# Patient Record
Sex: FEMALE | Race: WHITE | NOT HISPANIC OR LATINO | Employment: FULL TIME | URBAN - METROPOLITAN AREA
[De-identification: names, ages, dates, MRNs, and addresses within clinical notes are randomized per-mention and may not be internally consistent; named-entity substitution may affect disease eponyms.]

---

## 2017-01-12 ENCOUNTER — OFFICE VISIT (OUTPATIENT)
Dept: OBSTETRICS AND GYNECOLOGY | Facility: CLINIC | Age: 32
End: 2017-01-12
Payer: COMMERCIAL

## 2017-01-12 ENCOUNTER — ROUTINE PRENATAL (OUTPATIENT)
Dept: OBSTETRICS AND GYNECOLOGY | Facility: CLINIC | Age: 32
End: 2017-01-12
Payer: COMMERCIAL

## 2017-01-12 VITALS
WEIGHT: 157.63 LBS | BODY MASS INDEX: 25.44 KG/M2 | SYSTOLIC BLOOD PRESSURE: 108 MMHG | DIASTOLIC BLOOD PRESSURE: 66 MMHG

## 2017-01-12 DIAGNOSIS — Z34.90 PREGNANCY, UNSPECIFIED GESTATIONAL AGE: Primary | ICD-10-CM

## 2017-01-12 DIAGNOSIS — Z36.85 SCREENING, ANTENATAL, FOR STREPTOCOCCUS B: Primary | ICD-10-CM

## 2017-01-12 DIAGNOSIS — O26.849 UTERINE SIZE DATE DISCREPANCY, ANTEPARTUM, UNSPECIFIED TRIMESTER: Primary | ICD-10-CM

## 2017-01-12 DIAGNOSIS — Z34.90 PREGNANCY, UNSPECIFIED GESTATIONAL AGE: ICD-10-CM

## 2017-01-12 PROCEDURE — 99999 PR PBB SHADOW E&M-EST. PATIENT-LVL II: CPT | Mod: PBBFAC,,, | Performed by: OBSTETRICS & GYNECOLOGY

## 2017-01-12 PROCEDURE — 87081 CULTURE SCREEN ONLY: CPT

## 2017-01-12 PROCEDURE — 76816 OB US FOLLOW-UP PER FETUS: CPT | Mod: S$GLB,,, | Performed by: OBSTETRICS & GYNECOLOGY

## 2017-01-12 PROCEDURE — 0502F SUBSEQUENT PRENATAL CARE: CPT | Mod: S$GLB,,, | Performed by: OBSTETRICS & GYNECOLOGY

## 2017-01-17 LAB — BACTERIA SPEC AEROBE CULT: NORMAL

## 2017-01-19 ENCOUNTER — ROUTINE PRENATAL (OUTPATIENT)
Dept: OBSTETRICS AND GYNECOLOGY | Facility: CLINIC | Age: 32
End: 2017-01-19
Payer: COMMERCIAL

## 2017-01-19 VITALS — SYSTOLIC BLOOD PRESSURE: 104 MMHG | WEIGHT: 158.5 LBS | BODY MASS INDEX: 25.58 KG/M2 | DIASTOLIC BLOOD PRESSURE: 76 MMHG

## 2017-01-19 DIAGNOSIS — N91.2 AMENORRHEA: Primary | ICD-10-CM

## 2017-01-19 PROCEDURE — 0502F SUBSEQUENT PRENATAL CARE: CPT | Mod: S$GLB,,, | Performed by: OBSTETRICS & GYNECOLOGY

## 2017-01-19 PROCEDURE — 99999 PR PBB SHADOW E&M-EST. PATIENT-LVL II: CPT | Mod: PBBFAC,,, | Performed by: OBSTETRICS & GYNECOLOGY

## 2017-01-26 ENCOUNTER — ROUTINE PRENATAL (OUTPATIENT)
Dept: OBSTETRICS AND GYNECOLOGY | Facility: CLINIC | Age: 32
End: 2017-01-26
Payer: COMMERCIAL

## 2017-01-26 VITALS
DIASTOLIC BLOOD PRESSURE: 60 MMHG | WEIGHT: 160.06 LBS | SYSTOLIC BLOOD PRESSURE: 106 MMHG | BODY MASS INDEX: 25.83 KG/M2

## 2017-01-26 DIAGNOSIS — N91.2 AMENORRHEA: Primary | ICD-10-CM

## 2017-01-26 PROCEDURE — 99999 PR PBB SHADOW E&M-EST. PATIENT-LVL II: CPT | Mod: PBBFAC,,, | Performed by: OBSTETRICS & GYNECOLOGY

## 2017-01-26 PROCEDURE — 0502F SUBSEQUENT PRENATAL CARE: CPT | Mod: S$GLB,,, | Performed by: OBSTETRICS & GYNECOLOGY

## 2017-01-27 ENCOUNTER — TELEPHONE (OUTPATIENT)
Dept: OBSTETRICS AND GYNECOLOGY | Facility: CLINIC | Age: 32
End: 2017-01-27

## 2017-01-27 NOTE — TELEPHONE ENCOUNTER
----- Message from Luann Wesley sent at 1/27/2017 11:18 AM CST -----  Contact: [phone]/self  Patient is returning your call. Pt states Monday 1/30/17 at 10am is fine with her. Please advise

## 2017-01-29 ENCOUNTER — HOSPITAL ENCOUNTER (OUTPATIENT)
Facility: HOSPITAL | Age: 32
Discharge: HOME | End: 2017-01-30
Attending: OBSTETRICS & GYNECOLOGY | Admitting: OBSTETRICS & GYNECOLOGY
Payer: COMMERCIAL

## 2017-01-29 DIAGNOSIS — R58 BLEEDING: ICD-10-CM

## 2017-01-29 PROCEDURE — 87086 URINE CULTURE/COLONY COUNT: CPT

## 2017-01-29 PROCEDURE — 81000 URINALYSIS NONAUTO W/SCOPE: CPT

## 2017-01-29 NOTE — IP AVS SNAPSHOT
\[facility] [place] [facility]\""  [address]  [place] [address]  Phone: [phone]           Patient Discharge Instructions     Our goal is to set you up for success. This packet includes information on your condition, medications, and your home care. It will help you to care for yourself so you don't get sicker and need to go back to the hospital.     Please ask your nurse if you have any questions.        There are many details to remember when preparing to leave the hospital. Here is what you will need to do:    1. Take your medicine. If you are prescribed medications, review your Medication List in the following pages. You may have new medications to  at the pharmacy and others that you'll need to stop taking. Review the instructions for how and when to take your medications. Talk with your doctor or nurses if you are unsure of what to do.     2. Go to your follow-up appointments. Specific follow-up information is listed in the following pages. Your may be contacted by a transition nurse or clinical provider about future appointments. Be sure we have all of the phone numbers to reach you, if needed. Please contact your provider's office if you are unable to make an appointment.     3. Watch for warning signs. Your doctor or nurse will give you detailed warning signs to watch for and when to call for assistance. These instructions may also include educational information about your condition. If you experience any of warning signs to your health, call your doctor.               [facility]  Unless otherwise directed by your provider, please contact [facility], our nurse care line that is available for 24/7 assistance.     [phone] (toll-free)    Registered nurses in the [facility] provide clinical advisement, health education, appointment booking, and other advisory services.                    ** Verify the list of medication(s) below is accurate and up to date. Carry this  with you in case of emergency. If your medications have changed, please notify your healthcare provider.             Medication List      ASK your doctor about these medications        Additional Info                      nitrofurantoin (macrocrystal-monohydrate) 100 MG capsule   Commonly known as:  MACROBID   Refills:  0   Dose:  100 mg   Indications:  Urinary Tract Infection    Instructions:  Take 100 mg by mouth 2 (two) times daily.     Begin Date    AM    Noon    PM    Bedtime       ondansetron 4 MG tablet   Commonly known as:  ZOFRAN   Quantity:  30 tablet   Refills:  0    Instructions:  TAKE 1 TABLET (4 MG TOTAL) BY MOUTH DAILY AS NEEDED FOR NAUSEA.     Begin Date    AM    Noon    PM    Bedtime                  Please bring to all follow up appointments:    1. A copy of your discharge instructions.  2. All medicines you are currently taking in their original bottles.  3. Identification and insurance card.    Please arrive 15 minutes ahead of scheduled appointment time.    Please call 24 hours in advance if you must reschedule your appointment and/or time.        Your Scheduled Appointments     2017 10:00 AM CST   Routine Prenatal Visit with Michael A. Wiedemann, MD [facility] ([place]  5th Floor [facility], [address]  [place] [address] [phone]                  Discharge Instructions       Follow Labor Precautions:  Call MD or return to Labor and Delivery if...    Labor (after 36 weeks)  - Painful contractions every 5 minutes for 2 hours that do not go away with 2 bottles of water, 2 tylenol, and rest.      Labor (before 36 weeks)  - More than 4 contractions in 1 hour   -First try 2 bottles of water, rest, and 2 tylenol.... If the contractions do not go away call doctors office or after hours clinic first and/or come to hospital for evaluation.      Water Breaks  - Gush or leaking of fluid from vagina, or if unsure.     Vaginal bleeding  - Bright red bleeding  "like a period, soaking a pad in 1 hour.    Decreased or No fetal movement  - You should feel 10 movements within two hours.  -If you are not feeling the baby move.... Drink a glass of orange juice or apple juice  - If you DO NOT feel 10 movements within two hours, please  call the MD or come to the hospital.    Unable to keep fluids or food down for more than 24 hours    Blurred vision, spots before your eyes, dizziness, headache that does not get better with Tylenol (Acetaminophen), bad swelling, chest pain, or trouble breathing.    Drink 10-12 glasses of water daily  Take medications as prescribed  Keep all follow-up appointments      Admission Information     Date & Time Provider Department CSN    1/29/2017 11:38 PM Michael A. Wiedemann, MD [facility] [id number]      Care Providers     Provider Role Specialty Primary office phone    Michael A. Wiedemann, MD Attending Provider Obstetrics [phone]      Your Vitals Were     BP Pulse Temp Resp Height Weight    111/68 88 97.1 °F (36.2 °C) (Oral) 16 5' 6" (1.676 m) 72.6 kg (160 lb)    Last Period SpO2 BMI          05/07/2016 96% 25.82 kg/m2        Recent Lab Values     No lab values to display.      Pending Labs     Order Current Status    Urine culture In process      Allergies as of 1/30/2017     No Known Allergies      Advance Directives     An advance directive is a document which, in the event you are no longer able to make decisions for yourself, tells your healthcare team what kind of treatment you do or do not want to receive, or who you would like to make those decisions for you.  If you do not currently have an advance directive, [facility] encourages you to create one.  For more information call:  [phone]), [phone] ([phone]),  or log on to [email or web address].        Language Assistance Services     ATTENTION: Language assistance services are available, free of charge. Please call [phone].  "     ATENCIÓN: Si habla español, tiene a iraheta disposición servicios gratuitos de asistencia lingüística. Juan Carlos al [phone].     CHÚ Ý: N?u b?n nói Ti?ng Vi?t, có các d?ch v? h? tr? ngôn ng? mi?n phí dành cho b?n. G?i s? [phone].         [facility] complies with applicable Federal civil rights laws and does not discriminate on the basis of race, color, national origin, age, disability, or sex.

## 2017-01-30 ENCOUNTER — ROUTINE PRENATAL (OUTPATIENT)
Dept: OBSTETRICS AND GYNECOLOGY | Facility: CLINIC | Age: 32
End: 2017-01-30
Payer: COMMERCIAL

## 2017-01-30 VITALS
SYSTOLIC BLOOD PRESSURE: 116 MMHG | WEIGHT: 159.38 LBS | BODY MASS INDEX: 25.73 KG/M2 | DIASTOLIC BLOOD PRESSURE: 64 MMHG

## 2017-01-30 VITALS
HEIGHT: 66 IN | BODY MASS INDEX: 25.71 KG/M2 | SYSTOLIC BLOOD PRESSURE: 111 MMHG | WEIGHT: 160 LBS | TEMPERATURE: 97 F | OXYGEN SATURATION: 96 % | HEART RATE: 88 BPM | RESPIRATION RATE: 16 BRPM | DIASTOLIC BLOOD PRESSURE: 68 MMHG

## 2017-01-30 DIAGNOSIS — Z34.90 TERM PREGNANCY: ICD-10-CM

## 2017-01-30 DIAGNOSIS — N91.2 AMENORRHEA: Primary | ICD-10-CM

## 2017-01-30 PROBLEM — R58 BLEEDING: Status: ACTIVE | Noted: 2017-01-30

## 2017-01-30 LAB
BILIRUB UR QL STRIP: NEGATIVE
CLARITY UR: CLEAR
COLOR UR: ABNORMAL
GLUCOSE UR QL STRIP: NEGATIVE
HGB UR QL STRIP: ABNORMAL
KETONES UR QL STRIP: NEGATIVE
LEUKOCYTE ESTERASE UR QL STRIP: ABNORMAL
MICROSCOPIC COMMENT: ABNORMAL
NITRITE UR QL STRIP: NEGATIVE
PH UR STRIP: 7 [PH] (ref 5–8)
PROT UR QL STRIP: ABNORMAL
RBC #/AREA URNS HPF: >100 /HPF (ref 0–4)
SP GR UR STRIP: 1.01 (ref 1–1.03)
URN SPEC COLLECT METH UR: ABNORMAL
UROBILINOGEN UR STRIP-ACNC: NEGATIVE EU/DL
WBC #/AREA URNS HPF: 1 /HPF (ref 0–5)

## 2017-01-30 PROCEDURE — 25000003 PHARM REV CODE 250: Performed by: OBSTETRICS & GYNECOLOGY

## 2017-01-30 PROCEDURE — 59025 FETAL NON-STRESS TEST: CPT

## 2017-01-30 PROCEDURE — 99211 OFF/OP EST MAY X REQ PHY/QHP: CPT | Mod: 25

## 2017-01-30 PROCEDURE — 99999 PR PBB SHADOW E&M-EST. PATIENT-LVL II: CPT | Mod: PBBFAC,,, | Performed by: OBSTETRICS & GYNECOLOGY

## 2017-01-30 PROCEDURE — 0502F SUBSEQUENT PRENATAL CARE: CPT | Mod: S$GLB,,, | Performed by: OBSTETRICS & GYNECOLOGY

## 2017-01-30 RX ORDER — NITROFURANTOIN 25; 75 MG/1; MG/1
100 CAPSULE ORAL 2 TIMES DAILY
COMMUNITY
Start: 2017-01-30 | End: 2017-02-08

## 2017-01-30 RX ORDER — ACETAMINOPHEN AND CODEINE PHOSPHATE 300; 30 MG/1; MG/1
1 TABLET ORAL ONCE
Status: COMPLETED | OUTPATIENT
Start: 2017-01-30 | End: 2017-01-30

## 2017-01-30 RX ORDER — ACETAMINOPHEN 500 MG
500 TABLET ORAL EVERY 6 HOURS PRN
Status: DISCONTINUED | OUTPATIENT
Start: 2017-01-30 | End: 2017-01-30 | Stop reason: HOSPADM

## 2017-01-30 RX ADMIN — ACETAMINOPHEN AND CODEINE PHOSPHATE 1 TABLET: 300; 30 TABLET ORAL at 12:01

## 2017-01-30 NOTE — PROGRESS NOTES
Pt on LnD last pm, with hematuria, known stones in past, on macrobid, hydration, feels better now,   abd soft, baby actvie, cx 1-2, 50%, same, us done, grade 2 placenta, fluid normal  Plan, induce Friday, ie 39 weeks

## 2017-01-30 NOTE — PLAN OF CARE
5500 - Pt is  at 38 weeks 3 days arrived to L&D unit via ambulation with complaints of ctx, intermittent lower back and abd pain 4/10 and bright red bleeding during urination. Pt denies any other urinary symptoms, vaginal bleeding or leaking of fluid. +FM. Pt states she has a history of kidney stones and previous stent placement. Care assumed. Full assessment done, history and medications reviewed with pt. Pt updated on plan of care with questions answered. Bed in low locked position, call bell in reach. Pt placed on EFM and TOCO, automatic BP and pulse ox.     0015 - SVE /-4, intact.     0030 - Spoke to Dr. Talavera, notified of pt arrival and complaints, reassuring FHTs, ctx pattern 2-5 min, SVE, and urine specimen color. New orders received for tylenol #3 x one dose now for pain, send urine for UA and urine culture.    0115 - Spoke to Dr. Talavera, notified of results from UA, ctx pattern 1-13 min. Pt may go home on labor precautions and with prescription for macrobid, called in to [facility]. Take tylenol for pain.     0145 - Discharge instructions given to pt, address for pharmacy included. Pt verbalizes understanding. Ambulated off of unit.

## 2017-01-30 NOTE — DISCHARGE INSTRUCTIONS
Follow Labor Precautions:  Call MD or return to Labor and Delivery if...    Labor (after 36 weeks)  - Painful contractions every 5 minutes for 2 hours that do not go away with 2 bottles of water, 2 tylenol, and rest.      Labor (before 36 weeks)  - More than 4 contractions in 1 hour   -First try 2 bottles of water, rest, and 2 tylenol.... If the contractions do not go away call doctors office or after hours clinic first and/or come to hospital for evaluation.      Water Breaks  - Gush or leaking of fluid from vagina, or if unsure.     Vaginal bleeding  - Bright red bleeding like a period, soaking a pad in 1 hour.    Decreased or No fetal movement  - You should feel 10 movements within two hours.  -If you are not feeling the baby move.... Drink a glass of orange juice or apple juice  - If you DO NOT feel 10 movements within two hours, please  call the MD or come to the hospital.    Unable to keep fluids or food down for more than 24 hours    Blurred vision, spots before your eyes, dizziness, headache that does not get better with Tylenol (Acetaminophen), bad swelling, chest pain, or trouble breathing.    Drink 10-12 glasses of water daily  Take medications as prescribed  Keep all follow-up appointments

## 2017-01-31 LAB — BACTERIA UR CULT: NO GROWTH

## 2017-02-02 ENCOUNTER — HOSPITAL ENCOUNTER (INPATIENT)
Facility: HOSPITAL | Age: 32
LOS: 2 days | Discharge: HOME | End: 2017-02-05
Attending: OBSTETRICS & GYNECOLOGY | Admitting: OBSTETRICS & GYNECOLOGY
Payer: COMMERCIAL

## 2017-02-02 DIAGNOSIS — Z34.90 TERM PREGNANCY: ICD-10-CM

## 2017-02-02 DIAGNOSIS — N91.2 AMENORRHEA: ICD-10-CM

## 2017-02-02 LAB
ANISOCYTOSIS BLD QL SMEAR: SLIGHT
BASOPHILS # BLD AUTO: ABNORMAL K/UL
BASOPHILS NFR BLD: 0 %
DIFFERENTIAL METHOD: ABNORMAL
EOSINOPHIL # BLD AUTO: ABNORMAL K/UL
EOSINOPHIL NFR BLD: 0 %
ERYTHROCYTE [DISTWIDTH] IN BLOOD BY AUTOMATED COUNT: 13.8 %
HCT VFR BLD AUTO: 32.4 %
HGB BLD-MCNC: 10.6 G/DL
HYPOCHROMIA BLD QL SMEAR: ABNORMAL
LYMPHOCYTES # BLD AUTO: ABNORMAL K/UL
LYMPHOCYTES NFR BLD: 11 %
MCH RBC QN AUTO: 29.4 PG
MCHC RBC AUTO-ENTMCNC: 32.7 %
MCV RBC AUTO: 90 FL
MONOCYTES # BLD AUTO: ABNORMAL K/UL
MONOCYTES NFR BLD: 8 %
NEUTROPHILS NFR BLD: 77 %
NEUTS BAND NFR BLD MANUAL: 4 %
PLATELET # BLD AUTO: 174 K/UL
PLATELET BLD QL SMEAR: ABNORMAL
PMV BLD AUTO: 11.2 FL
RBC # BLD AUTO: 3.6 M/UL
WBC # BLD AUTO: 7.76 K/UL

## 2017-02-02 PROCEDURE — 86592 SYPHILIS TEST NON-TREP QUAL: CPT

## 2017-02-02 PROCEDURE — 86900 BLOOD TYPING SEROLOGIC ABO: CPT

## 2017-02-02 PROCEDURE — 85027 COMPLETE CBC AUTOMATED: CPT

## 2017-02-02 PROCEDURE — 86901 BLOOD TYPING SEROLOGIC RH(D): CPT

## 2017-02-02 PROCEDURE — 25000003 PHARM REV CODE 250: Performed by: OBSTETRICS & GYNECOLOGY

## 2017-02-02 PROCEDURE — 85007 BL SMEAR W/DIFF WBC COUNT: CPT

## 2017-02-02 PROCEDURE — 36415 COLL VENOUS BLD VENIPUNCTURE: CPT

## 2017-02-02 RX ORDER — METHYLERGONOVINE MALEATE 0.2 MG/ML
200 INJECTION INTRAVENOUS
Status: CANCELLED | OUTPATIENT
Start: 2017-02-02

## 2017-02-02 RX ORDER — OXYTOCIN/RINGER'S LACTATE 20/1000 ML
2 PLASTIC BAG, INJECTION (ML) INTRAVENOUS CONTINUOUS
Status: CANCELLED | OUTPATIENT
Start: 2017-02-02

## 2017-02-02 RX ORDER — MISOPROSTOL 200 UG/1
200 TABLET ORAL
Status: DISCONTINUED | OUTPATIENT
Start: 2017-02-02 | End: 2017-02-05 | Stop reason: HOSPADM

## 2017-02-02 RX ORDER — ONDANSETRON 4 MG/1
8 TABLET, ORALLY DISINTEGRATING ORAL EVERY 8 HOURS PRN
Status: CANCELLED | OUTPATIENT
Start: 2017-02-02

## 2017-02-02 RX ORDER — CARBOPROST TROMETHAMINE 250 UG/ML
250 INJECTION, SOLUTION INTRAMUSCULAR
Status: CANCELLED | OUTPATIENT
Start: 2017-02-02

## 2017-02-02 RX ORDER — ONDANSETRON 8 MG/1
8 TABLET, ORALLY DISINTEGRATING ORAL EVERY 8 HOURS PRN
Status: DISCONTINUED | OUTPATIENT
Start: 2017-02-02 | End: 2017-02-05 | Stop reason: HOSPADM

## 2017-02-02 RX ORDER — SODIUM CHLORIDE, SODIUM LACTATE, POTASSIUM CHLORIDE, CALCIUM CHLORIDE 600; 310; 30; 20 MG/100ML; MG/100ML; MG/100ML; MG/100ML
INJECTION, SOLUTION INTRAVENOUS CONTINUOUS
Status: CANCELLED | OUTPATIENT
Start: 2017-02-02

## 2017-02-02 RX ORDER — METHYLERGONOVINE MALEATE 0.2 MG/ML
200 INJECTION INTRAVENOUS
Status: DISCONTINUED | OUTPATIENT
Start: 2017-02-02 | End: 2017-02-05 | Stop reason: HOSPADM

## 2017-02-02 RX ORDER — OXYTOCIN/RINGER'S LACTATE 20/1000 ML
41.65 PLASTIC BAG, INJECTION (ML) INTRAVENOUS CONTINUOUS
Status: CANCELLED | OUTPATIENT
Start: 2017-02-02 | End: 2017-02-03

## 2017-02-02 RX ORDER — MISOPROSTOL 100 UG/1
200 TABLET ORAL
Status: CANCELLED | OUTPATIENT
Start: 2017-02-02

## 2017-02-02 RX ORDER — CARBOPROST TROMETHAMINE 250 UG/ML
250 INJECTION, SOLUTION INTRAMUSCULAR
Status: DISCONTINUED | OUTPATIENT
Start: 2017-02-02 | End: 2017-02-05 | Stop reason: HOSPADM

## 2017-02-02 RX ORDER — SODIUM CHLORIDE, SODIUM LACTATE, POTASSIUM CHLORIDE, CALCIUM CHLORIDE 600; 310; 30; 20 MG/100ML; MG/100ML; MG/100ML; MG/100ML
INJECTION, SOLUTION INTRAVENOUS CONTINUOUS
Status: DISCONTINUED | OUTPATIENT
Start: 2017-02-02 | End: 2017-02-05 | Stop reason: HOSPADM

## 2017-02-02 RX ORDER — OXYTOCIN/RINGER'S LACTATE 20/1000 ML
2 PLASTIC BAG, INJECTION (ML) INTRAVENOUS CONTINUOUS
Status: DISCONTINUED | OUTPATIENT
Start: 2017-02-02 | End: 2017-02-02 | Stop reason: SDUPTHER

## 2017-02-02 RX ORDER — OXYTOCIN/RINGER'S LACTATE 20/1000 ML
2 PLASTIC BAG, INJECTION (ML) INTRAVENOUS CONTINUOUS
Status: DISCONTINUED | OUTPATIENT
Start: 2017-02-02 | End: 2017-02-05 | Stop reason: HOSPADM

## 2017-02-02 RX ORDER — OXYTOCIN/RINGER'S LACTATE 20/1000 ML
41.65 PLASTIC BAG, INJECTION (ML) INTRAVENOUS CONTINUOUS
Status: ACTIVE | OUTPATIENT
Start: 2017-02-02 | End: 2017-02-03

## 2017-02-02 RX ORDER — SODIUM CHLORIDE, SODIUM LACTATE, POTASSIUM CHLORIDE, CALCIUM CHLORIDE 600; 310; 30; 20 MG/100ML; MG/100ML; MG/100ML; MG/100ML
INJECTION, SOLUTION INTRAVENOUS CONTINUOUS
Status: DISCONTINUED | OUTPATIENT
Start: 2017-02-02 | End: 2017-02-02 | Stop reason: SDUPTHER

## 2017-02-02 RX ORDER — BUTORPHANOL TARTRATE 1 MG/ML
1 INJECTION INTRAMUSCULAR; INTRAVENOUS
Status: DISCONTINUED | OUTPATIENT
Start: 2017-02-02 | End: 2017-02-05 | Stop reason: HOSPADM

## 2017-02-02 RX ORDER — BUTORPHANOL TARTRATE 1 MG/ML
1 INJECTION INTRAMUSCULAR; INTRAVENOUS
Status: CANCELLED | OUTPATIENT
Start: 2017-02-02

## 2017-02-02 RX ADMIN — SODIUM CHLORIDE, SODIUM LACTATE, POTASSIUM CHLORIDE, AND CALCIUM CHLORIDE: .6; .31; .03; .02 INJECTION, SOLUTION INTRAVENOUS at 10:02

## 2017-02-02 NOTE — IP AVS SNAPSHOT
[facility]  [address]  [place] [address]  Phone: [phone]           Patient Discharge Instructions     Our goal is to set you up for success. This packet includes information on your condition, medications, and your home care. It will help you to care for yourself so you don't get sicker and need to go back to the hospital.     Please ask your nurse if you have any questions.        There are many details to remember when preparing to leave the hospital. Here is what you will need to do:    1. Take your medicine. If you are prescribed medications, review your Medication List in the following pages. You may have new medications to  at the pharmacy and others that you'll need to stop taking. Review the instructions for how and when to take your medications. Talk with your doctor or nurses if you are unsure of what to do.     2. Go to your follow-up appointments. Specific follow-up information is listed in the following pages. Your may be contacted by a transition nurse or clinical provider about future appointments. Be sure we have all of the phone numbers to reach you, if needed. Please contact your provider's office if you are unable to make an appointment.     3. Watch for warning signs. Your doctor or nurse will give you detailed warning signs to watch for and when to call for assistance. These instructions may also include educational information about your condition. If you experience any of warning signs to your health, call your doctor.               [facility]  Unless otherwise directed by your provider, please contact [facility], our nurse care line that is available for 24/7 assistance.     [phone] (toll-free)    Registered nurses in the [facility] provide clinical advisement, health education, appointment booking, and other advisory services.                    ** Verify the list of medication(s) below is accurate and up to date. Carry this  with you in case of emergency. If your medications have changed, please notify your healthcare provider.             Medication List      ASK your doctor about these medications        Additional Info                      nitrofurantoin (macrocrystal-monohydrate) 100 MG capsule   Commonly known as:  MACROBID   Refills:  0   Dose:  100 mg   Indications:  Urinary Tract Infection    Instructions:  Take 100 mg by mouth 2 (two) times daily.     Begin Date    AM    Noon    PM    Bedtime       ondansetron 4 MG tablet   Commonly known as:  ZOFRAN   Quantity:  30 tablet   Refills:  0    Instructions:  TAKE 1 TABLET (4 MG TOTAL) BY MOUTH DAILY AS NEEDED FOR NAUSEA.     Begin Date    AM    Noon    PM    Bedtime                  Please bring to all follow up appointments:    1. A copy of your discharge instructions.  2. All medicines you are currently taking in their original bottles.  3. Identification and insurance card.    Please arrive 15 minutes ahead of scheduled appointment time.    Please call 24 hours in advance if you must reschedule your appointment and/or time.        Follow-up Information     Follow up with Michael A Wiedemann, MD. Schedule an appointment as soon as possible for a visit in 6 weeks.    Specialties:  Obstetrics, Obstetrics and Gynecology    Contact information:    [address] [place] [address]  [place] [id number] [phone]            Discharge Instructions       Patient Discharge Instructions for Postpartum Women    Resume Regular Diet  Increase activity gradually, no heavy lifting  Shower  No tampons, douching or sexual intercourse.  Wear a support bra    Call your physician if     *Fever of 100.4 or higher  *Persistent nausea/ vomiting  *Heavy vaginal bleeding or large clots (Heavy bleeding is soaking 1 pad in an hour)  *Swelling and pain in arms or legs  *Severe headaches, blurred vision or fainting  *Shortness of breath  *Frequency and burning with urination  *Signs of postpartum depression,  "discuss these signs with your physician    Call lactation services for questions regarding feeding, nipple and breast care, and general questions about lactation.  They can be reached at [phone]         Understanding Postpartum Depression    You've just had a baby.  You know you should be excited and happy.  But instead you find yourself crying for no reason.  You may have trouble coping with your daily tasks.  You feel sad, tired, and hopeless most of the time.  You may even feel ashamed or guilty.  But what you're going through is not your fault and you can feel better.  Talk to your doctor.  He or she can help.    Depression After Childbirth    You may be weepy and tired right after giving birth.  These feelings are normal.  They're sometimes called the "baby blues."  These blues go away 2-3 weeks.  However, postpartum (meaning "after birth") depression lasts much longer and is more sever than the "baby blues."  It can make you feel sad and hopeless.  You may also fear that your baby will be harmed and worry about being a bad mother.      What is Depression?    Depression is a mood disorder that affects the way you think and feel.  The most common symptom is a feeling of deep sadness.  You may also feel as if you just can't cope with life.    Other symptoms include:      * Gaining or loosing weight  * Sleeping too much or too little  * Feeling tired all the time  * Feeling restless  * Fears of harming your baby   * Lack of interest in your baby  * Feeling worthless or guilty  * No longer finding pleasure in things you used to  * Having trouble thinking clearly or making decisions  * Thoughts of hurting yourself or your baby    What Causes Postpartum Depression    The exact causes of postpartum depression isn't known.  It may be due to changes in your hormones during and after childbirth.  You may also be tired from caring for your baby and adjusting to being a mother.  All these factors may make you feel " depressed.  In some cases, your genes may also play a role.    Depression Can Be Treated    The good news is that there are many ways to treat postpartum depression.  Talking to your doctor is the first step toward feeling better.    Resources:    * National [place] of Mental Health  -- 678.174.8467    www.nimh.nih.gov    * National [place] on Mental Illness --304.523.5859    Www.kole.org    * Mental Health [place] -- 510.210.5979     Www.[facility].org    * National Suicide Hotline --475.429.8090 (800-SUICIDE)    8629-6744 [employer]  All rights reserved.  This information is not intended as a substitute for professional medical care.  Always follow up with your healthcare professional's instructions.      Breastfeeding Discharge Instructions       Feed the baby at the earliest sign of hunger or comfort  o Hands to mouth, sucking motions  o Rooting or searching for something to suck on  o Dont wait for crying - it is a late sign of hunger and comfort.     The feedings may be 8-12 times per 24hrs and will not follow a schedule   Avoid pacifiers and bottles for the first 4 weeks   Alternate the breast you start the feeding with, or start with the breast that feels the fullest   Switch breasts when the baby takes himself off the breast or falls asleep   Keep offering breasts until the baby looks full, no longer gives hunger signs, and stays asleep when placed on his back in the crib   If the baby is sleepy and wont wake for a feeding, put the baby skin-to-skin dressed in a diaper against the mothers bare chest   Sleep near your baby   The baby should be positioned and latched on to the breast correctly  o Chest-to-chest, chin in the breast  o Babys lips are flipped outward  o Babys mouth is stretched open wide like a shout  o Babys sucking should feel like tugging to the mother  - The baby should be drinking at the breast:  o You should hear swallowing or gulping throughout the  feeding  o You should see milk on the babys lips when he comes off the breast  o Your breasts should be softer when the baby is finished feeding  o The baby should look relaxed at the end of feedings  o After the 4th day and your milk is in:  o The babys poop should turn bright yellow and be loose, watery, and seedy  o The baby should have at least 3-4 poops the size of the palm of your hand per day  o The baby should have at least 6-8 wet diapers per day  o The urine should be light yellow in color  You should drink when you are thirsty and eat a healthy diet when you are    hungry.     Take naps to get the rest you need.   Take medications and/or drink alcohol only with permission of your obstetrician    or the babys pediatrician.  You can also call the Infant Risk Center,   [phone]), Monday-Friday, 8am-5pm Central time, to get the most   up-to-date evidence-based information on the use of medications during   pregnancy and breastfeeding.      The baby should be examined by a pediatrician at 3-5 days of age.  Once your   milk comes in, the baby should be gaining at least ½ - 1oz each day and should be back to birthweight no later than 10-14 days of age.          Community Resources    [facility] Breastfeeding Warmline:  741.732.1881  Local [facility] clinics: provide incentives and breastpumps to eligible mothers  La Leche League International (LLLI):  mother-to-mother support group website        www.lll.org  Local La Leche League mother-to-mother support groups:        www.wilberBioStratum.[employer]        La Leche League [facility]         www.karoline@MD.Voice.com  Dr. Chris Moser website for latch videos and general information:        www.breastfeedinginc.ca  Infant Risk Center is a call center that provides information about the safety of taking medications while breastfeeding.  Call [phone], M-F, 8am-5pm, CT.  International Lactation Consultant Association provides resources for  "assistance:        www.ilca.org  [place] Breastfeeding Coalition provides informationand resources for parents  and the community    http://louisianabreastfeeding.org     [employer] mom provides resources for assistance:        www.nolamom.org  Part[employer] for Healthy Babies:  8-200-943-BABY(7766)  [facility] provides a list of breastfeeding services by zip code:        www.MapbarmilBoatSetter.org  [employer]:  328.903.2309 a breastfeeding support group for women of color        Primary Diagnosis     Your primary diagnosis was:  Loss Of Menstrual Periods      Admission Information     Date & Time Provider Department CSN    2/2/2017 10:32 PM Michael A. Wiedemann, MD [facility] [id number]      Care Providers     Provider Role Specialty Primary office phone    Michael A. Wiedemann, MD Attending Provider Obstetrics [phone]      Your Vitals Were     BP Pulse Temp Resp Height Weight    105/69 (BP Location: Right arm, Patient Position: Sitting, BP Method: Automatic) 75 97.6 °F (36.4 °C) (Oral) 18 5' 6" (1.676 m) 72.3 kg (159 lb 6.3 oz)    Last Period SpO2 BMI          05/07/2016 98% 25.73 kg/m2        Recent Lab Values     No lab values to display.      Allergies as of 2/5/2017     No Known Allergies      Advance Directives     An advance directive is a document which, in the event you are no longer able to make decisions for yourself, tells your healthcare team what kind of treatment you do or do not want to receive, or who you would like to make those decisions for you.  If you do not currently have an advance directive, [facility] encourages you to create one.  For more information call:  [phone] ([phone]), [phone] ([phone]),  or log on to [email or web address].        Language Assistance Services     ATTENTION: Language assistance services are available, free of charge. Please call [phone].      ATENCIÓN: Si habla español, tiene a iraheta disposición servicios gratuitos de asistencia " lingüística. Juan Carlos al [phone].     EVELYN Ý: N?u b?n nói Ti?ng Vi?t, có các d?ch v? h? tr? ngôn ng? mi?n phí dành cho b?n. G?i s? [phone].         [facility] complies with applicable Federal civil rights laws and does not discriminate on the basis of race, color, national origin, age, disability, or sex.

## 2017-02-03 ENCOUNTER — ANESTHESIA EVENT (OUTPATIENT)
Dept: OBSTETRICS AND GYNECOLOGY | Facility: HOSPITAL | Age: 32
End: 2017-02-03
Payer: COMMERCIAL

## 2017-02-03 ENCOUNTER — ANESTHESIA (OUTPATIENT)
Dept: OBSTETRICS AND GYNECOLOGY | Facility: HOSPITAL | Age: 32
End: 2017-02-03
Payer: COMMERCIAL

## 2017-02-03 LAB
ABO + RH BLD: NORMAL
BLD GP AB SCN CELLS X3 SERPL QL: NORMAL
RPR SER QL: NORMAL

## 2017-02-03 PROCEDURE — 27800516 HC TRAY, EPIDURAL COMBO: Performed by: ANESTHESIOLOGY

## 2017-02-03 PROCEDURE — 25000003 PHARM REV CODE 250: Performed by: OBSTETRICS & GYNECOLOGY

## 2017-02-03 PROCEDURE — 59400 OBSTETRICAL CARE: CPT | Mod: ,,, | Performed by: OBSTETRICS & GYNECOLOGY

## 2017-02-03 PROCEDURE — 25000003 PHARM REV CODE 250: Performed by: ANESTHESIOLOGY

## 2017-02-03 PROCEDURE — 25000003 PHARM REV CODE 250

## 2017-02-03 PROCEDURE — 10907ZC DRAINAGE OF AMNIOTIC FLUID, THERAPEUTIC FROM PRODUCTS OF CONCEPTION, VIA NATURAL OR ARTIFICIAL OPENING: ICD-10-PCS | Performed by: OBSTETRICS & GYNECOLOGY

## 2017-02-03 PROCEDURE — 27200710 HC EPIDURAL INFUSION PUMP SET: Performed by: ANESTHESIOLOGY

## 2017-02-03 PROCEDURE — 11000001 HC ACUTE MED/SURG PRIVATE ROOM

## 2017-02-03 PROCEDURE — 72100003 HC LABOR CARE, EA. ADDL. 8 HRS

## 2017-02-03 PROCEDURE — 72200005 HC VAGINAL DELIVERY LEVEL II

## 2017-02-03 PROCEDURE — 72100002 HC LABOR CARE, 1ST 8 HOURS

## 2017-02-03 PROCEDURE — 51702 INSERT TEMP BLADDER CATH: CPT

## 2017-02-03 PROCEDURE — 63600175 PHARM REV CODE 636 W HCPCS: Performed by: ANESTHESIOLOGY

## 2017-02-03 PROCEDURE — 62326 NJX INTERLAMINAR LMBR/SAC: CPT | Performed by: ANESTHESIOLOGY

## 2017-02-03 RX ORDER — ACETAMINOPHEN 325 MG/1
650 TABLET ORAL EVERY 6 HOURS PRN
Status: DISCONTINUED | OUTPATIENT
Start: 2017-02-03 | End: 2017-02-05 | Stop reason: HOSPADM

## 2017-02-03 RX ORDER — SODIUM CITRATE AND CITRIC ACID MONOHYDRATE 334; 500 MG/5ML; MG/5ML
30 SOLUTION ORAL ONCE
Status: DISCONTINUED | OUTPATIENT
Start: 2017-02-03 | End: 2017-02-05 | Stop reason: HOSPADM

## 2017-02-03 RX ORDER — OXYCODONE AND ACETAMINOPHEN 5; 325 MG/1; MG/1
1 TABLET ORAL EVERY 4 HOURS PRN
Status: DISCONTINUED | OUTPATIENT
Start: 2017-02-03 | End: 2017-02-05 | Stop reason: HOSPADM

## 2017-02-03 RX ORDER — OXYTOCIN/RINGER'S LACTATE 20/1000 ML
41.65 PLASTIC BAG, INJECTION (ML) INTRAVENOUS CONTINUOUS
Status: DISPENSED | OUTPATIENT
Start: 2017-02-03 | End: 2017-02-03

## 2017-02-03 RX ORDER — DIPHENHYDRAMINE HYDROCHLORIDE 50 MG/ML
25 INJECTION INTRAMUSCULAR; INTRAVENOUS EVERY 4 HOURS PRN
Status: DISCONTINUED | OUTPATIENT
Start: 2017-02-03 | End: 2017-02-05 | Stop reason: HOSPADM

## 2017-02-03 RX ORDER — METOCLOPRAMIDE HYDROCHLORIDE 5 MG/ML
10 INJECTION INTRAMUSCULAR; INTRAVENOUS ONCE
Status: DISCONTINUED | OUTPATIENT
Start: 2017-02-03 | End: 2017-02-05 | Stop reason: HOSPADM

## 2017-02-03 RX ORDER — OXYCODONE AND ACETAMINOPHEN 10; 325 MG/1; MG/1
1 TABLET ORAL EVERY 4 HOURS PRN
Status: DISCONTINUED | OUTPATIENT
Start: 2017-02-03 | End: 2017-02-05 | Stop reason: HOSPADM

## 2017-02-03 RX ORDER — NAPROXEN 500 MG/1
500 TABLET ORAL EVERY 8 HOURS PRN
Status: DISCONTINUED | OUTPATIENT
Start: 2017-02-03 | End: 2017-02-05 | Stop reason: HOSPADM

## 2017-02-03 RX ORDER — FAMOTIDINE 10 MG/ML
20 INJECTION INTRAVENOUS ONCE
Status: DISCONTINUED | OUTPATIENT
Start: 2017-02-03 | End: 2017-02-05 | Stop reason: HOSPADM

## 2017-02-03 RX ORDER — DIPHENHYDRAMINE HCL 25 MG
25 CAPSULE ORAL EVERY 4 HOURS PRN
Status: DISCONTINUED | OUTPATIENT
Start: 2017-02-03 | End: 2017-02-05 | Stop reason: HOSPADM

## 2017-02-03 RX ORDER — HYDROCORTISONE 25 MG/G
CREAM TOPICAL 3 TIMES DAILY PRN
Status: DISCONTINUED | OUTPATIENT
Start: 2017-02-03 | End: 2017-02-05 | Stop reason: HOSPADM

## 2017-02-03 RX ORDER — ONDANSETRON 8 MG/1
8 TABLET, ORALLY DISINTEGRATING ORAL EVERY 8 HOURS PRN
Status: DISCONTINUED | OUTPATIENT
Start: 2017-02-03 | End: 2017-02-05 | Stop reason: HOSPADM

## 2017-02-03 RX ORDER — ZOLPIDEM TARTRATE 5 MG/1
5 TABLET ORAL NIGHTLY PRN
Status: DISCONTINUED | OUTPATIENT
Start: 2017-02-03 | End: 2017-02-05 | Stop reason: HOSPADM

## 2017-02-03 RX ORDER — DIPHENHYDRAMINE HYDROCHLORIDE 50 MG/ML
12.5 INJECTION INTRAMUSCULAR; INTRAVENOUS EVERY 4 HOURS PRN
Status: DISCONTINUED | OUTPATIENT
Start: 2017-02-03 | End: 2017-02-05 | Stop reason: HOSPADM

## 2017-02-03 RX ORDER — ROPIVACAINE HYDROCHLORIDE 2 MG/ML
INJECTION, SOLUTION EPIDURAL; INFILTRATION; PERINEURAL
Status: DISCONTINUED | OUTPATIENT
Start: 2017-02-03 | End: 2017-02-03

## 2017-02-03 RX ADMIN — OXYCODONE HYDROCHLORIDE AND ACETAMINOPHEN 1 TABLET: 10; 325 TABLET ORAL at 03:02

## 2017-02-03 RX ADMIN — NAPROXEN 500 MG: 500 TABLET ORAL at 11:02

## 2017-02-03 RX ADMIN — OXYCODONE HYDROCHLORIDE AND ACETAMINOPHEN 1 TABLET: 10; 325 TABLET ORAL at 11:02

## 2017-02-03 RX ADMIN — HYDROCORTISONE: 25 CREAM TOPICAL at 03:02

## 2017-02-03 RX ADMIN — ROPIVACAINE HYDROCHLORIDE 12 ML: 2 INJECTION, SOLUTION EPIDURAL; INFILTRATION at 01:02

## 2017-02-03 RX ADMIN — NAPROXEN 500 MG: 500 TABLET ORAL at 07:02

## 2017-02-03 RX ADMIN — SODIUM CHLORIDE, SODIUM LACTATE, POTASSIUM CHLORIDE, AND CALCIUM CHLORIDE 1000 ML: .6; .31; .03; .02 INJECTION, SOLUTION INTRAVENOUS at 01:02

## 2017-02-03 RX ADMIN — ACETAMINOPHEN 650 MG: 325 TABLET ORAL at 05:02

## 2017-02-03 RX ADMIN — Medication 2 MILLI-UNITS/MIN: at 12:02

## 2017-02-03 RX ADMIN — Medication 50 MILLI-UNITS/MIN: at 08:02

## 2017-02-03 RX ADMIN — SODIUM CHLORIDE, SODIUM LACTATE, POTASSIUM CHLORIDE, AND CALCIUM CHLORIDE: .6; .31; .03; .02 INJECTION, SOLUTION INTRAVENOUS at 02:02

## 2017-02-03 RX ADMIN — OXYCODONE HYDROCHLORIDE AND ACETAMINOPHEN 1 TABLET: 10; 325 TABLET ORAL at 07:02

## 2017-02-03 RX ADMIN — Medication 12 ML/HR: at 01:02

## 2017-02-03 NOTE — H&P
32 y/o , at term, for labor induction, however presented with contractions, on pitocin, uncomplicated prenatal care.  PMH neg  PSh neg  Sh neg  PE vss  Head/neck neg  abd gravid  extr normal    cx now 4-5, 70%, vtx, arom yielded clear fluid, vtx well applied to cx,  Pelvis feels adequate, fht cat 1    Assessment/ as above  Plan, cont labor, expect vag delivery

## 2017-02-03 NOTE — PLAN OF CARE
Problem: Patient Care Overview  Goal: Individualization & Mutuality  Outcome: Ongoing (interventions implemented as appropriate)  PT AMBULATED TO BATHROOM WITH MIN assist. Pt states slight weakness in one leg(l) . Pt voided without difficulty and a,buated back to bed with nurse at side for precaution.

## 2017-02-03 NOTE — ANESTHESIA PREPROCEDURE EVALUATION
2017  Kan Traore is a 31 y.o., female for SIUP@TERM    Review of patient's allergies indicates:  No Known Allergies    Past Medical History   Diagnosis Date    Allergic rhinitis     Anxiety     Asthma     Attention deficit disorder (ADD)     Depression     Kidney stone     Urinary tract infection     Vaginal infection      Past Surgical History   Procedure Laterality Date    Kidney stone surgery      Tonsillectomy      Dilation and curettage of uterus  2014    Lithotripsy  2011       Patient Active Problem List   Diagnosis    , threatened    Missed     Attention deficit disorder (ADD)    Allergic rhinitis    Anxiety    Depression    Kidney stone    Abdominal pain of unknown etiology    Active labor    28 weeks gestation of pregnancy    Bleeding    Amenorrhea     Wt Readings from Last 3 Encounters:   17 72.3 kg (159 lb 6.3 oz)   17 72.3 kg (159 lb 6.3 oz)   17 72.6 kg (160 lb)     Temp Readings from Last 3 Encounters:   17 36.5 °C (97.7 °F) (Oral)   17 36.2 °C (97.1 °F) (Oral)   16 37.2 °C (99 °F)     BP Readings from Last 3 Encounters:   17 (!) 106/58   17 116/64   17 111/68     Pulse Readings from Last 3 Encounters:   17 97   17 88   16 82         [facility] Anesthesia Evaluation         Review of Systems      Physical Exam  General:  Well nourished    Airway/Jaw/Neck:  Airway Findings: Mouth Opening: Normal Tongue: Normal  General Airway Assessment: Adult  Mallampati: II  Improves to II with phonation.  TM Distance: Normal, at least 6 cm            Mental Status:  Mental Status Findings:  Cooperative, Alert and Oriented       Lab Results   Component Value Date    WBC 7.76 2017    HGB 10.6 (L) 2017    HCT 32.4 (L) 2017    MCV 90 2017     2017          Anesthesia Plan  Type of Anesthesia, risks & benefits discussed:  Anesthesia Type:  epidural  Patient's Preference:   Intra-op Monitoring Plan:   Intra-op Monitoring Plan Comments:   Post Op Pain Control Plan:   Post Op Pain Control Plan Comments:   Induction:   IV  Beta Blocker:         Informed Consent: Patient understands risks and agrees with Anesthesia plan.  Questions answered. Anesthesia consent signed with patient.  ASA Score: 2     Day of Surgery Review of History & Physical: I have interviewed and examined the patient. I have reviewed the patient's H&P dated:  There are no significant changes.  H&P update referred to the provider.  H&P completed by Anesthesiologist.       Ready For Surgery From Anesthesia Perspective.

## 2017-02-03 NOTE — PLAN OF CARE
2 - Pt is  at 38 weeks 6 days arrived to L&D unit for scheduled induction of labor at [facility]. Care assumed. Full assessment done, history and medications reviewed with pt. Pt gowned, EFM, TOCO, automatic b/p and pulse ox applied. Pt and family updated on plan of care with questions answered. Bed in low locked position, call bell in reach.    2345 - Spoke to Dr. Wiedemann, update given on pt, orders to start pitocin at [facility] and okay for pt to continue home medication of macrobid.    0100 - Pt request epidural, Dr. Houston on his way to unit, LR bolus started.    0115 - Anesthesia at bedside for epidural placement, pt positioned, timeout @ 0121, test dose negative @ 0128.     0435 - Dr. Wiedemann updated on pt status, SVE 4/70/-3, bulging bag, ctx 2-3 mins, reassuring FHTs, pitocin @ 12mu/min.     0530 -  Dr. Wiedemann at bedside for SVE and AROM. 4.5/70/-2, clear. Orders to leave pit at 14.    0645 - Report given to RUBI Kim.

## 2017-02-03 NOTE — BRIEF OP NOTE
Delivery Note:       of healthy male after 3 pushes, nuchal cord x 1,clear fluid, no complications, very superficial perineal tear, 3-0 chromic x 1, cervix and placenta intact, ebl 300ccc, post delivery exam neg, vag sweep neg.    Rx for dc on chart

## 2017-02-03 NOTE — ANESTHESIA PROCEDURE NOTES
Epidural    Patient location during procedure: OB   Reason for block: primary anesthetic   Diagnosis: SIUP@TERM   Start time: 2/3/2017 1:21 AM  Timeout: 2/3/2017 1:21 AM  End time: 2/3/2017 1:30 AM  Surgery related to: labour  Staffing  Anesthesiologist: LUIS ENRIQUE FARLEY  Performed by: anesthesiologist   Preanesthetic Checklist  Completed: patient identified, site marked, surgical consent, pre-op evaluation, timeout performed, IV checked, risks and benefits discussed, monitors and equipment checked, anesthesia consent given, hand hygiene performed and patient being monitored  Preparation  Patient position: sitting  Prep: ChloraPrep  Patient monitoring: Blood Pressure and Pulse Ox  Epidural  Skin Anesthetic: lidocaine 1%  Skin Wheal: 5 mL  Administration type: continuous  Approach: midline  Interspace: L3-4  Injection technique: YANA saline  Needle and Epidural Catheter  Needle type: Tuohy   Needle gauge: 17  Needle length: 7.0 inches  Needle insertion depth: 5 cm  Catheter type: springwound  Catheter size: 20 G  Catheter at skin depth: 13 cm  Test dose: 5 mL of lidocaine 1.5% with Epi 1-to-200,000  Additional Documentation: incremental injection, negative aspiration for heme and CSF, no paresthesia on injection, no signs/symptoms of IV or SA injection, no significant complaints from patient and no significant pain on injection  Needle localization: anatomical landmarks  Medications:  Bolus administered: 12 mL of ropivacaine  Volume per aspiration: 3 mL  Time between aspirations: 0.5 minutes  Assessment  Upper dermatomal levels - Left: T6  Right: T6  Lower dermatomal level: T6   Dermatomal levels determined by alcohol wipe  Ease of block: easy  Patient's tolerance of the procedure: comfortable throughout block and no complaints

## 2017-02-03 NOTE — PLAN OF CARE
Problem: Patient Care Overview  Goal: Plan of Care Review  Outcome: Ongoing (interventions implemented as appropriate)  Pt is , IOL on pitocin at 4 mu/min. Pain controlled with epidural. Remains free of falls and trauma, repositioning in bed with assistance. IVF infusing per order. Aguilar draining to gravity with red urine, adequate UO. Ctx 2-3 min, -130s with acels/early decel/variables/late decels. SVE 7/90/-1, AROM clear @ 0624. Mother plans to breastfeed.

## 2017-02-03 NOTE — PLAN OF CARE
Dr. Wiedemann in room to deliver patient. Placed in stirrups and ready to push.   7:29 Patient delivered without difficulty  9:30 In and out cath done, 500cc urine drained

## 2017-02-04 LAB
BASOPHILS # BLD AUTO: 0.03 K/UL
BASOPHILS NFR BLD: 0.3 %
DIFFERENTIAL METHOD: ABNORMAL
EOSINOPHIL # BLD AUTO: 0.3 K/UL
EOSINOPHIL NFR BLD: 3.4 %
ERYTHROCYTE [DISTWIDTH] IN BLOOD BY AUTOMATED COUNT: 14.1 %
HCT VFR BLD AUTO: 34.9 %
HGB BLD-MCNC: 11.3 G/DL
LYMPHOCYTES # BLD AUTO: 2.7 K/UL
LYMPHOCYTES NFR BLD: 27.5 %
MCH RBC QN AUTO: 29.2 PG
MCHC RBC AUTO-ENTMCNC: 32.4 %
MCV RBC AUTO: 90 FL
MONOCYTES # BLD AUTO: 1 K/UL
MONOCYTES NFR BLD: 9.9 %
NEUTROPHILS # BLD AUTO: 5.7 K/UL
NEUTROPHILS NFR BLD: 58.3 %
PLATELET # BLD AUTO: 157 K/UL
PMV BLD AUTO: 11.6 FL
RBC # BLD AUTO: 3.87 M/UL
WBC # BLD AUTO: 9.82 K/UL

## 2017-02-04 PROCEDURE — 85025 COMPLETE CBC W/AUTO DIFF WBC: CPT

## 2017-02-04 PROCEDURE — 25000003 PHARM REV CODE 250: Performed by: OBSTETRICS & GYNECOLOGY

## 2017-02-04 PROCEDURE — 36415 COLL VENOUS BLD VENIPUNCTURE: CPT

## 2017-02-04 PROCEDURE — 11000001 HC ACUTE MED/SURG PRIVATE ROOM

## 2017-02-04 RX ADMIN — OXYCODONE HYDROCHLORIDE AND ACETAMINOPHEN 1 TABLET: 10; 325 TABLET ORAL at 09:02

## 2017-02-04 RX ADMIN — OXYCODONE HYDROCHLORIDE AND ACETAMINOPHEN 1 TABLET: 10; 325 TABLET ORAL at 04:02

## 2017-02-04 RX ADMIN — NAPROXEN 500 MG: 500 TABLET ORAL at 09:02

## 2017-02-04 RX ADMIN — NAPROXEN 500 MG: 500 TABLET ORAL at 04:02

## 2017-02-04 RX ADMIN — OXYCODONE HYDROCHLORIDE AND ACETAMINOPHEN 1 TABLET: 10; 325 TABLET ORAL at 05:02

## 2017-02-04 RX ADMIN — OXYCODONE HYDROCHLORIDE AND ACETAMINOPHEN 1 TABLET: 10; 325 TABLET ORAL at 01:02

## 2017-02-04 RX ADMIN — NAPROXEN 500 MG: 500 TABLET ORAL at 01:02

## 2017-02-04 NOTE — LACTATION NOTE
This note was copied from a baby's chart.  Called to patients room. Questions about how long is too long to breastfeed. Told mom to feed until baby is completely satisfied. Discussed cluster feeding and importance of being alert for feeding cues.

## 2017-02-04 NOTE — LACTATION NOTE
02/04/17 1025   Maternal Infant Assessment   Breast Size Issue none   Breast Shape round   Breast Density soft   Areola elastic   Nipple(s) everted   Nipple Symptoms tender   Infant Assessment   Mouth Size average   Sucking Reflex present   Rooting Reflex present   Swallow Reflex present   LATCH Score   Latch 2-->grasps breast, tongue down, lips flanged, rhythmic sucking   Audible Swallowing 2-->spontaneous and intermittent (24 hrs old)   Type Of Nipple 2-->everted (after stimulation)   Comfort (Breast/Nipple) 2-->soft/nontender   Hold (Positioning) 1-->minimal assist, teach one side: mother does other, staff holds   Score (less than 7 for 2/more consecutive times, consult Lactation Consultant) 9   Maternal Infant Feeding   Maternal Preparation breast care;hand hygiene   Maternal Emotional State relaxed   Infant Positioning cross-cradle;cradle   Signs of Milk Transfer audible swallow   Presence of Pain no   Time Spent (min) 15-30 min   Comfort Measures Following Feeding expressed milk applied   Latch Assistance no   Breastfeeding Education adequate milk volume;adequate infant intake;importance of skin-to-skin contact;increasing milk supply    Following Delivery yes   Breastfeeding History   Currently Breastfeeding no   Infant First Feeding   Infant First Feeding breastfeeding   Breastfeeding Start Date 02/03/17   Breastfeeding Start Time 1445   Skin-to-Skin Contact Following Delivery yes  (per mom)   Breastfeeding breastfeeding, left side only   Breastfeeding Left Side (min) 20 Min  (feeding continues)   Feeding Infant   Feeding Readiness Cues rooting   Feeding Tolerance/Success coordinated suck;coordinated swallow;strong suck;sustained alertness;adequate pause for breath   Effective Latch During Feeding yes   Audible Swallow yes   Suck/Swallow Coordination present   Skin-to-Skin Contact During Feeding yes   Lactation Referrals   Lactation Consult Initial assessment;Breastfeeding assessment   Lactation  Interventions   Attachment Promotion breastfeeding assistance provided;counseling provided;environment adjusted;face-to-face positioning promoted;infant-mother separation minimized;privacy provided;rooming-in promoted;skin-to-skin contact encouraged   Breast Care: Breastfeeding lanolin to nipple(s) applied   Breastfeeding Assistance assisted with positioning;feeding cue recognition promoted;feeding on demand promoted;feeding session observed;infant latch-on verified;infant suck/swallow verified   Maternal Breastfeeding Support encouragement offered;infant-mother separation minimized;lactation counseling provided   Latch Promotion positioning assisted

## 2017-02-04 NOTE — PLAN OF CARE
Problem: Breastfeeding (Adult,Obstetrics,Pediatric)  Goal: Signs and Symptoms of Listed Potential Problems Will be Absent, Minimized or Managed (Breastfeeding)  Signs and symptoms of listed potential problems will be absent, minimized or managed by discharge/transition of care (reference Breastfeeding (Adult,Obstetrics,Pediatric) CPG).   Outcome: Ongoing (interventions implemented as appropriate)  Mother will breastfeed on cue at least eight or more times in 24 hours. Will keep track of feedings and wet and dirty diapers. Will call with any breastfeeding needs.

## 2017-02-04 NOTE — PROGRESS NOTES
"POD #1 s/p     Subjective: No complaints. Doing well, wants circ    Objective:   Visit Vitals    /63 (BP Location: Right arm, Patient Position: Sitting, BP Method: Automatic)    Pulse 80    Temp 97.8 °F (36.6 °C) (Oral)    Resp 18    Ht 5' 6" (1.676 m)    Wt 72.3 kg (159 lb 6.3 oz)    LMP 2016    SpO2 98%    Breastfeeding Yes    BMI 25.73 kg/m2       I/O last 3 completed shifts:  In:  [IV Piggyback:]  Out: 3000 [Urine:1100; Blood:1900]           H/H:   Lab Results   Component Value Date    WBC 9.82 2017    HGB 11.3 (L) 2017    HCT 34.9 (L) 2017    MCV 90 2017     2017       Chest: Clear to auscultation  CV: Regular rate and rhythm  Abdomen: Non-tender, non-distended, soft, positive bowel sounds    Extremities: Non-tender, no edema    Assessment:POD #1 S/P     Plan: Routine progressive care    circ today  D/c nohemy  "

## 2017-02-05 VITALS
TEMPERATURE: 98 F | HEART RATE: 75 BPM | SYSTOLIC BLOOD PRESSURE: 105 MMHG | RESPIRATION RATE: 18 BRPM | HEIGHT: 66 IN | OXYGEN SATURATION: 98 % | BODY MASS INDEX: 25.61 KG/M2 | WEIGHT: 159.38 LBS | DIASTOLIC BLOOD PRESSURE: 69 MMHG

## 2017-02-05 PROCEDURE — 25000003 PHARM REV CODE 250: Performed by: OBSTETRICS & GYNECOLOGY

## 2017-02-05 RX ADMIN — OXYCODONE HYDROCHLORIDE AND ACETAMINOPHEN 1 TABLET: 10; 325 TABLET ORAL at 12:02

## 2017-02-05 RX ADMIN — OXYCODONE HYDROCHLORIDE AND ACETAMINOPHEN 1 TABLET: 10; 325 TABLET ORAL at 03:02

## 2017-02-05 RX ADMIN — NAPROXEN 500 MG: 500 TABLET ORAL at 07:02

## 2017-02-05 RX ADMIN — NAPROXEN 500 MG: 500 TABLET ORAL at 05:02

## 2017-02-05 RX ADMIN — OXYCODONE HYDROCHLORIDE AND ACETAMINOPHEN 1 TABLET: 10; 325 TABLET ORAL at 07:02

## 2017-02-05 RX ADMIN — OXYCODONE HYDROCHLORIDE AND ACETAMINOPHEN 1 TABLET: 10; 325 TABLET ORAL at 05:02

## 2017-02-05 NOTE — NURSING
1250 - Discharge instructions given verbally and in writing.  Verbalized understanding.  Received Mother-Baby care guide during hospital stay.  Prescriptions given with explanation for use.  Verbalized understanding.   States she feels comfortable taking care of baby and has demonstrated ability to care for  and herself.  Says she will have assistance when she returns home.      182 - Discharged to home in stable condition via wheelchair with infant in arms.

## 2017-02-05 NOTE — DISCHARGE SUMMARY
Pt is a 31 y.o. yo  PPD 2 from vaginal delivery. She was admitted on 2017 for labor. She was managed accordingly. She delivered a male infant weighing 8# 2oz.  By PPD 1, she was voiding without difficulty, ambulating well, tolerating a diet, and passing flatus. Her pain is well controlled. H/H is 1134. She was desiring discharge home. She was given discharge teaching and instructions prior to leaving the hospital.  Pt voiced understanding of all instructions.     D/C pt. To home in stable condition  Reg diet  Ad christiano activity with pelvic rest x 6 wks  Call for fever >101, heavy vag bleeding, pain uncontrolled w/ pain meds  F/u in office in 6-8wks  Rx for Motrin and Percocet given  Vitaly Vargas MD

## 2017-02-05 NOTE — PLAN OF CARE
Problem: Patient Care Overview  Goal: Individualization & Mutuality  Outcome: Outcome(s) achieved Date Met:  02/05/17  Pt bonding well with baby. Pain well controlled with po pain medicaitons. Ambulating well.

## 2017-02-05 NOTE — DISCHARGE INSTRUCTIONS
"Patient Discharge Instructions for Postpartum Women    Resume Regular Diet  Increase activity gradually, no heavy lifting  Shower  No tampons, douching or sexual intercourse.  Wear a support bra    Call your physician if     *Fever of 100.4 or higher  *Persistent nausea/ vomiting  *Heavy vaginal bleeding or large clots (Heavy bleeding is soaking 1 pad in an hour)  *Swelling and pain in arms or legs  *Severe headaches, blurred vision or fainting  *Shortness of breath  *Frequency and burning with urination  *Signs of postpartum depression, discuss these signs with your physician    Call lactation services for questions regarding feeding, nipple and breast care, and general questions about lactation.  They can be reached at [phone]         Understanding Postpartum Depression    You've just had a baby.  You know you should be excited and happy.  But instead you find yourself crying for no reason.  You may have trouble coping with your daily tasks.  You feel sad, tired, and hopeless most of the time.  You may even feel ashamed or guilty.  But what you're going through is not your fault and you can feel better.  Talk to your doctor.  He or she can help.    Depression After Childbirth    You may be weepy and tired right after giving birth.  These feelings are normal.  They're sometimes called the "baby blues."  These blues go away 2-3 weeks.  However, postpartum (meaning "after birth") depression lasts much longer and is more sever than the "baby blues."  It can make you feel sad and hopeless.  You may also fear that your baby will be harmed and worry about being a bad mother.      What is Depression?    Depression is a mood disorder that affects the way you think and feel.  The most common symptom is a feeling of deep sadness.  You may also feel as if you just can't cope with life.    Other symptoms include:      * Gaining or loosing weight  * Sleeping too much or too little  * Feeling tired all the time  * Feeling " restless  * Fears of harming your baby   * Lack of interest in your baby  * Feeling worthless or guilty  * No longer finding pleasure in things you used to  * Having trouble thinking clearly or making decisions  * Thoughts of hurting yourself or your baby    What Causes Postpartum Depression    The exact causes of postpartum depression isn't known.  It may be due to changes in your hormones during and after childbirth.  You may also be tired from caring for your baby and adjusting to being a mother.  All these factors may make you feel depressed.  In some cases, your genes may also play a role.    Depression Can Be Treated    The good news is that there are many ways to treat postpartum depression.  Talking to your doctor is the first step toward feeling better.    Resources:    * National [place] of Mental Health  -- 388.411.9682    www.nimh.nih.gov    * National [place] on Mental Illness --920.966.3381    Www.kole.org    * Mental Health [place] -- 274.155.2927     Www.nmha.org    * National Suicide Hotline --934.639.4652 (800-SUICIDE)    3811-0817 [employer]  All rights reserved.  This information is not intended as a substitute for professional medical care.  Always follow up with your healthcare professional's instructions.      Breastfeeding Discharge Instructions       Feed the baby at the earliest sign of hunger or comfort  o Hands to mouth, sucking motions  o Rooting or searching for something to suck on  o Dont wait for crying - it is a late sign of hunger and comfort.     The feedings may be 8-12 times per 24hrs and will not follow a schedule   Avoid pacifiers and bottles for the first 4 weeks   Alternate the breast you start the feeding with, or start with the breast that feels the fullest   Switch breasts when the baby takes himself off the breast or falls asleep   Keep offering breasts until the baby looks full, no longer gives hunger signs, and stays asleep when placed on his  back in the crib   If the baby is sleepy and wont wake for a feeding, put the baby skin-to-skin dressed in a diaper against the mothers bare chest   Sleep near your baby   The baby should be positioned and latched on to the breast correctly  o Chest-to-chest, chin in the breast  o Babys lips are flipped outward  o Babys mouth is stretched open wide like a shout  o Babys sucking should feel like tugging to the mother  - The baby should be drinking at the breast:  o You should hear swallowing or gulping throughout the feeding  o You should see milk on the babys lips when he comes off the breast  o Your breasts should be softer when the baby is finished feeding  o The baby should look relaxed at the end of feedings  o After the 4th day and your milk is in:  o The babys poop should turn bright yellow and be loose, watery, and seedy  o The baby should have at least 3-4 poops the size of the palm of your hand per day  o The baby should have at least 6-8 wet diapers per day  o The urine should be light yellow in color  You should drink when you are thirsty and eat a healthy diet when you are    hungry.     Take naps to get the rest you need.   Take medications and/or drink alcohol only with permission of your obstetrician    or the babys pediatrician.  You can also call the Infant Risk Center,   ([phone]), Monday-Friday, 8am-5pm Central time, to get the most   up-to-date evidence-based information on the use of medications during   pregnancy and breastfeeding.      The baby should be examined by a pediatrician at 3-5 days of age.  Once your   milk comes in, the baby should be gaining at least ½ - 1oz each day and should be back to birthweight no later than 10-14 days of age.          Community Resources    [facility] Breastfeeding Warmline:  661.225.2395  Local [facility] clinics: provide incentives and breastpumps to eligible mothers  La Leche League International (LLLI):  mother-to-mother  support group website        www.lll.org  [facility] La Leche League mother-to-mother support groups:        www.[employer]wh[employer]ijaya[employer]ribe.[employer]earScience        La Leche [facility]         www.karoline@[employer].com  Dr. Chris Moser website for latch videos and general information:        www.breastfeedinginc.ca  Infant Risk Center is a call center that provides information about the safety of taking medications while breastfeeding.  Call 1-727.722.5456, M-F, 8am-5pm, [place].  International Lactation Consultant Association provides resources for assistance:        www.ilca.org  [facility] Breastfeeding Coalition provides informationand resources for parents  and the community    http://[facility] [place].org     [place] mom provides resources for assistance:        www.nolamom.org  [facility] for Healthy Babies:  1-204-233-BABY(7130)  [facility] provides a list of breastfeeding services by zip code:        www.[facility] Center[facility]etect[facility] Microsystems.org  C[employer]ait:  466.485.6524 a breastfeeding support group for women of color

## 2017-02-05 NOTE — ANESTHESIA POSTPROCEDURE EVALUATION
"Anesthesia Post Evaluation    Patient: Kan Traore    Procedure(s) Performed: * No procedures listed *        Anesthetic complications: no              Visit Vitals    /69 (BP Location: Right arm, Patient Position: Sitting, BP Method: Automatic)    Pulse 75    Temp 36.4 °C (97.6 °F) (Oral)    Resp 18    Ht 5' 6" (1.676 m)    Wt 72.3 kg (159 lb 6.3 oz)    LMP 05/07/2016    SpO2 98%    Breastfeeding Yes    BMI 25.73 kg/m2       Pain/Alberto Score: Pain Rating Prior to Med Admin: 7 (2/5/2017  7:49 AM)  Pain Rating Post Med Admin: 0 (2/4/2017 10:30 PM)    Post Anesthesia Evaluation      Anesthesia Type: CSE    Patient Location: OB floor    Post Pain: Adequate analgesia    Post Assessment: no apparent anesthetic complications and tolerated procedure well    Post Vital Signs: stable    Was patient able to participate in evaluation? Yes    Level of Consciousness: awake, alert  and oriented    Nausea/Vomiting: no nausea/no vomiting    Complications: none    Airway Patency: patent    Respiratory: unassisted    Cardiovascular: stable    Hydration: euvolemic    Follow-up Needed: No    No catheter in back  No headache/neckache/backache  Full return of neurological function  Able to urinate  Advised patient to report any new problems of back pain, especially with fever or decreasing bladder function occurring during coming days to weeks    Awake, alert & oriented  yes  Vital signs stable  yes  Pain controlled  yes  No nausea/vomiting  yes  Adequate hydration  yes    "

## 2017-02-05 NOTE — LACTATION NOTE
02/05/17 0815   Maternal Infant Assessment   Breast Size Issue none   Breast Shape round   Breast Density soft   Areola elastic   Nipple(s) everted   Nipple Symptoms tender  (left nipple)   Infant Assessment   Weight Loss (%) 5.5   Mouth Size average   Sucking Reflex present   Rooting Reflex present   Swallow Reflex present   LATCH Score   Latch 2-->grasps breast, tongue down, lips flanged, rhythmic sucking   Audible Swallowing 2-->spontaneous and intermittent (24 hrs old)   Type Of Nipple 2-->everted (after stimulation)   Comfort (Breast/Nipple) 2-->soft/nontender   Hold (Positioning) 2-->no assist from staff, mother able to position/hold infant   Score (less than 7 for 2/more consecutive times, consult Lactation Consultant) 10   Maternal Infant Feeding   Maternal Preparation breast care;hand hygiene   Maternal Emotional State independent;relaxed   Infant Positioning cradle   Signs of Milk Transfer audible swallow   Presence of Pain no   Time Spent (min) 15-30 min   Comfort Measures Following Feeding (gel pad)   Latch Assistance no   Breastfeeding Education adequate infant intake;adequate milk volume;diet;importance of skin-to-skin contact;increasing milk supply;medication effects;returning to work;vitamins/minerals, infant   Lactation Referrals   Lactation Consult Breastfeeding assessment  (discharge teaching)   Lactation Referrals pediatric care provider   Lactation Interventions   Attachment Promotion counseling provided;family involvement promoted;infant-mother separation minimized   Breastfeeding Assistance feeding cue recognition promoted;feeding on demand promoted;infant latch-on verified;infant suck/swallow verified   Maternal Breastfeeding Support diary/feeding log utilized;encouragement offered;infant-mother separation minimized;lactation counseling provided;maternal hydration promoted;maternal nutrition promoted;maternal rest encouraged

## 2017-02-05 NOTE — PLAN OF CARE
Problem: Breastfeeding (Adult,Obstetrics,Pediatric)  Goal: Signs and Symptoms of Listed Potential Problems Will be Absent, Minimized or Managed (Breastfeeding)  Signs and symptoms of listed potential problems will be absent, minimized or managed by discharge/transition of care (reference Breastfeeding (Adult,Obstetrics,Pediatric) CPG).   Outcome: Outcome(s) achieved Date Met:  02/05/17  Mother will breastfeed on cue at least eight or more times in 24 hours. Will keep track of feedings and wet and dirty diapers. Will call with any breastfeeding needs.

## 2017-02-06 ENCOUNTER — TELEPHONE (OUTPATIENT)
Dept: OBSTETRICS AND GYNECOLOGY | Facility: CLINIC | Age: 32
End: 2017-02-06

## 2017-02-06 NOTE — TELEPHONE ENCOUNTER
----- Message from Michael A. Wiedemann, MD sent at 2/6/2017  6:49 AM CST -----  Give post partal appt in 4weeks, thanks

## 2017-02-07 ENCOUNTER — PATIENT MESSAGE (OUTPATIENT)
Dept: OBSTETRICS AND GYNECOLOGY | Facility: CLINIC | Age: 32
End: 2017-02-07

## 2017-02-08 ENCOUNTER — TELEPHONE (OUTPATIENT)
Dept: OBSTETRICS AND GYNECOLOGY | Facility: CLINIC | Age: 32
End: 2017-02-08

## 2017-02-08 ENCOUNTER — OFFICE VISIT (OUTPATIENT)
Dept: UROLOGY | Facility: CLINIC | Age: 32
End: 2017-02-08
Payer: COMMERCIAL

## 2017-02-08 ENCOUNTER — TELEPHONE (OUTPATIENT)
Dept: UROLOGY | Facility: CLINIC | Age: 32
End: 2017-02-08

## 2017-02-08 ENCOUNTER — PATIENT MESSAGE (OUTPATIENT)
Dept: FAMILY MEDICINE | Facility: CLINIC | Age: 32
End: 2017-02-08

## 2017-02-08 VITALS
WEIGHT: 147 LBS | OXYGEN SATURATION: 98 % | SYSTOLIC BLOOD PRESSURE: 107 MMHG | TEMPERATURE: 99 F | DIASTOLIC BLOOD PRESSURE: 70 MMHG | HEART RATE: 95 BPM | HEIGHT: 66 IN | BODY MASS INDEX: 23.63 KG/M2

## 2017-02-08 DIAGNOSIS — N13.30 HYDRONEPHROSIS OF RIGHT KIDNEY: ICD-10-CM

## 2017-02-08 DIAGNOSIS — N23 RENAL COLIC ON RIGHT SIDE: ICD-10-CM

## 2017-02-08 DIAGNOSIS — N20.1 URETERAL CALCULUS, RIGHT: ICD-10-CM

## 2017-02-08 PROCEDURE — 99999 PR PBB SHADOW E&M-EST. PATIENT-LVL IV: CPT | Mod: PBBFAC,,, | Performed by: UROLOGY

## 2017-02-08 PROCEDURE — 99245 OFF/OP CONSLTJ NEW/EST HI 55: CPT | Mod: S$GLB,,, | Performed by: UROLOGY

## 2017-02-08 RX ORDER — SODIUM CHLORIDE 9 MG/ML
INJECTION, SOLUTION INTRAVENOUS CONTINUOUS
Status: CANCELLED | OUTPATIENT
Start: 2017-02-08

## 2017-02-08 RX ORDER — LIDOCAINE HYDROCHLORIDE 20 MG/ML
JELLY TOPICAL ONCE
Status: CANCELLED | OUTPATIENT
Start: 2017-02-08 | End: 2017-02-08

## 2017-02-08 NOTE — LETTER
February 8, 2017        [place]  [address] [facility] [address]  [address]  [facility] [address]             [facility]  [address]  [facility] [address]  Phone: [phone]  Fax: [phone]   Patient: Kan Traore   MR Number: [id number]   YOB: 1985   Date of Visit: 2/8/2017       Dear Dr. Aguilar:    Thank you for referring Kan Traore to me for evaluation. Below are the relevant portions of my assessment and plan of care.       1. Ureteral calculus, right    2. Hydronephrosis of right kidney    3. Renal colic on right side         Patient Instructions   Plan to Admit to Outpatient in am and perform ureteroscopic stone extraction with laser lithotripsy.        If you have questions, please do not hesitate to call me. I look forward to following Kan along with you.    Sincerely,      Kendell Hernandez MD           CC  No Recipients

## 2017-02-08 NOTE — TELEPHONE ENCOUNTER
Dr. Hernandez ordered patient to hold Naproxen today prior to surgery. Patient contacted and verbalized understanding.

## 2017-02-08 NOTE — TELEPHONE ENCOUNTER
----- Message from Galina Wilhelm sent at 2/8/2017 12:10 PM CST -----  Patient no. [phone] or [phone]   If necessary, please leave message.   Patient is having surgery tomorrow.  Does she take the Naproxen.   Please call.

## 2017-02-08 NOTE — TELEPHONE ENCOUNTER
Whatever she needs is ok, maybe send a message to the urology nurse  And say the pt is so nice, can get in earlier?   LMK, thanks

## 2017-02-08 NOTE — PATIENT INSTRUCTIONS
Plan to Admit to Outpatient in am and perform ureteroscopic stone extraction with laser lithotripsy.

## 2017-02-08 NOTE — TELEPHONE ENCOUNTER
Dr. Min,   I am having very bad right flank pain. I am scheduled to see urology tomorrow at 2:30pm. I wanted to let you know because i know I am still under your care even though I am discharged.  I also wanted to see if you could maybe call the urologist there and get me an appointment a little earlier. I spoke with Roshni in L&D and she said I could take two 5mg  Percocets so that's what I am taking getting a small amount of relief. I am also dealing with Geoffrey who may need to be readmittrd for Jaundice. I would really appreciate anything you can do to help me. Thanks!!   Kan Traore

## 2017-02-08 NOTE — PROGRESS NOTES
Subjective:       Patient ID: Kan Traore is a 31 y.o. female.    Chief Complaint: Nephrolithiasis    HPI Comments: Pt is 5 days s/p delivery of baby. History of nephrolithiasis since college. Had stones with the last 2 pregnancies. Developed severe Right flank pain with Nausea and vomiting last pm and was seen in the ED. Pt wanted to be seen as soon as possible and presented to Dr. Aguilar's office and she referred pt for treatment. Pt's CT reveals  a 9 mm distal ureteral calculus and a 7 mm upper ureteral calculus on the right with resultant hydronephrosis. Multiple bilateral calculi.      Flank Pain   This is a new problem. The current episode started yesterday. The problem occurs constantly. The problem is unchanged. The pain is present in the costovertebral angle. The quality of the pain is described as stabbing. Radiates to: Radiates to RLQ. The pain is at a severity of 9/10. The pain is severe. The pain is the same all the time. Associated symptoms include dysuria. Pertinent negatives include no abdominal pain, bladder incontinence, bowel incontinence, chest pain, fever, headaches, leg pain, numbness, paresis, paresthesias, pelvic pain, perianal numbness, tingling, weakness or weight loss. Risk factors include renal stones and pregnancy. She has tried analgesics for the symptoms. The treatment provided moderate relief.     Review of Systems   Constitutional: Negative for activity change, appetite change, chills, fatigue, fever and weight loss.   HENT: Negative for congestion, ear pain, hearing loss, nosebleeds, sinus pressure, sore throat and trouble swallowing.    Eyes: Negative for pain and visual disturbance.   Respiratory: Negative for apnea, cough and shortness of breath.    Cardiovascular: Negative for chest pain and leg swelling.   Gastrointestinal: Negative for abdominal distention, abdominal pain, anal bleeding, blood in stool, bowel incontinence, constipation, diarrhea, nausea, rectal pain  and vomiting.   Endocrine: Negative for cold intolerance, heat intolerance, polydipsia, polyphagia and polyuria.   Genitourinary: Positive for dysuria and flank pain. Negative for bladder incontinence, decreased urine volume, difficulty urinating, dyspareunia, enuresis, frequency, genital sores, hematuria, menstrual problem, pelvic pain, urgency, vaginal bleeding, vaginal discharge and vaginal pain.   Musculoskeletal: Negative for arthralgias and back pain.   Skin: Negative for color change, pallor and rash.   Allergic/Immunologic: Negative for environmental allergies, food allergies and immunocompromised state.   Neurological: Negative for dizziness, tingling, speech difficulty, weakness, numbness, headaches and paresthesias.   Hematological: Negative for adenopathy. Does not bruise/bleed easily.   Psychiatric/Behavioral: Negative.        Objective:      Physical Exam   Nursing note and vitals reviewed.  Constitutional: She is oriented to person, place, and time. She appears well-developed and well-nourished.   HENT:   Head: Normocephalic.   Nose: Nose normal.   Mouth/Throat: Oropharynx is clear and moist.   Eyes: Conjunctivae and EOM are normal. Pupils are equal, round, and reactive to light.   Neck: Normal range of motion. Neck supple.   Cardiovascular: Normal rate, regular rhythm, normal heart sounds and intact distal pulses.    Pulmonary/Chest: Effort normal and breath sounds normal.   Abdominal: Soft. Bowel sounds are normal.   Genitourinary:   Genitourinary Comments: R. CVAT   Musculoskeletal: Normal range of motion.   Neurological: She is alert and oriented to person, place, and time. She has normal reflexes.   Skin: Skin is warm and dry.     Psychiatric: She has a normal mood and affect. Her behavior is normal. Judgment and thought content normal.       Assessment:       1. Ureteral calculus, right    2. Hydronephrosis of right kidney    3. Renal colic on right side        Plan:       Patient Instructions    Plan to Admit to Outpatient in am and perform ureteroscopic stone extraction with laser lithotripsy.

## 2017-02-13 ENCOUNTER — OFFICE VISIT (OUTPATIENT)
Dept: FAMILY MEDICINE | Facility: CLINIC | Age: 32
End: 2017-02-13
Payer: COMMERCIAL

## 2017-02-13 VITALS
SYSTOLIC BLOOD PRESSURE: 100 MMHG | OXYGEN SATURATION: 98 % | TEMPERATURE: 98 F | HEART RATE: 65 BPM | WEIGHT: 140.44 LBS | HEIGHT: 66 IN | BODY MASS INDEX: 22.57 KG/M2 | DIASTOLIC BLOOD PRESSURE: 64 MMHG

## 2017-02-13 DIAGNOSIS — F32.A ANXIETY AND DEPRESSION: Primary | ICD-10-CM

## 2017-02-13 DIAGNOSIS — F41.9 ANXIETY AND DEPRESSION: Primary | ICD-10-CM

## 2017-02-13 PROCEDURE — 99999 PR PBB SHADOW E&M-EST. PATIENT-LVL III: CPT | Mod: PBBFAC,,, | Performed by: NURSE PRACTITIONER

## 2017-02-13 PROCEDURE — 99213 OFFICE O/P EST LOW 20 MIN: CPT | Mod: S$GLB,,, | Performed by: NURSE PRACTITIONER

## 2017-02-13 RX ORDER — SERTRALINE HYDROCHLORIDE 50 MG/1
50 TABLET, FILM COATED ORAL DAILY
Qty: 30 TABLET | Refills: 1 | Status: SHIPPED | OUTPATIENT
Start: 2017-02-13 | End: 2017-04-18 | Stop reason: SDUPTHER

## 2017-02-13 NOTE — PROGRESS NOTES
Subjective:       Patient ID: Kan Traore is a 31 y.o. female.    Chief Complaint: Medication Refill    HPI Comments: Patient is  Here today to discuss medications.    Patient has a history of Anxiety and Depression.  Patient was taking Zoloft 50 mg daily prior to pregnancy.  She reports she got off of the medication when she found out she was pregnant. Patient delivered her baby @ 2/2/17 and had kidney stones during pregnancy.  Last week - kidney stone pain became severe - she has 19 stones - she had a lithotripsy done on 2/9/17 and still has stent in place.  She states she will require more surgeries to have stones removed.  Patient reports she finds she is having increase in depressed mood and would like to get back on medication.  She states she is not sure if she is having postpartal depression or just overwhelmed with having 2 small children at home and now an infant and dealing with kidney stone pain and surgery all in such a short period of time.  Patient denies any thoughts of suicide.    Patient also has ADHD and was on Adderall prior to pregnancy.  I advised patient that she should not start any medication for ADHD until the anxiety/depression is well controlled.  Patient verbalizes understanding.          Previous Medications    HYDROCODONE-ACETAMINOPHEN 5-325MG (NORCO) 5-325 MG PER TABLET    Take 1 tablet by mouth every 6 (six) hours as needed for Pain.    NAPROXEN (NAPROSYN) 500 MG TABLET    Take 500 mg by mouth 2 (two) times daily.    NITROFURANTOIN, MACROCRYSTAL-MONOHYDRATE, (MACROBID) 100 MG CAPSULE    Take 1 capsule (100 mg total) by mouth 2 (two) times daily.    ONDANSETRON (ZOFRAN) 4 MG TABLET    Take 1 tablet (4 mg total) by mouth every 6 (six) hours.    OXYCODONE-ACETAMINOPHEN (PERCOCET) 5-325 MG PER TABLET    Take 1 tablet by mouth every 4 (four) hours as needed for Pain.       Past Medical History   Diagnosis Date    Allergic rhinitis     Anxiety     Asthma     Attention deficit  disorder (ADD)     Depression     Kidney stone     Urinary tract infection     Vaginal infection        Past Surgical History   Procedure Laterality Date    Kidney stone surgery      Tonsillectomy      Dilation and curettage of uterus  2014    Lithotripsy  2011    Lithotripsy  2017       Family History   Problem Relation Age of Onset    Hypertension Mother 50    Hypothyroidism Father 52    Stroke Maternal Grandfather     Hypertension Maternal Grandfather     Heart disease Maternal Grandfather 56      of AMI    Heart failure Paternal Grandmother 81    No Known Problems Son     No Known Problems Son     No Known Problems Son     Breast cancer Neg Hx     Colon cancer Neg Hx     Ovarian cancer Neg Hx     Kidney disease Neg Hx        Social History     Social History    Marital status:      Spouse name: N/A    Number of children: N/A    Years of education: N/A     Occupational History     [employer]     Social History Main Topics    Smoking status: Never Smoker    Smokeless tobacco: None    Alcohol use No    Drug use: No    Sexual activity: Yes     Partners: Male     Other Topics Concern    None     Social History Narrative    None       Review of Systems   Constitutional: Negative for appetite change, chills, fatigue, fever and unexpected weight change.   HENT: Negative for congestion, ear pain, mouth sores, nosebleeds, postnasal drip, rhinorrhea, sinus pressure, sneezing, sore throat, trouble swallowing and voice change.    Eyes: Negative for photophobia, pain, discharge, redness, itching and visual disturbance.   Respiratory: Negative for cough, chest tightness and shortness of breath.    Cardiovascular: Negative for chest pain, palpitations and leg swelling.   Gastrointestinal: Negative for abdominal pain, blood in stool, constipation, diarrhea, nausea and vomiting.   Genitourinary: Negative for dysuria, frequency, hematuria and urgency.  "  Musculoskeletal: Negative for arthralgias, back pain, joint swelling and myalgias.   Skin: Negative for color change and rash.   Allergic/Immunologic: Negative for immunocompromised state.   Neurological: Negative for dizziness, seizures, syncope, weakness and headaches.   Hematological: Negative for adenopathy. Does not bruise/bleed easily.   Psychiatric/Behavioral: Positive for dysphoric mood and sleep disturbance. Negative for agitation, self-injury and suicidal ideas. The patient is nervous/anxious.          Objective:     Vitals:    02/13/17 1131   BP: 100/64   BP Location: Left arm   Patient Position: Sitting   BP Method: Manual   Pulse: 65   Temp: 97.8 °F (36.6 °C)   TempSrc: Oral   SpO2: 98%   Weight: 63.7 kg (140 lb 6.9 oz)   Height: 5' 6" (1.676 m)          Physical Exam   Constitutional: She is oriented to person, place, and time. She appears well-developed and well-nourished.   HENT:   Head: Normocephalic and atraumatic.   Right Ear: External ear normal.   Left Ear: External ear normal.   Nose: Nose normal.   Mouth/Throat: Oropharynx is clear and moist. No oropharyngeal exudate.   Eyes: EOM are normal. Pupils are equal, round, and reactive to light.   Neck: Normal range of motion. Neck supple. No tracheal deviation present. No thyromegaly present.   Cardiovascular: Normal rate, regular rhythm and normal heart sounds.    No murmur heard.  Pulmonary/Chest: Effort normal and breath sounds normal. No respiratory distress.   Abdominal: Soft. She exhibits no distension.   Musculoskeletal: Normal range of motion. She exhibits no edema.   Lymphadenopathy:     She has no cervical adenopathy.   Neurological: She is alert and oriented to person, place, and time. No cranial nerve deficit. Coordination normal.   Skin: Skin is warm and dry. No rash noted.   Psychiatric: She has a normal mood and affect.         Assessment:         ICD-10-CM ICD-9-CM   1. Anxiety and depression F41.9 300.00    F32.9 311       Plan: "       Anxiety and depression  -  Will start back on Zoloft 50 mg daily.  Patient is aware that it will take 2 weeks to note the positive benefits of medication.  Advised patient that she may want to consider counseling if she feels this is more postpartum depression. Will follow up in 4 weeks or sooner if needed.  Patient has good support system at home.    -     sertraline (ZOLOFT) 50 MG tablet; Take 1 tablet (50 mg total) by mouth once daily.  Dispense: 30 tablet; Refill: 1    Return in about 4 weeks (around 3/13/2017).     Patient's Medications   New Prescriptions    SERTRALINE (ZOLOFT) 50 MG TABLET    Take 1 tablet (50 mg total) by mouth once daily.   Previous Medications    HYDROCODONE-ACETAMINOPHEN 5-325MG (NORCO) 5-325 MG PER TABLET    Take 1 tablet by mouth every 6 (six) hours as needed for Pain.    NAPROXEN (NAPROSYN) 500 MG TABLET    Take 500 mg by mouth 2 (two) times daily.    NITROFURANTOIN, MACROCRYSTAL-MONOHYDRATE, (MACROBID) 100 MG CAPSULE    Take 1 capsule (100 mg total) by mouth 2 (two) times daily.    ONDANSETRON (ZOFRAN) 4 MG TABLET    Take 1 tablet (4 mg total) by mouth every 6 (six) hours.    OXYCODONE-ACETAMINOPHEN (PERCOCET) 5-325 MG PER TABLET    Take 1 tablet by mouth every 4 (four) hours as needed for Pain.   Modified Medications    No medications on file   Discontinued Medications    ACETAMINOPHEN (TYLENOL) 325 MG TABLET    Take 1 tablet (325 mg total) by mouth every 6 (six) hours as needed for Pain.

## 2017-02-13 NOTE — MR AVS SNAPSHOT
[facility]  [address] [place] [address]  [place] [address]  Phone: [phone]  Fax: [phone]                  Kan Traore   2017 11:20 AM   Office Visit    Description:  Female : 1985   Provider:  Madeline Aguilar NP   Department:  [facility]           Reason for Visit     Medication Refill           Diagnoses this Visit        Comments    Anxiety and depression    -  Primary            To Do List           Future Appointments        Provider Department Dept Phone    2017 8:00 AM Kendell Hernandez MD [facility] [phone]    3/1/2017 11:00 AM Kendell Hernandez MD [facility] [phone]    3/6/2017 11:15 AM Michael A. Wiedemann, MD [phone]      Goals (5 Years of Data)     None      Follow-Up and Disposition     Return in about 4 weeks (around 3/13/2017).       These Medications        Disp Refills Start End    sertraline (ZOLOFT) 50 MG tablet 30 tablet 1 2017    Take 1 tablet (50 mg total) by mouth once daily. - Oral    Pharmacy: [id number] [place] - [address] [facility]  #: [phone]         [facility] On Call     [facility] On Call Nurse Care Line - 24/7 Assistance  Registered nurses in the [facility] On Call Center provide clinical advisement, health education, appointment booking, and other advisory services.  Call for this free service at [phone].             Medications           Message regarding Medications     Verify the changes and/or additions to your medication regime listed below are the same as discussed with your clinician today.  If any of these changes or additions are incorrect, please notify your healthcare provider.        START taking these NEW medications        Refills    sertraline (ZOLOFT) 50 MG tablet 1    Sig: Take 1 tablet (50 mg total) by mouth once daily.    Class: Normal    Route: Oral      STOP taking these medications     acetaminophen  (TYLENOL) 325 MG tablet Take 1 tablet (325 mg total) by mouth every 6 (six) hours as needed for Pain.           Verify that the below list of medications is an accurate representation of the medications you are currently taking.  If none reported, the list may be blank. If incorrect, please contact your healthcare provider. Carry this list with you in case of emergency.           Current Medications     nitrofurantoin, macrocrystal-monohydrate, (MACROBID) 100 MG capsule Take 1 capsule (100 mg total) by mouth 2 (two) times daily.    ondansetron (ZOFRAN) 4 MG tablet Take 1 tablet (4 mg total) by mouth every 6 (six) hours.    oxycodone-acetaminophen (PERCOCET) 5-325 mg per tablet Take 1 tablet by mouth every 4 (four) hours as needed for Pain.    hydrocodone-acetaminophen 5-325mg (NORCO) 5-325 mg per tablet Take 1 tablet by mouth every 6 (six) hours as needed for Pain.    naproxen (NAPROSYN) 500 MG tablet Take 500 mg by mouth 2 (two) times daily.    sertraline (ZOLOFT) 50 MG tablet Take 1 tablet (50 mg total) by mouth once daily.           Clinical Reference Information           Prenatal Vitals     Enc. Date GA Prenatal Vitals Prenatal Pulse Pain Level Urine Albumin/Glucose Edema Presentation Dilation/Effacement/Station    2/13/17 39w0d 100/64 / 63.7 kg (140 lb 6.9 oz)  65         2/3/17 39w0d Admission Dept: [facility] L&D    2/2/17 38w6d Admission Dx: Amenorrhea Dept: [facility] MOMBABY    1/30/17 38w3d 116/64 / 72.3 kg (159 lb 6.3 oz)   6        1/29/17 38w2d Admission Dept: [facility] L&D    1/26/17 37w6d 106/60 / 72.6 kg (160 lb 0.9 oz)   0        1/19/17 36w6d 104/76 / 71.9 kg (158 lb 8.2 oz)   0        1/12/17 35w6d 108/66 / 71.5 kg (157 lb 10.1 oz)   3        12/30/16 34w0d 110/62 / 70.2 kg (154 lb 12.2 oz)   0 Negative / Negative       11/18/16 28w0d Admission Dept: [facility] L&D    11/15/16 27w4d 116/70 / 65.7 kg (144 lb 13.5 oz)   0        10/17/16 23w3d 100/80 / 64.6 kg (142 lb 6.7 oz)           9/19/16 19w3d 114/68 / 60.9 kg  "(134 lb 4.2 oz)   0        8/22/16 15w3d 104/68 / 58.5 kg (128 lb 15.5 oz)   0        7/14/16 9w6d 122/64 / 54.7 kg (120 lb 9.5 oz)   0           Number of babies: 1   Height: 5' 6" (1.676 m)       Your Vitals Were     BP Pulse Temp Height Weight Last Period    100/64 (BP Location: Left arm, Patient Position: Sitting, BP Method: Manual) 65 97.8 °F (36.6 °C) (Oral) 5' 6" (1.676 m) 63.7 kg (140 lb 6.9 oz) (LMP Unknown)    SpO2 BMI             98% 22.67 kg/m2         Blood Pressure          Most Recent Value    BP  100/64      Allergies as of 2/13/2017     No Known Allergies      Immunizations Administered on Date of Encounter - 2/13/2017     None      Language Assistance Services     ATTENTION: Language assistance services are available, free of charge. Please call [phone].      ATENCIÓN: Si habla silva, tiene a iraheta disposición servicios gratuitos de asistencia lingüística. Llame al [phone].     EVELYN Ý: N?u b?n nói Ti?ng Vi?t, có các d?ch v? h? tr? ngôn ng? mi?n phí dành cho b?n. G?i s? [phone].         [facility] complies with applicable Federal civil rights laws and does not discriminate on the basis of race, color, national origin, age, disability, or sex.        "

## 2017-02-17 ENCOUNTER — OFFICE VISIT (OUTPATIENT)
Dept: UROLOGY | Facility: CLINIC | Age: 32
End: 2017-02-17
Payer: COMMERCIAL

## 2017-02-17 VITALS
TEMPERATURE: 98 F | WEIGHT: 138.88 LBS | SYSTOLIC BLOOD PRESSURE: 110 MMHG | HEART RATE: 66 BPM | BODY MASS INDEX: 22.32 KG/M2 | DIASTOLIC BLOOD PRESSURE: 70 MMHG | HEIGHT: 66 IN | RESPIRATION RATE: 20 BRPM

## 2017-02-17 DIAGNOSIS — N20.1 RIGHT URETERAL CALCULUS: Primary | ICD-10-CM

## 2017-02-17 DIAGNOSIS — T83.84XA PAIN DUE TO URETERAL STENT, INITIAL ENCOUNTER: ICD-10-CM

## 2017-02-17 DIAGNOSIS — N20.1 URETERAL CALCULUS, RIGHT: ICD-10-CM

## 2017-02-17 DIAGNOSIS — N20.0 NEPHROLITHIASIS: ICD-10-CM

## 2017-02-17 DIAGNOSIS — N13.30 HYDRONEPHROSIS OF RIGHT KIDNEY: ICD-10-CM

## 2017-02-17 DIAGNOSIS — N23 RENAL COLIC ON RIGHT SIDE: ICD-10-CM

## 2017-02-17 PROCEDURE — 99999 PR PBB SHADOW E&M-EST. PATIENT-LVL V: CPT | Mod: PBBFAC,,, | Performed by: UROLOGY

## 2017-02-17 PROCEDURE — 82370 X-RAY ASSAY CALCULUS: CPT

## 2017-02-17 PROCEDURE — 99213 OFFICE O/P EST LOW 20 MIN: CPT | Mod: S$GLB,,, | Performed by: UROLOGY

## 2017-02-17 RX ORDER — SODIUM CHLORIDE 9 MG/ML
INJECTION, SOLUTION INTRAVENOUS CONTINUOUS
Status: CANCELLED | OUTPATIENT
Start: 2017-02-17

## 2017-02-17 RX ORDER — OXYCODONE AND ACETAMINOPHEN 5; 325 MG/1; MG/1
1 TABLET ORAL EVERY 4 HOURS PRN
Qty: 30 TABLET | Refills: 0 | Status: SHIPPED | OUTPATIENT
Start: 2017-02-17 | End: 2017-04-18

## 2017-02-17 NOTE — MR AVS SNAPSHOT
[facility]  [address]  [place] [address]  Phone: [phone]  Fax: [phone]                  Kan Traore   2017 8:00 AM   Office Visit    Description:  Female : 1985   Provider:  Kendell Hernandez MD   Department:  [facility]           Reason for Visit     Post-op Evaluation           Diagnoses this Visit        Comments    Right ureteral calculus    -  Primary     Ureteral calculus, right         Renal colic on right side         Nephrolithiasis         Hydronephrosis of right kidney         Pain due to ureteral stent, initial encounter                To Do List           Future Appointments        Provider Department Dept Phone    3/1/2017 11:00 AM Kendell Hernandez MD [facility] [place] [phone]    3/6/2017 11:15 AM Michael A. Wiedemann, MD [place] [id number]    3/24/2017 4:00 PM Kendell Hernandez MD [facility] [place] [phone]      Goals (5 Years of Data)     None      Follow-Up and Disposition     Return in about 4 weeks (around 3/17/2017) for with KUB.    Follow-up and Disposition History       These Medications        Disp Refills Start End    oxycodone-acetaminophen (PERCOCET) 5-325 mg per tablet 30 tablet 0 2017     Take 1 tablet by mouth every 4 (four) hours as needed for Pain. - Oral    Pharmacy: [id number] [place] [facility] [address] Ph #: [phone]         [facility] Call     [facility] On Call Nurse Care Line -  Assistance  Registered nurses in the [facility] On Call Center provide clinical advisement, health education, appointment booking, and other advisory services.  Call for this free service at [phone].             Medications           Message regarding Medications     Verify the changes and/or additions to your medication regime listed below are the same as discussed with your clinician today.  If any of these changes or additions are incorrect, please notify your  healthcare provider.             Verify that the below list of medications is an accurate representation of the medications you are currently taking.  If none reported, the list may be blank. If incorrect, please contact your healthcare provider. Carry this list with you in case of emergency.           Current Medications     hydrocodone-acetaminophen 5-325mg (NORCO) 5-325 mg per tablet Take 1 tablet by mouth every 6 (six) hours as needed for Pain.    naproxen (NAPROSYN) 500 MG tablet Take 500 mg by mouth 2 (two) times daily.    nitrofurantoin, macrocrystal-monohydrate, (MACROBID) 100 MG capsule Take 1 capsule (100 mg total) by mouth 2 (two) times daily.    ondansetron (ZOFRAN) 4 MG tablet Take 1 tablet (4 mg total) by mouth every 6 (six) hours.    oxycodone-acetaminophen (PERCOCET) 5-325 mg per tablet Take 1 tablet by mouth every 4 (four) hours as needed for Pain.    sertraline (ZOLOFT) 50 MG tablet Take 1 tablet (50 mg total) by mouth once daily.           Clinical Reference Information           Prenatal Vitals     Enc. Date GA Prenatal Vitals Prenatal Pulse Pain Level Urine Albumin/Glucose Edema Presentation Dilation/Effacement/Station    2/17/17 39w0d 110/70 / 63 kg (138 lb 14.2 oz)  66         2/3/17 39w0d Admission Dept: [facility] L&D    2/2/17 38w6d Admission Dx: Amenorrhea Dept: [facility] MOMBABY    1/30/17 38w3d 116/64 / 72.3 kg (159 lb 6.3 oz)   6        1/29/17 38w2d Admission Dept: [facility] L&D    1/26/17 37w6d 106/60 / 72.6 kg (160 lb 0.9 oz)   0        1/19/17 36w6d 104/76 / 71.9 kg (158 lb 8.2 oz)   0        1/12/17 35w6d 108/66 / 71.5 kg (157 lb 10.1 oz)   3        12/30/16 34w0d 110/62 / 70.2 kg (154 lb 12.2 oz)   0 Negative / Negative       11/18/16 28w0d Admission Dept: [facility] L&D    11/15/16 27w4d 116/70 / 65.7 kg (144 lb 13.5 oz)   0        10/17/16 23w3d 100/80 / 64.6 kg (142 lb 6.7 oz)           9/19/16 19w3d 114/68 / 60.9 kg (134 lb 4.2 oz)   0        8/22/16 15w3d 104/68 / 58.5 kg (128 lb 15.5 oz)   0      "   7/14/16 9w6d 122/64 / 54.7 kg (120 lb 9.5 oz)   0           Number of babies: 1   Height: 5' 6" (1.676 m)       Your Vitals Were     BP Pulse Temp Resp Height Weight    110/70 66 98 °F (36.7 °C) 20 5' 6" (1.676 m) 63 kg (138 lb 14.2 oz)    Last Period BMI             (LMP Unknown) 22.42 kg/m2         Blood Pressure          Most Recent Value    BP  110/70      Allergies as of 2/17/2017     No Known Allergies      Immunizations Administered on Date of Encounter - 2/17/2017     None      Orders Placed During Today's Visit      Normal Orders This Visit    Case Request Operating Room: LITHOTRIPSY-EXTRACORPOREAL SHOCK WAVE, CYSTOSCOPY WITH STENT REMOVAL     Diet NPO     Future Labs/Procedures Expected by Expires    Basic metabolic panel  2/17/2017 4/18/2018    CBC auto differential  2/17/2017 4/18/2018    Urinalysis  2/17/2017 4/18/2018    Urinary Stone Analysis  2/17/2017 4/18/2018    Urine culture  2/17/2017 4/18/2018      Instructions    ESWL 2/23/17 with stent removal.  Percocet as needed.  Increase fluid intake.  Sone analysis       Language Assistance Services     ATTENTION: Language assistance services are available, free of charge. Please call [phone].      ATENCIÓN: Si habla silva, tiene a iraheta disposición servicios gratuitos de asistencia lingüística. Llame al [phone].     [facility] Ý: N?u b?n nói Ti?ng Vi?t, có các d?ch v? h? tr? ngôn ng? mi?n phí dành cho b?n. G?i s? [phone].         [facility] complies with applicable Federal civil rights laws and does not discriminate on the basis of race, color, national origin, age, disability, or sex.        "

## 2017-02-17 NOTE — PROGRESS NOTES
Subjective:       Patient ID: Kan Traore is a 31 y.o. female.    Chief Complaint: Post-op Evaluation    HPI Comments: Pt with impacted right upper ureteral calculus. S/P laser lithotripsy to Right Lower ureteral calculus. Pt ha s stent colic. 14 days post-partum.      Other   This is a new problem. The current episode started 1 to 4 weeks ago. The problem occurs 2 to 4 times per day. The problem has been unchanged. Associated symptoms include fatigue and urinary symptoms. Pertinent negatives include no abdominal pain, anorexia, arthralgias, change in bowel habit, chest pain, chills, congestion, coughing, diaphoresis, fever, headaches, joint swelling, myalgias, nausea, neck pain, numbness, rash, sore throat, swollen glands, vertigo, visual change, vomiting or weakness.   Flank Pain   This is a recurrent problem. The current episode started 1 to 4 weeks ago. The problem occurs 2 to 4 times per day. The problem has been waxing and waning since onset. The pain is present in the costovertebral angle. The quality of the pain is described as cramping and aching. The pain does not radiate. The pain is at a severity of 3/10. The pain is mild. The pain is the same all the time. The symptoms are aggravated by urinating (Picking up children). Pertinent negatives include no abdominal pain, bladder incontinence, bowel incontinence, chest pain, dysuria, fever, headaches, leg pain, numbness, paresis, paresthesias, pelvic pain, perianal numbness, tingling, weakness or weight loss. Risk factors include renal stones and pregnancy. She has tried analgesics (S/P laser lithotripsy) for the symptoms. The treatment provided moderate relief.     Review of Systems   Constitutional: Positive for fatigue. Negative for activity change, appetite change, chills, diaphoresis, fever and weight loss.   HENT: Negative for congestion, ear pain, hearing loss, nosebleeds, sinus pressure, sore throat and trouble swallowing.    Eyes: Negative  for pain and visual disturbance.   Respiratory: Negative for apnea, cough and shortness of breath.    Cardiovascular: Negative for chest pain and leg swelling.   Gastrointestinal: Negative for abdominal distention, abdominal pain, anal bleeding, anorexia, blood in stool, bowel incontinence, change in bowel habit, constipation, diarrhea, nausea, rectal pain and vomiting.   Endocrine: Negative for cold intolerance, heat intolerance, polydipsia, polyphagia and polyuria.   Genitourinary: Positive for flank pain. Negative for bladder incontinence, decreased urine volume, difficulty urinating, dyspareunia, dysuria, enuresis, frequency, genital sores, hematuria, menstrual problem, pelvic pain, urgency, vaginal bleeding, vaginal discharge and vaginal pain.   Musculoskeletal: Negative for arthralgias, back pain, joint swelling, myalgias and neck pain.   Skin: Negative for color change, pallor and rash.   Allergic/Immunologic: Negative for environmental allergies, food allergies and immunocompromised state.   Neurological: Negative for dizziness, vertigo, tingling, speech difficulty, weakness, numbness, headaches and paresthesias.   Hematological: Negative for adenopathy. Does not bruise/bleed easily.   Psychiatric/Behavioral: Negative.        Objective:      Physical Exam   Nursing note and vitals reviewed.  Constitutional: She is oriented to person, place, and time. She appears well-developed and well-nourished.   HENT:   Head: Normocephalic.   Nose: Nose normal.   Mouth/Throat: Oropharynx is clear and moist.   Eyes: Conjunctivae and EOM are normal. Pupils are equal, round, and reactive to light.   Neck: Normal range of motion. Neck supple.   Cardiovascular: Normal rate, regular rhythm, normal heart sounds and intact distal pulses.    Pulmonary/Chest: Effort normal and breath sounds normal.   Abdominal: Soft. Bowel sounds are normal.   Genitourinary: Vagina normal.   Genitourinary Comments: Mild Right CVAT.    Musculoskeletal: Normal range of motion.   Neurological: She is alert and oriented to person, place, and time. She has normal reflexes.   Skin: Skin is warm and dry.     Psychiatric: She has a normal mood and affect. Her behavior is normal. Judgment and thought content normal.       Assessment:       1. Right ureteral calculus    2. Ureteral calculus, right    3. Renal colic on right side    4. Nephrolithiasis    5. Hydronephrosis of right kidney    6. Pain due to ureteral stent, initial encounter        Plan:       Patient Instructions   ESWL 2/23/17 with stent removal.  Percocet as needed.  Increase fluid intake.  Sone analysis

## 2017-02-23 ENCOUNTER — TELEPHONE (OUTPATIENT)
Dept: OBSTETRICS AND GYNECOLOGY | Facility: CLINIC | Age: 32
End: 2017-02-23

## 2017-02-23 DIAGNOSIS — N20.0 KIDNEY STONES: Primary | ICD-10-CM

## 2017-02-23 NOTE — TELEPHONE ENCOUNTER
Returned patients call.   Patient notified she can take percocet or vicodin. Patient verbalized understanding.

## 2017-02-23 NOTE — TELEPHONE ENCOUNTER
Mother called for information on a medication she has been prescribed.  On Thurs. 2/16 and today (2/23) she was on out patient at [facility] for a Lithotripsy procedure for kidney stones.  She was prescribed Norco for pain and wanted information on drug and compare it to Percocet which she was discharged from here with.  Information given on both drugs (L3) from Dr. French book on Medications and mother's milk.  Mother told to call Dr. Renee, baby's pediatrician and discuss with her if she has a preference on which drug she can take for pain.  Mother stated that her baby was breastfeeding well, getting lots of dirty/wet diapers and denies any needs.  Positive reinforcement given to patient, all questions answered, verbalizes understanding.

## 2017-02-23 NOTE — TELEPHONE ENCOUNTER
----- Message from Sallie Paul sent at 2/23/2017 10:36 AM CST -----  Contact: [phone]  Patient states she is breast feeding and has kidney stones and would like to know what kind of pain meds. She can take either vicodin or oxycodone

## 2017-03-10 LAB — URINARY STONE ANALYSIS: NORMAL

## 2017-03-24 ENCOUNTER — OFFICE VISIT (OUTPATIENT)
Dept: UROLOGY | Facility: CLINIC | Age: 32
End: 2017-03-24
Payer: COMMERCIAL

## 2017-03-24 VITALS
SYSTOLIC BLOOD PRESSURE: 100 MMHG | DIASTOLIC BLOOD PRESSURE: 60 MMHG | WEIGHT: 128.5 LBS | HEART RATE: 65 BPM | TEMPERATURE: 98 F | HEIGHT: 66 IN | RESPIRATION RATE: 20 BRPM | BODY MASS INDEX: 20.65 KG/M2

## 2017-03-24 DIAGNOSIS — N20.0 NEPHROLITHIASIS: Primary | ICD-10-CM

## 2017-03-24 PROCEDURE — 99999 PR PBB SHADOW E&M-EST. PATIENT-LVL III: CPT | Mod: PBBFAC,,, | Performed by: UROLOGY

## 2017-03-24 PROCEDURE — 99024 POSTOP FOLLOW-UP VISIT: CPT | Mod: S$GLB,,, | Performed by: UROLOGY

## 2017-03-24 NOTE — MR AVS SNAPSHOT
[facility]  [address] [place] [address] [address]  [place] [address]  Phone: [phone]  Fax: [phone]                  Kan Traore   3/24/2017 4:00 PM   Office Visit    Description:  Female : 1985   Provider:  Kendell Hernandez MD   Department:  [facility]           Reason for Visit     Nephrolithiasis           Diagnoses this Visit        Comments    Nephrolithiasis    -  Primary            To Do List           Goals (5 Years of Data)     None      Follow-Up and Disposition     Return in about 6 months (around 2017) for f/u and renal U/S.      [facility] On Call     [facility] On Call Nurse [facility] -  Assistance  Registered nurses in the [facility] On Call Center provide clinical advisement, health education, appointment booking, and other advisory services.  Call for this free service at [phone].             Medications           Message regarding Medications     Verify the changes and/or additions to your medication regime listed below are the same as discussed with your clinician today.  If any of these changes or additions are incorrect, please notify your healthcare provider.        STOP taking these medications     oxycodone-acetaminophen (PERCOCET)  mg per tablet Take 1 tablet by mouth every 4 (four) hours as needed for Pain.    nitrofurantoin, macrocrystal-monohydrate, (MACROBID) 100 MG capsule Take 100 mg by mouth 2 (two) times daily.           Verify that the below list of medications is an accurate representation of the medications you are currently taking.  If none reported, the list may be blank. If incorrect, please contact your healthcare provider. Carry this list with you in case of emergency.           Current Medications     acetaminophen (TYLENOL) 325 MG tablet Take 1 tablet (325 mg total) by mouth every 6 (six) hours as needed for Pain.    ondansetron (ZOFRAN) 4 MG tablet Take 1 tablet (4 mg total) by mouth every 6 (six) hours.     "oxycodone-acetaminophen (PERCOCET) 5-325 mg per tablet Take 1 tablet by mouth every 4 (four) hours as needed for Pain.    sertraline (ZOLOFT) 50 MG tablet Take 1 tablet (50 mg total) by mouth once daily.    tamsulosin (FLOMAX) 0.4 mg Cp24 Take 1 capsule (0.4 mg total) by mouth every evening.    ketorolac (TORADOL) 10 mg tablet Take 1 tablet (10 mg total) by mouth every 6 (six) hours.    naproxen (NAPROSYN) 500 MG tablet Take 500 mg by mouth 2 (two) times daily.           Clinical Reference Information           Prenatal Vitals     Enc. Date GA Prenatal Vitals Prenatal Pulse Pain Level Urine Albumin/Glucose Edema Presentation Dilation/Effacement/Station    3/24/17 39w0d 100/60 / 58.3 kg (128 lb 8.5 oz)  65         2/3/17 39w0d Admission Dept: [facility] L&D    2/2/17 38w6d [facility] Dx: [facility]hea [facility]    1/30/17 38w3d 116/64 / 72.3 kg (159 lb 6.3 oz)   6        1/29/17 38w2d Admission Dept: [facility] L&D    1/26/17 37w6d 106/60 / 72.6 kg (160 lb 0.9 oz)   0        1/19/17 36w6d 104/76 / 71.9 kg (158 lb 8.2 oz)   0        1/12/17 35w6d 108/66 / 71.5 kg (157 lb 10.1 oz)   3        12/30/16 34w0d 110/62 / 70.2 kg (154 lb 12.2 oz)   0 Negative / Negative       11/18/16 28w0d Admission Dept: [facility] L&D    11/15/16 27w4d 116/70 / 65.7 kg (144 lb 13.5 oz)   0        10/17/16 23w3d 100/80 / 64.6 kg (142 lb 6.7 oz)           9/19/16 19w3d 114/68 / 60.9 kg (134 lb 4.2 oz)   0        8/22/16 15w3d 104/68 / 58.5 kg (128 lb 15.5 oz)   0        7/14/16 9w6d 122/64 / 54.7 kg (120 lb 9.5 oz)   0           Number of babies: 1   Height: 5' 6" (1.676 m)       Your Vitals Were     BP Pulse Temp Resp Height Weight    100/60 65 98.2 °F (36.8 °C) 20 5' 6" (1.676 m) 58.3 kg (128 lb 8.5 oz)    Last Period BMI             05/07/2016 20.74 kg/m2         Blood Pressure          Most Recent Value    BP  100/60      Allergies as of 3/24/2017     No Known Allergies      Immunizations Administered on Date of Encounter - 3/24/2017     None    "   Orders Placed During Today's Visit     Future Labs/Procedures Expected by Expires    BASIC METABOLIC PANEL  3/24/2017 5/23/2018    Stone risk profile  As directed 3/24/2018      Instructions    Obtain 24 hour stone risk study and BMP  F/U 6 mo with renal U/S  Increase water to 8 glasses of water daily.  Preventing Kidney Stones  If youve had a kidney stone, you may worry that youll have another. Removing or passing your stone doesnt prevent future stones. With your doctors help, though, you can reduce your risk of forming new stones. Follow up with your doctor to help detect new stones. You may need follow-up every 3 months to a year for a lifetime.    Drink lots of water  Staying well-hydrated is the best way to reduce your risk of future stones. Drink 8 12-ounce glasses of water daily. Have 2 with each meal and 2 between meals. Try keeping a pitcher of water nearby during the day and at night.  Take medications if needed  Medications, including vitamins and minerals, may be prescribed for certain types of stones. You may want to write your doses and medication times on a calendar. Some medications decrease stone-forming chemicals in your blood. Others help prevent those chemicals from crystallizing in urine. Still others help keep a normal acid balance in your urine.  Follow your prescribed diet  Your doctor will tell you which foods contain the chemicals you should avoid. Your doctor may also suggest talking to a dietitian. He or she can help you plan meals youll enjoy. These meals wont put you at risk for future stones. You may be told to limit certain foods, depending on which type of stones youve had. You should limit the amount of salt in your food to about 2 grams a day. This will help prevent most types of kidney stones. Make sure you get an adequate amount of calcium in your diet.  For calcium oxalate stones: Limit animal protein, such as meat, eggs, and fish. Limit grapefruit juice and  alcohol. Limit high-oxalate foods (such as cola, tea, chocolate, spinach, rhubarb, wheat bran, and peanuts).  For uric acid stones: Limit high-purine foods, such as mushrooms, peas, beans, anchovies, meat, poultry, shellfish, and organ meats. These foods increase uric acid production.  For cystine stones: Limit high-methionine foods (fish is the most common, but eggs and meats, also). These foods increase production of cystine.  Date Last Reviewed: 1/5/2015  © 1180-2653 [employer]. [address] [place] [address]. All rights reserved. This information is not intended as a substitute for professional medical care. Always follow your healthcare professional's instructions.             Language Assistance Services     ATTENTION: Language assistance services are available, free of charge. Please call [phone].      ATENCIÓN: Si sofiala silva, tiene a iraheta disposición servicios gratuitos de asistencia lingüística. Llame al [phone].     CHÚ Ý: N?u b?n nói Ti?ng Vi?t, có các d?ch v? h? tr? ngôn ng? mi?n phí dành cho b?n. G?i s? [phone].         [facility] complies with applicable Federal civil rights laws and does not discriminate on the basis of race, color, national origin, age, disability, or sex.

## 2017-03-24 NOTE — PROGRESS NOTES
Subjective:       Patient ID: Kan Traore is a 31 y.o. female.    Chief Complaint: Nephrolithiasis    HPI Comments: 32 yo WF 29 days s/p ESWL. No pain at present. KUB with residual renal calculi.    Other   This is a chronic (nephrolithiasis) problem. The current episode started more than 1 year ago. The problem occurs intermittently. The problem has been gradually worsening. Pertinent negatives include no abdominal pain, anorexia, arthralgias, change in bowel habit, chest pain, chills, congestion, coughing, diaphoresis, fatigue, fever, headaches, joint swelling, myalgias, nausea, neck pain, numbness, rash, sore throat, swollen glands, urinary symptoms, vertigo, visual change, vomiting or weakness. Exacerbated by: pregnancy. She has tried oral narcotics, NSAIDs and drinking (ESWL, Ureteroscopy) for the symptoms. The treatment provided mild relief.     Review of Systems   Constitutional: Negative for activity change, appetite change, chills, diaphoresis, fatigue and fever.   HENT: Negative for congestion, ear pain, hearing loss, nosebleeds, sinus pressure, sore throat and trouble swallowing.    Eyes: Negative for pain and visual disturbance.   Respiratory: Negative for apnea, cough and shortness of breath.    Cardiovascular: Negative for chest pain and leg swelling.   Gastrointestinal: Negative for abdominal distention, abdominal pain, anal bleeding, anorexia, blood in stool, change in bowel habit, constipation, diarrhea, nausea, rectal pain and vomiting.   Endocrine: Negative for cold intolerance, heat intolerance, polydipsia, polyphagia and polyuria.   Genitourinary: Negative for decreased urine volume, difficulty urinating, dyspareunia, dysuria, enuresis, flank pain, frequency, genital sores, hematuria, menstrual problem, pelvic pain, urgency, vaginal bleeding, vaginal discharge and vaginal pain.   Musculoskeletal: Negative for arthralgias, back pain, joint swelling, myalgias and neck pain.   Skin:  Negative for color change, pallor and rash.   Allergic/Immunologic: Negative for environmental allergies, food allergies and immunocompromised state.   Neurological: Negative for dizziness, vertigo, speech difficulty, weakness, numbness and headaches.   Hematological: Negative for adenopathy. Does not bruise/bleed easily.   Psychiatric/Behavioral: Negative.        Objective:      Physical Exam   Nursing note and vitals reviewed.  Constitutional: She is oriented to person, place, and time. She appears well-developed and well-nourished.   HENT:   Head: Normocephalic.   Nose: Nose normal.   Mouth/Throat: Oropharynx is clear and moist.   Eyes: Conjunctivae and EOM are normal. Pupils are equal, round, and reactive to light.   Neck: Normal range of motion. Neck supple.   Cardiovascular: Normal rate, regular rhythm, normal heart sounds and intact distal pulses.    Pulmonary/Chest: Effort normal and breath sounds normal.   Abdominal: Soft. Bowel sounds are normal.   Musculoskeletal: Normal range of motion.   Neurological: She is alert and oriented to person, place, and time. She has normal reflexes.   Skin: Skin is warm and dry.     Psychiatric: She has a normal mood and affect. Her behavior is normal. Judgment and thought content normal.       Assessment:       1. Nephrolithiasis        Plan:       Patient Instructions   Obtain 24 hour stone risk study and BMP  F/U 6 mo with renal U/S  Increase water to 8 glasses of water daily.  Preventing Kidney Stones  If youve had a kidney stone, you may worry that youll have another. Removing or passing your stone doesnt prevent future stones. With your doctors help, though, you can reduce your risk of forming new stones. Follow up with your doctor to help detect new stones. You may need follow-up every 3 months to a year for a lifetime.    Drink lots of water  Staying well-hydrated is the best way to reduce your risk of future stones. Drink 8 12-ounce glasses of water daily. Have  2 with each meal and 2 between meals. Try keeping a pitcher of water nearby during the day and at night.  Take medications if needed  Medications, including vitamins and minerals, may be prescribed for certain types of stones. You may want to write your doses and medication times on a calendar. Some medications decrease stone-forming chemicals in your blood. Others help prevent those chemicals from crystallizing in urine. Still others help keep a normal acid balance in your urine.  Follow your prescribed diet  Your doctor will tell you which foods contain the chemicals you should avoid. Your doctor may also suggest talking to a dietitian. He or she can help you plan meals youll enjoy. These meals wont put you at risk for future stones. You may be told to limit certain foods, depending on which type of stones youve had. You should limit the amount of salt in your food to about 2 grams a day. This will help prevent most types of kidney stones. Make sure you get an adequate amount of calcium in your diet.  For calcium oxalate stones: Limit animal protein, such as meat, eggs, and fish. Limit grapefruit juice and alcohol. Limit high-oxalate foods (such as cola, tea, chocolate, spinach, rhubarb, wheat bran, and peanuts).  For uric acid stones: Limit high-purine foods, such as mushrooms, peas, beans, anchovies, meat, poultry, shellfish, and organ meats. These foods increase uric acid production.  For cystine stones: Limit high-methionine foods (fish is the most common, but eggs and meats, also). These foods increase production of cystine.  Date Last Reviewed: 1/5/2015  © 6210-3334 [employer]. [address] [place] [address], [place] [address]. All rights reserved. This information is not intended as a substitute for professional medical care. Always follow your healthcare professional's instructions.

## 2017-03-24 NOTE — PATIENT INSTRUCTIONS
Obtain 24 hour stone risk study and BMP  F/U 6 mo with renal U/S  Increase water to 8 glasses of water daily.  Preventing Kidney Stones  If youve had a kidney stone, you may worry that youll have another. Removing or passing your stone doesnt prevent future stones. With your doctors help, though, you can reduce your risk of forming new stones. Follow up with your doctor to help detect new stones. You may need follow-up every 3 months to a year for a lifetime.    Drink lots of water  Staying well-hydrated is the best way to reduce your risk of future stones. Drink 8 12-ounce glasses of water daily. Have 2 with each meal and 2 between meals. Try keeping a pitcher of water nearby during the day and at night.  Take medications if needed  Medications, including vitamins and minerals, may be prescribed for certain types of stones. You may want to write your doses and medication times on a calendar. Some medications decrease stone-forming chemicals in your blood. Others help prevent those chemicals from crystallizing in urine. Still others help keep a normal acid balance in your urine.  Follow your prescribed diet  Your doctor will tell you which foods contain the chemicals you should avoid. Your doctor may also suggest talking to a dietitian. He or she can help you plan meals youll enjoy. These meals wont put you at risk for future stones. You may be told to limit certain foods, depending on which type of stones youve had. You should limit the amount of salt in your food to about 2 grams a day. This will help prevent most types of kidney stones. Make sure you get an adequate amount of calcium in your diet.  For calcium oxalate stones: Limit animal protein, such as meat, eggs, and fish. Limit grapefruit juice and alcohol. Limit high-oxalate foods (such as cola, tea, chocolate, spinach, rhubarb, wheat bran, and peanuts).  For uric acid stones: Limit high-purine foods, such as mushrooms, peas, beans, anchovies, meat,  poultry, shellfish, and organ meats. These foods increase uric acid production.  For cystine stones: Limit high-methionine foods (fish is the most common, but eggs and meats, also). These foods increase production of cystine.  Date Last Reviewed: 1/5/2015  © 8005-1974 [employer]. [address] [place] [address], [place] [address]. All rights reserved. This information is not intended as a substitute for professional medical care. Always follow your healthcare professional's instructions.

## 2017-04-06 ENCOUNTER — OFFICE VISIT (OUTPATIENT)
Dept: OBSTETRICS AND GYNECOLOGY | Facility: CLINIC | Age: 32
End: 2017-04-06
Payer: COMMERCIAL

## 2017-04-06 VITALS
WEIGHT: 126.56 LBS | SYSTOLIC BLOOD PRESSURE: 120 MMHG | BODY MASS INDEX: 20.42 KG/M2 | DIASTOLIC BLOOD PRESSURE: 76 MMHG

## 2017-04-06 DIAGNOSIS — Z01.419 WELL WOMAN EXAM WITH ROUTINE GYNECOLOGICAL EXAM: Primary | ICD-10-CM

## 2017-04-06 PROCEDURE — 99999 PR PBB SHADOW E&M-EST. PATIENT-LVL III: CPT | Mod: PBBFAC,,, | Performed by: OBSTETRICS & GYNECOLOGY

## 2017-04-06 PROCEDURE — 99395 PREV VISIT EST AGE 18-39: CPT | Mod: S$GLB,,, | Performed by: OBSTETRICS & GYNECOLOGY

## 2017-04-06 PROCEDURE — 88175 CYTOPATH C/V AUTO FLUID REDO: CPT

## 2017-04-06 RX ORDER — ACETAMINOPHEN AND CODEINE PHOSPHATE 120; 12 MG/5ML; MG/5ML
1 SOLUTION ORAL DAILY
Qty: 30 TABLET | Refills: 6 | Status: SHIPPED | OUTPATIENT
Start: 2017-04-06 | End: 2017-11-17 | Stop reason: SDUPTHER

## 2017-04-06 NOTE — PROGRESS NOTES
HPI:   31 y.o.   OB History      Para Term  AB TAB SAB Ectopic Multiple Living    4 3 3 0 1 0 1 0 0 3       Patient's last menstrual period was 2016.    Patient is  here for her annual gynecologic exam.  She has no complaints.     ROS:  GENERAL: No fever, chills, fatigability or weight loss.  SKIN: No rashes, itching or changes in color or texture of skin.  HEAD: No headaches or recent head trauma.  EYES: Visual acuity fine. No photophobia, ocular pain or diplopia.  EARS: Denies ear pain, discharge or vertigo.  NOSE: No loss of smell, no epistaxis or postnasal drip.  MOUTH & THROAT: No hoarseness or change in voice. No excessive gum bleeding.  NODES: Denies swollen glands.  CHEST: Denies JONES, cyanosis, wheezing, cough and sputum production.  CARDIOVASCULAR: Denies chest pain, PND, orthopnea or reduced exercise tolerance.  ABDOMEN: Appetite fine. No weight loss. Denies diarrhea, abdominal pain, hematemesis or blood in stool.  URINARY: No flank pain, dysuria or hematuria.  PERIPHERAL VASCULAR: No claudication or cyanosis.  MUSCULOSKELETAL: No joint stiffness or swelling. Denies back pain.  NEUROLOGIC: No history of seizures, paralysis, alteration of gait or coordination.    PE:   /76  Wt 57.4 kg (126 lb 8.7 oz)  LMP 2016  BMI 20.42 kg/m2  APPEARANCE: Well nourished, well developed, in no acute distress.  NECK: Neck symmetric without masses or thyromegaly.  BREASTS: Symmetrical, no skin changes or visible lesions. No palpable masses, nipple discharge or adenopathy bilaterally.  ABDOMEN: Flat. Soft. No tenderness or masses. No hepatosplenomegaly. No hernias. No CVA tenderness.  VULVA: No lesions. Normal female genitalia.  URETHRAL MEATUS: Normal size and location, no lesions, no prolapse.  URETHRA: No masses, tenderness, prolapse or scarring.  VAGINA: Moist and well rugated, no discharge, no significant cystocele or rectocele.  CERVIX: No lesions and discharge. PAP done.  UTERUS:  Normal size, regular shape, mobile, non-tender, bladder base nontender.  ADNEXA: No masses, tenderness or CDS nodularity.  ANUS PERINEUM: Normal.    PROCEDURES:  Pap smear    Assessment:  Normal Gynecologic Exam    Plan:  Mammogram and Colonoscopy if indicated by current recommendations.  Return to clinic in one year or for any problems or complaints.  Kidney stones,   Bf  prog only pill

## 2017-04-18 ENCOUNTER — OFFICE VISIT (OUTPATIENT)
Dept: FAMILY MEDICINE | Facility: CLINIC | Age: 32
End: 2017-04-18
Payer: COMMERCIAL

## 2017-04-18 VITALS
HEART RATE: 77 BPM | BODY MASS INDEX: 20.35 KG/M2 | DIASTOLIC BLOOD PRESSURE: 64 MMHG | TEMPERATURE: 98 F | OXYGEN SATURATION: 97 % | SYSTOLIC BLOOD PRESSURE: 100 MMHG | WEIGHT: 126.13 LBS

## 2017-04-18 DIAGNOSIS — F41.9 ANXIETY AND DEPRESSION: ICD-10-CM

## 2017-04-18 DIAGNOSIS — F32.A ANXIETY AND DEPRESSION: ICD-10-CM

## 2017-04-18 DIAGNOSIS — F98.8 ATTENTION DEFICIT DISORDER (ADD): Primary | ICD-10-CM

## 2017-04-18 PROCEDURE — 99213 OFFICE O/P EST LOW 20 MIN: CPT | Mod: S$GLB,,, | Performed by: NURSE PRACTITIONER

## 2017-04-18 PROCEDURE — 1160F RVW MEDS BY RX/DR IN RCRD: CPT | Mod: S$GLB,,, | Performed by: NURSE PRACTITIONER

## 2017-04-18 PROCEDURE — 99999 PR PBB SHADOW E&M-EST. PATIENT-LVL III: CPT | Mod: PBBFAC,,, | Performed by: NURSE PRACTITIONER

## 2017-04-18 RX ORDER — SERTRALINE HYDROCHLORIDE 50 MG/1
50 TABLET, FILM COATED ORAL DAILY
Qty: 30 TABLET | Refills: 5 | Status: SHIPPED | OUTPATIENT
Start: 2017-04-18 | End: 2017-10-16 | Stop reason: SDUPTHER

## 2017-04-18 RX ORDER — DEXTROAMPHETAMINE SACCHARATE, AMPHETAMINE ASPARTATE MONOHYDRATE, DEXTROAMPHETAMINE SULFATE AND AMPHETAMINE SULFATE 2.5; 2.5; 2.5; 2.5 MG/1; MG/1; MG/1; MG/1
10 CAPSULE, EXTENDED RELEASE ORAL EVERY MORNING
Qty: 30 CAPSULE | Refills: 0 | Status: SHIPPED | OUTPATIENT
Start: 2017-04-18 | End: 2017-05-18 | Stop reason: SDUPTHER

## 2017-04-18 NOTE — MR AVS SNAPSHOT
[facility]  [address]  [place] [address]  Phone: [phone]  Fax: [phone]                  Kan Traore   2017 11:20 AM   Office Visit    Description:  Female : 1985   Provider:  Madeline Aguilar NP   Department:  [facility]           Reason for Visit     Medication Refill           Diagnoses this Visit        Comments    Attention deficit disorder (ADD)    -  Primary     Anxiety and depression                To Do List           Future Appointments        Provider Department Dept Phone    2017 10:00 AM Madeline Aguilar NP [facility] [phone]    2017 10:30 AM Kendell Hernandez MD [facility] [phone]      Goals (5 Years of Data)     None      Follow-Up and Disposition     Return in about 4 weeks (around 2017).       These Medications        Disp Refills Start End    dextroamphetamine-amphetamine (ADDERALL XR) 10 MG 24 hr capsule 30 capsule 0 2017     Take 1 capsule (10 mg total) by mouth every morning. - Oral    Pharmacy: [id number] - [place] - [id number] [facility] Ph #: [phone]       sertraline (ZOLOFT) 50 MG tablet 30 tablet 5 2017    Take 1 tablet (50 mg total) by mouth once daily. - Oral    Pharmacy: [id number] - [place] - [address] [facility] Ph #: [phone]         [facility]     [facility] -  Assistance  Unless otherwise directed by your provider, please contact [facility], our nurse care line that is available for  assistance.     Registered nurses in the [facility] provide: appointment scheduling, clinical advisement, health education, and other advisory services.  Call: [phone] (toll free)               Medications           Message regarding Medications     Verify the changes and/or additions to your medication regime listed below are the same as discussed with  your clinician today.  If any of these changes or additions are incorrect, please notify your healthcare provider.        START taking these NEW medications        Refills    dextroamphetamine-amphetamine (ADDERALL XR) 10 MG 24 hr capsule 0    Sig: Take 1 capsule (10 mg total) by mouth every morning.    Class: Print    Route: Oral      STOP taking these medications     acetaminophen (TYLENOL) 325 MG tablet Take 1 tablet (325 mg total) by mouth every 6 (six) hours as needed for Pain.    ketorolac (TORADOL) 10 mg tablet Take 1 tablet (10 mg total) by mouth every 6 (six) hours.    naproxen (NAPROSYN) 500 MG tablet Take 500 mg by mouth 2 (two) times daily.    ondansetron (ZOFRAN) 4 MG tablet Take 1 tablet (4 mg total) by mouth every 6 (six) hours.    oxycodone-acetaminophen (PERCOCET) 5-325 mg per tablet Take 1 tablet by mouth every 4 (four) hours as needed for Pain.    tamsulosin (FLOMAX) 0.4 mg Cp24 Take 1 capsule (0.4 mg total) by mouth every evening.           Verify that the below list of medications is an accurate representation of the medications you are currently taking.  If none reported, the list may be blank. If incorrect, please contact your healthcare provider. Carry this list with you in case of emergency.           Current Medications     norethindrone (MICRONOR) 0.35 mg tablet Take 1 tablet (0.35 mg total) by mouth once daily.    sertraline (ZOLOFT) 50 MG tablet Take 1 tablet (50 mg total) by mouth once daily.    dextroamphetamine-amphetamine (ADDERALL XR) 10 MG 24 hr capsule Take 1 capsule (10 mg total) by mouth every morning.           Clinical Reference Information           Prenatal Vitals     Enc. Date GA Prenatal Vitals Prenatal Pulse Pain Level Urine Albumin/Glucose Edema Presentation Dilation/Effacement/Station    4/18/17 39w0d 100/64 / 57.2 kg (126 lb 1.7 oz)  77         4/6/17 39w0d 120/76 / 57.4 kg (126 lb 8.7 oz)           2/3/17 39w0d Admission Dept: [facility] L&D    2/2/17 38w6d Admission Dx:  "Amenorrhea Dept: [facility] MOMBABY    1/30/17 38w3d 116/64 / 72.3 kg (159 lb 6.3 oz)   6        1/29/17 38w2d Admission Dept: [facility] L&D    1/26/17 37w6d 106/60 / 72.6 kg (160 lb 0.9 oz)   0        1/19/17 36w6d 104/76 / 71.9 kg (158 lb 8.2 oz)   0        1/12/17 35w6d 108/66 / 71.5 kg (157 lb 10.1 oz)   3        12/30/16 34w0d 110/62 / 70.2 kg (154 lb 12.2 oz)   0 Negative / Negative       11/18/16 28w0d Admission Dept: [facility] L&D    11/15/16 27w4d 116/70 / 65.7 kg (144 lb 13.5 oz)   0        10/17/16 23w3d 100/80 / 64.6 kg (142 lb 6.7 oz)           9/19/16 19w3d 114/68 / 60.9 kg (134 lb 4.2 oz)   0        8/22/16 15w3d 104/68 / 58.5 kg (128 lb 15.5 oz)   0        7/14/16 9w6d 122/64 / 54.7 kg (120 lb 9.5 oz)   0           Number of babies: 1   Height: 5' 6" (1.676 m)       Your Vitals Were     BP Pulse Temp Weight Last Period SpO2    100/64 (BP Location: Left arm, Patient Position: Sitting, BP Method: Manual) 77 97.7 °F (36.5 °C) (Oral) 57.2 kg (126 lb 1.7 oz) 05/07/2016 97%    BMI                20.35 kg/m2          Blood Pressure          Most Recent Value    BP  100/64      Allergies as of 4/18/2017     No Known Allergies      Immunizations Administered on Date of Encounter - 4/18/2017     None      Language Assistance Services     ATTENTION: Language assistance services are available, free of charge. Please call [phone].      ATENCIÓN: Si habla español, tiene a iraheta disposición servicios gratuitos de asistencia lingüística. Llame al [phone].     CHÚ Ý: N?u b?n nói Ti?ng Vi?t, có các d?ch v? h? tr? ngôn ng? mi?n phí dành cho b?n. G?i s? [phone].         [facility] complies with applicable Federal civil rights laws and does not discriminate on the basis of race, color, national origin, age, disability, or sex.        "

## 2017-04-18 NOTE — PROGRESS NOTES
Subjective:       Patient ID: Kan Traore is a 31 y.o. female.    Chief Complaint: Medication Refill    HPI Comments: Patient is here today for follow up.    Patient has Anxiety and Depression and was started back on Zoloft 50 mg daily after delivering baby in 2017.  Anxiety and depression are now well controlled on present medication.    Patient also has ADHD.  We had held off on starting back on ADHD medication until we got anxiety/depression controlled.  Now controlled, would like to get back on ADHD medication before she starts back/goes back to work after having baby.  Patient was on Adderall XR 20 mg in AM and plain Aderall 10 mg mid-day prior to pregnancy.          Previous Medications    NORETHINDRONE (MICRONOR) 0.35 MG TABLET    Take 1 tablet (0.35 mg total) by mouth once daily.    SERTRALINE (ZOLOFT) 50 MG TABLET    Take 1 tablet (50 mg total) by mouth once daily.       Past Medical History:   Diagnosis Date    Allergic rhinitis     Anxiety     Asthma     Attention deficit disorder (ADD)     Depression     Kidney stone     Urinary tract infection     Vaginal infection        Past Surgical History:   Procedure Laterality Date    DILATION AND CURETTAGE OF UTERUS  2014    KIDNEY STONE SURGERY      LITHOTRIPSY  2011    LITHOTRIPSY  2017    TONSILLECTOMY         Family History   Problem Relation Age of Onset    Hypertension Mother 50    Hypothyroidism Father 52    Stroke Maternal Grandfather     Hypertension Maternal Grandfather     Heart disease Maternal Grandfather 56      of AMI    Heart failure Paternal Grandmother 81    No Known Problems Son     No Known Problems Son     No Known Problems Son     Breast cancer Neg Hx     Colon cancer Neg Hx     Ovarian cancer Neg Hx     Kidney disease Neg Hx        Social History     Social History    Marital status:      Spouse name: N/A    Number of children: N/A    Years of education: N/A     Occupational  History     [employer]     Social History Main Topics    Smoking status: Never Smoker    Smokeless tobacco: None    Alcohol use No    Drug use: No    Sexual activity: Yes     Partners: Male     Other Topics Concern    None     Social History Narrative       Review of Systems   Constitutional: Negative for appetite change, chills, fatigue, fever and unexpected weight change.   HENT: Negative for congestion, ear pain, mouth sores, nosebleeds, postnasal drip, rhinorrhea, sinus pressure, sneezing, sore throat, trouble swallowing and voice change.    Eyes: Negative for photophobia, pain, discharge, redness, itching and visual disturbance.   Respiratory: Negative for cough, chest tightness and shortness of breath.    Cardiovascular: Negative for chest pain, palpitations and leg swelling.   Gastrointestinal: Negative for abdominal pain, blood in stool, constipation, diarrhea, nausea and vomiting.   Genitourinary: Negative for dysuria, frequency, hematuria and urgency.   Musculoskeletal: Negative for arthralgias, back pain, joint swelling and myalgias.   Skin: Negative for color change and rash.   Allergic/Immunologic: Negative for immunocompromised state.   Neurological: Negative for dizziness, seizures, syncope, weakness and headaches.   Hematological: Negative for adenopathy. Does not bruise/bleed easily.   Psychiatric/Behavioral: Positive for decreased concentration. Negative for agitation, dysphoric mood, sleep disturbance and suicidal ideas. The patient is not nervous/anxious.          Objective:     Vitals:    04/18/17 1143   BP: 100/64   BP Location: Left arm   Patient Position: Sitting   BP Method: Manual   Pulse: 77   Temp: 97.7 °F (36.5 °C)   TempSrc: Oral   SpO2: 97%   Weight: 57.2 kg (126 lb 1.7 oz)          Physical Exam   Constitutional: She is oriented to person, place, and time. She appears well-developed and well-nourished.   HENT:   Head: Normocephalic and atraumatic.   Right  Ear: External ear normal.   Left Ear: External ear normal.   Nose: Nose normal.   Mouth/Throat: Oropharynx is clear and moist. No oropharyngeal exudate.   Eyes: EOM are normal. Pupils are equal, round, and reactive to light.   Neck: Normal range of motion. Neck supple. No tracheal deviation present. No thyromegaly present.   Cardiovascular: Normal rate, regular rhythm and normal heart sounds.    No murmur heard.  Pulmonary/Chest: Effort normal and breath sounds normal. No respiratory distress.   Abdominal: Soft. She exhibits no distension.   Musculoskeletal: Normal range of motion. She exhibits no edema.   Lymphadenopathy:     She has no cervical adenopathy.   Neurological: She is alert and oriented to person, place, and time. No cranial nerve deficit. Coordination normal.   Skin: Skin is warm and dry. No rash noted.   Psychiatric: She has a normal mood and affect.         Assessment:         ICD-10-CM ICD-9-CM   1. Attention deficit disorder (ADD) F98.8 314.00   2. Anxiety and depression F41.9 300.00    F32.9 311       Plan:       Attention deficit disorder (ADD)  -  Will start back on lower dose.  Start Adderall XR 10 mg in AM and follow up in 4 weeks.  -     dextroamphetamine-amphetamine (ADDERALL XR) 10 MG 24 hr capsule; Take 1 capsule (10 mg total) by mouth every morning.  Dispense: 30 capsule; Refill: 0    Anxiety and depression  -  Controlled on present medication.  -     sertraline (ZOLOFT) 50 MG tablet; Take 1 tablet (50 mg total) by mouth once daily.  Dispense: 30 tablet; Refill: 5    Return in about 4 weeks (around 5/16/2017).     Patient's Medications   New Prescriptions    DEXTROAMPHETAMINE-AMPHETAMINE (ADDERALL XR) 10 MG 24 HR CAPSULE    Take 1 capsule (10 mg total) by mouth every morning.   Previous Medications    NORETHINDRONE (MICRONOR) 0.35 MG TABLET    Take 1 tablet (0.35 mg total) by mouth once daily.   Modified Medications    Modified Medication Previous Medication    SERTRALINE (ZOLOFT) 50 MG  TABLET sertraline (ZOLOFT) 50 MG tablet       Take 1 tablet (50 mg total) by mouth once daily.    Take 1 tablet (50 mg total) by mouth once daily.   Discontinued Medications    ACETAMINOPHEN (TYLENOL) 325 MG TABLET    Take 1 tablet (325 mg total) by mouth every 6 (six) hours as needed for Pain.    KETOROLAC (TORADOL) 10 MG TABLET    Take 1 tablet (10 mg total) by mouth every 6 (six) hours.    NAPROXEN (NAPROSYN) 500 MG TABLET    Take 500 mg by mouth 2 (two) times daily.    ONDANSETRON (ZOFRAN) 4 MG TABLET    Take 1 tablet (4 mg total) by mouth every 6 (six) hours.    OXYCODONE-ACETAMINOPHEN (PERCOCET) 5-325 MG PER TABLET    Take 1 tablet by mouth every 4 (four) hours as needed for Pain.    TAMSULOSIN (FLOMAX) 0.4 MG CP24    Take 1 capsule (0.4 mg total) by mouth every evening.

## 2017-04-28 ENCOUNTER — TELEPHONE (OUTPATIENT)
Dept: UROLOGY | Facility: CLINIC | Age: 32
End: 2017-04-28

## 2017-04-28 DIAGNOSIS — R10.9 FLANK PAIN: Primary | ICD-10-CM

## 2017-04-28 RX ORDER — KETOROLAC TROMETHAMINE 10 MG/1
10 TABLET, FILM COATED ORAL EVERY 6 HOURS
Qty: 20 TABLET | Refills: 1 | Status: SHIPPED | OUTPATIENT
Start: 2017-04-28 | End: 2017-05-18

## 2017-04-28 RX ORDER — TAMSULOSIN HYDROCHLORIDE 0.4 MG/1
0.4 CAPSULE ORAL DAILY
Qty: 30 CAPSULE | Refills: 11 | Status: SHIPPED | OUTPATIENT
Start: 2017-04-28 | End: 2017-06-06 | Stop reason: SDUPTHER

## 2017-04-28 NOTE — TELEPHONE ENCOUNTER
----- Message from Sallie Paul sent at 4/28/2017  8:16 AM CDT -----  Contact: [phone]  Patient  States she has kidney pain and would like to speak with you

## 2017-04-28 NOTE — TELEPHONE ENCOUNTER
Patient states her flank pain has returned. She has an appointment to come in next week. Needs a RX for pain just in case it gets worse before she comes in.

## 2017-05-18 ENCOUNTER — OFFICE VISIT (OUTPATIENT)
Dept: FAMILY MEDICINE | Facility: CLINIC | Age: 32
End: 2017-05-18
Payer: COMMERCIAL

## 2017-05-18 VITALS
SYSTOLIC BLOOD PRESSURE: 102 MMHG | HEART RATE: 81 BPM | OXYGEN SATURATION: 99 % | BODY MASS INDEX: 20.46 KG/M2 | TEMPERATURE: 98 F | WEIGHT: 122.81 LBS | HEIGHT: 65 IN | DIASTOLIC BLOOD PRESSURE: 68 MMHG

## 2017-05-18 DIAGNOSIS — F32.A ANXIETY AND DEPRESSION: Primary | ICD-10-CM

## 2017-05-18 DIAGNOSIS — F98.8 ATTENTION DEFICIT DISORDER (ADD): ICD-10-CM

## 2017-05-18 DIAGNOSIS — F41.9 ANXIETY AND DEPRESSION: Primary | ICD-10-CM

## 2017-05-18 PROCEDURE — 1160F RVW MEDS BY RX/DR IN RCRD: CPT | Mod: S$GLB,,, | Performed by: NURSE PRACTITIONER

## 2017-05-18 PROCEDURE — 99999 PR PBB SHADOW E&M-EST. PATIENT-LVL III: CPT | Mod: PBBFAC,,, | Performed by: NURSE PRACTITIONER

## 2017-05-18 PROCEDURE — 99213 OFFICE O/P EST LOW 20 MIN: CPT | Mod: S$GLB,,, | Performed by: NURSE PRACTITIONER

## 2017-05-18 RX ORDER — DEXTROAMPHETAMINE SACCHARATE, AMPHETAMINE ASPARTATE MONOHYDRATE, DEXTROAMPHETAMINE SULFATE AND AMPHETAMINE SULFATE 5; 5; 5; 5 MG/1; MG/1; MG/1; MG/1
20 CAPSULE, EXTENDED RELEASE ORAL EVERY MORNING
Qty: 30 CAPSULE | Refills: 0 | Status: SHIPPED | OUTPATIENT
Start: 2017-05-18 | End: 2017-06-15 | Stop reason: SDUPTHER

## 2017-05-18 NOTE — MR AVS SNAPSHOT
[facility]  [address]  [place] [address]  Phone: [phone]  Fax: [phone]                  Kan Traore   2017 10:00 AM   Office Visit    Description:  Female : 1985   Provider:  Madeline Aguilar NP   Department:  [facility]           Reason for Visit     Follow-up           Diagnoses this Visit        Comments    Anxiety and depression    -  Primary     Attention deficit disorder (ADD)                To Do List           Future Appointments        Provider Department Dept Phone    2017 3:00 PM Kendell Hernandez MD [facility] [phone]    6/15/2017 11:00 AM Madeline Aguilar NP [facility] [phone]      Goals (5 Years of Data)     None      Follow-Up and Disposition     Return in about 4 weeks (around 6/15/2017).       These Medications        Disp Refills Start End    dextroamphetamine-amphetamine (ADDERALL XR) 20 MG 24 hr capsule 30 capsule 0 2017     Take 1 capsule (20 mg total) by mouth every morning. - Oral    Pharmacy: [id number] - [place], [place] - [address] [address] Ph #: [phone]         [facility]     [facility] -  Assistance  Unless otherwise directed by your provider, please contact [facility], our nurse care line that is available for  assistance.     Registered nurses in the [facility] provide: appointment scheduling, clinical advisement, health education, and other advisory services.  Call: [phone] (toll free)               Medications           Message regarding Medications     Verify the changes and/or additions to your medication regime listed below are the same as discussed with your clinician today.  If any of these changes or additions are incorrect, please notify your healthcare provider.        CHANGE how you are taking these medications     Start Taking Instead of    dextroamphetamine-amphetamine  (ADDERALL XR) 20 MG 24 hr capsule dextroamphetamine-amphetamine (ADDERALL XR) 10 MG 24 hr capsule    Dosage:  Take 1 capsule (20 mg total) by mouth every morning. Dosage:  Take 1 capsule (10 mg total) by mouth every morning.    Reason for Change:  Reorder       STOP taking these medications     ketorolac (TORADOL) 10 mg tablet Take 1 tablet (10 mg total) by mouth every 6 (six) hours. For pain    dextroamphetamine-amphetamine (ADDERALL XR) 10 MG 24 hr capsule Take 1 capsule (10 mg total) by mouth every morning.           Verify that the below list of medications is an accurate representation of the medications you are currently taking.  If none reported, the list may be blank. If incorrect, please contact your healthcare provider. Carry this list with you in case of emergency.           Current Medications     dextroamphetamine-amphetamine (ADDERALL XR) 20 MG 24 hr capsule Take 1 capsule (20 mg total) by mouth every morning.    norethindrone (MICRONOR) 0.35 mg tablet Take 1 tablet (0.35 mg total) by mouth once daily.    sertraline (ZOLOFT) 50 MG tablet Take 1 tablet (50 mg total) by mouth once daily.    tamsulosin (FLOMAX) 0.4 mg Cp24 Take 1 capsule (0.4 mg total) by mouth once daily.           Clinical Reference Information           Prenatal Vitals     Enc. Date GA Prenatal Vitals Prenatal Pulse Pain Level Urine Albumin/Glucose Edema Presentation Dilation/Effacement/Station    5/18/17 39w0d 102/68 / 55.7 kg (122 lb 12.7 oz)  81         4/6/17 39w0d 120/76 / 57.4 kg (126 lb 8.7 oz)           2/3/17 39w0d Admission Dept: [facility] L&D    2/2/17 38w6d Admission Dx: Amenorrhea Dept: [facility] MOMBABY    1/30/17 38w3d 116/64 / 72.3 kg (159 lb 6.3 oz)   6        1/29/17 38w2d Admission Dept: [facility] L&D    1/26/17 37w6d 106/60 / 72.6 kg (160 lb 0.9 oz)   0        1/19/17 36w6d 104/76 / 71.9 kg (158 lb 8.2 oz)   0        1/12/17 35w6d 108/66 / 71.5 kg (157 lb 10.1 oz)   3        12/30/16 34w0d 110/62 / 70.2 kg (154 lb 12.2 oz)   0  "Negative / Negative       11/18/16 28w0d Admission Dept: [facility] L&D    11/15/16 27w4d 116/70 / 65.7 kg (144 lb 13.5 oz)   0        10/17/16 23w3d 100/80 / 64.6 kg (142 lb 6.7 oz)           9/19/16 19w3d 114/68 / 60.9 kg (134 lb 4.2 oz)   0        8/22/16 15w3d 104/68 / 58.5 kg (128 lb 15.5 oz)   0        7/14/16 9w6d 122/64 / 54.7 kg (120 lb 9.5 oz)   0           Number of babies: 1   Height: 5' 6" (1.676 m)       Your Vitals Were     BP Pulse Temp Height Weight Last Period    102/68 (BP Location: Left arm, Patient Position: Sitting, BP Method: Manual) 81 97.7 °F (36.5 °C) (Oral) 5' 5" (1.651 m) 55.7 kg (122 lb 12.7 oz) 05/07/2016    SpO2 BMI             99% 20.43 kg/m2         Blood Pressure          Most Recent Value    BP  102/68      Allergies as of 5/18/2017     No Known Allergies      Immunizations Administered on Date of Encounter - 5/18/2017     None      Language Assistance Services     ATTENTION: Language assistance services are available, free of charge. Please call [phone].      ATENCIÓN: Si habla silva, tiene a iraheta disposición servicios gratuitos de asistencia lingüística. Llame al [phone].     CHÚ Ý: N?u b?n nói Ti?ng Vi?t, có các d?ch v? h? tr? ngôn ng? mi?n phí dành cho b?n. G?i s? [phone].         [facility] complies with applicable Federal civil rights laws and does not discriminate on the basis of race, color, national origin, age, disability, or sex.        "

## 2017-05-18 NOTE — PROGRESS NOTES
Subjective:       Patient ID: Kan Troare is a 31 y.o. female.    Chief Complaint: Follow-up (med check)    HPI Comments: Patient has Anxiety and Depression and was started back on Zoloft 50 mg daily after delivering baby in 2017. Anxiety and depression are now well controlled on present medication.     Patient also has ADHD. We started back on ADHD medication Adderall XR 10 mg daily and tolerated well but not quite as effective.  Patient was on Adderall XR 20 in AM and Adderall 10 mg mid-day in the past.      Previous Medications    DEXTROAMPHETAMINE-AMPHETAMINE (ADDERALL XR) 10 MG 24 HR CAPSULE    Take 1 capsule (10 mg total) by mouth every morning.    NORETHINDRONE (MICRONOR) 0.35 MG TABLET    Take 1 tablet (0.35 mg total) by mouth once daily.    SERTRALINE (ZOLOFT) 50 MG TABLET    Take 1 tablet (50 mg total) by mouth once daily.    TAMSULOSIN (FLOMAX) 0.4 MG CP24    Take 1 capsule (0.4 mg total) by mouth once daily.       Past Medical History:   Diagnosis Date    Allergic rhinitis     Anxiety     Asthma     Attention deficit disorder (ADD)     Depression     Kidney stone     Urinary tract infection     Vaginal infection        Past Surgical History:   Procedure Laterality Date    DILATION AND CURETTAGE OF UTERUS  2014    KIDNEY STONE SURGERY      LITHOTRIPSY  2011    LITHOTRIPSY  2017    TONSILLECTOMY         Family History   Problem Relation Age of Onset    Hypertension Mother 50    Hypothyroidism Father 52    Stroke Maternal Grandfather     Hypertension Maternal Grandfather     Heart disease Maternal Grandfather 56      of AMI    Heart failure Paternal Grandmother 81    No Known Problems Son     No Known Problems Son     No Known Problems Son     Breast cancer Neg Hx     Colon cancer Neg Hx     Ovarian cancer Neg Hx     Kidney disease Neg Hx        Social History     Social History    Marital status:      Spouse name: N/A    Number of children: N/A  "   Years of education: N/A     Occupational History     [employer]     Social History Main Topics    Smoking status: Never Smoker    Smokeless tobacco: None    Alcohol use No    Drug use: No    Sexual activity: Yes     Partners: Male     Other Topics Concern    None     Social History Narrative       Review of Systems   Constitutional: Negative for appetite change, chills, fatigue, fever and unexpected weight change.   HENT: Negative for congestion, ear pain, mouth sores, nosebleeds, postnasal drip, rhinorrhea, sinus pressure, sneezing, sore throat, trouble swallowing and voice change.    Eyes: Negative for photophobia, pain, discharge, redness, itching and visual disturbance.   Respiratory: Negative for cough, chest tightness and shortness of breath.    Cardiovascular: Negative for chest pain, palpitations and leg swelling.   Gastrointestinal: Negative for abdominal pain, blood in stool, constipation, diarrhea, nausea and vomiting.   Genitourinary: Negative for dysuria, frequency, hematuria and urgency.   Musculoskeletal: Negative for arthralgias, back pain, joint swelling and myalgias.   Skin: Negative for color change and rash.   Allergic/Immunologic: Negative for immunocompromised state.   Neurological: Negative for dizziness, seizures, syncope, weakness and headaches.   Hematological: Negative for adenopathy. Does not bruise/bleed easily.   Psychiatric/Behavioral: Positive for decreased concentration. Negative for agitation, dysphoric mood, sleep disturbance and suicidal ideas. The patient is not nervous/anxious.          Objective:     Vitals:    05/18/17 1015   BP: (!) 90/58   BP Location: Left arm   Patient Position: Sitting   BP Method: Manual   Pulse: 81   Temp: 97.7 °F (36.5 °C)   TempSrc: Oral   SpO2: 99%   Weight: 55.7 kg (122 lb 12.7 oz)   Height: 5' 6" (1.676 m)          Physical Exam   Constitutional: She is oriented to person, place, and time. She appears well-developed " and well-nourished.   HENT:   Head: Normocephalic and atraumatic.   Right Ear: External ear normal.   Left Ear: External ear normal.   Nose: Nose normal.   Mouth/Throat: Oropharynx is clear and moist. No oropharyngeal exudate.   Eyes: EOM are normal. Pupils are equal, round, and reactive to light.   Neck: Normal range of motion. Neck supple. No tracheal deviation present. No thyromegaly present.   Cardiovascular: Normal rate, regular rhythm and normal heart sounds.    No murmur heard.  Pulmonary/Chest: Effort normal and breath sounds normal. No respiratory distress.   Abdominal: Soft. She exhibits no distension.   Musculoskeletal: Normal range of motion. She exhibits no edema.   Lymphadenopathy:     She has no cervical adenopathy.   Neurological: She is alert and oriented to person, place, and time. No cranial nerve deficit. Coordination normal.   Skin: Skin is warm and dry. No rash noted.   Psychiatric: She has a normal mood and affect.         Assessment:         ICD-10-CM ICD-9-CM   1. Anxiety and depression F41.9 300.00    F32.9 311   2. Attention deficit disorder (ADD) F98.8 314.00       Plan:       Anxiety and depression    Attention deficit disorder (ADD)  -  Increase Adderall XR from 10 to 20 mg daily and recheck in 1 month.  -     dextroamphetamine-amphetamine (ADDERALL XR) 20 MG 24 hr capsule; Take 1 capsule (20 mg total) by mouth every morning.  Dispense: 30 capsule; Refill: 0    Return in about 4 weeks (around 6/15/2017).     Patient's Medications   New Prescriptions    No medications on file   Previous Medications    NORETHINDRONE (MICRONOR) 0.35 MG TABLET    Take 1 tablet (0.35 mg total) by mouth once daily.    SERTRALINE (ZOLOFT) 50 MG TABLET    Take 1 tablet (50 mg total) by mouth once daily.    TAMSULOSIN (FLOMAX) 0.4 MG CP24    Take 1 capsule (0.4 mg total) by mouth once daily.   Modified Medications    Modified Medication Previous Medication    DEXTROAMPHETAMINE-AMPHETAMINE (ADDERALL XR) 20 MG 24  HR CAPSULE dextroamphetamine-amphetamine (ADDERALL XR) 10 MG 24 hr capsule       Take 1 capsule (20 mg total) by mouth every morning.    Take 1 capsule (10 mg total) by mouth every morning.   Discontinued Medications    KETOROLAC (TORADOL) 10 MG TABLET    Take 1 tablet (10 mg total) by mouth every 6 (six) hours. For pain

## 2017-06-06 ENCOUNTER — OFFICE VISIT (OUTPATIENT)
Dept: UROLOGY | Facility: CLINIC | Age: 32
End: 2017-06-06
Payer: COMMERCIAL

## 2017-06-06 VITALS
DIASTOLIC BLOOD PRESSURE: 70 MMHG | HEIGHT: 65 IN | TEMPERATURE: 98 F | BODY MASS INDEX: 20.35 KG/M2 | HEART RATE: 68 BPM | SYSTOLIC BLOOD PRESSURE: 100 MMHG | WEIGHT: 122.13 LBS

## 2017-06-06 DIAGNOSIS — R10.9 FLANK PAIN: ICD-10-CM

## 2017-06-06 DIAGNOSIS — N23 RENAL COLIC ON RIGHT SIDE: Primary | ICD-10-CM

## 2017-06-06 DIAGNOSIS — N20.0 NEPHROLITHIASIS: ICD-10-CM

## 2017-06-06 PROCEDURE — 99999 PR PBB SHADOW E&M-EST. PATIENT-LVL III: CPT | Mod: PBBFAC,,, | Performed by: UROLOGY

## 2017-06-06 PROCEDURE — 99214 OFFICE O/P EST MOD 30 MIN: CPT | Mod: S$GLB,,, | Performed by: UROLOGY

## 2017-06-06 RX ORDER — KETOROLAC TROMETHAMINE 10 MG/1
10 TABLET, FILM COATED ORAL EVERY 6 HOURS
Qty: 20 TABLET | Refills: 1 | Status: SHIPPED | OUTPATIENT
Start: 2017-06-06 | End: 2017-06-06 | Stop reason: SDUPTHER

## 2017-06-06 RX ORDER — TAMSULOSIN HYDROCHLORIDE 0.4 MG/1
0.4 CAPSULE ORAL DAILY
Qty: 30 CAPSULE | Refills: 11 | Status: SHIPPED | OUTPATIENT
Start: 2017-06-06 | End: 2017-08-16

## 2017-06-06 RX ORDER — HYDROCODONE BITARTRATE AND ACETAMINOPHEN 5; 325 MG/1; MG/1
1 TABLET ORAL EVERY 6 HOURS PRN
Qty: 30 TABLET | Refills: 0 | Status: SHIPPED | OUTPATIENT
Start: 2017-06-06 | End: 2017-06-16 | Stop reason: SDUPTHER

## 2017-06-06 RX ORDER — TAMSULOSIN HYDROCHLORIDE 0.4 MG/1
0.4 CAPSULE ORAL DAILY
Qty: 30 CAPSULE | Refills: 11 | Status: SHIPPED | OUTPATIENT
Start: 2017-06-06 | End: 2017-06-06 | Stop reason: SDUPTHER

## 2017-06-06 RX ORDER — KETOROLAC TROMETHAMINE 10 MG/1
10 TABLET, FILM COATED ORAL EVERY 6 HOURS
Qty: 20 TABLET | Refills: 1 | Status: SHIPPED | OUTPATIENT
Start: 2017-06-06 | End: 2017-06-16 | Stop reason: SDUPTHER

## 2017-06-06 NOTE — PROGRESS NOTES
Subjective:       Patient ID: Kan Traore is a 31 y.o. female.    Chief Complaint: Follow-up    32 yo WF with history of Ca Phosphate calculi. 24 hour urine pending. Here with Right>Left Flank Pain.      Flank Pain   This is a recurrent problem. The current episode started 1 to 4 weeks ago. The problem occurs daily. The problem has been waxing and waning since onset. The pain is present in the costovertebral angle (Right mostly, but occssionally). The quality of the pain is described as aching. The pain does not radiate. The pain is at a severity of 5/10. The pain is moderate. The pain is worse during the night. The symptoms are aggravated by sitting and lying down. Pertinent negatives include no abdominal pain, bladder incontinence, bowel incontinence, chest pain, dysuria, fever, headaches, leg pain, numbness, paresis, paresthesias, pelvic pain, perianal numbness, tingling, weakness or weight loss. Risk factors include renal stones. She has tried analgesics and NSAIDs for the symptoms. The treatment provided no relief.     Review of Systems   Constitutional: Negative for activity change, appetite change, chills, fatigue, fever and weight loss.   HENT: Negative for congestion, ear pain, hearing loss, nosebleeds, sinus pressure, sore throat and trouble swallowing.    Eyes: Negative for pain and visual disturbance.   Respiratory: Negative for apnea, cough and shortness of breath.    Cardiovascular: Negative for chest pain and leg swelling.   Gastrointestinal: Negative for abdominal distention, abdominal pain, anal bleeding, blood in stool, bowel incontinence, constipation, diarrhea, nausea, rectal pain and vomiting.   Endocrine: Negative for cold intolerance, heat intolerance, polydipsia, polyphagia and polyuria.   Genitourinary: Positive for flank pain. Negative for bladder incontinence, decreased urine volume, difficulty urinating, dyspareunia, dysuria, enuresis, frequency, genital sores, hematuria,  menstrual problem, pelvic pain, urgency, vaginal bleeding, vaginal discharge and vaginal pain.   Musculoskeletal: Negative for arthralgias and back pain.   Skin: Negative for color change, pallor and rash.   Allergic/Immunologic: Negative for environmental allergies, food allergies and immunocompromised state.   Neurological: Negative for dizziness, tingling, speech difficulty, weakness, numbness, headaches and paresthesias.   Hematological: Negative for adenopathy. Does not bruise/bleed easily.   Psychiatric/Behavioral: Negative.        Objective:      Physical Exam   Nursing note and vitals reviewed.  Constitutional: She is oriented to person, place, and time. She appears well-developed and well-nourished.   HENT:   Head: Normocephalic.   Nose: Nose normal.   Mouth/Throat: Oropharynx is clear and moist.   Eyes: Conjunctivae and EOM are normal. Pupils are equal, round, and reactive to light.   Neck: Normal range of motion. Neck supple.   Cardiovascular: Normal rate, regular rhythm, normal heart sounds and intact distal pulses.    Pulmonary/Chest: Effort normal and breath sounds normal.   Abdominal: Soft. Bowel sounds are normal.   Genitourinary: Vagina normal and uterus normal.   Genitourinary Comments: Mild Right CVAT   Musculoskeletal: Normal range of motion.   Neurological: She is alert and oriented to person, place, and time. She has normal reflexes.   Skin: Skin is warm and dry.     Psychiatric: She has a normal mood and affect. Her behavior is normal. Judgment and thought content normal.       Assessment:       1. Renal colic on right side    2. Nephrolithiasis    3. Flank pain        Plan:       Patient Instructions   Obtain Renal U/S.  Increase water intake  Check U/A.  If stones are too large to pass, May need ESWL.  Await 24 hour urine test and adjust meds and diet as necessary  Flomax Daily  Toradol as needed  Norco PRN

## 2017-06-06 NOTE — PATIENT INSTRUCTIONS
Obtain Renal U/S.  Increase water intake  Check U/A.  If stones are too large to pass, May need ESWL.  Await 24 hour urine test and adjust meds and diet as necessary  Flomax Daily  Toradol as needed  Norco PRN

## 2017-06-15 ENCOUNTER — OFFICE VISIT (OUTPATIENT)
Dept: FAMILY MEDICINE | Facility: CLINIC | Age: 32
End: 2017-06-15
Payer: COMMERCIAL

## 2017-06-15 VITALS
WEIGHT: 124.75 LBS | DIASTOLIC BLOOD PRESSURE: 60 MMHG | HEIGHT: 65 IN | HEART RATE: 75 BPM | OXYGEN SATURATION: 99 % | BODY MASS INDEX: 20.79 KG/M2 | SYSTOLIC BLOOD PRESSURE: 90 MMHG | TEMPERATURE: 98 F

## 2017-06-15 DIAGNOSIS — F32.A ANXIETY AND DEPRESSION: ICD-10-CM

## 2017-06-15 DIAGNOSIS — F41.9 ANXIETY AND DEPRESSION: ICD-10-CM

## 2017-06-15 DIAGNOSIS — F98.8 ATTENTION DEFICIT DISORDER (ADD): Primary | ICD-10-CM

## 2017-06-15 PROBLEM — R58 BLEEDING: Status: RESOLVED | Noted: 2017-01-30 | Resolved: 2017-06-15

## 2017-06-15 PROCEDURE — 99999 PR PBB SHADOW E&M-EST. PATIENT-LVL IV: CPT | Mod: PBBFAC,,, | Performed by: NURSE PRACTITIONER

## 2017-06-15 PROCEDURE — 99213 OFFICE O/P EST LOW 20 MIN: CPT | Mod: S$GLB,,, | Performed by: NURSE PRACTITIONER

## 2017-06-15 RX ORDER — DEXTROAMPHETAMINE SACCHARATE, AMPHETAMINE ASPARTATE MONOHYDRATE, DEXTROAMPHETAMINE SULFATE AND AMPHETAMINE SULFATE 5; 5; 5; 5 MG/1; MG/1; MG/1; MG/1
20 CAPSULE, EXTENDED RELEASE ORAL EVERY MORNING
Qty: 30 CAPSULE | Refills: 0 | Status: SHIPPED | OUTPATIENT
Start: 2017-06-15 | End: 2017-08-16 | Stop reason: SDUPTHER

## 2017-06-15 RX ORDER — DEXTROAMPHETAMINE SACCHARATE, AMPHETAMINE ASPARTATE MONOHYDRATE, DEXTROAMPHETAMINE SULFATE AND AMPHETAMINE SULFATE 5; 5; 5; 5 MG/1; MG/1; MG/1; MG/1
20 CAPSULE, EXTENDED RELEASE ORAL EVERY MORNING
Qty: 30 CAPSULE | Refills: 0 | Status: SHIPPED | OUTPATIENT
Start: 2017-07-14 | End: 2017-08-16 | Stop reason: SDUPTHER

## 2017-06-15 NOTE — PROGRESS NOTES
Subjective:       Patient ID: Kan Traore is a 31 y.o. female.    Chief Complaint: Medication Refill    Patient is here today for ADHD follow up.  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 Reports the Adderall XR 20 mg is working well without problems.        Previous Medications    DEXTROAMPHETAMINE-AMPHETAMINE (ADDERALL XR) 20 MG 24 HR CAPSULE    Take 1 capsule (20 mg total) by mouth every morning.    HYDROCODONE-ACETAMINOPHEN 5-325MG (NORCO) 5-325 MG PER TABLET    Take 1 tablet by mouth every 6 (six) hours as needed for Pain.    KETOROLAC (TORADOL) 10 MG TABLET    Take 1 tablet (10 mg total) by mouth every 6 (six) hours.    NORETHINDRONE (MICRONOR) 0.35 MG TABLET    Take 1 tablet (0.35 mg total) by mouth once daily.    SERTRALINE (ZOLOFT) 50 MG TABLET    Take 1 tablet (50 mg total) by mouth once daily.    TAMSULOSIN (FLOMAX) 0.4 MG CP24    Take 1 capsule (0.4 mg total) by mouth once daily.       Past Medical History:   Diagnosis Date    Allergic rhinitis     Anxiety     Asthma     Attention deficit disorder (ADD)     Depression     Kidney stone     Urinary tract infection     Vaginal infection        Past Surgical History:   Procedure Laterality Date    DILATION AND CURETTAGE OF UTERUS  2014    KIDNEY STONE SURGERY      LITHOTRIPSY      LITHOTRIPSY  2017    TONSILLECTOMY         Family History   Problem Relation Age of Onset    Hypertension Mother 50    Hypothyroidism Father 52    Stroke Maternal Grandfather     Hypertension Maternal Grandfather     Heart disease Maternal Grandfather 56      of AMI    Heart failure Paternal Grandmother 81    No Known Problems Son     No Known Problems Son     No Known Problems Son     Breast cancer Neg Hx     Colon cancer Neg Hx     Ovarian cancer Neg Hx     Kidney disease Neg Hx        Social History  "    Social History    Marital status:      Spouse name: N/A    Number of children: N/A    Years of education: N/A     Occupational History     [employer]     Social History Main Topics    Smoking status: Never Smoker    Smokeless tobacco: None    Alcohol use No    Drug use: No    Sexual activity: Yes     Partners: Male     Other Topics Concern    None     Social History Narrative    None       Review of Systems   Constitutional: Negative for appetite change, chills, fatigue, fever and unexpected weight change.   HENT: Negative for congestion, ear pain, mouth sores, nosebleeds, postnasal drip, rhinorrhea, sinus pressure, sneezing, sore throat, trouble swallowing and voice change.    Eyes: Negative for photophobia, pain, discharge, redness, itching and visual disturbance.   Respiratory: Negative for cough, chest tightness and shortness of breath.    Cardiovascular: Negative for chest pain, palpitations and leg swelling.   Gastrointestinal: Negative for abdominal pain, blood in stool, constipation, diarrhea, nausea and vomiting.   Genitourinary: Negative for dysuria, frequency, hematuria and urgency.   Musculoskeletal: Negative for arthralgias, back pain, joint swelling and myalgias.   Skin: Negative for color change and rash.   Allergic/Immunologic: Negative for immunocompromised state.   Neurological: Negative for dizziness, seizures, syncope, weakness and headaches.   Hematological: Negative for adenopathy. Does not bruise/bleed easily.   Psychiatric/Behavioral: Negative for agitation, dysphoric mood, sleep disturbance and suicidal ideas. The patient is not nervous/anxious.          Objective:     Vitals:    06/15/17 0855 06/15/17 0915   BP: (!) 80/60 90/60   BP Location: Right arm    Patient Position: Sitting    BP Method: Manual    Pulse: 75    Temp: 97.5 °F (36.4 °C)    TempSrc: Oral    SpO2: 99%    Weight: 56.6 kg (124 lb 12.5 oz)    Height: 5' 5" (1.651 m)         "   Physical Exam   Constitutional: She is oriented to person, place, and time. She appears well-developed and well-nourished.   HENT:   Head: Normocephalic and atraumatic.   Right Ear: External ear normal.   Left Ear: External ear normal.   Nose: Nose normal.   Mouth/Throat: Oropharynx is clear and moist. No oropharyngeal exudate.   Eyes: EOM are normal. Pupils are equal, round, and reactive to light.   Neck: Normal range of motion. Neck supple. No tracheal deviation present. No thyromegaly present.   Cardiovascular: Normal rate, regular rhythm and normal heart sounds.    No murmur heard.  Pulmonary/Chest: Effort normal and breath sounds normal. No respiratory distress.   Abdominal: Soft. She exhibits no distension.   Musculoskeletal: Normal range of motion. She exhibits no edema.   Lymphadenopathy:     She has no cervical adenopathy.   Neurological: She is alert and oriented to person, place, and time. No cranial nerve deficit. Coordination normal.   Skin: Skin is warm and dry. No rash noted.   Psychiatric: She has a normal mood and affect.         Assessment:         ICD-10-CM ICD-9-CM   1. Attention deficit disorder (ADD) F98.8 314.00   2. Anxiety and depression F41.9 300.00    F32.9 311       Plan:       Attention deficit disorder (ADD)  -  Controlled on present medication - follow up in 2 months.  -     dextroamphetamine-amphetamine (ADDERALL XR) 20 MG 24 hr capsule; Take 1 capsule (20 mg total) by mouth every morning.  Dispense: 30 capsule; Refill: 0  -     dextroamphetamine-amphetamine (ADDERALL XR) 20 MG 24 hr capsule; Take 1 capsule (20 mg total) by mouth every morning.  Dispense: 30 capsule; Refill: 0    Anxiety and depression  -  Controlled on Zoloft at present dose.      Return in about 2 months (around 8/15/2017).     Patient's Medications   New Prescriptions    DEXTROAMPHETAMINE-AMPHETAMINE (ADDERALL XR) 20 MG 24 HR CAPSULE    Take 1 capsule (20 mg total) by mouth every morning.   Previous Medications     HYDROCODONE-ACETAMINOPHEN 5-325MG (NORCO) 5-325 MG PER TABLET    Take 1 tablet by mouth every 6 (six) hours as needed for Pain.    KETOROLAC (TORADOL) 10 MG TABLET    Take 1 tablet (10 mg total) by mouth every 6 (six) hours.    NORETHINDRONE (MICRONOR) 0.35 MG TABLET    Take 1 tablet (0.35 mg total) by mouth once daily.    SERTRALINE (ZOLOFT) 50 MG TABLET    Take 1 tablet (50 mg total) by mouth once daily.    TAMSULOSIN (FLOMAX) 0.4 MG CP24    Take 1 capsule (0.4 mg total) by mouth once daily.   Modified Medications    Modified Medication Previous Medication    DEXTROAMPHETAMINE-AMPHETAMINE (ADDERALL XR) 20 MG 24 HR CAPSULE dextroamphetamine-amphetamine (ADDERALL XR) 20 MG 24 hr capsule       Take 1 capsule (20 mg total) by mouth every morning.    Take 1 capsule (20 mg total) by mouth every morning.   Discontinued Medications    No medications on file

## 2017-06-16 ENCOUNTER — OFFICE VISIT (OUTPATIENT)
Dept: UROLOGY | Facility: CLINIC | Age: 32
End: 2017-06-16
Payer: COMMERCIAL

## 2017-06-16 VITALS
RESPIRATION RATE: 18 BRPM | WEIGHT: 124.75 LBS | SYSTOLIC BLOOD PRESSURE: 100 MMHG | HEIGHT: 65 IN | HEART RATE: 70 BPM | BODY MASS INDEX: 20.79 KG/M2 | TEMPERATURE: 98 F | OXYGEN SATURATION: 99 % | DIASTOLIC BLOOD PRESSURE: 62 MMHG

## 2017-06-16 DIAGNOSIS — N23 RENAL COLIC ON RIGHT SIDE: ICD-10-CM

## 2017-06-16 DIAGNOSIS — N20.0 NEPHROLITHIASIS: Primary | ICD-10-CM

## 2017-06-16 PROCEDURE — 99213 OFFICE O/P EST LOW 20 MIN: CPT | Mod: S$GLB,,, | Performed by: UROLOGY

## 2017-06-16 PROCEDURE — 99999 PR PBB SHADOW E&M-EST. PATIENT-LVL III: CPT | Mod: PBBFAC,,, | Performed by: UROLOGY

## 2017-06-16 RX ORDER — HYDROCODONE BITARTRATE AND ACETAMINOPHEN 5; 325 MG/1; MG/1
1 TABLET ORAL EVERY 6 HOURS PRN
Qty: 30 TABLET | Refills: 0 | Status: SHIPPED | OUTPATIENT
Start: 2017-06-16 | End: 2017-07-28 | Stop reason: SDUPTHER

## 2017-06-16 RX ORDER — KETOROLAC TROMETHAMINE 10 MG/1
10 TABLET, FILM COATED ORAL EVERY 6 HOURS
Qty: 20 TABLET | Refills: 1 | Status: SHIPPED | OUTPATIENT
Start: 2017-06-16 | End: 2017-09-27 | Stop reason: SDUPTHER

## 2017-06-16 NOTE — PROGRESS NOTES
Subjective:       Patient ID: Kan Traore is a 31 y.o. female.    Chief Complaint: Follow-up (test results)    32 yo WF with recurrent right flank pain. History of nephrolithiasis bilaterally on CT 2/17. Recent U/S does not reveal calculi but pt having renal colic.      Flank Pain   This is a recurrent problem. The current episode started more than 1 month ago. The problem occurs intermittently. The problem has been gradually worsening since onset. The pain is present in the costovertebral angle. The quality of the pain is described as cramping (Throbbing). The pain does not radiate. The pain is at a severity of 5/10. The pain is moderate. The pain is worse during the night. Exacerbated by: Holding child. Pertinent negatives include no abdominal pain, bladder incontinence, bowel incontinence, chest pain, dysuria, fever, headaches, leg pain, numbness, paresis, paresthesias, pelvic pain, perianal numbness, tingling, weakness or weight loss. Risk factors include renal stones. She has tried NSAIDs, analgesics and heat for the symptoms. The treatment provided moderate relief.     Review of Systems   Constitutional: Negative for activity change, appetite change, chills, fatigue, fever and weight loss.   HENT: Negative for congestion, ear pain, hearing loss, nosebleeds, sinus pressure, sore throat and trouble swallowing.    Eyes: Negative for pain and visual disturbance.   Respiratory: Negative for apnea, cough and shortness of breath.    Cardiovascular: Negative for chest pain and leg swelling.   Gastrointestinal: Negative for abdominal distention, abdominal pain, anal bleeding, blood in stool, bowel incontinence, constipation, diarrhea, nausea, rectal pain and vomiting.   Endocrine: Negative for cold intolerance, heat intolerance, polydipsia, polyphagia and polyuria.   Genitourinary: Positive for flank pain. Negative for bladder incontinence, decreased urine volume, difficulty urinating, dyspareunia, dysuria,  enuresis, frequency, genital sores, hematuria, menstrual problem, pelvic pain, urgency, vaginal bleeding, vaginal discharge and vaginal pain.   Musculoskeletal: Negative for arthralgias and back pain.   Skin: Negative for color change, pallor and rash.   Allergic/Immunologic: Negative for environmental allergies, food allergies and immunocompromised state.   Neurological: Negative for dizziness, tingling, speech difficulty, weakness, numbness, headaches and paresthesias.   Hematological: Negative for adenopathy. Does not bruise/bleed easily.   Psychiatric/Behavioral: Negative.        Objective:      Physical Exam   Nursing note and vitals reviewed.  Constitutional: She is oriented to person, place, and time. She appears well-developed and well-nourished.   HENT:   Head: Normocephalic.   Nose: Nose normal.   Mouth/Throat: Oropharynx is clear and moist.   Eyes: Conjunctivae and EOM are normal. Pupils are equal, round, and reactive to light.   Neck: Normal range of motion. Neck supple.   Cardiovascular: Normal rate, regular rhythm, normal heart sounds and intact distal pulses.    Pulmonary/Chest: Effort normal and breath sounds normal.   Abdominal: Soft. Bowel sounds are normal.   Genitourinary:   Genitourinary Comments: Mild Right CVAT   Musculoskeletal: Normal range of motion.   Neurological: She is alert and oriented to person, place, and time. She has normal reflexes.   Skin: Skin is warm and dry.     Psychiatric: She has a normal mood and affect. Her behavior is normal. Judgment and thought content normal.       Assessment:       1. Nephrolithiasis    2. Renal colic on right side        Plan:       Patient Instructions   CT renal survey.   ESWL if needed, pending result  Norco and Toradol as needed

## 2017-06-20 ENCOUNTER — OFFICE VISIT (OUTPATIENT)
Dept: PODIATRY | Facility: CLINIC | Age: 32
End: 2017-06-20
Payer: COMMERCIAL

## 2017-06-20 VITALS
WEIGHT: 122.38 LBS | DIASTOLIC BLOOD PRESSURE: 69 MMHG | HEIGHT: 65 IN | HEART RATE: 79 BPM | BODY MASS INDEX: 20.39 KG/M2 | SYSTOLIC BLOOD PRESSURE: 106 MMHG | RESPIRATION RATE: 18 BRPM

## 2017-06-20 DIAGNOSIS — L60.0 INGROWN NAIL: Primary | ICD-10-CM

## 2017-06-20 PROCEDURE — 99203 OFFICE O/P NEW LOW 30 MIN: CPT | Mod: S$GLB,,, | Performed by: PODIATRIST

## 2017-06-20 RX ORDER — CEPHALEXIN 500 MG/1
500 CAPSULE ORAL EVERY 6 HOURS
Qty: 28 CAPSULE | Refills: 0 | Status: SHIPPED | OUTPATIENT
Start: 2017-06-20 | End: 2017-06-27

## 2017-06-20 NOTE — PROGRESS NOTES
Subjective:    Patient ID: Kan Traore is a 31 y.o. female.    Chief Complaint: Ingrown Toenail (Left Big Toe)      HPI:   Kan is a 31 y.o. female who presents to the clinic complaining of painful ingrown toenail on the left foot.  HPI    Last Podiatry Enc: Visit date not found  Last Enc w/ Me: Visit date not found    Past Medical History:   Diagnosis Date    Allergic rhinitis     Anxiety     Asthma     Attention deficit disorder (ADD)     Depression     Kidney stone     Urinary tract infection     Vaginal infection      Past Surgical History:   Procedure Laterality Date    DILATION AND CURETTAGE OF UTERUS  4/2014    KIDNEY STONE SURGERY      LITHOTRIPSY  2011    LITHOTRIPSY  02/09/2017    TONSILLECTOMY       Social History     Social History    Marital status:      Spouse name: N/A    Number of children: N/A    Years of education: N/A     Occupational History     [employer]     Social History Main Topics    Smoking status: Never Smoker    Smokeless tobacco: Not on file    Alcohol use No    Drug use: No    Sexual activity: Yes     Partners: Male     Other Topics Concern    Not on file     Social History Narrative    No narrative on file         Current Outpatient Prescriptions:     dextroamphetamine-amphetamine (ADDERALL XR) 20 MG 24 hr capsule, Take 1 capsule (20 mg total) by mouth every morning., Disp: 30 capsule, Rfl: 0    [START ON 7/14/2017] dextroamphetamine-amphetamine (ADDERALL XR) 20 MG 24 hr capsule, Take 1 capsule (20 mg total) by mouth every morning., Disp: 30 capsule, Rfl: 0    hydrocodone-acetaminophen 5-325mg (NORCO) 5-325 mg per tablet, Take 1 tablet by mouth every 6 (six) hours as needed for Pain., Disp: 30 tablet, Rfl: 0    ketorolac (TORADOL) 10 mg tablet, Take 1 tablet (10 mg total) by mouth every 6 (six) hours., Disp: 20 tablet, Rfl: 1    norethindrone (MICRONOR) 0.35 mg tablet, Take 1 tablet (0.35 mg total) by mouth once  "daily., Disp: 30 tablet, Rfl: 6    sertraline (ZOLOFT) 50 MG tablet, Take 1 tablet (50 mg total) by mouth once daily., Disp: 30 tablet, Rfl: 5    cephALEXin (KEFLEX) 500 MG capsule, Take 1 capsule (500 mg total) by mouth every 6 (six) hours., Disp: 28 capsule, Rfl: 0    tamsulosin (FLOMAX) 0.4 mg Cp24, Take 1 capsule (0.4 mg total) by mouth once daily., Disp: 30 capsule, Rfl: 11  Review of patient's allergies indicates:  No Known Allergies    ROS  ROS Constitutional:  General Appearance: well nourished  Vascular: negative for cramps, edema and bruising  Musculoskeletal: negative for joint paint and joint edema  Skin: negative for rashes and lesions  Neurological: negative for burning, tingling and numbness  Gastrointestinal: negative for stomach pain, nausea and vomiting        Objective:        /69 (BP Location: Right arm, Patient Position: Sitting, BP Method: Automatic)   Pulse 79   Resp 18   Ht 5' 5" (1.651 m)   Wt 55.5 kg (122 lb 6.4 oz)   LMP 06/05/2017   BMI 20.37 kg/m²     Ortho/SPM Exam  Physical Exam  LE exam con't:  V: DP 2/4, PT 2/4, CRT< 3s to all digits tested.     N: SILT in SP/DP/T/Zena/Saph distributions.    Derm: Skin intact. No erythema, edema or ecchymosis. L hallux nail mildly ingrowing and incurvated. Mild local erythema, but otherwise No signs of infection    Ortho:  +Motor EHL/FHL/TA/GA   Compartments soft/compressible. No pain on passive stretch of big toe. No calf  pain.        Assessment:     Imaging / Labs:            1. Ingrown nail          Plan:       Orders Placed This Encounter    cephALEXin (KEFLEX) 500 MG capsule     Procedures  .   Kan was seen today for ingrown toenail.    Diagnoses and all orders for this visit:    Ingrown nail  -     cephALEXin (KEFLEX) 500 MG capsule; Take 1 capsule (500 mg total) by mouth every 6 (six) hours.        Kan Traore is a 31 y.o. female presenting w/   1. Ingrown nail        -pt seen, evaluated, and managed  -dx discussed " in detail. All questions/concerns addressed  -all tx options discussed. All alternatives, risks, benefits of all txs discussed  -The patient was educated regarding the above diagnosis. We discussed conservative care options regarding shoe wear and/or padding.  -rxs dispensed: nkeflex PO for 5 days  -discussed ingrowing toenails and all tx options. Pt opts for non-procedural slantback which was performed as a courtesy w/o complication  -pt to perform epsom salt or betadine soaks once daily x 2wks. Written instructions dispensed  -keep toe covered with triple abx ointment + bandaid x 2wks  -will plan for procedural removal at nxt visit     rtc 4 wks for possible procedure: PNA w matrixectomy L hallux medial border      Return in about 4 weeks (around 7/18/2017).

## 2017-06-20 NOTE — LETTER
June 21, 2017      [place]  [address]  [address]  [facility] [address]           [facility]  [address], [address]   [facility] [address]  Phone: [phone]  Fax: [phone]          Patient: aKn Traore   MR Number: [id number]   YOB: 1985   Date of Visit: 6/20/2017       Dear Madeline Aguilar:    Thank you for referring Kan Traore to me for evaluation. Attached you will find relevant portions of my assessment and plan of care.    If you have questions, please do not hesitate to call me. I look forward to following Kan Traore along with you.    Sincerely,    Yonatan Douglass Jr., DPM    Enclosure  CC:  No Recipients    If you would like to receive this communication electronically, please contact [email or web address] or [phone] to request more information on [employer] Link access.    For providers and/or their staff who would like to refer a patient to [facility], please contact us through our one-stop-shop provider referral line, [facility] [place], at [phone].    If you feel you have received this communication in error or would no longer like to receive these types of communications, please e-mail [email or web address]

## 2017-06-20 NOTE — PATIENT INSTRUCTIONS
Instructions for Care after Ingrown Nail removal      Dressing Options- Traditional Method:  1. Soaking two times a day in WARM water with Epsom salts or diluted Povidone Iodine  (Betadine) for 15-20 minutes. You will need to purchase these products from the pharmacy.  2. Dry toe then apply an antibiotic cream or ointment such as Neosporin or Polysporin plus or  Garamycin and cover with a 2 x 2 inch size gauze and then secure with a 1 inch band aid.  3. In the second week, take the dressing off at bedtime to air dry the toe.  4. If the toe is infected take the Antibiotic Pills as directed until finished.            Understanding Ingrown Toenails    An ingrown nail is the result of a nail growing into the skin that surrounds it. This often occurs at either edge of the big toe. Ingrown nails may be caused by improper trimming, inherited nail deformities, injuries, fungal infections, or pressure.  Symptoms  Ingrown nails may cause pain at the tip of the toe or all the way to the base of the toe. The pain is often worse while walking. An ingrown nail may also lead to infection, inflammation, or a more serious condition. If its infected, you might see pus or redness.  Evaluation  To determine the extent of your problem, your healthcare provider examines and possibly presses the painful area. If other problems are suspected, blood tests, cultures, and X-rays may be done as well.  Treatment  If the nail isnt infected, your healthcare provider may trim the corner of it to help relieve your symptoms. He or she may need to remove one side of your nail back to the cuticle. The base of the nail may then be treated with a chemical to keep the ingrown part from growing back. Severe infections or ingrown nails may require antibiotics and temporary or permanent removal of a portion of the nail. To prevent pain, a local anesthetic may be used in these procedures. This treatment is usually done at your healthcare provider's  office.  Prevention  Many nail problems can be prevented by wearing the right shoes and trimming your nails properly. To help avoid infection, keep your feet clean and dry. If you have diabetes, talk with your healthcare provider before doing any foot self-care.  · The right shoes: Get your feet measured (your size may change as you age). Wear shoes that are supportive and roomy enough for your toes to wiggle. Look for shoes made of natural materials such as leather, which allow your feet to breathe.  · Proper trimming: To avoid problems, trim your toenails straight across without cutting down into the corners. If you cant trim your own nails, ask your healthcare provider to do so for you.  Date Last Reviewed: 10/1/2016  © 3713-7817 [employer]. [address] [place] [address], [place] [address]. All rights reserved. This information is not intended as a substitute for professional medical care. Always follow your healthcare professional's instructions.

## 2017-06-30 ENCOUNTER — TELEPHONE (OUTPATIENT)
Dept: UROLOGY | Facility: CLINIC | Age: 32
End: 2017-06-30

## 2017-06-30 NOTE — TELEPHONE ENCOUNTER
----- Message from Jenny Workman sent at 6/30/2017  1:36 PM CDT -----  Contact: Self/[phone]  Patient said she would like to speak with you regarding her CT scan results. Please advise

## 2017-07-28 NOTE — TELEPHONE ENCOUNTER
----- Message from Luann Wesley sent at 7/28/2017 10:46 AM CDT -----  Contact: [phone]/kotf  Pt states she's returning your call.  Please advise

## 2017-07-28 NOTE — TELEPHONE ENCOUNTER
Called patient she would like to schedule her lithotripsy and get a script for med's until she has the procedure.

## 2017-07-31 RX ORDER — HYDROCODONE BITARTRATE AND ACETAMINOPHEN 5; 325 MG/1; MG/1
1 TABLET ORAL EVERY 6 HOURS PRN
Qty: 30 TABLET | Refills: 0 | Status: SHIPPED | OUTPATIENT
Start: 2017-07-31 | End: 2017-09-27 | Stop reason: ALTCHOICE

## 2017-07-31 RX ORDER — HYDROCODONE BITARTRATE AND ACETAMINOPHEN 5; 325 MG/1; MG/1
1 TABLET ORAL EVERY 6 HOURS PRN
Qty: 30 TABLET | Refills: 0 | Status: SHIPPED | OUTPATIENT
Start: 2017-07-31 | End: 2017-07-31 | Stop reason: SDUPTHER

## 2017-08-14 ENCOUNTER — TELEPHONE (OUTPATIENT)
Dept: UROLOGY | Facility: CLINIC | Age: 32
End: 2017-08-14

## 2017-08-14 NOTE — TELEPHONE ENCOUNTER
----- Message from Juliana Riojas sent at 8/14/2017  9:33 AM CDT -----  Contact: self, [phone]  Patient requests to schedule her procedure appointment. Please advise.

## 2017-08-15 ENCOUNTER — TELEPHONE (OUTPATIENT)
Dept: UROLOGY | Facility: CLINIC | Age: 32
End: 2017-08-15

## 2017-08-15 DIAGNOSIS — N20.0 KIDNEY STONE: Primary | ICD-10-CM

## 2017-08-15 NOTE — TELEPHONE ENCOUNTER
----- Message from Chastity Tee sent at 8/15/2017  2:13 PM CDT -----  Contact: [phone]  Patient requesting to speak with you regarding her procedure. Please advise.

## 2017-08-16 ENCOUNTER — OFFICE VISIT (OUTPATIENT)
Dept: FAMILY MEDICINE | Facility: CLINIC | Age: 32
End: 2017-08-16
Payer: COMMERCIAL

## 2017-08-16 VITALS
WEIGHT: 123.69 LBS | DIASTOLIC BLOOD PRESSURE: 78 MMHG | HEART RATE: 97 BPM | SYSTOLIC BLOOD PRESSURE: 114 MMHG | TEMPERATURE: 98 F | HEIGHT: 66 IN | OXYGEN SATURATION: 99 % | BODY MASS INDEX: 19.88 KG/M2

## 2017-08-16 DIAGNOSIS — F98.8 ATTENTION DEFICIT DISORDER, UNSPECIFIED HYPERACTIVITY PRESENCE: ICD-10-CM

## 2017-08-16 DIAGNOSIS — N20.0 RENAL CALCULUS, RIGHT: Primary | ICD-10-CM

## 2017-08-16 DIAGNOSIS — N23 RENAL COLIC ON RIGHT SIDE: ICD-10-CM

## 2017-08-16 PROCEDURE — 99213 OFFICE O/P EST LOW 20 MIN: CPT | Mod: S$GLB,,, | Performed by: NURSE PRACTITIONER

## 2017-08-16 PROCEDURE — 99999 PR PBB SHADOW E&M-EST. PATIENT-LVL IV: CPT | Mod: PBBFAC,,, | Performed by: NURSE PRACTITIONER

## 2017-08-16 PROCEDURE — 3008F BODY MASS INDEX DOCD: CPT | Mod: S$GLB,,, | Performed by: NURSE PRACTITIONER

## 2017-08-16 RX ORDER — DEXTROAMPHETAMINE SACCHARATE, AMPHETAMINE ASPARTATE MONOHYDRATE, DEXTROAMPHETAMINE SULFATE AND AMPHETAMINE SULFATE 5; 5; 5; 5 MG/1; MG/1; MG/1; MG/1
20 CAPSULE, EXTENDED RELEASE ORAL EVERY MORNING
Qty: 30 CAPSULE | Refills: 0 | Status: SHIPPED | OUTPATIENT
Start: 2017-10-16 | End: 2017-11-06 | Stop reason: SDUPTHER

## 2017-08-16 RX ORDER — DEXTROAMPHETAMINE SACCHARATE, AMPHETAMINE ASPARTATE MONOHYDRATE, DEXTROAMPHETAMINE SULFATE AND AMPHETAMINE SULFATE 5; 5; 5; 5 MG/1; MG/1; MG/1; MG/1
20 CAPSULE, EXTENDED RELEASE ORAL EVERY MORNING
Qty: 30 CAPSULE | Refills: 0 | Status: SHIPPED | OUTPATIENT
Start: 2017-08-16 | End: 2017-10-06 | Stop reason: ALTCHOICE

## 2017-08-16 RX ORDER — DEXTROAMPHETAMINE SACCHARATE, AMPHETAMINE ASPARTATE MONOHYDRATE, DEXTROAMPHETAMINE SULFATE AND AMPHETAMINE SULFATE 5; 5; 5; 5 MG/1; MG/1; MG/1; MG/1
20 CAPSULE, EXTENDED RELEASE ORAL EVERY MORNING
Qty: 30 CAPSULE | Refills: 0 | Status: SHIPPED | OUTPATIENT
Start: 2017-09-15 | End: 2017-11-06 | Stop reason: SDUPTHER

## 2017-08-16 RX ORDER — CIPROFLOXACIN 2 MG/ML
400 INJECTION, SOLUTION INTRAVENOUS
Status: CANCELLED | OUTPATIENT
Start: 2017-08-16

## 2017-08-16 RX ORDER — SODIUM CHLORIDE 9 MG/ML
INJECTION, SOLUTION INTRAVENOUS CONTINUOUS
Status: CANCELLED | OUTPATIENT
Start: 2017-08-16

## 2017-08-16 NOTE — PROGRESS NOTES
Subjective:       Patient ID: Kan Traore is a 32 y.o. female.    Chief Complaint: Medication Refill    Patient is here today for ADHD follow up.  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 Reports the Adderall XR 20 mg is working well without problems.        Previous Medications    DEXTROAMPHETAMINE-AMPHETAMINE (ADDERALL XR) 20 MG 24 HR CAPSULE    Take 1 capsule (20 mg total) by mouth every morning.    HYDROCODONE-ACETAMINOPHEN 5-325MG (NORCO) 5-325 MG PER TABLET    Take 1 tablet by mouth every 6 (six) hours as needed for Pain.    KETOROLAC (TORADOL) 10 MG TABLET    Take 1 tablet (10 mg total) by mouth every 6 (six) hours.    NORETHINDRONE (MICRONOR) 0.35 MG TABLET    Take 1 tablet (0.35 mg total) by mouth once daily.    SERTRALINE (ZOLOFT) 50 MG TABLET    Take 1 tablet (50 mg total) by mouth once daily.       Past Medical History:   Diagnosis Date    Allergic rhinitis     Anxiety     Asthma     Attention deficit disorder (ADD)     Depression     Kidney stone     Urinary tract infection     Vaginal infection        Past Surgical History:   Procedure Laterality Date    DILATION AND CURETTAGE OF UTERUS  2014    KIDNEY STONE SURGERY      LITHOTRIPSY  2011    LITHOTRIPSY  2017    TONSILLECTOMY         Family History   Problem Relation Age of Onset    Hypertension Mother 50    Hypothyroidism Father 52    Stroke Maternal Grandfather     Hypertension Maternal Grandfather     Heart disease Maternal Grandfather 56      of AMI    Heart failure Paternal Grandmother 81    No Known Problems Son     No Known Problems Son     No Known Problems Son     Breast cancer Neg Hx     Colon cancer Neg Hx     Ovarian cancer Neg Hx     Kidney disease Neg Hx        Social History     Social History    Marital status:      Spouse name: N/A    Number of children:  "N/A    Years of education: N/A     Occupational History     [employer]     Social History Main Topics    Smoking status: Never Smoker    Smokeless tobacco: Never Used    Alcohol use No    Drug use: No    Sexual activity: Yes     Partners: Male     Other Topics Concern    None     Social History Narrative    None       Review of Systems   Constitutional: Negative for appetite change, chills, fatigue, fever and unexpected weight change.   HENT: Negative for congestion, ear pain, mouth sores, nosebleeds, postnasal drip, rhinorrhea, sinus pressure, sneezing, sore throat, trouble swallowing and voice change.    Eyes: Negative for photophobia, pain, discharge, redness, itching and visual disturbance.   Respiratory: Negative for cough, chest tightness and shortness of breath.    Cardiovascular: Negative for chest pain, palpitations and leg swelling.   Gastrointestinal: Negative for abdominal pain, blood in stool, constipation, diarrhea, nausea and vomiting.   Genitourinary: Negative for dysuria, frequency, hematuria and urgency.   Musculoskeletal: Negative for arthralgias, back pain, joint swelling and myalgias.   Skin: Negative for color change and rash.   Allergic/Immunologic: Negative for immunocompromised state.   Neurological: Negative for dizziness, seizures, syncope, weakness and headaches.   Hematological: Negative for adenopathy. Does not bruise/bleed easily.   Psychiatric/Behavioral: Negative for agitation, dysphoric mood, sleep disturbance and suicidal ideas. The patient is not nervous/anxious.          Objective:     Vitals:    08/16/17 1612   BP: 114/78   BP Location: Right arm   Patient Position: Sitting   BP Method: Medium (Manual)   Pulse: 97   Temp: 98.1 °F (36.7 °C)   TempSrc: Oral   SpO2: 99%   Weight: 56.1 kg (123 lb 10.9 oz)   Height: 5' 6" (1.676 m)          Physical Exam   Constitutional: She is oriented to person, place, and time. She appears well-developed and " well-nourished.   HENT:   Head: Normocephalic and atraumatic.   Right Ear: External ear normal.   Left Ear: External ear normal.   Nose: Nose normal.   Mouth/Throat: Oropharynx is clear and moist. No oropharyngeal exudate.   Eyes: EOM are normal. Pupils are equal, round, and reactive to light.   Neck: Normal range of motion. Neck supple. No tracheal deviation present. No thyromegaly present.   Cardiovascular: Normal rate, regular rhythm and normal heart sounds.    No murmur heard.  Pulmonary/Chest: Effort normal and breath sounds normal. No respiratory distress.   Abdominal: Soft. She exhibits no distension.   Musculoskeletal: Normal range of motion. She exhibits no edema.   Lymphadenopathy:     She has no cervical adenopathy.   Neurological: She is alert and oriented to person, place, and time. No cranial nerve deficit. Coordination normal.   Skin: Skin is warm and dry. No rash noted.   Psychiatric: She has a normal mood and affect.         Assessment:         ICD-10-CM ICD-9-CM   1. Attention deficit disorder, unspecified hyperactivity presence F98.8 314.00       Plan:       Attention deficit disorder, unspecified hyperactivity presence  -     dextroamphetamine-amphetamine (ADDERALL XR) 20 MG 24 hr capsule; Take 1 capsule (20 mg total) by mouth every morning.  Dispense: 30 capsule; Refill: 0  -     dextroamphetamine-amphetamine (ADDERALL XR) 20 MG 24 hr capsule; Take 1 capsule (20 mg total) by mouth every morning.  Dispense: 30 capsule; Refill: 0    Other orders  -     dextroamphetamine-amphetamine (ADDERALL XR) 20 MG 24 hr capsule; Take 1 capsule (20 mg total) by mouth every morning.  Dispense: 30 capsule; Refill: 0      Return in about 3 months (around 11/16/2017).     Patient's Medications   New Prescriptions    DEXTROAMPHETAMINE-AMPHETAMINE (ADDERALL XR) 20 MG 24 HR CAPSULE    Take 1 capsule (20 mg total) by mouth every morning.   Previous Medications    HYDROCODONE-ACETAMINOPHEN 5-325MG (NORCO) 5-325 MG PER  TABLET    Take 1 tablet by mouth every 6 (six) hours as needed for Pain.    KETOROLAC (TORADOL) 10 MG TABLET    Take 1 tablet (10 mg total) by mouth every 6 (six) hours.    NORETHINDRONE (MICRONOR) 0.35 MG TABLET    Take 1 tablet (0.35 mg total) by mouth once daily.    SERTRALINE (ZOLOFT) 50 MG TABLET    Take 1 tablet (50 mg total) by mouth once daily.   Modified Medications    Modified Medication Previous Medication    DEXTROAMPHETAMINE-AMPHETAMINE (ADDERALL XR) 20 MG 24 HR CAPSULE dextroamphetamine-amphetamine (ADDERALL XR) 20 MG 24 hr capsule       Take 1 capsule (20 mg total) by mouth every morning.    Take 1 capsule (20 mg total) by mouth every morning.    DEXTROAMPHETAMINE-AMPHETAMINE (ADDERALL XR) 20 MG 24 HR CAPSULE dextroamphetamine-amphetamine (ADDERALL XR) 20 MG 24 hr capsule       Take 1 capsule (20 mg total) by mouth every morning.    Take 1 capsule (20 mg total) by mouth every morning.   Discontinued Medications    No medications on file

## 2017-08-17 ENCOUNTER — OFFICE VISIT (OUTPATIENT)
Dept: PODIATRY | Facility: CLINIC | Age: 32
End: 2017-08-17
Payer: COMMERCIAL

## 2017-08-17 VITALS
WEIGHT: 120 LBS | BODY MASS INDEX: 19.29 KG/M2 | DIASTOLIC BLOOD PRESSURE: 72 MMHG | SYSTOLIC BLOOD PRESSURE: 105 MMHG | HEIGHT: 66 IN | HEART RATE: 75 BPM

## 2017-08-17 DIAGNOSIS — L60.0 INGROWN NAIL: Primary | ICD-10-CM

## 2017-08-17 PROBLEM — N20.0 RENAL CALCULUS, RIGHT: Status: ACTIVE | Noted: 2017-08-17

## 2017-08-17 PROCEDURE — 99213 OFFICE O/P EST LOW 20 MIN: CPT | Mod: S$GLB,,, | Performed by: PODIATRIST

## 2017-08-17 PROCEDURE — 3008F BODY MASS INDEX DOCD: CPT | Mod: S$GLB,,, | Performed by: PODIATRIST

## 2017-08-17 NOTE — LETTER
August 17, 2017      [place]  [address]  [address]  [facility] [address]           [place]  [address], [address]   [facility] [address]  Phone: [phone]  Fax: [phone]          Patient: Kan Traore   MR Number: [id number]   YOB: 1985   Date of Visit: 8/17/2017       Dear Madeline Aguilar:    Thank you for referring Kan Traore to me for evaluation. Attached you will find relevant portions of my assessment and plan of care.    If you have questions, please do not hesitate to call me. I look forward to following Kan Traore along with you.    Sincerely,    Yonatan Douglass Jr., DPM    Enclosure  CC:  No Recipients    If you would like to receive this communication electronically, please contact [email or web address] or [phone] to request more information on [employer] Link access.    For providers and/or their staff who would like to refer a patient to [facility], please contact us through our one-stop-shop provider referral line, [facility], at [phone].    If you feel you have received this communication in error or would no longer like to receive these types of communications, please e-mail [email or web address]

## 2017-08-17 NOTE — PATIENT INSTRUCTIONS
Instructions for Care after Ingrown Nail removal      Dressing Options- Traditional Method:  1. Soaking two times a day in WARM water with Epsom salts or diluted Povidone Iodine  (Betadine) for 15-20 minutes. You will need to purchase these products from the pharmacy.  2. Dry toe then apply an antibiotic cream or ointment such as Neosporin or Polysporin plus or  Garamycin and cover with a 2 x 2 inch size gauze and then secure with a 1 inch band aid.  3. In the second week, take the dressing off at bedtime to air dry the toe.  4. If the toe is infected take the Antibiotic Pills as directed until finished.            Understanding Ingrown Toenails    An ingrown nail is the result of a nail growing into the skin that surrounds it. This often occurs at either edge of the big toe. Ingrown nails may be caused by improper trimming, inherited nail deformities, injuries, fungal infections, or pressure.  Symptoms  Ingrown nails may cause pain at the tip of the toe or all the way to the base of the toe. The pain is often worse while walking. An ingrown nail may also lead to infection, inflammation, or a more serious condition. If its infected, you might see pus or redness.  Evaluation  To determine the extent of your problem, your healthcare provider examines and possibly presses the painful area. If other problems are suspected, blood tests, cultures, and X-rays may be done as well.  Treatment  If the nail isnt infected, your healthcare provider may trim the corner of it to help relieve your symptoms. He or she may need to remove one side of your nail back to the cuticle. The base of the nail may then be treated with a chemical to keep the ingrown part from growing back. Severe infections or ingrown nails may require antibiotics and temporary or permanent removal of a portion of the nail. To prevent pain, a local anesthetic may be used in these procedures. This treatment is usually done at your healthcare provider's  office.  Prevention  Many nail problems can be prevented by wearing the right shoes and trimming your nails properly. To help avoid infection, keep your feet clean and dry. If you have diabetes, talk with your healthcare provider before doing any foot self-care.  · The right shoes: Get your feet measured (your size may change as you age). Wear shoes that are supportive and roomy enough for your toes to wiggle. Look for shoes made of natural materials such as leather, which allow your feet to breathe.  · Proper trimming: To avoid problems, trim your toenails straight across without cutting down into the corners. If you cant trim your own nails, ask your healthcare provider to do so for you.  Date Last Reviewed: 10/1/2016  © 3839-5358 [employer]. [address] [place] [address], [place] [address]. All rights reserved. This information is not intended as a substitute for professional medical care. Always follow your healthcare professional's instructions.

## 2017-08-17 NOTE — PROGRESS NOTES
Subjective:    Patient ID: Kan Traore is a 32 y.o. female.    Chief Complaint: Ingrown Toenail      HPI:   Kan is a 32 y.o. female who presents to the clinic complaining of painful ingrown toenail on the left foot.    31 yo f/u for L hallux ingrowing nail. She had a slantback last visit and reports that it resolved her problem, but that she thinks the ingrowing nail is starting to recur        Last Podiatry Enc: Visit date not found  Last Enc w/ Me: Visit date not found    Past Medical History:   Diagnosis Date    Allergic rhinitis     Anxiety     Asthma     Attention deficit disorder (ADD)     Depression     Kidney stone     Urinary tract infection     Vaginal infection      Past Surgical History:   Procedure Laterality Date    DILATION AND CURETTAGE OF UTERUS  4/2014    KIDNEY STONE SURGERY      LITHOTRIPSY  2011    LITHOTRIPSY  02/09/2017    TONSILLECTOMY       Social History     Social History    Marital status:      Spouse name: N/A    Number of children: N/A    Years of education: N/A     Occupational History     [employer]     Social History Main Topics    Smoking status: Never Smoker    Smokeless tobacco: Never Used    Alcohol use No    Drug use: No    Sexual activity: Yes     Partners: Male     Other Topics Concern    Not on file     Social History Narrative    No narrative on file         Current Outpatient Prescriptions:     dextroamphetamine-amphetamine (ADDERALL XR) 20 MG 24 hr capsule, Take 1 capsule (20 mg total) by mouth every morning., Disp: 30 capsule, Rfl: 0    [START ON 9/15/2017] dextroamphetamine-amphetamine (ADDERALL XR) 20 MG 24 hr capsule, Take 1 capsule (20 mg total) by mouth every morning., Disp: 30 capsule, Rfl: 0    [START ON 10/16/2017] dextroamphetamine-amphetamine (ADDERALL XR) 20 MG 24 hr capsule, Take 1 capsule (20 mg total) by mouth every morning., Disp: 30 capsule, Rfl: 0    hydrocodone-acetaminophen 5-325mg  "(NORCO) 5-325 mg per tablet, Take 1 tablet by mouth every 6 (six) hours as needed for Pain., Disp: 30 tablet, Rfl: 0    norethindrone (MICRONOR) 0.35 mg tablet, Take 1 tablet (0.35 mg total) by mouth once daily., Disp: 30 tablet, Rfl: 6    sertraline (ZOLOFT) 50 MG tablet, Take 1 tablet (50 mg total) by mouth once daily., Disp: 30 tablet, Rfl: 5    acetaminophen (TYLENOL) 325 MG tablet, Take 1 tablet (325 mg total) by mouth every 6 (six) hours as needed for Pain., Disp: , Rfl:     ciprofloxacin HCl (CIPRO) 500 MG tablet, Take 1 tablet (500 mg total) by mouth every 12 (twelve) hours., Disp: 10 tablet, Rfl: 0    hydrocodone-acetaminophen 10-325mg (NORCO)  mg Tab, Take 1 tablet by mouth every 6 (six) hours as needed., Disp: 20 tablet, Rfl: 0    ketorolac (TORADOL) 10 mg tablet, Take 1 tablet (10 mg total) by mouth every 6 (six) hours. (Patient taking differently: Take 10 mg by mouth every 6 (six) hours as needed. ), Disp: 20 tablet, Rfl: 1    tamsulosin (FLOMAX) 0.4 mg Cp24, Take 1 capsule (0.4 mg total) by mouth every evening., Disp: 30 capsule, Rfl: 1  Review of patient's allergies indicates:  No Known Allergies    ROS  ROS Constitutional:  General Appearance: well nourished  Vascular: negative for cramps, edema and bruising  Musculoskeletal: negative for joint paint and joint edema  Skin: negative for rashes and lesions  Neurological: negative for burning, tingling and numbness  Gastrointestinal: negative for stomach pain, nausea and vomiting        Objective:        /72 (BP Location: Right arm, Patient Position: Sitting, BP Method: Medium (Automatic))   Pulse 75   Ht 5' 6" (1.676 m)   Wt 54.4 kg (120 lb)   LMP 08/01/2017   BMI 19.37 kg/m²     Ortho/SPM Exam  Physical Exam  LE exam con't:  V: DP 2/4, PT 2/4, CRT< 3s to all digits tested.     N: SILT in SP/DP/T/Zena/Saph distributions.    Derm: Skin intact. No erythema, edema or ecchymosis. L hallux nail mildly ingrowing and incurvated. Mild " local erythema, but otherwise No signs of infection    Ortho:  +Motor EHL/FHL/TA/GA   Compartments soft/compressible. No pain on passive stretch of big toe. No calf  pain.        Assessment:     Imaging / Labs:            1. Ingrown nail          Plan:          Procedures  .   Kan was seen today for ingrown toenail.    Diagnoses and all orders for this visit:    Ingrown nail        Kan Traore is a 32 y.o. female presenting w/   1. Ingrown nail        -pt seen, evaluated, and managed  -dx discussed in detail. All questions/concerns addressed  -all tx options discussed. All alternatives, risks, benefits of all txs discussed  -The patient was educated regarding the above diagnosis. We discussed conservative care options regarding shoe wear and/or padding.  -rxs dispensed: none  -discussed ingrowing toenails and all tx options. Pt opts for non-procedural slantback today which was performed as a courtesy w/o complication  -Pt wants to schedule to have L hallux nail removed  -pt to perform epsom salt or betadine soaks once daily x 2wks. Written instructions dispensed  -keep toe covered with triple abx ointment + bandaid x 2wks  -will plan for procedural removal at nxt visit     rtc 4-6 wks for possible procedure: TNA w matrixectomy L hallux b/l border      Return in about 4 weeks (around 9/14/2017).

## 2017-08-18 ENCOUNTER — TELEPHONE (OUTPATIENT)
Dept: UROLOGY | Facility: CLINIC | Age: 32
End: 2017-08-18

## 2017-08-18 DIAGNOSIS — N20.0 KIDNEY STONE: Primary | ICD-10-CM

## 2017-09-25 ENCOUNTER — PATIENT MESSAGE (OUTPATIENT)
Dept: UROLOGY | Facility: CLINIC | Age: 32
End: 2017-09-25

## 2017-09-27 ENCOUNTER — OFFICE VISIT (OUTPATIENT)
Dept: UROLOGY | Facility: CLINIC | Age: 32
End: 2017-09-27
Payer: COMMERCIAL

## 2017-09-27 VITALS
WEIGHT: 120 LBS | BODY MASS INDEX: 19.29 KG/M2 | DIASTOLIC BLOOD PRESSURE: 66 MMHG | SYSTOLIC BLOOD PRESSURE: 120 MMHG | HEIGHT: 66 IN

## 2017-09-27 DIAGNOSIS — N23 RENAL COLIC ON LEFT SIDE: ICD-10-CM

## 2017-09-27 DIAGNOSIS — N23 RENAL COLIC ON RIGHT SIDE: ICD-10-CM

## 2017-09-27 DIAGNOSIS — N20.0 NEPHROLITHIASIS: Primary | ICD-10-CM

## 2017-09-27 DIAGNOSIS — N20.0 RENAL CALCULUS, RIGHT: ICD-10-CM

## 2017-09-27 PROCEDURE — 99999 PR PBB SHADOW E&M-EST. PATIENT-LVL III: CPT | Mod: PBBFAC,,, | Performed by: UROLOGY

## 2017-09-27 PROCEDURE — 99024 POSTOP FOLLOW-UP VISIT: CPT | Mod: S$GLB,,, | Performed by: UROLOGY

## 2017-09-27 RX ORDER — TAMSULOSIN HYDROCHLORIDE 0.4 MG/1
0.4 CAPSULE ORAL DAILY
Qty: 30 CAPSULE | Refills: 11 | Status: SHIPPED | OUTPATIENT
Start: 2017-09-27 | End: 2018-07-26

## 2017-09-27 RX ORDER — KETOROLAC TROMETHAMINE 10 MG/1
10 TABLET, FILM COATED ORAL EVERY 6 HOURS PRN
Qty: 20 TABLET | Refills: 1 | Status: SHIPPED | OUTPATIENT
Start: 2017-09-27 | End: 2019-02-18

## 2017-09-27 RX ORDER — HYDROCODONE BITARTRATE AND ACETAMINOPHEN 10; 325 MG/1; MG/1
1 TABLET ORAL EVERY 6 HOURS PRN
Qty: 30 TABLET | Refills: 0 | Status: SHIPPED | OUTPATIENT
Start: 2017-09-27 | End: 2017-11-03 | Stop reason: SDUPTHER

## 2017-09-27 NOTE — PATIENT INSTRUCTIONS
Increase fluid intake  Flomax 0.4 mg daily  Toradol 10 mg every 6 hours as needed  Norco PRN  F/U 6 weeks with KUB  Plan further care at next visit.

## 2017-09-27 NOTE — PROGRESS NOTES
"Kan Traore is a 32 y.o. female patient.   No diagnosis found.  Past Medical History:   Diagnosis Date    Allergic rhinitis     Anxiety     Asthma     Attention deficit disorder (ADD)     Depression     Kidney stone     Urinary tract infection     Vaginal infection      No past surgical history pertinent negatives on file.  Scheduled Meds:  Continuous Infusions:  PRN Meds:    Review of patient's allergies indicates:  No Known Allergies  There are no hospital problems to display for this patient.    Blood pressure 120/66, height 5' 6" (1.676 m), weight 54.4 kg (120 lb), not currently breastfeeding.    Subjective:   Diet: Adequate intake.    Activity level: Returning to normal.    Pain control: Partially controlled (Pain at night when lying down).      Objective:  Vital signs (most recent): Blood pressure 120/66, height 5' 6" (1.676 m), weight 54.4 kg (120 lb), not currently breastfeeding.  General appearance: Comfortable, well-appearing, in no acute distress and not in pain.    Lungs:  Normal respiratory rate and normal effort.    Heart: Normal rate.  Regular rhythm.    Chest: Symmetric chest wall expansion.    Abdomen: Abdomen is soft.    Bowel sounds:  Bowel sounds are normal.    Tenderness: There is no abdominal tenderness tenderness.    Extremities: There is normal range of motion.    Neurological: The patient is alert.  Normal strength.  Pupils are equal, round, and reactive to light.      KUB: Right lower pole 1-2 mm calculi (3), Left renal calculus 2mm according to radiologists report. Films difficult to interpret personally on computer.   Assessment:   Post-op: 41 days.    Condition: In stable condition.     Plan:  Encourage ambulation.  Regular diet.  Resume oral medications.    Restar Flomax, otradol and Norco prn.   Repeat kub in 6 weeks and plan further treatment at that point.       Kendell Hernandez MD  9/27/2017  "

## 2017-10-06 ENCOUNTER — OFFICE VISIT (OUTPATIENT)
Dept: PODIATRY | Facility: CLINIC | Age: 32
End: 2017-10-06
Payer: COMMERCIAL

## 2017-10-06 VITALS
HEART RATE: 83 BPM | WEIGHT: 120 LBS | SYSTOLIC BLOOD PRESSURE: 101 MMHG | DIASTOLIC BLOOD PRESSURE: 67 MMHG | BODY MASS INDEX: 19.29 KG/M2 | RESPIRATION RATE: 18 BRPM | HEIGHT: 66 IN

## 2017-10-06 DIAGNOSIS — L60.0 INGROWN NAIL: Primary | ICD-10-CM

## 2017-10-06 PROCEDURE — 11750 EXCISION NAIL&NAIL MATRIX: CPT | Mod: TA,S$GLB,, | Performed by: PODIATRIST

## 2017-10-06 PROCEDURE — 99213 OFFICE O/P EST LOW 20 MIN: CPT | Mod: 25,S$GLB,, | Performed by: PODIATRIST

## 2017-10-06 NOTE — PATIENT INSTRUCTIONS
Instructions for Care after Ingrown Nail removal    General Information: Stay off your feet as much as possible today. You may wear a surgical shoe, sandal or any open toed shoe that does not squeeze, constrict or put pressure on your toe(s). Your toe(s) may remain numb for up to 2-24 hours after the procedure. Although most patients can wear a closed loose fitting shoe after the first week, the toe will heal faster the more you use the open toed shoe in the first 2-3  weeks. Please contact our office if you have any questions or concerns.    Bleeding: Slight bleeding, discoloration and drainage are normal. Due to the chemical used there may be some yellow-clear drainage coming from the toe for 2-3 weeks.    Discomfort: You can elevate your foot to help alleviate minor swelling, bleeding and discomfort. You may also take Advil, Tylenol or other over the counter pain medications to help alleviate pain. Call our office if the pain is not well controlled. Most patients have very little discomfort as long as they minimize their walking for the first 24 hours and do not bump the toe.    Removing the Bandage: Starting the day after surgery, carefully remove the dressing and shower or bathe as normal. It is Ok to get the bandage soaking wet in the shower and when you remove it, it should not stick to the surgery site.    Dressing Options- Traditional Method:  1. Soaking two times a day in WARM water with Epsom salts or diluted Povidone Iodine  (Betadine) for 15-20 minutes. You will need to purchase these products from the pharmacy.  2. Dry toe then apply an antibiotic cream or ointment such as Neosporin or Polysporin plus or  Garamycin and cover with a 2 x 2 inch size gauze and then secure with a 1 inch band aid.  3. In the second week, take the dressing off at bedtime to air dry the toe.  4. If the toe is infected take the Antibiotic Pills as directed until finished.        Understanding Ingrown Toenails    An ingrown  nail is the result of a nail growing into the skin that surrounds it. This often occurs at either edge of the big toe. Ingrown nails may be caused by improper trimming, inherited nail deformities, injuries, fungal infections, or pressure.  Symptoms  Ingrown nails may cause pain at the tip of the toe or all the way to the base of the toe. The pain is often worse while walking. An ingrown nail may also lead to infection, inflammation, or a more serious condition. If its infected, you might see pus or redness.  Evaluation  To determine the extent of your problem, your healthcare provider examines and possibly presses the painful area. If other problems are suspected, blood tests, cultures, and X-rays may be done as well.  Treatment  If the nail isnt infected, your healthcare provider may trim the corner of it to help relieve your symptoms. He or she may need to remove one side of your nail back to the cuticle. The base of the nail may then be treated with a chemical to keep the ingrown part from growing back. Severe infections or ingrown nails may require antibiotics and temporary or permanent removal of a portion of the nail. To prevent pain, a local anesthetic may be used in these procedures. This treatment is usually done at your healthcare provider's office.  Prevention  Many nail problems can be prevented by wearing the right shoes and trimming your nails properly. To help avoid infection, keep your feet clean and dry. If you have diabetes, talk with your healthcare provider before doing any foot self-care.  · The right shoes: Get your feet measured (your size may change as you age). Wear shoes that are supportive and roomy enough for your toes to wiggle. Look for shoes made of natural materials such as leather, which allow your feet to breathe.  · Proper trimming: To avoid problems, trim your toenails straight across without cutting down into the corners. If you cant trim your own nails, ask your healthcare  provider to do so for you.  Date Last Reviewed: 10/1/2016  © 5211-7349 [employer]. [address] [place] [address], [place] [address]. All rights reserved. This information is not intended as a substitute for professional medical care. Always follow your healthcare professional's instructions.          Plantar Wart (Verruca Plantaris)        What Is a Plantar Wart?     A wart is a small growth on the skin that develops when the skin is infected by a virus. Warts can develop anywhere on the foot, but they typically appear on the bottom (plantar side) of the foot. Plantar warts most commonly occur in children, adolescents and the elderly.    Plantar wart    There are two types of plantar warts:    A solitary wart is a single wart. It often increases in size and may eventually multiply, forming additional satellite warts.  Mosaic warts are a cluster of several small warts growing closely together in one area. Mosaic warts are more difficult to treat than solitary warts.     Causes  Plantar warts are caused by direct contact with the human papilloma virus (HPV). This is the same virus that causes warts on other areas of the body.    Symptoms  The symptoms of a plantar wart may include:    Thickened skin. A plantar wart often resembles a callus because of its tough, thick tissue.  Pain. Walking and standing may be painful. Squeezing the sides of the wart may also cause pain.  Tiny black dots. These often appear on the surface of the wart. The dots are actually dried blood contained in the capillaries (tiny blood vessels). Plantar warts grow deep into the skin. Usually, this growth occurs slowly with the wart starting small and becoming larger over time.     Diagnosis & Treatment  To diagnose a plantar wart, the foot and ankle surgeon will examine the patients foot and look for signs and symptoms of a wart.    Although plantar warts may eventually clear up on their own, most patients desire faster relief. The goal  of treatment is to completely remove the wart.    The foot and ankle surgeon may use topical or oral treatments, laser therapy, cryotherapy (freezing), acid treatments or surgery to remove the wart.    Regardless of the treatment approaches undertaken, it is important that the patient follow the surgeons instructions, including all home care and medication that has been prescribed, as well as follow-up visits with the surgeon. Warts may return, requiring further treatment.    If there is no response to treatment, further diagnostic evaluation may be necessary. In such cases, the surgeon can perform a biopsy to rule out other potential causes for the growth.    Although many folk remedies for warts exist, patients should be aware that these remain unproven and may be dangerous. Patients should never try to remove warts themselves. This can do more harm than good.

## 2017-10-06 NOTE — PROGRESS NOTES
Subjective:    Patient ID: Kan Traore is a 32 y.o. female.    Chief Complaint: Ingrown Toenail (Left Big Toe Nail Removal)      HPI:   Kan is a 32 y.o. female who presents to the clinic complaining of painful ingrown toenail on the left foot.    33 yo f/u for L hallux ingrowing nail. She had a slantback last visit and reports that it resolved her problem, but that she thinks the ingrowing nail is starting to recur        Last Podiatry Enc: Visit date not found  Last Enc w/ Me: Visit date not found    Past Medical History:   Diagnosis Date    Allergic rhinitis     Anxiety     Asthma     Attention deficit disorder (ADD)     Depression     Kidney stone     Urinary tract infection     Vaginal infection      Past Surgical History:   Procedure Laterality Date    DILATION AND CURETTAGE OF UTERUS  4/2014    KIDNEY STONE SURGERY      LITHOTRIPSY  2011    LITHOTRIPSY  02/09/2017    TONSILLECTOMY       Social History     Social History    Marital status:      Spouse name: N/A    Number of children: N/A    Years of education: N/A     Occupational History     [employer]     Social History Main Topics    Smoking status: Never Smoker    Smokeless tobacco: Never Used    Alcohol use No    Drug use: No    Sexual activity: Yes     Partners: Male     Other Topics Concern    Not on file     Social History Narrative    No narrative on file         Current Outpatient Prescriptions:     dextroamphetamine-amphetamine (ADDERALL XR) 20 MG 24 hr capsule, Take 1 capsule (20 mg total) by mouth every morning., Disp: 30 capsule, Rfl: 0    norethindrone (MICRONOR) 0.35 mg tablet, Take 1 tablet (0.35 mg total) by mouth once daily., Disp: 30 tablet, Rfl: 6    sertraline (ZOLOFT) 50 MG tablet, Take 1 tablet (50 mg total) by mouth once daily., Disp: 30 tablet, Rfl: 5    [START ON 10/16/2017] dextroamphetamine-amphetamine (ADDERALL XR) 20 MG 24 hr capsule, Take 1 capsule (20 mg total)  "by mouth every morning., Disp: 30 capsule, Rfl: 0    hydrocodone-acetaminophen 10-325mg (NORCO)  mg Tab, Take 1 tablet by mouth every 6 (six) hours as needed for Pain., Disp: 30 tablet, Rfl: 0    ketorolac (TORADOL) 10 mg tablet, Take 1 tablet (10 mg total) by mouth every 6 (six) hours as needed., Disp: 20 tablet, Rfl: 1    tamsulosin (FLOMAX) 0.4 mg Cp24, Take 1 capsule (0.4 mg total) by mouth once daily., Disp: 30 capsule, Rfl: 11  Review of patient's allergies indicates:  No Known Allergies    ROS  ROS Constitutional:  General Appearance: well nourished  Vascular: negative for cramps, edema and bruising  Musculoskeletal: negative for joint paint and joint edema  Skin: negative for rashes and lesions  Neurological: negative for burning, tingling and numbness  Gastrointestinal: negative for stomach pain, nausea and vomiting        Objective:        /67 (BP Location: Right arm, Patient Position: Lying, BP Method: Large (Automatic))   Pulse 83   Resp 18   Ht 5' 6" (1.676 m)   Wt 54.4 kg (120 lb)   BMI 19.37 kg/m²     Ortho/SPM Exam  Physical Exam  LE exam con't:  V: DP 2/4, PT 2/4, CRT< 3s to all digits tested.     N: SILT in SP/DP/T/Zena/Saph distributions.    Derm: Skin intact. No erythema, edema or ecchymosis. L hallux nail mildly ingrowing and incurvated. Mild local erythema, but otherwise No signs of infection    Ortho:  +Motor EHL/FHL/TA/GA   Compartments soft/compressible. No pain on passive stretch of big toe. No calf  pain.        Assessment:     Imaging / Labs:            1. Ingrown nail - Left Foot          Plan:       Orders Placed This Encounter    Nail Removal     Nail Removal  Date/Time: 10/6/2017 10:11 AM  Performed by: RUBY CUNNINGHAM JR.  Authorized by: RUBY CUNNINGHAM JR.     Consent Done?:  Yes (Written)    Location:  Left foot  Location detail:  Left big toe  Anesthesia:  Digital block  Anesthetic total (ml):  8  Preparation:  Skin prepped with alcohol, skin prepped with " Betadine and sterile field established    Amount removed:  Complete  Wedge excision of skin of nail fold: No    Nail bed sutured?: No    Nail matrix removed:  Partial  Removed nail replaced and anchored: No    Dressing applied:  4x4, tube gauze, antibiotic ointment, gauze roll, dressing applied and petrolatum-impregnated gauze  Patient tolerance:  Patient tolerated the procedure well with no immediate complications      .   Kan was seen today for ingrown toenail.    Diagnoses and all orders for this visit:    Ingrown nail - Left Foot  -     Nail Removal        Kan Traore is a 32 y.o. female presenting w/ 1. Ingrown nail - Left Foot        -pt seen, evaluated, and managed  -dx discussed in detail. All questions/concerns addressed  -all tx options discussed. All alternatives, risks, benefits of all txs discussed  -The patient was educated regarding the above diagnosis. We discussed conservative care options regarding shoe wear and/or padding.  -rxs dispensed: none  -discussed ingrowing toenails and all tx options. Pt opts for procedural removal  -px as above  -dressings applied. Keep c/d/i for 48 hrs and then remove  -pt to perform epsom salt or betadine soaks once daily x 2wks. Written instructions dispensed  -keep toe covered with triple abx ointment + bandaid x 2wks      Return in about 3 weeks (around 10/27/2017).

## 2017-10-16 DIAGNOSIS — F32.A ANXIETY AND DEPRESSION: ICD-10-CM

## 2017-10-16 DIAGNOSIS — F41.9 ANXIETY AND DEPRESSION: ICD-10-CM

## 2017-10-16 RX ORDER — SERTRALINE HYDROCHLORIDE 50 MG/1
TABLET, FILM COATED ORAL
Qty: 30 TABLET | Refills: 4 | Status: SHIPPED | OUTPATIENT
Start: 2017-10-16 | End: 2018-05-23

## 2017-10-17 ENCOUNTER — PATIENT MESSAGE (OUTPATIENT)
Dept: PODIATRY | Facility: CLINIC | Age: 32
End: 2017-10-17

## 2017-10-17 NOTE — TELEPHONE ENCOUNTER
I called patient to offer a  Earlier appt she wanted Dr Douglass opinion on the photo to advised what should be done next .

## 2017-10-19 ENCOUNTER — LAB VISIT (OUTPATIENT)
Dept: LAB | Facility: HOSPITAL | Age: 32
End: 2017-10-19
Attending: PODIATRIST
Payer: COMMERCIAL

## 2017-10-19 ENCOUNTER — OFFICE VISIT (OUTPATIENT)
Dept: PODIATRY | Facility: CLINIC | Age: 32
End: 2017-10-19
Payer: COMMERCIAL

## 2017-10-19 VITALS
RESPIRATION RATE: 18 BRPM | SYSTOLIC BLOOD PRESSURE: 107 MMHG | HEART RATE: 85 BPM | WEIGHT: 121 LBS | DIASTOLIC BLOOD PRESSURE: 71 MMHG | HEIGHT: 66 IN | BODY MASS INDEX: 19.44 KG/M2

## 2017-10-19 DIAGNOSIS — L60.0 INGROWN NAIL: Primary | ICD-10-CM

## 2017-10-19 DIAGNOSIS — L60.0 INGROWN NAIL: ICD-10-CM

## 2017-10-19 LAB
GRAM STN SPEC: NORMAL
GRAM STN SPEC: NORMAL

## 2017-10-19 PROCEDURE — 87116 MYCOBACTERIA CULTURE: CPT

## 2017-10-19 PROCEDURE — 87075 CULTR BACTERIA EXCEPT BLOOD: CPT

## 2017-10-19 PROCEDURE — 87102 FUNGUS ISOLATION CULTURE: CPT

## 2017-10-19 PROCEDURE — 87015 SPECIMEN INFECT AGNT CONCNTJ: CPT

## 2017-10-19 PROCEDURE — 87077 CULTURE AEROBIC IDENTIFY: CPT

## 2017-10-19 PROCEDURE — 87186 SC STD MICRODIL/AGAR DIL: CPT

## 2017-10-19 PROCEDURE — 99024 POSTOP FOLLOW-UP VISIT: CPT | Mod: S$GLB,,, | Performed by: PODIATRIST

## 2017-10-19 PROCEDURE — 87205 SMEAR GRAM STAIN: CPT

## 2017-10-19 PROCEDURE — 87070 CULTURE OTHR SPECIMN AEROBIC: CPT

## 2017-10-19 RX ORDER — DOXYCYCLINE 100 MG/1
100 CAPSULE ORAL 2 TIMES DAILY
Qty: 14 CAPSULE | Refills: 0 | Status: SHIPPED | OUTPATIENT
Start: 2017-10-19 | End: 2017-10-26

## 2017-10-19 RX ORDER — TRAMADOL HYDROCHLORIDE 50 MG/1
50 TABLET ORAL EVERY 6 HOURS PRN
Qty: 30 TABLET | Refills: 0 | Status: ON HOLD | OUTPATIENT
Start: 2017-10-19 | End: 2017-11-20 | Stop reason: HOSPADM

## 2017-10-19 NOTE — LETTER
October 19, 2017      [place]  [address]  [address]  [facility] [address]           [facility]  [address]   [facility] [address]  Phone: [phone]  Fax: [phone]          Patient: Kan Traore   MR Number: [id number]   YOB: 1985   Date of Visit: 10/19/2017       Dear Madeline Aguilar:    Thank you for referring Kan Traore to me for evaluation. Attached you will find relevant portions of my assessment and plan of care.    If you have questions, please do not hesitate to call me. I look forward to following Kan Traore along with you.    Sincerely,    Yonatan Douglass Jr., DPM    Enclosure  CC:  No Recipients    If you would like to receive this communication electronically, please contact [email or web address] or [phone] to request more information on [employer] [facility] access.    For providers and/or their staff who would like to refer a patient to [facility], please contact us through our one-stop-shop provider referral line, [facility], at [phone].    If you feel you have received this communication in error or would no longer like to receive these types of communications, please e-mail [email or web address]

## 2017-10-19 NOTE — PROGRESS NOTES
Subjective:    Patient ID: Kan rTaore is a 32 y.o. female.    Chief Complaint: Ingrown Toenail      HPI:   Kan is a 32 y.o. female who presents to the clinic complaining of painful ingrown toenail on the left foot.    31 yo f/u for L hallux ingrowing nail. She is 1 wk s/p b/l PNA with matrixectomy. Reports toenail became red hot and swollen ~ 2-3 days after the procedure. Denies n/v/f/c.        Last Podiatry Enc: Visit date not found  Last Enc w/ Me: Visit date not found    Past Medical History:   Diagnosis Date    Allergic rhinitis     Anxiety     Asthma     Attention deficit disorder (ADD)     Depression     Kidney stone     Urinary tract infection     Vaginal infection      Past Surgical History:   Procedure Laterality Date    DILATION AND CURETTAGE OF UTERUS  4/2014    KIDNEY STONE SURGERY      LITHOTRIPSY  2011    LITHOTRIPSY  02/09/2017    TONSILLECTOMY       Social History     Social History    Marital status:      Spouse name: N/A    Number of children: N/A    Years of education: N/A     Occupational History     [employer]     Social History Main Topics    Smoking status: Never Smoker    Smokeless tobacco: Never Used    Alcohol use No    Drug use: No    Sexual activity: Yes     Partners: Male     Other Topics Concern    Not on file     Social History Narrative    No narrative on file         Current Outpatient Prescriptions:     dextroamphetamine-amphetamine (ADDERALL XR) 20 MG 24 hr capsule, Take 1 capsule (20 mg total) by mouth every morning., Disp: 30 capsule, Rfl: 0    dextroamphetamine-amphetamine (ADDERALL XR) 20 MG 24 hr capsule, Take 1 capsule (20 mg total) by mouth every morning., Disp: 30 capsule, Rfl: 0    hydrocodone-acetaminophen 10-325mg (NORCO)  mg Tab, Take 1 tablet by mouth every 6 (six) hours as needed for Pain., Disp: 30 tablet, Rfl: 0    ketorolac (TORADOL) 10 mg tablet, Take 1 tablet (10 mg total) by mouth every 6  "(six) hours as needed., Disp: 20 tablet, Rfl: 1    norethindrone (MICRONOR) 0.35 mg tablet, Take 1 tablet (0.35 mg total) by mouth once daily., Disp: 30 tablet, Rfl: 6    sertraline (ZOLOFT) 50 MG tablet, TAKE ONE TABLET BY MOUTH EVERY DAY, Disp: 30 tablet, Rfl: 4    tamsulosin (FLOMAX) 0.4 mg Cp24, Take 1 capsule (0.4 mg total) by mouth once daily., Disp: 30 capsule, Rfl: 11    doxycycline (MONODOX) 100 MG capsule, Take 1 capsule (100 mg total) by mouth 2 (two) times daily., Disp: 14 capsule, Rfl: 0    tramadol (ULTRAM) 50 mg tablet, Take 1 tablet (50 mg total) by mouth every 6 (six) hours as needed for Pain., Disp: 30 tablet, Rfl: 0  Review of patient's allergies indicates:  No Known Allergies    ROS  ROS Constitutional:  General Appearance: well nourished  Vascular: negative for cramps, edema and bruising  Musculoskeletal: negative for joint paint and joint edema  Skin: negative for rashes and lesions  Neurological: negative for burning, tingling and numbness  Gastrointestinal: negative for stomach pain, nausea and vomiting        Objective:        /71 (BP Location: Right arm, Patient Position: Sitting, BP Method: Medium (Automatic))   Pulse 85   Resp 18   Ht 5' 6" (1.676 m)   Wt 54.9 kg (121 lb)   BMI 19.53 kg/m²     Ortho/SPM Exam  Physical Exam  LE exam con't:  V: DP 2/4, PT 2/4, CRT< 3s to all digits tested.     N: SILT in SP/DP/T/Zena/Saph distributions.    Derm: Skin intact. No erythema, edema or ecchymosis. L hallux nail b/l nail fold with erythema. Remaining central portion of nail plate loose.     Ortho:  +Motor EHL/FHL/TA/GA   Compartments soft/compressible. No pain on passive stretch of big toe. No calf  pain.        Assessment:     Imaging / Labs:            1. Ingrown nail          Plan:       Orders Placed This Encounter    Gram Stain    AFB Culture & Smear    Fungus Culture    Culture, Anaerobe    Aerobic Culture    doxycycline (MONODOX) 100 MG capsule    tramadol (ULTRAM) 50 " mg tablet     Procedures  .   Kan was seen today for ingrown toenail.    Diagnoses and all orders for this visit:    Ingrown nail  -     Gram Stain; Future  -     AFB Culture & Smear; Future  -     Fungus Culture; Future  -     Culture, Anaerobe; Future  -     Aerobic Culture; Future  -     doxycycline (MONODOX) 100 MG capsule; Take 1 capsule (100 mg total) by mouth 2 (two) times daily.  -     tramadol (ULTRAM) 50 mg tablet; Take 1 tablet (50 mg total) by mouth every 6 (six) hours as needed for Pain.        Kan Traore is a 32 y.o. female presenting w/ 1. Ingrown nail        -pt seen, evaluated, and managed  -dx discussed in detail. All questions/concerns addressed  -all tx options discussed. All alternatives, risks, benefits of all txs discussed  -The patient was educated regarding the above diagnosis. We discussed conservative care options regarding shoe wear and/or padding.  -rxs dispensed: PO doxy x 7 days  -discussed ingrowing toenails and all tx options.  -suspect localized bacterial contamination vs reaction to phenol  -with patients permission the remaining loosened nail plate was removed  -cultures were obtained and sent for micro analysis  -dressings applied. Keep c/d/i for 48 hrs and then remove  -pt to perform epsom salt or betadine soaks once daily x 2wks. Written instructions dispensed  -keep toe covered with triple abx ointment + bandaid x 2wks      Return in about 1 week (around 10/26/2017), or if symptoms worsen or fail to improve.

## 2017-10-19 NOTE — PATIENT INSTRUCTIONS
Instructions for Care after Ingrown Nail removal    General Information: Stay off your feet as much as possible today. You may wear a surgical shoe, sandal or any open toed shoe that does not squeeze, constrict or put pressure on your toe(s). Your toe(s) may remain numb for up to 2-24 hours after the procedure. Although most patients can wear a closed loose fitting shoe after the first week, the toe will heal faster the more you use the open toed shoe in the first 2-3  weeks. Please contact our office if you have any questions or concerns.    Bleeding: Slight bleeding, discoloration and drainage are normal. Due to the chemical used there may be some yellow-clear drainage coming from the toe for 2-3 weeks.    Discomfort: You can elevate your foot to help alleviate minor swelling, bleeding and discomfort. You may also take Advil, Tylenol or other over the counter pain medications to help alleviate pain. Call our office if the pain is not well controlled. Most patients have very little discomfort as long as they minimize their walking for the first 24 hours and do not bump the toe.    Removing the Bandage: Starting the day after surgery, carefully remove the dressing and shower or bathe as normal. It is Ok to get the bandage soaking wet in the shower and when you remove it, it should not stick to the surgery site.    Dressing Options- Traditional Method:  1. Soaking two times a day in WARM water with Epsom salts or diluted Povidone Iodine  (Betadine) for 15-20 minutes. You will need to purchase these products from the pharmacy.  2. Dry toe then apply an antibiotic cream or ointment such as Neosporin or Polysporin plus or  Garamycin and cover with a 2 x 2 inch size gauze and then secure with a 1 inch band aid.  3. In the second week, take the dressing off at bedtime to air dry the toe.  4. If the toe is infected take the Antibiotic Pills as directed until finished.

## 2017-10-21 LAB — BACTERIA SPEC AEROBE CULT: NORMAL

## 2017-10-24 LAB — BACTERIA SPEC ANAEROBE CULT: NORMAL

## 2017-10-30 ENCOUNTER — OFFICE VISIT (OUTPATIENT)
Dept: PODIATRY | Facility: CLINIC | Age: 32
End: 2017-10-30
Payer: COMMERCIAL

## 2017-10-30 VITALS
HEIGHT: 66 IN | DIASTOLIC BLOOD PRESSURE: 74 MMHG | BODY MASS INDEX: 19.44 KG/M2 | HEART RATE: 93 BPM | WEIGHT: 121 LBS | SYSTOLIC BLOOD PRESSURE: 106 MMHG | RESPIRATION RATE: 18 BRPM

## 2017-10-30 DIAGNOSIS — L60.0 INGROWN NAIL: Primary | ICD-10-CM

## 2017-10-30 PROCEDURE — 99024 POSTOP FOLLOW-UP VISIT: CPT | Mod: S$GLB,,, | Performed by: PODIATRIST

## 2017-10-30 RX ORDER — DOXYCYCLINE 100 MG/1
100 CAPSULE ORAL 2 TIMES DAILY
Qty: 14 CAPSULE | Refills: 0 | Status: SHIPPED | OUTPATIENT
Start: 2017-10-30 | End: 2017-11-06

## 2017-10-30 NOTE — PATIENT INSTRUCTIONS
Instructions for Care after Ingrown Nail removal    General Information: Stay off your feet as much as possible today. You may wear a surgical shoe, sandal or any open toed shoe that does not squeeze, constrict or put pressure on your toe(s). Your toe(s) may remain numb for up to 2-24 hours after the procedure. Although most patients can wear a closed loose fitting shoe after the first week, the toe will heal faster the more you use the open toed shoe in the first 2-3  weeks. Please contact our office if you have any questions or concerns.    Bleeding: Slight bleeding, discoloration and drainage are normal. Due to the chemical used there may be some yellow-clear drainage coming from the toe for 2-3 weeks.    Discomfort: You can elevate your foot to help alleviate minor swelling, bleeding and discomfort. You may also take Advil, Tylenol or other over the counter pain medications to help alleviate pain. Call our office if the pain is not well controlled. Most patients have very little discomfort as long as they minimize their walking for the first 24 hours and do not bump the toe.    Removing the Bandage: Starting the day after surgery, carefully remove the dressing and shower or bathe as normal. It is Ok to get the bandage soaking wet in the shower and when you remove it, it should not stick to the surgery site.    Dressing Options- Traditional Method:  1. Soaking two times a day in WARM water with Epsom salts or diluted Povidone Iodine  (Betadine) for 15-20 minutes. You will need to purchase these products from the pharmacy.  2. Dry toe then apply an antibiotic cream or ointment such as Neosporin or Polysporin plus or  Garamycin and cover with a 2 x 2 inch size gauze and then secure with a 1 inch band aid.  3. In the second week, take the dressing off at bedtime to air dry the toe.  4. If the toe is infected take the Antibiotic Pills as directed until finished.        Understanding Ingrown Toenails    An ingrown  nail is the result of a nail growing into the skin that surrounds it. This often occurs at either edge of the big toe. Ingrown nails may be caused by improper trimming, inherited nail deformities, injuries, fungal infections, or pressure.  Symptoms  Ingrown nails may cause pain at the tip of the toe or all the way to the base of the toe. The pain is often worse while walking. An ingrown nail may also lead to infection, inflammation, or a more serious condition. If its infected, you might see pus or redness.  Evaluation  To determine the extent of your problem, your healthcare provider examines and possibly presses the painful area. If other problems are suspected, blood tests, cultures, and X-rays may be done as well.  Treatment  If the nail isnt infected, your healthcare provider may trim the corner of it to help relieve your symptoms. He or she may need to remove one side of your nail back to the cuticle. The base of the nail may then be treated with a chemical to keep the ingrown part from growing back. Severe infections or ingrown nails may require antibiotics and temporary or permanent removal of a portion of the nail. To prevent pain, a local anesthetic may be used in these procedures. This treatment is usually done at your healthcare provider's office.  Prevention  Many nail problems can be prevented by wearing the right shoes and trimming your nails properly. To help avoid infection, keep your feet clean and dry. If you have diabetes, talk with your healthcare provider before doing any foot self-care.  · The right shoes: Get your feet measured (your size may change as you age). Wear shoes that are supportive and roomy enough for your toes to wiggle. Look for shoes made of natural materials such as leather, which allow your feet to breathe.  · Proper trimming: To avoid problems, trim your toenails straight across without cutting down into the corners. If you cant trim your own nails, ask your healthcare  provider to do so for you.  Date Last Reviewed: 10/1/2016  © 2509-9385 [employer]. [address] [place] [address], [place] [address]. All rights reserved. This information is not intended as a substitute for professional medical care. Always follow your healthcare professional's instructions.

## 2017-10-30 NOTE — PROGRESS NOTES
Subjective:    Patient ID: Kan Traore is a 32 y.o. female.    Chief Complaint: Nail Problem (left toe infection)      HPI:   Kan is a 32 y.o. female who presents to the clinic complaining of painful ingrown toenail on the left foot.    33 yo f/u for L hallux ingrowing nail. She is 2 wk s/p b/l PNA with matrixectomy. Denies n/v/f/c.        Last Podiatry Enc: Visit date not found  Last Enc w/ Me: Visit date not found    Past Medical History:   Diagnosis Date    Allergic rhinitis     Anxiety     Asthma     Attention deficit disorder (ADD)     Depression     Kidney stone     Urinary tract infection     Vaginal infection      Past Surgical History:   Procedure Laterality Date    DILATION AND CURETTAGE OF UTERUS  4/2014    KIDNEY STONE SURGERY      LITHOTRIPSY  2011    LITHOTRIPSY  02/09/2017    TONSILLECTOMY       Social History     Social History    Marital status:      Spouse name: N/A    Number of children: N/A    Years of education: N/A     Occupational History     [employer]     Social History Main Topics    Smoking status: Never Smoker    Smokeless tobacco: Never Used    Alcohol use No    Drug use: No    Sexual activity: Yes     Partners: Male     Other Topics Concern    Not on file     Social History Narrative    No narrative on file         Current Outpatient Prescriptions:     dextroamphetamine-amphetamine (ADDERALL XR) 20 MG 24 hr capsule, Take 1 capsule (20 mg total) by mouth every morning., Disp: 30 capsule, Rfl: 0    dextroamphetamine-amphetamine (ADDERALL XR) 20 MG 24 hr capsule, Take 1 capsule (20 mg total) by mouth every morning., Disp: 30 capsule, Rfl: 0    hydrocodone-acetaminophen 10-325mg (NORCO)  mg Tab, Take 1 tablet by mouth every 6 (six) hours as needed for Pain., Disp: 30 tablet, Rfl: 0    ketorolac (TORADOL) 10 mg tablet, Take 1 tablet (10 mg total) by mouth every 6 (six) hours as needed., Disp: 20 tablet, Rfl: 1     "norethindrone (MICRONOR) 0.35 mg tablet, Take 1 tablet (0.35 mg total) by mouth once daily., Disp: 30 tablet, Rfl: 6    sertraline (ZOLOFT) 50 MG tablet, TAKE ONE TABLET BY MOUTH EVERY DAY, Disp: 30 tablet, Rfl: 4    tamsulosin (FLOMAX) 0.4 mg Cp24, Take 1 capsule (0.4 mg total) by mouth once daily., Disp: 30 capsule, Rfl: 11    tramadol (ULTRAM) 50 mg tablet, Take 1 tablet (50 mg total) by mouth every 6 (six) hours as needed for Pain., Disp: 30 tablet, Rfl: 0    doxycycline (MONODOX) 100 MG capsule, Take 1 capsule (100 mg total) by mouth 2 (two) times daily., Disp: 14 capsule, Rfl: 0  Review of patient's allergies indicates:  No Known Allergies    ROS  ROS Constitutional:  General Appearance: well nourished  Vascular: negative for cramps, edema and bruising  Musculoskeletal: negative for joint paint and joint edema  Skin: negative for rashes and lesions  Neurological: negative for burning, tingling and numbness  Gastrointestinal: negative for stomach pain, nausea and vomiting        Objective:        /74 (BP Location: Right arm, Patient Position: Sitting, BP Method: Medium (Automatic))   Pulse 93   Resp 18   Ht 5' 6" (1.676 m)   Wt 54.9 kg (121 lb)   BMI 19.53 kg/m²     Ortho/SPM Exam  Physical Exam  LE exam con't:  V: DP 2/4, PT 2/4, CRT< 3s to all digits tested.     N: SILT in SP/DP/T/Zena/Saph distributions.    Derm: Skin intact. No erythema, edema or ecchymosis. L hallux nail b/l nail fold with erythema.    Ortho:  +Motor EHL/FHL/TA/GA   Compartments soft/compressible. No pain on passive stretch of big toe. No calf  pain.        Assessment:     Imaging / Labs:    Aerobic Culture   Order: [id number]   Status:  Final result   Visible to patient:  Yes (Patient Portal)   Next appt:  11/02/2017 at 09:30 AM in Radiology ([facility])   Dx:  Ingrown nail    11d ago   Aerobic Bacterial Culture METHICILLIN RESISTANT STAPHYLOCOCCUS AUREUS Many    Resulting Agency [facility]   Susceptibility " "     Methicillin resistant staphylococcus aureus     CULTURE, AEROBIC  (SPECIFY SOURCE)     Clindamycin <=0.5 "><=0.5  Sensitive     Erythromycin >4  Resistant     Oxacillin >2  Resistant     Penicillin 8  Resistant     Tetracycline <=4 "><=4  Sensitive     Trimeth/Sulfa <=0.5/9.5 "><=0.5/9.5  Sensitive     Vancomycin 1  Sensitive              Specimen Collected: 10/19/17 09:32 Last Resulted: 10/21/17 12:20                      1. Ingrown nail          Plan:       Orders Placed This Encounter    doxycycline (MONODOX) 100 MG capsule     Procedures  .   Kan was seen today for nail problem.    Diagnoses and all orders for this visit:    Ingrown nail  -     doxycycline (MONODOX) 100 MG capsule; Take 1 capsule (100 mg total) by mouth 2 (two) times daily.        Kan Traore is a 32 y.o. female presenting w/   1. Ingrown nail        -pt seen, evaluated, and managed  -dx discussed in detail. All questions/concerns addressed  -all tx options discussed. All alternatives, risks, benefits of all txs discussed  -The patient was educated regarding the above diagnosis. We discussed conservative care options regarding shoe wear and/or padding.  -rxs dispensed: doxy PO x 7 days  -discussed ingrowing toenails and all tx options.  -cultures were obtained and sent for micro analysis at last visit   Grew MRSA sensitive to doxy   -b/l nail fold manually curetted and cleansed with betadine and then NS  -pt to perform epsom salt or betadine soaks once daily x 2wks. Written instructions dispensed  -keep toe covered with triple abx ointment + bandaid x 2wks      Return in about 1 week (around 11/6/2017).        "

## 2017-11-03 ENCOUNTER — OFFICE VISIT (OUTPATIENT)
Dept: UROLOGY | Facility: CLINIC | Age: 32
End: 2017-11-03
Payer: COMMERCIAL

## 2017-11-03 VITALS — WEIGHT: 119 LBS | HEIGHT: 66 IN | BODY MASS INDEX: 19.13 KG/M2

## 2017-11-03 DIAGNOSIS — N23 RENAL COLIC, BILATERAL: Primary | ICD-10-CM

## 2017-11-03 DIAGNOSIS — N20.0: ICD-10-CM

## 2017-11-03 LAB
BILIRUB UR QL STRIP: NEGATIVE
CLARITY UR REFRACT.AUTO: CLEAR
COLOR UR AUTO: YELLOW
GLUCOSE UR QL STRIP: NEGATIVE
HGB UR QL STRIP: NEGATIVE
KETONES UR QL STRIP: NEGATIVE
LEUKOCYTE ESTERASE UR QL STRIP: NEGATIVE
NITRITE UR QL STRIP: NEGATIVE
PH UR STRIP: 6 [PH] (ref 5–8)
PROT UR QL STRIP: NEGATIVE
SP GR UR STRIP: 1.02 (ref 1–1.03)
URN SPEC COLLECT METH UR: NORMAL
UROBILINOGEN UR STRIP-ACNC: NEGATIVE EU/DL

## 2017-11-03 PROCEDURE — 99024 POSTOP FOLLOW-UP VISIT: CPT | Mod: S$GLB,,, | Performed by: UROLOGY

## 2017-11-03 PROCEDURE — 99999 PR PBB SHADOW E&M-EST. PATIENT-LVL III: CPT | Mod: PBBFAC,,, | Performed by: UROLOGY

## 2017-11-03 PROCEDURE — 81003 URINALYSIS AUTO W/O SCOPE: CPT

## 2017-11-03 RX ORDER — KETOROLAC TROMETHAMINE 10 MG/1
10 TABLET, FILM COATED ORAL EVERY 6 HOURS
Qty: 20 TABLET | Refills: 1 | Status: SHIPPED | OUTPATIENT
Start: 2017-11-03 | End: 2017-11-09

## 2017-11-03 RX ORDER — HYDROCODONE BITARTRATE AND ACETAMINOPHEN 10; 325 MG/1; MG/1
1 TABLET ORAL EVERY 6 HOURS PRN
Qty: 30 TABLET | Refills: 0 | Status: ON HOLD | OUTPATIENT
Start: 2017-11-03 | End: 2017-11-20 | Stop reason: HOSPADM

## 2017-11-03 NOTE — PROGRESS NOTES
Subjective:       Patient ID: Kan Traore is a 32 y.o. female.    Chief Complaint: Results (kub from yesterday); Medication Refill (norco); and Other (disscuss possible litho)    31 yo WF with recurrent Ca Ox calculi presents with bilateral renal colic and bilateral renal calculi      Medication Refill   This is a recurrent (Renal Calculi and renal colic flare) problem. The current episode started more than 1 year ago. The problem occurs intermittently. The problem has been waxing and waning. Associated symptoms include nausea and urinary symptoms (Frequency and nocturia). Pertinent negatives include no abdominal pain, anorexia, arthralgias, change in bowel habit, chest pain, chills, congestion, coughing, diaphoresis, fatigue, fever, headaches, joint swelling, myalgias, neck pain, numbness, rash, sore throat, swollen glands, vertigo, visual change, vomiting or weakness. The symptoms are aggravated by bending (Sitting on Ground). She has tried NSAIDs and oral narcotics (Diet) for the symptoms.     Review of Systems   Constitutional: Negative for activity change, appetite change, chills, diaphoresis, fatigue and fever.   HENT: Negative for congestion, ear pain, hearing loss, nosebleeds, sinus pressure, sore throat and trouble swallowing.    Eyes: Negative for pain and visual disturbance.   Respiratory: Negative for apnea, cough and shortness of breath.    Cardiovascular: Negative for chest pain and leg swelling.   Gastrointestinal: Positive for nausea. Negative for abdominal distention, abdominal pain, anal bleeding, anorexia, blood in stool, change in bowel habit, constipation, diarrhea, rectal pain and vomiting.   Endocrine: Negative for cold intolerance, heat intolerance, polydipsia, polyphagia and polyuria.   Genitourinary: Negative for decreased urine volume, difficulty urinating, dyspareunia, dysuria, enuresis, flank pain, frequency, genital sores, hematuria, menstrual problem, pelvic pain, urgency,  vaginal bleeding, vaginal discharge and vaginal pain.   Musculoskeletal: Negative for arthralgias, back pain, joint swelling, myalgias and neck pain.   Skin: Negative for color change, pallor and rash.   Allergic/Immunologic: Negative for environmental allergies, food allergies and immunocompromised state.   Neurological: Negative for dizziness, vertigo, speech difficulty, weakness, numbness and headaches.   Hematological: Negative for adenopathy. Does not bruise/bleed easily.   Psychiatric/Behavioral: Negative.        Objective:      Physical Exam   Nursing note and vitals reviewed.  Constitutional: She is oriented to person, place, and time. She appears well-developed and well-nourished.   HENT:   Head: Normocephalic.   Nose: Nose normal.   Mouth/Throat: Oropharynx is clear and moist.   Eyes: Conjunctivae and EOM are normal. Pupils are equal, round, and reactive to light.   Neck: Normal range of motion. Neck supple.   Cardiovascular: Normal rate, regular rhythm, normal heart sounds and intact distal pulses.    Pulmonary/Chest: Effort normal and breath sounds normal.   Abdominal: Soft. Bowel sounds are normal.   Genitourinary:   Genitourinary Comments: Bilateral CVAT   Musculoskeletal: Normal range of motion.   Neurological: She is alert and oriented to person, place, and time. She has normal reflexes.   Skin: Skin is warm and dry.     Psychiatric: She has a normal mood and affect. Her behavior is normal. Judgment and thought content normal.       Assessment:       1. Renal colic, bilateral    2. Calcium oxalate monohydrate renal calculi        Plan:       Patient Instructions   CT Stone survey.  U/A  Norco 10 mg PRN  Toradol 10 mg  Every 6 hours  For 5 days for stone pain and passge

## 2017-11-03 NOTE — PATIENT INSTRUCTIONS
CT Stone survey.  U/A  Norco 10 mg PRN  Toradol 10 mg  Every 6 hours  For 5 days for stone pain and passge  Plan ESWL prior to year end if necessary.

## 2017-11-06 ENCOUNTER — OFFICE VISIT (OUTPATIENT)
Dept: FAMILY MEDICINE | Facility: CLINIC | Age: 32
End: 2017-11-06
Payer: COMMERCIAL

## 2017-11-06 ENCOUNTER — PATIENT MESSAGE (OUTPATIENT)
Dept: ADMINISTRATIVE | Facility: OTHER | Age: 32
End: 2017-11-06

## 2017-11-06 VITALS
SYSTOLIC BLOOD PRESSURE: 100 MMHG | TEMPERATURE: 98 F | HEIGHT: 66 IN | BODY MASS INDEX: 19.35 KG/M2 | OXYGEN SATURATION: 100 % | DIASTOLIC BLOOD PRESSURE: 62 MMHG | WEIGHT: 120.38 LBS | HEART RATE: 70 BPM

## 2017-11-06 DIAGNOSIS — K38.9 APPENDICOLITH: ICD-10-CM

## 2017-11-06 DIAGNOSIS — F98.8 ATTENTION DEFICIT DISORDER, UNSPECIFIED HYPERACTIVITY PRESENCE: Primary | ICD-10-CM

## 2017-11-06 PROCEDURE — 99999 PR PBB SHADOW E&M-EST. PATIENT-LVL IV: CPT | Mod: PBBFAC,,, | Performed by: NURSE PRACTITIONER

## 2017-11-06 PROCEDURE — 99213 OFFICE O/P EST LOW 20 MIN: CPT | Mod: S$GLB,,, | Performed by: NURSE PRACTITIONER

## 2017-11-06 RX ORDER — DEXTROAMPHETAMINE SACCHARATE, AMPHETAMINE ASPARTATE MONOHYDRATE, DEXTROAMPHETAMINE SULFATE AND AMPHETAMINE SULFATE 5; 5; 5; 5 MG/1; MG/1; MG/1; MG/1
20 CAPSULE, EXTENDED RELEASE ORAL EVERY MORNING
Qty: 30 CAPSULE | Refills: 0 | Status: SHIPPED | OUTPATIENT
Start: 2018-01-12 | End: 2018-02-08 | Stop reason: SDUPTHER

## 2017-11-06 RX ORDER — DEXTROAMPHETAMINE SACCHARATE, AMPHETAMINE ASPARTATE MONOHYDRATE, DEXTROAMPHETAMINE SULFATE AND AMPHETAMINE SULFATE 5; 5; 5; 5 MG/1; MG/1; MG/1; MG/1
20 CAPSULE, EXTENDED RELEASE ORAL EVERY MORNING
Qty: 30 CAPSULE | Refills: 0 | Status: SHIPPED | OUTPATIENT
Start: 2017-12-15 | End: 2018-02-08 | Stop reason: SDUPTHER

## 2017-11-06 RX ORDER — DEXTROAMPHETAMINE SACCHARATE, AMPHETAMINE ASPARTATE MONOHYDRATE, DEXTROAMPHETAMINE SULFATE AND AMPHETAMINE SULFATE 5; 5; 5; 5 MG/1; MG/1; MG/1; MG/1
20 CAPSULE, EXTENDED RELEASE ORAL EVERY MORNING
Qty: 30 CAPSULE | Refills: 0 | Status: SHIPPED | OUTPATIENT
Start: 2017-11-15 | End: 2018-02-08 | Stop reason: SDUPTHER

## 2017-11-06 NOTE — PROGRESS NOTES
Subjective:       Patient ID: Kan Traore is a 32 y.o. female.    Chief Complaint: Medication Refill    Patient is here today for ADHD follow up.  Patient had a baby in Feb. 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 Reports the Adderall XR 20 mg is working well without problems.    Patient has suffered with multiple kidney stones since delivery of baby in Feb. 2017.  She reports she just had CT stone study done on Friday, 11/3/17.  Patient states she is aware that Dr. Hernandez is out for the next 2 weeks but asked me to look at results because it showed abnormality to the appendix and wants to know if she need to follow up for this.  CT showed incidental finding of a thickened appendix measuring up to 1 cm in diameter which contains an appendicolith.  Patient has no RLQ pain present.             Previous Medications    DEXTROAMPHETAMINE-AMPHETAMINE (ADDERALL XR) 20 MG 24 HR CAPSULE    Take 1 capsule (20 mg total) by mouth every morning.    DEXTROAMPHETAMINE-AMPHETAMINE (ADDERALL XR) 20 MG 24 HR CAPSULE    Take 1 capsule (20 mg total) by mouth every morning.    DOXYCYCLINE (MONODOX) 100 MG CAPSULE    Take 1 capsule (100 mg total) by mouth 2 (two) times daily.    HYDROCODONE-ACETAMINOPHEN 10-325MG (NORCO)  MG TAB    Take 1 tablet by mouth every 6 (six) hours as needed for Pain.    KETOROLAC (TORADOL) 10 MG TABLET    Take 1 tablet (10 mg total) by mouth every 6 (six) hours as needed.    KETOROLAC (TORADOL) 10 MG TABLET    Take 1 tablet (10 mg total) by mouth every 6 (six) hours.    NORETHINDRONE (MICRONOR) 0.35 MG TABLET    Take 1 tablet (0.35 mg total) by mouth once daily.    SERTRALINE (ZOLOFT) 50 MG TABLET    TAKE ONE TABLET BY MOUTH EVERY DAY    TAMSULOSIN (FLOMAX) 0.4 MG CP24    Take 1 capsule (0.4 mg total) by mouth once daily.    TRAMADOL (ULTRAM) 50 MG TABLET    Take 1 tablet (50 mg total) by mouth  every 6 (six) hours as needed for Pain.       Past Medical History:   Diagnosis Date    Allergic rhinitis     Anxiety     Asthma     Attention deficit disorder (ADD)     Depression     Kidney stone     Urinary tract infection     Vaginal infection        Past Surgical History:   Procedure Laterality Date    DILATION AND CURETTAGE OF UTERUS  2014    KIDNEY STONE SURGERY      LITHOTRIPSY  2011    LITHOTRIPSY  2017    TONSILLECTOMY         Family History   Problem Relation Age of Onset    Hypertension Mother 50    Hypothyroidism Father 52    Stroke Maternal Grandfather     Hypertension Maternal Grandfather     Heart disease Maternal Grandfather 56      of AMI    Heart failure Paternal Grandmother 81    No Known Problems Son     No Known Problems Son     No Known Problems Son     Breast cancer Neg Hx     Colon cancer Neg Hx     Ovarian cancer Neg Hx     Kidney disease Neg Hx        Social History     Social History    Marital status:      Spouse name: N/A    Number of children: N/A    Years of education: N/A     Occupational History     [employer]     Social History Main Topics    Smoking status: Never Smoker    Smokeless tobacco: Never Used    Alcohol use No    Drug use: No    Sexual activity: Yes     Partners: Male     Other Topics Concern    None     Social History Narrative    None       Review of Systems   Constitutional: Negative for appetite change, chills, fatigue, fever and unexpected weight change.   HENT: Negative for congestion, ear pain, mouth sores, nosebleeds, postnasal drip, rhinorrhea, sinus pressure, sneezing, sore throat, trouble swallowing and voice change.    Eyes: Negative for photophobia, pain, discharge, redness, itching and visual disturbance.   Respiratory: Negative for cough, chest tightness and shortness of breath.    Cardiovascular: Negative for chest pain, palpitations and leg swelling.   Gastrointestinal: Negative  "for abdominal pain, blood in stool, constipation, diarrhea, nausea and vomiting.   Genitourinary: Negative for dysuria, frequency, hematuria and urgency.   Musculoskeletal: Negative for arthralgias, back pain, joint swelling and myalgias.   Skin: Negative for color change and rash.   Allergic/Immunologic: Negative for immunocompromised state.   Neurological: Negative for dizziness, seizures, syncope, weakness and headaches.   Hematological: Negative for adenopathy. Does not bruise/bleed easily.   Psychiatric/Behavioral: Negative for agitation, dysphoric mood, sleep disturbance and suicidal ideas. The patient is not nervous/anxious.          Objective:     Vitals:    11/06/17 1326   BP: 100/62   BP Location: Left arm   Patient Position: Sitting   BP Method: Medium (Manual)   Pulse: 70   Temp: 97.8 °F (36.6 °C)   TempSrc: Oral   SpO2: 100%   Weight: 54.6 kg (120 lb 5.9 oz)   Height: 5' 6" (1.676 m)          Physical Exam   Constitutional: She is oriented to person, place, and time. She appears well-developed and well-nourished.   HENT:   Head: Normocephalic and atraumatic.   Right Ear: External ear normal.   Left Ear: External ear normal.   Nose: Nose normal.   Mouth/Throat: Oropharynx is clear and moist. No oropharyngeal exudate.   Eyes: EOM are normal. Pupils are equal, round, and reactive to light.   Neck: Normal range of motion. Neck supple. No tracheal deviation present. No thyromegaly present.   Cardiovascular: Normal rate, regular rhythm and normal heart sounds.    No murmur heard.  Pulmonary/Chest: Effort normal and breath sounds normal. No respiratory distress.   Abdominal: Soft. She exhibits no distension.   Musculoskeletal: Normal range of motion. She exhibits no edema.   Lymphadenopathy:     She has no cervical adenopathy.   Neurological: She is alert and oriented to person, place, and time. No cranial nerve deficit. Coordination normal.   Skin: Skin is warm and dry. No rash noted.   Psychiatric: She has a " normal mood and affect.         Assessment:         ICD-10-CM ICD-9-CM   1. Attention deficit disorder, unspecified hyperactivity presence F98.8 314.00   2. Appendicolith K38.9 543.9       Plan:       Attention deficit disorder, unspecified hyperactivity presence  -  Controlled on present medication - follow up in 3 months.  -     dextroamphetamine-amphetamine (ADDERALL XR) 20 MG 24 hr capsule; Take 1 capsule (20 mg total) by mouth every morning.  Dispense: 30 capsule; Refill: 0  -     dextroamphetamine-amphetamine (ADDERALL XR) 20 MG 24 hr capsule; Take 1 capsule (20 mg total) by mouth every morning.  Dispense: 30 capsule; Refill: 0  -     dextroamphetamine-amphetamine (ADDERALL XR) 20 MG 24 hr capsule; Take 1 capsule (20 mg total) by mouth every morning.  Dispense: 30 capsule; Refill: 0    Appendicolith  -  IM with general surgeon, Dr. Troncoso - advised willing to see patient to discuss options - additional imaginge, etc.  Dr. Troncoso staff will contact patient to set up appointment.      Return in about 3 months (around 2/6/2018).     Patient's Medications   New Prescriptions    DEXTROAMPHETAMINE-AMPHETAMINE (ADDERALL XR) 20 MG 24 HR CAPSULE    Take 1 capsule (20 mg total) by mouth every morning.   Previous Medications    DOXYCYCLINE (MONODOX) 100 MG CAPSULE    Take 1 capsule (100 mg total) by mouth 2 (two) times daily.    HYDROCODONE-ACETAMINOPHEN 10-325MG (NORCO)  MG TAB    Take 1 tablet by mouth every 6 (six) hours as needed for Pain.    KETOROLAC (TORADOL) 10 MG TABLET    Take 1 tablet (10 mg total) by mouth every 6 (six) hours as needed.    KETOROLAC (TORADOL) 10 MG TABLET    Take 1 tablet (10 mg total) by mouth every 6 (six) hours.    NORETHINDRONE (MICRONOR) 0.35 MG TABLET    Take 1 tablet (0.35 mg total) by mouth once daily.    SERTRALINE (ZOLOFT) 50 MG TABLET    TAKE ONE TABLET BY MOUTH EVERY DAY    TAMSULOSIN (FLOMAX) 0.4 MG CP24    Take 1 capsule (0.4 mg total) by mouth once daily.    TRAMADOL  (ULTRAM) 50 MG TABLET    Take 1 tablet (50 mg total) by mouth every 6 (six) hours as needed for Pain.   Modified Medications    Modified Medication Previous Medication    DEXTROAMPHETAMINE-AMPHETAMINE (ADDERALL XR) 20 MG 24 HR CAPSULE dextroamphetamine-amphetamine (ADDERALL XR) 20 MG 24 hr capsule       Take 1 capsule (20 mg total) by mouth every morning.    Take 1 capsule (20 mg total) by mouth every morning.    DEXTROAMPHETAMINE-AMPHETAMINE (ADDERALL XR) 20 MG 24 HR CAPSULE dextroamphetamine-amphetamine (ADDERALL XR) 20 MG 24 hr capsule       Take 1 capsule (20 mg total) by mouth every morning.    Take 1 capsule (20 mg total) by mouth every morning.   Discontinued Medications    No medications on file

## 2017-11-07 ENCOUNTER — TELEPHONE (OUTPATIENT)
Dept: SURGERY | Facility: CLINIC | Age: 32
End: 2017-11-07

## 2017-11-07 NOTE — TELEPHONE ENCOUNTER
----- Message from Althea Troncoso DO sent at 11/6/2017  4:57 PM CST -----  This is a pt of Yamil AbN appendix incidental finding on CT. Please schedule appt with me at pt convenience. Thx  11-7-17 0815  Called, no answer, left message to call clinic.     ----- Message -----  From: Madeline Aguilar NP  Sent: 11/6/2017   4:38 PM  To: Althea Troncoso DO, #    Thanks so much for taking the time to review this patient case for me.    I spoke with the patient and recommended that she see you to discuss options and she is more than willing to do so.  I told her I would have your staff contact her to set up an appointment so I also included Anjali in this message.    Thanks to both of you,  Madeline    ----- Message -----  From: Althea Troncoso DO  Sent: 11/6/2017   4:23 PM  To: Madeline Aguilar NP    I would be happy to see her to discuss options.  It is an interesting finding. Having an appendicolith does put her at increased risk for appendicitis in the future. Another option would be repeat imaging, or f/u ultrasound.  Maybe best to schedule an appt so I can do physical exam and answer her questions.  Thanks!  April    ----- Message -----  From: Madeline Aguilar NP  Sent: 11/6/2017   2:59 PM  To: Althea Troncoso DO    Hi Dr. Troncoso,    If you can please review this patient's CT and give me your medical opinion.    Patient had a CT abd/pelvis for stone study But incidental finding was noted of a dilated appendix measuring up to 1 cm in diameter which contains an appendicolith but no significant periappendiceal inflammation.  Patient is having no RLQ pain at this time -     Patient is asking if she needs to be see a general surgeon about this - will this lead to acute appendicitis later?    Would you recommend a surgical referral?    Madeline

## 2017-11-09 ENCOUNTER — OFFICE VISIT (OUTPATIENT)
Dept: SURGERY | Facility: CLINIC | Age: 32
End: 2017-11-09
Payer: COMMERCIAL

## 2017-11-09 ENCOUNTER — OFFICE VISIT (OUTPATIENT)
Dept: PODIATRY | Facility: CLINIC | Age: 32
End: 2017-11-09
Payer: COMMERCIAL

## 2017-11-09 VITALS
TEMPERATURE: 98 F | HEART RATE: 84 BPM | WEIGHT: 120.38 LBS | BODY MASS INDEX: 19.35 KG/M2 | SYSTOLIC BLOOD PRESSURE: 114 MMHG | OXYGEN SATURATION: 99 % | HEIGHT: 66 IN | DIASTOLIC BLOOD PRESSURE: 70 MMHG

## 2017-11-09 VITALS
DIASTOLIC BLOOD PRESSURE: 70 MMHG | HEART RATE: 84 BPM | SYSTOLIC BLOOD PRESSURE: 114 MMHG | WEIGHT: 120.38 LBS | BODY MASS INDEX: 19.35 KG/M2 | HEIGHT: 66 IN | RESPIRATION RATE: 14 BRPM

## 2017-11-09 DIAGNOSIS — L60.0 INGROWN NAIL: Primary | ICD-10-CM

## 2017-11-09 DIAGNOSIS — N20.0 RENAL CALCULUS, RIGHT: ICD-10-CM

## 2017-11-09 DIAGNOSIS — K38.9 APPENDIX DISEASE: Primary | ICD-10-CM

## 2017-11-09 DIAGNOSIS — N23 RENAL COLIC ON LEFT SIDE: ICD-10-CM

## 2017-11-09 DIAGNOSIS — R10.31 RIGHT LOWER QUADRANT ABDOMINAL PAIN: ICD-10-CM

## 2017-11-09 PROCEDURE — 99204 OFFICE O/P NEW MOD 45 MIN: CPT | Mod: 57,S$GLB,, | Performed by: SURGERY

## 2017-11-09 PROCEDURE — 99213 OFFICE O/P EST LOW 20 MIN: CPT | Mod: S$GLB,,, | Performed by: PODIATRIST

## 2017-11-09 RX ORDER — AMOXICILLIN AND CLAVULANATE POTASSIUM 875; 125 MG/1; MG/1
1 TABLET, FILM COATED ORAL EVERY 12 HOURS
Qty: 14 TABLET | Refills: 0 | Status: SHIPPED | OUTPATIENT
Start: 2017-11-09 | End: 2017-11-10 | Stop reason: SDUPTHER

## 2017-11-09 RX ORDER — METRONIDAZOLE 500 MG/100ML
500 INJECTION, SOLUTION INTRAVENOUS
Status: CANCELLED | OUTPATIENT
Start: 2017-11-09

## 2017-11-09 NOTE — PROGRESS NOTES
Subjective:      Patient ID: Kan Traore is a 32 y.o. female.    Chief Complaint: Other (Appendix)   32-year-old female presents alone to discuss recent abnormal CT.  Pt has long h/o bilateral kidney stones. On recent CT A/P, abnormal appendix was identified.  On further evaluation, she had an appendix noted to be upper limits of normal at her 2017 CT.  Now the appendix has increased in size measuring 1 cm on her CT earlier this month.  Patient was unsure if she was having abdominal pain or right flank pain due to her history of kidney stones and renal colic.  No nausea or vomiting.  No appetite change.  No fevers or chills.  Normal bowel movements.  No other complaints.    Past Medical History:   Diagnosis Date    Allergic rhinitis     Anxiety     Asthma     Attention deficit disorder (ADD)     Depression     Kidney stone     Urinary tract infection     Vaginal infection      Past Surgical History:   Procedure Laterality Date    DILATION AND CURETTAGE OF UTERUS  2014    KIDNEY STONE SURGERY      LITHOTRIPSY      LITHOTRIPSY  2017    TONSILLECTOMY       Family History   Problem Relation Age of Onset    Hypertension Mother 50    Hypothyroidism Father 52    Stroke Maternal Grandfather     Hypertension Maternal Grandfather     Heart disease Maternal Grandfather 56      of AMI    Heart failure Paternal Grandmother 81    No Known Problems Son     No Known Problems Son     No Known Problems Son     Breast cancer Neg Hx     Colon cancer Neg Hx     Ovarian cancer Neg Hx     Kidney disease Neg Hx      Social History     Social History    Marital status:      Spouse name: N/A    Number of children: N/A    Years of education: N/A     Occupational History     [employer]     Social History Main Topics    Smoking status: Never Smoker    Smokeless tobacco: Never Used    Alcohol use No    Drug use: No    Sexual activity: Yes     Partners:  "Male     Other Topics Concern    None     Social History Narrative    None       Current Outpatient Prescriptions   Medication Sig Dispense Refill    [START ON 11/15/2017] dextroamphetamine-amphetamine (ADDERALL XR) 20 MG 24 hr capsule Take 1 capsule (20 mg total) by mouth every morning. 30 capsule 0    [START ON 12/15/2017] dextroamphetamine-amphetamine (ADDERALL XR) 20 MG 24 hr capsule Take 1 capsule (20 mg total) by mouth every morning. 30 capsule 0    [START ON 1/12/2018] dextroamphetamine-amphetamine (ADDERALL XR) 20 MG 24 hr capsule Take 1 capsule (20 mg total) by mouth every morning. 30 capsule 0    hydrocodone-acetaminophen 10-325mg (NORCO)  mg Tab Take 1 tablet by mouth every 6 (six) hours as needed for Pain. 30 tablet 0    ketorolac (TORADOL) 10 mg tablet Take 1 tablet (10 mg total) by mouth every 6 (six) hours as needed. 20 tablet 1    norethindrone (MICRONOR) 0.35 mg tablet Take 1 tablet (0.35 mg total) by mouth once daily. 30 tablet 6    sertraline (ZOLOFT) 50 MG tablet TAKE ONE TABLET BY MOUTH EVERY DAY 30 tablet 4    tamsulosin (FLOMAX) 0.4 mg Cp24 Take 1 capsule (0.4 mg total) by mouth once daily. 30 capsule 11    tramadol (ULTRAM) 50 mg tablet Take 1 tablet (50 mg total) by mouth every 6 (six) hours as needed for Pain. 30 tablet 0    [START ON 11/17/2017] amoxicillin-clavulanate 875-125mg (AUGMENTIN) 875-125 mg per tablet Take 1 tablet by mouth every 12 (twelve) hours. 14 tablet 0     No current facility-administered medications for this visit.      Review of patient's allergies indicates:  No Known Allergies    ROS:  All systems were reviewed and are negative, except that mentioned in the HPI.    Objective:     Vitals:    11/09/17 1001   BP: 114/70   BP Location: Right arm   Patient Position: Sitting   BP Method: Large (Manual)   Pulse: 84   Temp: 97.7 °F (36.5 °C)   SpO2: 99%   Weight: 54.6 kg (120 lb 6.4 oz)   Height: 5' 6" (1.676 m)     Physical Exam   Constitutional: She is " oriented to person, place, and time. She appears well-developed and well-nourished. No distress.   HENT:   Head: Normocephalic and atraumatic.   Eyes: EOM are normal. Pupils are equal, round, and reactive to light. No scleral icterus.   Neck: Normal range of motion. No JVD present.   Cardiovascular: Normal rate and regular rhythm.    Pulmonary/Chest: Effort normal and breath sounds normal. No respiratory distress.   Abdominal: Soft. Bowel sounds are normal. She exhibits no distension and no mass. There is tenderness (mild ttp RLQ). There is no rebound and no guarding.   No flank pain b/l   Musculoskeletal: Normal range of motion.   Neurological: She is alert and oriented to person, place, and time. No cranial nerve deficit.   Skin: Skin is warm and dry. She is not diaphoretic.   Psychiatric: She has a normal mood and affect.       Lab Review: CBC:   Lab Results   Component Value Date    WBC 7.44 02/22/2017    RBC 4.86 02/22/2017    HGB 13.9 02/22/2017    HCT 43.6 02/22/2017     02/22/2017     BMP:   Lab Results   Component Value Date    GLU 85 04/04/2017     04/04/2017    K 4.2 04/04/2017     04/04/2017    CO2 31 (H) 04/04/2017    BUN 17 04/04/2017    CREATININE 0.72 04/04/2017    CALCIUM 9.3 04/04/2017     Lab Results   Component Value Date    ALT 89 (H) 02/08/2017    AST 85 (H) 02/08/2017    ALKPHOS 96 02/08/2017    BILITOT 0.7 02/08/2017       Diagnostics Review:  CT A/P 11/3/17 images and reports were personally reviewed.  The report states:  The bowel is significant for a thickened appendix measuring up to 1 cm in diameter which contains an appendicolith. There is no significant periappendiceal inflammation.    Assessment:     1. Appendix disease    2. Right lower quadrant abdominal pain    3. Renal colic on left side    4. Renal calculus, right      Plan:   The pathology of the patient's disease was discussed, abnormal appendix, enlarging with appendicolith, possible chronic appendicitis. No  periappendiceal fat-stranding noted, however, pt is very thin and has minimal adipose in general so inflammation may be difficult to appreciate in mild cases.  Written handouts were explained and given to the patient.  Elective lap appy was recommended. The surgical procedure, risks, postop recovery and consent were discussed. All questions were answered and the consent was signed. Signs and symptoms of worsening disease was discussed.  The patient will call or go to ER should those symptoms develop.  Pt declined repeat labs. Surgery recommended asap due to mild rlq discomfort and increasing appendiceal size on CT (as compared to June CT).  Augmentin prescribed, should cover her recovering foot wound (so ok to stop doxy). Surgery scheduled for 11/15/17, but pt later rescheduled to the following week as her  will be out of town next week. Antibiotics were extended until that time.

## 2017-11-09 NOTE — PATIENT INSTRUCTIONS
Instructions for Care after Ingrown Nail removal    General Information: Stay off your feet as much as possible today. You may wear a surgical shoe, sandal or any open toed shoe that does not squeeze, constrict or put pressure on your toe(s). Your toe(s) may remain numb for up to 2-24 hours after the procedure. Although most patients can wear a closed loose fitting shoe after the first week, the toe will heal faster the more you use the open toed shoe in the first 2-3  weeks. Please contact our office if you have any questions or concerns.    Bleeding: Slight bleeding, discoloration and drainage are normal. Due to the chemical used there may be some yellow-clear drainage coming from the toe for 2-3 weeks.    Discomfort: You can elevate your foot to help alleviate minor swelling, bleeding and discomfort. You may also take Advil, Tylenol or other over the counter pain medications to help alleviate pain. Call our office if the pain is not well controlled. Most patients have very little discomfort as long as they minimize their walking for the first 24 hours and do not bump the toe.    Removing the Bandage: Starting the day after surgery, carefully remove the dressing and shower or bathe as normal. It is Ok to get the bandage soaking wet in the shower and when you remove it, it should not stick to the surgery site.    Dressing Options- Traditional Method:  1. Soaking two times a day in WARM water with Epsom salts or diluted Povidone Iodine  (Betadine) for 15-20 minutes. You will need to purchase these products from the pharmacy.  2. Dry toe then apply an antibiotic cream or ointment such as Neosporin or Polysporin plus or  Garamycin and cover with a 2 x 2 inch size gauze and then secure with a 1 inch band aid.  3. In the second week, take the dressing off at bedtime to air dry the toe.  4. If the toe is infected take the Antibiotic Pills as directed until finished.        Understanding Ingrown Toenails    An ingrown  nail is the result of a nail growing into the skin that surrounds it. This often occurs at either edge of the big toe. Ingrown nails may be caused by improper trimming, inherited nail deformities, injuries, fungal infections, or pressure.  Symptoms  Ingrown nails may cause pain at the tip of the toe or all the way to the base of the toe. The pain is often worse while walking. An ingrown nail may also lead to infection, inflammation, or a more serious condition. If its infected, you might see pus or redness.  Evaluation  To determine the extent of your problem, your healthcare provider examines and possibly presses the painful area. If other problems are suspected, blood tests, cultures, and X-rays may be done as well.  Treatment  If the nail isnt infected, your healthcare provider may trim the corner of it to help relieve your symptoms. He or she may need to remove one side of your nail back to the cuticle. The base of the nail may then be treated with a chemical to keep the ingrown part from growing back. Severe infections or ingrown nails may require antibiotics and temporary or permanent removal of a portion of the nail. To prevent pain, a local anesthetic may be used in these procedures. This treatment is usually done at your healthcare provider's office.  Prevention  Many nail problems can be prevented by wearing the right shoes and trimming your nails properly. To help avoid infection, keep your feet clean and dry. If you have diabetes, talk with your healthcare provider before doing any foot self-care.  · The right shoes: Get your feet measured (your size may change as you age). Wear shoes that are supportive and roomy enough for your toes to wiggle. Look for shoes made of natural materials such as leather, which allow your feet to breathe.  · Proper trimming: To avoid problems, trim your toenails straight across without cutting down into the corners. If you cant trim your own nails, ask your healthcare  provider to do so for you.  Date Last Reviewed: 10/1/2016  © 6450-2240 [employer], [employer]. [address] [place] [address], [place] [address]. All rights reserved. This information is not intended as a substitute for professional medical care. Always follow your healthcare professional's instructions.

## 2017-11-09 NOTE — LETTER
November 10, 2017      [place]  [address] [address]  [address]  [facility] [address]           [facility]  [address], [address]  [facility] [address]  Phone: [phone]  Fax: [phone]          Patient: Kan Traore   MR Number: [id number]   YOB: 1985   Date of Visit: 11/9/2017       Dear Madeline Aguilar:    Thank you for referring Kan Traore to me for evaluation. Attached you will find relevant portions of my assessment and plan of care.    If you have questions, please do not hesitate to call me. I look forward to following Kan Traore along with you.    Sincerely,    Althea Troncoso,     Enclosure  CC:  No Recipients    If you would like to receive this communication electronically, please contact [email or web address] or [phone] to request more information on [facility] access.    For providers and/or their staff who would like to refer a patient to [facility], please contact us through our one-stop-shop provider referral line, [facility], at [phone].    If you feel you have received this communication in error or would no longer like to receive these types of communications, please e-mail [email or web address]

## 2017-11-09 NOTE — PROGRESS NOTES
Subjective:    Patient ID: Kan Traore is a 32 y.o. female.    Chief Complaint: Follow-up (left big toe)      HPI:   Kan is a 32 y.o. female who presents to the clinic complaining of painful ingrown toenail on the left foot.    33 yo f/u for L hallux ingrowing nail. She is 3 wk s/p b/l PNA with matrixectomy. Denies n/v/f/c.        Last Podiatry Enc: Visit date not found  Last Enc w/ Me: Visit date not found    Past Medical History:   Diagnosis Date    Allergic rhinitis     Anxiety     Asthma     Attention deficit disorder (ADD)     Depression     Kidney stone     Urinary tract infection     Vaginal infection      Past Surgical History:   Procedure Laterality Date    DILATION AND CURETTAGE OF UTERUS  4/2014    KIDNEY STONE SURGERY      LITHOTRIPSY  2011    LITHOTRIPSY  02/09/2017    TONSILLECTOMY       Social History     Social History    Marital status:      Spouse name: N/A    Number of children: N/A    Years of education: N/A     Occupational History     [employer]     Social History Main Topics    Smoking status: Never Smoker    Smokeless tobacco: Never Used    Alcohol use No    Drug use: No    Sexual activity: Yes     Partners: Male     Other Topics Concern    Not on file     Social History Narrative    No narrative on file         Current Outpatient Prescriptions:     [START ON 11/15/2017] dextroamphetamine-amphetamine (ADDERALL XR) 20 MG 24 hr capsule, Take 1 capsule (20 mg total) by mouth every morning., Disp: 30 capsule, Rfl: 0    [START ON 12/15/2017] dextroamphetamine-amphetamine (ADDERALL XR) 20 MG 24 hr capsule, Take 1 capsule (20 mg total) by mouth every morning., Disp: 30 capsule, Rfl: 0    [START ON 1/12/2018] dextroamphetamine-amphetamine (ADDERALL XR) 20 MG 24 hr capsule, Take 1 capsule (20 mg total) by mouth every morning., Disp: 30 capsule, Rfl: 0    hydrocodone-acetaminophen 10-325mg (NORCO)  mg Tab, Take 1 tablet by mouth  "every 6 (six) hours as needed for Pain., Disp: 30 tablet, Rfl: 0    ketorolac (TORADOL) 10 mg tablet, Take 1 tablet (10 mg total) by mouth every 6 (six) hours as needed., Disp: 20 tablet, Rfl: 1    ketorolac (TORADOL) 10 mg tablet, Take 1 tablet (10 mg total) by mouth every 6 (six) hours., Disp: 20 tablet, Rfl: 1    norethindrone (MICRONOR) 0.35 mg tablet, Take 1 tablet (0.35 mg total) by mouth once daily., Disp: 30 tablet, Rfl: 6    sertraline (ZOLOFT) 50 MG tablet, TAKE ONE TABLET BY MOUTH EVERY DAY, Disp: 30 tablet, Rfl: 4    tamsulosin (FLOMAX) 0.4 mg Cp24, Take 1 capsule (0.4 mg total) by mouth once daily., Disp: 30 capsule, Rfl: 11    tramadol (ULTRAM) 50 mg tablet, Take 1 tablet (50 mg total) by mouth every 6 (six) hours as needed for Pain., Disp: 30 tablet, Rfl: 0  Review of patient's allergies indicates:  No Known Allergies    ROS  ROS Constitutional:  General Appearance: well nourished  Vascular: negative for cramps, edema and bruising  Musculoskeletal: negative for joint paint and joint edema  Skin: negative for rashes and lesions  Neurological: negative for burning, tingling and numbness  Gastrointestinal: negative for stomach pain, nausea and vomiting        Objective:        /70 (BP Location: Right arm, Patient Position: Sitting)   Pulse 84   Resp 14   Ht 5' 6" (1.676 m)   Wt 54.6 kg (120 lb 6.4 oz)   LMP 10/09/2017   BMI 19.43 kg/m²     Ortho/SPM Exam  Physical Exam  LE exam con't:  V: DP 2/4, PT 2/4, CRT< 3s to all digits tested.     N: SILT in SP/DP/T/Zena/Saph distributions.    Derm: Skin intact. No erythema, edema or ecchymosis. L hallux nail px site healing well. So signs of infection    Ortho:  +Motor EHL/FHL/TA/GA   Compartments soft/compressible. No pain on passive stretch of big toe. No calf  pain.        Assessment:     Imaging / Labs:    Aerobic Culture   Order: [id number]   Status:  Final result   Visible to patient:  Yes (Patient Portal)   Next appt:  11/02/2017 at 09:30 " "AM in Radiology ([facility] XR2 [facility])   Dx:  Ingrown nail    11d ago   Aerobic Bacterial Culture METHICILLIN RESISTANT STAPHYLOCOCCUS AUREUS Many    Resulting Agency [facility]   Susceptibility      Methicillin resistant staphylococcus aureus     CULTURE, AEROBIC  (SPECIFY SOURCE)     Clindamycin <=0.5 "><=0.5  Sensitive     Erythromycin >4  Resistant     Oxacillin >2  Resistant     Penicillin 8  Resistant     Tetracycline <=4 "><=4  Sensitive     Trimeth/Sulfa <=0.5/9.5 "><=0.5/9.5  Sensitive     Vancomycin 1  Sensitive              Specimen Collected: 10/19/17 09:32 Last Resulted: 10/21/17 12:20                      1. Ingrown nail          Plan:          Procedures  .   Kan was seen today for follow-up.    Diagnoses and all orders for this visit:    Ingrown nail        Kan Traore is a 32 y.o. female presenting w/   1. Ingrown nail        -pt seen, evaluated, and managed  -dx discussed in detail. All questions/concerns addressed  -all tx options discussed. All alternatives, risks, benefits of all txs discussed  -The patient was educated regarding the above diagnosis. We discussed conservative care options regarding shoe wear and/or padding.  -rxs dispensed: doxy PO x 7 days   Pt almost completed  -discussed ingrowing toenails and all tx options.  -cultures were obtained and sent for micro analysis at last visit   Grew MRSA sensitive to doxy   -b/l nail fold manually curetted and cleansed with betadine and then NS  -pt to perform epsom salt or betadine soaks once daily x 2wks. Written instructions dispensed  -keep toe covered with triple abx ointment + bandaid x 2wks      Return if symptoms worsen or fail to improve.        "

## 2017-11-17 RX ORDER — ACETAMINOPHEN AND CODEINE PHOSPHATE 120; 12 MG/5ML; MG/5ML
SOLUTION ORAL
Qty: 28 TABLET | Refills: 5 | Status: SHIPPED | OUTPATIENT
Start: 2017-11-17 | End: 2018-06-26 | Stop reason: SDUPTHER

## 2017-11-20 PROBLEM — K38.9 APPENDIX DISEASE: Status: ACTIVE | Noted: 2017-11-20

## 2017-11-20 LAB — FUNGUS SPEC CULT: NORMAL

## 2017-11-22 ENCOUNTER — TELEPHONE (OUTPATIENT)
Dept: SURGERY | Facility: CLINIC | Age: 32
End: 2017-11-22

## 2017-11-22 NOTE — TELEPHONE ENCOUNTER
"11-22-17 3064  POST OP CALL-  Spoke with patient and she stated,"I'm feeling a little dizzy. It's probably my BP, cause I have low BP and it got low after surgery. Took a pain pill about one hour ago."  Recommended to patient to not take the pain medication as it can cause your BP to drop, but to take Ibuprofen for her pain. Also recommended that patient increase her water intake as she may be dehydrated after surgery and that can cause a person to be dizzy also, but encouraged her to not drink carbonated beverages as these can also dehydrate and can also cause bloating which can cause pain at incision site.   Asked if patient had a BM and how her appetite was. Patient stated, "Had a BM this morning and my appetite is good."   Reminded patient that Dr. Troncoso was going to be out of town but that if she had any problems from her surgery she could call [phone] and ask to speak to the doctor on call for the General Surgery clinic.  Patient voiced understanding on above recommendations.  "

## 2017-11-30 ENCOUNTER — OFFICE VISIT (OUTPATIENT)
Dept: SURGERY | Facility: CLINIC | Age: 32
End: 2017-11-30
Payer: COMMERCIAL

## 2017-11-30 VITALS
HEART RATE: 93 BPM | DIASTOLIC BLOOD PRESSURE: 60 MMHG | OXYGEN SATURATION: 96 % | HEIGHT: 66 IN | BODY MASS INDEX: 18.84 KG/M2 | SYSTOLIC BLOOD PRESSURE: 90 MMHG | TEMPERATURE: 98 F | WEIGHT: 117.19 LBS

## 2017-11-30 DIAGNOSIS — K38.9 APPENDIX DISEASE: Primary | ICD-10-CM

## 2017-11-30 DIAGNOSIS — R10.31 RIGHT LOWER QUADRANT ABDOMINAL PAIN: ICD-10-CM

## 2017-11-30 DIAGNOSIS — Z90.49 S/P APPENDECTOMY: ICD-10-CM

## 2017-11-30 DIAGNOSIS — K76.9 LIVER LESION, LEFT LOBE: ICD-10-CM

## 2017-11-30 PROCEDURE — 99214 OFFICE O/P EST MOD 30 MIN: CPT | Mod: 24,S$GLB,, | Performed by: SURGERY

## 2017-11-30 PROCEDURE — 99999 PR PBB SHADOW E&M-EST. PATIENT-LVL III: CPT | Mod: PBBFAC,,, | Performed by: SURGERY

## 2017-11-30 NOTE — PROGRESS NOTES
"Kan Traore is a 32 y.o. female patient.   1. Appendix disease    2. Right lower quadrant abdominal pain    3. S/P appendectomy    4. Liver lesion, left lobe      Past Medical History:   Diagnosis Date    Allergic rhinitis     Anxiety     Asthma     Attention deficit disorder (ADD)     Depression     Kidney stone     Urinary tract infection     Vaginal infection        Review of patient's allergies indicates:  No Known Allergies  There are no hospital problems to display for this patient.    Blood pressure 90/60, pulse 93, temperature 97.7 °F (36.5 °C), height 5' 6" (1.676 m), weight 53.2 kg (117 lb 3.2 oz), last menstrual period 10/09/2017, SpO2 96 %, currently breastfeeding.    Subjective   Pt reports feeling well. She stopped the narcotic pain meds and switched to NSAIDs pod1-2 due to feeling light-headed from the narcotics. Crispin diet. No f/c. No n/v. RLQ abd pain has resolved. She presents with her 9mo son. She asked about the liver lesion seen on CT and wondered if f/u was needed.    Objective:  Vital signs (most recent): Blood pressure 90/60, pulse 93, temperature 97.7 °F (36.5 °C), height 5' 6" (1.676 m), weight 53.2 kg (117 lb 3.2 oz), last menstrual period 10/09/2017, SpO2 96 %, currently breastfeeding.  General appearance: Comfortable, well-appearing and in no acute distress.    Lungs:  Normal respiratory rate and normal effort.    Heart: Normal rate.  Regular rhythm.    Abdomen: Abdomen is soft.    Bowel sounds:  Bowel sounds are normal.    Tenderness: There is no abdominal tenderness tenderness.    Wound:  Clean.  There is no dehiscence.  There is no drainage.    Extremities: There is normal range of motion.    Neurological: The patient is alert and oriented to person, place and time.      CT 11/3/17:  Findings: The visualized portion of the base of the lungs, visualized portion of the heart, stomach, spleen, pancreas, and gallbladder are unremarkable. There is a 1.4 cm hypodensity " within the lateral segment of the left lobe of the liver which is incompletely characterized. The adrenal glands are unremarkable. The visualized portion of the aorta is unremarkable. The right kidney contains 4-5 stones measuring approximately 1-2 mm. The left kidney contains 3-4 stones measuring up to 0.2 cm. There is no hydronephrosis or hydroureter. The bladder is unremarkable. The uterus has a normal contour. The bowel is significant for a thickened appendix measuring up to 1 cm in diameter which contains an appendicolith. There is no significant periappendiceal inflammation. The osseous structures are unremarkable.    Path- benign, several fecaliths within specimen     Assessment & Plan   31yo female 10d s/p lap appy:  -pt is healing well  -continue with restrictions (as much as possible) for 4 more weeks  -f/u appt declined, pt will f/u prn with questions or problems  -all questions were answered  -Will discuss small liver lesion seen on non-contrast CT with radiology and determine the f/u of this. The patient will be contacted after this information has been received.    Althea Troncoso DO  11/30/2017

## 2017-12-21 LAB
ACID FAST MOD KINY STN SPEC: NORMAL
MYCOBACTERIUM SPEC QL CULT: NORMAL

## 2018-01-02 ENCOUNTER — PATIENT MESSAGE (OUTPATIENT)
Dept: SURGERY | Facility: CLINIC | Age: 33
End: 2018-01-02

## 2018-01-02 ENCOUNTER — PATIENT MESSAGE (OUTPATIENT)
Dept: UROLOGY | Facility: CLINIC | Age: 33
End: 2018-01-02

## 2018-02-08 ENCOUNTER — OFFICE VISIT (OUTPATIENT)
Dept: FAMILY MEDICINE | Facility: CLINIC | Age: 33
End: 2018-02-08
Payer: COMMERCIAL

## 2018-02-08 VITALS
BODY MASS INDEX: 19.07 KG/M2 | TEMPERATURE: 98 F | HEIGHT: 66 IN | SYSTOLIC BLOOD PRESSURE: 90 MMHG | WEIGHT: 118.63 LBS | DIASTOLIC BLOOD PRESSURE: 64 MMHG | HEART RATE: 85 BPM | OXYGEN SATURATION: 98 %

## 2018-02-08 DIAGNOSIS — F98.8 ATTENTION DEFICIT DISORDER, UNSPECIFIED HYPERACTIVITY PRESENCE: ICD-10-CM

## 2018-02-08 PROCEDURE — 99213 OFFICE O/P EST LOW 20 MIN: CPT | Mod: S$GLB,,, | Performed by: NURSE PRACTITIONER

## 2018-02-08 PROCEDURE — 3008F BODY MASS INDEX DOCD: CPT | Mod: S$GLB,,, | Performed by: NURSE PRACTITIONER

## 2018-02-08 PROCEDURE — 99999 PR PBB SHADOW E&M-EST. PATIENT-LVL IV: CPT | Mod: PBBFAC,,, | Performed by: NURSE PRACTITIONER

## 2018-02-08 RX ORDER — DEXTROAMPHETAMINE SACCHARATE, AMPHETAMINE ASPARTATE MONOHYDRATE, DEXTROAMPHETAMINE SULFATE AND AMPHETAMINE SULFATE 5; 5; 5; 5 MG/1; MG/1; MG/1; MG/1
20 CAPSULE, EXTENDED RELEASE ORAL EVERY MORNING
Qty: 30 CAPSULE | Refills: 0 | Status: SHIPPED | OUTPATIENT
Start: 2018-04-13 | End: 2018-05-23 | Stop reason: SDUPTHER

## 2018-02-08 RX ORDER — DEXTROAMPHETAMINE SACCHARATE, AMPHETAMINE ASPARTATE MONOHYDRATE, DEXTROAMPHETAMINE SULFATE AND AMPHETAMINE SULFATE 5; 5; 5; 5 MG/1; MG/1; MG/1; MG/1
20 CAPSULE, EXTENDED RELEASE ORAL EVERY MORNING
Qty: 30 CAPSULE | Refills: 0 | Status: SHIPPED | OUTPATIENT
Start: 2018-02-14 | End: 2018-05-23 | Stop reason: SDUPTHER

## 2018-02-08 RX ORDER — DEXTROAMPHETAMINE SACCHARATE, AMPHETAMINE ASPARTATE MONOHYDRATE, DEXTROAMPHETAMINE SULFATE AND AMPHETAMINE SULFATE 5; 5; 5; 5 MG/1; MG/1; MG/1; MG/1
20 CAPSULE, EXTENDED RELEASE ORAL EVERY MORNING
Qty: 30 CAPSULE | Refills: 0 | Status: SHIPPED | OUTPATIENT
Start: 2018-03-15 | End: 2018-05-23 | Stop reason: SDUPTHER

## 2018-02-08 NOTE — PROGRESS NOTES
Subjective:       Patient ID: Kan Traore is a 32 y.o. female.    Chief Complaint: Medication Refill    Patient is here today for ADHD follow up.  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 Reports the Adderall XR 20 mg is working well without problems.        Previous Medications    DEXTROAMPHETAMINE-AMPHETAMINE (ADDERALL XR) 20 MG 24 HR CAPSULE    Take 1 capsule (20 mg total) by mouth every morning.    DEXTROAMPHETAMINE-AMPHETAMINE (ADDERALL XR) 20 MG 24 HR CAPSULE    Take 1 capsule (20 mg total) by mouth every morning.    DEXTROAMPHETAMINE-AMPHETAMINE (ADDERALL XR) 20 MG 24 HR CAPSULE    Take 1 capsule (20 mg total) by mouth every morning.    KETOROLAC (TORADOL) 10 MG TABLET    Take 1 tablet (10 mg total) by mouth every 6 (six) hours as needed.    NORETHINDRONE (MICRONOR) 0.35 MG TABLET    TAKE ONE TABLET BY MOUTH EVERY DAY    ONDANSETRON (ZOFRAN-ODT) 8 MG TBDL    Take 1 tablet (8 mg total) by mouth every 8 (eight) hours as needed.    SERTRALINE (ZOLOFT) 50 MG TABLET    TAKE ONE TABLET BY MOUTH EVERY DAY    TAMSULOSIN (FLOMAX) 0.4 MG CP24    Take 1 capsule (0.4 mg total) by mouth once daily.       Past Medical History:   Diagnosis Date    Allergic rhinitis     Anxiety     Asthma     Attention deficit disorder (ADD)     Depression     Kidney stone     Urinary tract infection     Vaginal infection        Past Surgical History:   Procedure Laterality Date    APPENDECTOMY  2017    DILATION AND CURETTAGE OF UTERUS  2014    KIDNEY STONE SURGERY      LITHOTRIPSY  2011    LITHOTRIPSY  2017    TONSILLECTOMY         Family History   Problem Relation Age of Onset    Hypertension Mother 50    Hypothyroidism Father 52    Stroke Maternal Grandfather     Hypertension Maternal Grandfather     Heart disease Maternal Grandfather 56      of AMI    Heart failure  Paternal Grandmother 81    No Known Problems Son     No Known Problems Son     No Known Problems Son     Breast cancer Neg Hx     Colon cancer Neg Hx     Ovarian cancer Neg Hx     Kidney disease Neg Hx        Social History     Social History    Marital status:      Spouse name: N/A    Number of children: N/A    Years of education: N/A     Occupational History     [employer]     Social History Main Topics    Smoking status: Never Smoker    Smokeless tobacco: Never Used    Alcohol use No    Drug use: No    Sexual activity: Yes     Partners: Male     Other Topics Concern    None     Social History Narrative    None       Review of Systems   Constitutional: Negative for appetite change, chills, fatigue, fever and unexpected weight change.   HENT: Negative for congestion, ear pain, mouth sores, nosebleeds, postnasal drip, rhinorrhea, sinus pressure, sneezing, sore throat, trouble swallowing and voice change.    Eyes: Negative for photophobia, pain, discharge, redness, itching and visual disturbance.   Respiratory: Negative for cough, chest tightness and shortness of breath.    Cardiovascular: Negative for chest pain, palpitations and leg swelling.   Gastrointestinal: Negative for abdominal pain, blood in stool, constipation, diarrhea, nausea and vomiting.   Genitourinary: Negative for dysuria, frequency, hematuria and urgency.   Musculoskeletal: Negative for arthralgias, back pain, joint swelling and myalgias.   Skin: Negative for color change and rash.   Allergic/Immunologic: Negative for immunocompromised state.   Neurological: Negative for dizziness, seizures, syncope, weakness and headaches.   Hematological: Negative for adenopathy. Does not bruise/bleed easily.   Psychiatric/Behavioral: Negative for agitation, dysphoric mood, sleep disturbance and suicidal ideas. The patient is not nervous/anxious.          Objective:     Vitals:    02/08/18 1124   BP: 90/64   BP  "Location: Left arm   Patient Position: Sitting   BP Method: Medium (Manual)   Pulse: 85   Temp: 98.3 °F (36.8 °C)   TempSrc: Oral   SpO2: 98%   Weight: 53.8 kg (118 lb 9.7 oz)   Height: 5' 6" (1.676 m)          Physical Exam   Constitutional: She is oriented to person, place, and time. She appears well-developed and well-nourished.   HENT:   Head: Normocephalic and atraumatic.   Right Ear: External ear normal.   Left Ear: External ear normal.   Nose: Nose normal.   Mouth/Throat: Oropharynx is clear and moist. No oropharyngeal exudate.   Eyes: EOM are normal. Pupils are equal, round, and reactive to light.   Neck: Normal range of motion. Neck supple. No tracheal deviation present. No thyromegaly present.   Cardiovascular: Normal rate, regular rhythm and normal heart sounds.    No murmur heard.  Pulmonary/Chest: Effort normal and breath sounds normal. No respiratory distress.   Abdominal: Soft. She exhibits no distension.   Musculoskeletal: Normal range of motion. She exhibits no edema.   Lymphadenopathy:     She has no cervical adenopathy.   Neurological: She is alert and oriented to person, place, and time. No cranial nerve deficit. Coordination normal.   Skin: Skin is warm and dry. No rash noted.   Psychiatric: She has a normal mood and affect.         Assessment:         ICD-10-CM ICD-9-CM   1. Attention deficit disorder, unspecified hyperactivity presence F98.8 314.00       Plan:       Attention deficit disorder, unspecified hyperactivity presence  -     dextroamphetamine-amphetamine (ADDERALL XR) 20 MG 24 hr capsule; Take 1 capsule (20 mg total) by mouth every morning.  Dispense: 30 capsule; Refill: 0  -     dextroamphetamine-amphetamine (ADDERALL XR) 20 MG 24 hr capsule; Take 1 capsule (20 mg total) by mouth every morning.  Dispense: 30 capsule; Refill: 0  -     dextroamphetamine-amphetamine (ADDERALL XR) 20 MG 24 hr capsule; Take 1 capsule (20 mg total) by mouth every morning.  Dispense: 30 capsule; Refill: " 0      Follow-up in about 3 months (around 5/8/2018) for med check.     Patient's Medications   New Prescriptions    No medications on file   Previous Medications    KETOROLAC (TORADOL) 10 MG TABLET    Take 1 tablet (10 mg total) by mouth every 6 (six) hours as needed.    NORETHINDRONE (MICRONOR) 0.35 MG TABLET    TAKE ONE TABLET BY MOUTH EVERY DAY    ONDANSETRON (ZOFRAN-ODT) 8 MG TBDL    Take 1 tablet (8 mg total) by mouth every 8 (eight) hours as needed.    SERTRALINE (ZOLOFT) 50 MG TABLET    TAKE ONE TABLET BY MOUTH EVERY DAY    TAMSULOSIN (FLOMAX) 0.4 MG CP24    Take 1 capsule (0.4 mg total) by mouth once daily.   Modified Medications    Modified Medication Previous Medication    DEXTROAMPHETAMINE-AMPHETAMINE (ADDERALL XR) 20 MG 24 HR CAPSULE dextroamphetamine-amphetamine (ADDERALL XR) 20 MG 24 hr capsule       Take 1 capsule (20 mg total) by mouth every morning.    Take 1 capsule (20 mg total) by mouth every morning.    DEXTROAMPHETAMINE-AMPHETAMINE (ADDERALL XR) 20 MG 24 HR CAPSULE dextroamphetamine-amphetamine (ADDERALL XR) 20 MG 24 hr capsule       Take 1 capsule (20 mg total) by mouth every morning.    Take 1 capsule (20 mg total) by mouth every morning.    DEXTROAMPHETAMINE-AMPHETAMINE (ADDERALL XR) 20 MG 24 HR CAPSULE dextroamphetamine-amphetamine (ADDERALL XR) 20 MG 24 hr capsule       Take 1 capsule (20 mg total) by mouth every morning.    Take 1 capsule (20 mg total) by mouth every morning.   Discontinued Medications    IBUPROFEN (ADVIL,MOTRIN) 100 MG TABLET    Take 100 mg by mouth every 6 (six) hours as needed for Temperature greater than.    OXYCODONE-ACETAMINOPHEN (PERCOCET) 5-325 MG PER TABLET    Take 1-2 tablets PO q4-6hours PRN pain

## 2018-05-15 ENCOUNTER — PATIENT MESSAGE (OUTPATIENT)
Dept: FAMILY MEDICINE | Facility: CLINIC | Age: 33
End: 2018-05-15

## 2018-05-23 ENCOUNTER — OFFICE VISIT (OUTPATIENT)
Dept: FAMILY MEDICINE | Facility: CLINIC | Age: 33
End: 2018-05-23
Payer: COMMERCIAL

## 2018-05-23 VITALS
HEIGHT: 66 IN | OXYGEN SATURATION: 98 % | SYSTOLIC BLOOD PRESSURE: 98 MMHG | DIASTOLIC BLOOD PRESSURE: 64 MMHG | HEART RATE: 81 BPM | BODY MASS INDEX: 18.88 KG/M2 | WEIGHT: 117.5 LBS | TEMPERATURE: 99 F

## 2018-05-23 DIAGNOSIS — F41.9 ANXIETY AND DEPRESSION: ICD-10-CM

## 2018-05-23 DIAGNOSIS — F32.A ANXIETY AND DEPRESSION: ICD-10-CM

## 2018-05-23 DIAGNOSIS — F98.8 ATTENTION DEFICIT DISORDER, UNSPECIFIED HYPERACTIVITY PRESENCE: Primary | ICD-10-CM

## 2018-05-23 DIAGNOSIS — Z13.1 DIABETES MELLITUS SCREENING: ICD-10-CM

## 2018-05-23 DIAGNOSIS — Z13.29 THYROID DISORDER SCREEN: ICD-10-CM

## 2018-05-23 DIAGNOSIS — Z13.0 SCREENING FOR DEFICIENCY ANEMIA: ICD-10-CM

## 2018-05-23 DIAGNOSIS — Z13.220 SCREENING CHOLESTEROL LEVEL: ICD-10-CM

## 2018-05-23 PROCEDURE — 99213 OFFICE O/P EST LOW 20 MIN: CPT | Mod: S$GLB,,, | Performed by: NURSE PRACTITIONER

## 2018-05-23 PROCEDURE — 99999 PR PBB SHADOW E&M-EST. PATIENT-LVL IV: CPT | Mod: PBBFAC,,, | Performed by: NURSE PRACTITIONER

## 2018-05-23 RX ORDER — SERTRALINE HYDROCHLORIDE 50 MG/1
50 TABLET, FILM COATED ORAL DAILY
Qty: 30 TABLET | Refills: 4 | Status: SHIPPED | OUTPATIENT
Start: 2018-05-23 | End: 2018-05-31 | Stop reason: SDUPTHER

## 2018-05-23 RX ORDER — DEXTROAMPHETAMINE SACCHARATE, AMPHETAMINE ASPARTATE MONOHYDRATE, DEXTROAMPHETAMINE SULFATE AND AMPHETAMINE SULFATE 5; 5; 5; 5 MG/1; MG/1; MG/1; MG/1
20 CAPSULE, EXTENDED RELEASE ORAL EVERY MORNING
Qty: 30 CAPSULE | Refills: 0 | Status: SHIPPED | OUTPATIENT
Start: 2018-05-23 | End: 2018-08-23

## 2018-05-23 RX ORDER — DEXTROAMPHETAMINE SACCHARATE, AMPHETAMINE ASPARTATE MONOHYDRATE, DEXTROAMPHETAMINE SULFATE AND AMPHETAMINE SULFATE 5; 5; 5; 5 MG/1; MG/1; MG/1; MG/1
20 CAPSULE, EXTENDED RELEASE ORAL EVERY MORNING
Qty: 30 CAPSULE | Refills: 0 | Status: SHIPPED | OUTPATIENT
Start: 2018-07-22 | End: 2018-08-23

## 2018-05-23 RX ORDER — DEXTROAMPHETAMINE SACCHARATE, AMPHETAMINE ASPARTATE MONOHYDRATE, DEXTROAMPHETAMINE SULFATE AND AMPHETAMINE SULFATE 5; 5; 5; 5 MG/1; MG/1; MG/1; MG/1
20 CAPSULE, EXTENDED RELEASE ORAL EVERY MORNING
Qty: 30 CAPSULE | Refills: 0 | Status: SHIPPED | OUTPATIENT
Start: 2018-06-22 | End: 2018-08-23

## 2018-05-23 NOTE — PROGRESS NOTES
Subjective:       Patient ID: Kan Traore is a 32 y.o. female.    Chief Complaint: Medication Refill    Patient is here today for ADHD follow up.  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 Reports the Adderall XR 20 mg is working well without problems.        Previous Medications    KETOROLAC (TORADOL) 10 MG TABLET    Take 1 tablet (10 mg total) by mouth every 6 (six) hours as needed.    NORETHINDRONE (MICRONOR) 0.35 MG TABLET    TAKE ONE TABLET BY MOUTH EVERY DAY    ONDANSETRON (ZOFRAN-ODT) 8 MG TBDL    Take 1 tablet (8 mg total) by mouth every 8 (eight) hours as needed.    SERTRALINE (ZOLOFT) 50 MG TABLET    TAKE ONE TABLET BY MOUTH EVERY DAY    TAMSULOSIN (FLOMAX) 0.4 MG CP24    Take 1 capsule (0.4 mg total) by mouth once daily.       Past Medical History:   Diagnosis Date    Allergic rhinitis     Anxiety     Asthma     Attention deficit disorder (ADD)     Depression     Kidney stone     Urinary tract infection     Vaginal infection        Past Surgical History:   Procedure Laterality Date    APPENDECTOMY  2017    DILATION AND CURETTAGE OF UTERUS  2014    KIDNEY STONE SURGERY      LITHOTRIPSY  2011    LITHOTRIPSY  2017    TONSILLECTOMY         Family History   Problem Relation Age of Onset    Hypertension Mother 50    Hypothyroidism Father 52    Stroke Maternal Grandfather     Hypertension Maternal Grandfather     Heart disease Maternal Grandfather 56         of AMI    Heart failure Paternal Grandmother 81    No Known Problems Son     No Known Problems Son     No Known Problems Son     Breast cancer Neg Hx     Colon cancer Neg Hx     Ovarian cancer Neg Hx     Kidney disease Neg Hx        Social History     Social History    Marital status:      Spouse name: N/A    Number of children: N/A    Years of education: N/A  "    Occupational History     [employer]     Social History Main Topics    Smoking status: Never Smoker    Smokeless tobacco: Never Used    Alcohol use No    Drug use: No    Sexual activity: Yes     Partners: Male     Other Topics Concern    None     Social History Narrative    None       Review of Systems   Constitutional: Negative for appetite change, chills, fatigue, fever and unexpected weight change.   HENT: Negative for congestion, ear pain, mouth sores, nosebleeds, postnasal drip, rhinorrhea, sinus pressure, sneezing, sore throat, trouble swallowing and voice change.    Eyes: Negative for photophobia, pain, discharge, redness, itching and visual disturbance.   Respiratory: Negative for cough, chest tightness and shortness of breath.    Cardiovascular: Negative for chest pain, palpitations and leg swelling.   Gastrointestinal: Negative for abdominal pain, blood in stool, constipation, diarrhea, nausea and vomiting.   Genitourinary: Negative for dysuria, frequency, hematuria and urgency.   Musculoskeletal: Negative for arthralgias, back pain, joint swelling and myalgias.   Skin: Negative for color change and rash.   Allergic/Immunologic: Negative for immunocompromised state.   Neurological: Negative for dizziness, seizures, syncope, weakness and headaches.   Hematological: Negative for adenopathy. Does not bruise/bleed easily.   Psychiatric/Behavioral: Negative for agitation, dysphoric mood, sleep disturbance and suicidal ideas. The patient is not nervous/anxious.          Objective:     Vitals:    05/23/18 1052 05/23/18 1118   BP: (!) 86/62 98/64   BP Location: Right arm    Patient Position: Sitting    BP Method: Medium (Manual)    Pulse: 81    Temp: 98.5 °F (36.9 °C)    TempSrc: Oral    SpO2: 98%    Weight: 53.3 kg (117 lb 8.1 oz)    Height: 5' 6" (1.676 m)           Physical Exam   Constitutional: She is oriented to person, place, and time. She appears well-developed and " well-nourished.   HENT:   Head: Normocephalic and atraumatic.   Right Ear: External ear normal.   Left Ear: External ear normal.   Nose: Nose normal.   Mouth/Throat: Oropharynx is clear and moist. No oropharyngeal exudate.   Eyes: EOM are normal. Pupils are equal, round, and reactive to light.   Neck: Normal range of motion. Neck supple. No tracheal deviation present. No thyromegaly present.   Cardiovascular: Normal rate, regular rhythm and normal heart sounds.    No murmur heard.  Pulmonary/Chest: Effort normal and breath sounds normal. No respiratory distress.   Abdominal: Soft. She exhibits no distension.   Musculoskeletal: Normal range of motion. She exhibits no edema.   Lymphadenopathy:     She has no cervical adenopathy.   Neurological: She is alert and oriented to person, place, and time. No cranial nerve deficit. Coordination normal.   Skin: Skin is warm and dry. No rash noted.   Psychiatric: She has a normal mood and affect.         Assessment:         ICD-10-CM ICD-9-CM   1. Attention deficit disorder, unspecified hyperactivity presence F98.8 314.00   2. Screening for deficiency anemia Z13.0 V78.1   3. Thyroid disorder screen Z13.29 V77.0   4. Diabetes mellitus screening Z13.1 V77.1   5. Screening cholesterol level Z13.220 V77.91       Plan:       Attention deficit disorder, unspecified hyperactivity presence  -  Controlled on present medication follow-up in 3 months  -     dextroamphetamine-amphetamine (ADDERALL XR) 20 MG 24 hr capsule; Take 1 capsule (20 mg total) by mouth every morning.  Dispense: 30 capsule; Refill: 0  -     dextroamphetamine-amphetamine (ADDERALL XR) 20 MG 24 hr capsule; Take 1 capsule (20 mg total) by mouth every morning.  Dispense: 30 capsule; Refill: 0  -     dextroamphetamine-amphetamine (ADDERALL XR) 20 MG 24 hr capsule; Take 1 capsule (20 mg total) by mouth every morning.  Dispense: 30 capsule; Refill: 0    Screening for deficiency anemia  -     CBC auto differential; Future;  Expected date: 05/23/2018    Thyroid disorder screen  -     TSH; Future; Expected date: 05/23/2018    Diabetes mellitus screening  -     Comprehensive metabolic panel; Future; Expected date: 05/23/2018    Screening cholesterol level  -     Lipid panel; Future; Expected date: 05/23/2018      Follow-up in about 3 months (around 8/23/2018) for fasting labs and WELLNESS EXAM.     Patient's Medications   New Prescriptions    No medications on file   Previous Medications    KETOROLAC (TORADOL) 10 MG TABLET    Take 1 tablet (10 mg total) by mouth every 6 (six) hours as needed.    NORETHINDRONE (MICRONOR) 0.35 MG TABLET    TAKE ONE TABLET BY MOUTH EVERY DAY    ONDANSETRON (ZOFRAN-ODT) 8 MG TBDL    Take 1 tablet (8 mg total) by mouth every 8 (eight) hours as needed.    SERTRALINE (ZOLOFT) 50 MG TABLET    TAKE ONE TABLET BY MOUTH EVERY DAY    TAMSULOSIN (FLOMAX) 0.4 MG CP24    Take 1 capsule (0.4 mg total) by mouth once daily.   Modified Medications    Modified Medication Previous Medication    DEXTROAMPHETAMINE-AMPHETAMINE (ADDERALL XR) 20 MG 24 HR CAPSULE dextroamphetamine-amphetamine (ADDERALL XR) 20 MG 24 hr capsule       Take 1 capsule (20 mg total) by mouth every morning.    Take 1 capsule (20 mg total) by mouth every morning.    DEXTROAMPHETAMINE-AMPHETAMINE (ADDERALL XR) 20 MG 24 HR CAPSULE dextroamphetamine-amphetamine (ADDERALL XR) 20 MG 24 hr capsule       Take 1 capsule (20 mg total) by mouth every morning.    Take 1 capsule (20 mg total) by mouth every morning.    DEXTROAMPHETAMINE-AMPHETAMINE (ADDERALL XR) 20 MG 24 HR CAPSULE dextroamphetamine-amphetamine (ADDERALL XR) 20 MG 24 hr capsule       Take 1 capsule (20 mg total) by mouth every morning.    Take 1 capsule (20 mg total) by mouth every morning.   Discontinued Medications    No medications on file

## 2018-05-31 DIAGNOSIS — F41.9 ANXIETY AND DEPRESSION: ICD-10-CM

## 2018-05-31 DIAGNOSIS — F32.A ANXIETY AND DEPRESSION: ICD-10-CM

## 2018-05-31 RX ORDER — SERTRALINE HYDROCHLORIDE 50 MG/1
50 TABLET, FILM COATED ORAL DAILY
Qty: 30 TABLET | Refills: 3 | Status: SHIPPED | OUTPATIENT
Start: 2018-05-31 | End: 2018-08-23 | Stop reason: SDUPTHER

## 2018-06-27 RX ORDER — ACETAMINOPHEN AND CODEINE PHOSPHATE 120; 12 MG/5ML; MG/5ML
SOLUTION ORAL
Qty: 28 TABLET | Refills: 3 | Status: SHIPPED | OUTPATIENT
Start: 2018-06-27 | End: 2018-07-31 | Stop reason: SDUPTHER

## 2018-07-26 ENCOUNTER — OFFICE VISIT (OUTPATIENT)
Dept: PODIATRY | Facility: CLINIC | Age: 33
End: 2018-07-26
Payer: COMMERCIAL

## 2018-07-26 VITALS
BODY MASS INDEX: 18.8 KG/M2 | WEIGHT: 117 LBS | SYSTOLIC BLOOD PRESSURE: 113 MMHG | HEIGHT: 66 IN | HEART RATE: 85 BPM | DIASTOLIC BLOOD PRESSURE: 71 MMHG

## 2018-07-26 DIAGNOSIS — M79.675 GREAT TOE PAIN, LEFT: ICD-10-CM

## 2018-07-26 DIAGNOSIS — L60.0 ONYCHOCRYPTOSIS: Primary | ICD-10-CM

## 2018-07-26 PROCEDURE — 99999 PR PBB SHADOW E&M-EST. PATIENT-LVL III: CPT | Mod: PBBFAC,,, | Performed by: PODIATRIST

## 2018-07-26 PROCEDURE — 99213 OFFICE O/P EST LOW 20 MIN: CPT | Mod: 25,S$GLB,, | Performed by: PODIATRIST

## 2018-07-26 RX ORDER — TRAMADOL HYDROCHLORIDE 50 MG/1
50 TABLET ORAL EVERY 6 HOURS PRN
Qty: 10 TABLET | Refills: 0 | Status: SHIPPED | OUTPATIENT
Start: 2018-07-26 | End: 2018-08-05

## 2018-07-26 RX ORDER — CLINDAMYCIN HYDROCHLORIDE 300 MG/1
300 CAPSULE ORAL 3 TIMES DAILY
Qty: 30 CAPSULE | Refills: 0 | Status: SHIPPED | OUTPATIENT
Start: 2018-07-26 | End: 2018-08-24

## 2018-07-26 RX ORDER — TOBRAMYCIN 3 MG/ML
SOLUTION/ DROPS OPHTHALMIC
Qty: 5 ML | Refills: 0 | Status: SHIPPED | OUTPATIENT
Start: 2018-07-26 | End: 2018-09-25

## 2018-07-27 NOTE — PROGRESS NOTES
"Subjective:      Patient ID: Kan Traore is a 33 y.o. female.    Chief Complaint: Ingrown Toenail (left foot big toe)    Kan is a 33 y.o. female who presents to the clinic complaining of painful ingrown toenail on the left foot. History of previous P&A left hallux per Dr. Douglass, however it has recurred per patient. Treated for MRSA infection in past.    Vitals:    18 1358   BP: 113/71   Pulse: 85   Weight: 53.1 kg (117 lb)   Height: 5' 6" (1.676 m)   PainSc:   5      Past Medical History:   Diagnosis Date    Allergic rhinitis     Anxiety     Asthma     Attention deficit disorder (ADD)     Depression     Kidney stone     Urinary tract infection     Vaginal infection        Past Surgical History:   Procedure Laterality Date    APPENDECTOMY  2017    DILATION AND CURETTAGE OF UTERUS  2014    KIDNEY STONE SURGERY      LITHOTRIPSY  2011    LITHOTRIPSY  2017    TONSILLECTOMY         Family History   Problem Relation Age of Onset    Hypertension Mother 50    Hypothyroidism Father 52    Stroke Maternal Grandfather     Hypertension Maternal Grandfather     Heart disease Maternal Grandfather 56         of AMI    Heart failure Paternal Grandmother 81    No Known Problems Son     No Known Problems Son     No Known Problems Son     Breast cancer Neg Hx     Colon cancer Neg Hx     Ovarian cancer Neg Hx     Kidney disease Neg Hx        Social History     Social History    Marital status:      Spouse name: N/A    Number of children: N/A    Years of education: N/A     Occupational History     [employer]     Social History Main Topics    Smoking status: Never Smoker    Smokeless tobacco: Never Used    Alcohol use No    Drug use: No    Sexual activity: Yes     Partners: Male     Other Topics Concern    Not on file     Social History Narrative    No narrative on file       Current Outpatient Prescriptions   Medication Sig Dispense " Refill    dextroamphetamine-amphetamine (ADDERALL XR) 20 MG 24 hr capsule Take 1 capsule (20 mg total) by mouth every morning. 30 capsule 0    dextroamphetamine-amphetamine (ADDERALL XR) 20 MG 24 hr capsule Take 1 capsule (20 mg total) by mouth every morning. 30 capsule 0    dextroamphetamine-amphetamine (ADDERALL XR) 20 MG 24 hr capsule Take 1 capsule (20 mg total) by mouth every morning. 30 capsule 0    ketorolac (TORADOL) 10 mg tablet Take 1 tablet (10 mg total) by mouth every 6 (six) hours as needed. 20 tablet 1    norethindrone (MICRONOR) 0.35 mg tablet TAKE ONE TABLET BY MOUTH EVERY DAY 28 tablet 3    ondansetron (ZOFRAN-ODT) 8 MG TbDL Take 1 tablet (8 mg total) by mouth every 8 (eight) hours as needed. 12 tablet 2    sertraline (ZOLOFT) 50 MG tablet TAKE ONE TABLET BY MOUTH EVERY DAY 30 tablet 3    clindamycin (CLEOCIN) 300 MG capsule Take 1 capsule (300 mg total) by mouth 3 (three) times daily. 30 capsule 0    tobramycin sulfate 0.3% (TOBREX) 0.3 % ophthalmic solution Apply two drops to wound site on toe twice daily. 5 mL 0    traMADol (ULTRAM) 50 mg tablet Take 1 tablet (50 mg total) by mouth every 6 (six) hours as needed for Pain. 10 tablet 0     No current facility-administered medications for this visit.        Review of patient's allergies indicates:  No Known Allergies      Review of Systems   Constitution: Negative for chills, fever, weakness and malaise/fatigue.   Cardiovascular: Negative for chest pain, claudication and leg swelling.   Respiratory: Negative for cough and shortness of breath.    Skin: Positive for nail changes. Negative for itching, poor wound healing and rash.   Musculoskeletal: Negative for back pain, joint pain, muscle cramps and muscle weakness.   Gastrointestinal: Negative for nausea and vomiting.   Neurological: Negative for numbness and paresthesias.   Psychiatric/Behavioral: Negative for altered mental status.           Objective:      Physical Exam    Constitutional: She is oriented to person, place, and time. She appears well-developed and well-nourished. No distress.   Cardiovascular: Intact distal pulses.    Pulses:       Dorsalis pedis pulses are 2+ on the right side, and 2+ on the left side.        Posterior tibial pulses are 2+ on the right side, and 2+ on the left side.   CFT< 3 secs all toes bilateral foot, skin temp warm bilateral foot, diminished digital hair growth bilateral foot, no lower extremity edema bilateral.     Musculoskeletal:   No pain with ROM or MMT bilateral lower extremity.     Neurological: She is alert and oriented to person, place, and time. She has normal strength. No sensory deficit.   Skin: Skin is warm, dry and intact. Capillary refill takes less than 2 seconds. No ecchymosis and no rash noted. She is not diaphoretic. No cyanosis or erythema. No pallor. Nails show no clubbing.   Localized pain on palpation along the left hallux medial nail border with incurvated nail border, no erythema, edema or drainage noted.             Assessment:       Encounter Diagnoses   Name Primary?    Onychocryptosis Yes    Great toe pain, left          Plan:       Kan was seen today for ingrown toenail.    Diagnoses and all orders for this visit:    Onychocryptosis    Great toe pain, left    Other orders  -     clindamycin (CLEOCIN) 300 MG capsule; Take 1 capsule (300 mg total) by mouth 3 (three) times daily.  -     tobramycin sulfate 0.3% (TOBREX) 0.3 % ophthalmic solution; Apply two drops to wound site on toe twice daily.  -     traMADol (ULTRAM) 50 mg tablet; Take 1 tablet (50 mg total) by mouth every 6 (six) hours as needed for Pain.      I counseled the patient on her conditions, their implications and medical management.    Discussed treatment options for painful ingrown toenails in detail.    Procedure: left hallux medial nail border phenol and alcohol matrixectomy   Pathology: none  EBL: < 1 mL  Materials: none  Injectibles: 3.5 mL  0.25% plain marcaine and 3 mL 1% plain lidocaine  Complications: none    Procedure in detail: Time out called verifying patient, procedure and toe. Skin prepped with alcohol followed by ethyl chloride application and infiltration of local anesthetic described above into proximal toe. Skin was prepped with betadine. A sterile tourniquet was applied to the toe. Sterile instrumentation was used to excise the affected nail borders of the described toes above. Phenol was then applied with a cotton tip applicator for 30 second intervals x 3. Alcohol was used to dilute the phenol followed by irrigation with saline. The tourniquet was released noting instant return of the toe color and capillary fill time was instant. Bacitracin ointment was applied to the wound followed by gauze secured with coban.  Home care instructions reviewed in detail.    The patient tolerated the procedure well without complication.    RTC 10-14 days or prn.      .

## 2018-07-31 RX ORDER — ACETAMINOPHEN AND CODEINE PHOSPHATE 120; 12 MG/5ML; MG/5ML
SOLUTION ORAL
Qty: 28 TABLET | Refills: 3 | Status: SHIPPED | OUTPATIENT
Start: 2018-07-31 | End: 2018-09-25 | Stop reason: SDUPTHER

## 2018-08-18 ENCOUNTER — PATIENT MESSAGE (OUTPATIENT)
Dept: PODIATRY | Facility: CLINIC | Age: 33
End: 2018-08-18

## 2018-08-23 ENCOUNTER — OFFICE VISIT (OUTPATIENT)
Dept: FAMILY MEDICINE | Facility: CLINIC | Age: 33
End: 2018-08-23
Payer: COMMERCIAL

## 2018-08-23 VITALS
BODY MASS INDEX: 19.48 KG/M2 | HEART RATE: 80 BPM | OXYGEN SATURATION: 97 % | SYSTOLIC BLOOD PRESSURE: 112 MMHG | DIASTOLIC BLOOD PRESSURE: 70 MMHG | WEIGHT: 116.94 LBS | HEIGHT: 65 IN | TEMPERATURE: 98 F

## 2018-08-23 DIAGNOSIS — F33.42 RECURRENT MAJOR DEPRESSIVE DISORDER, IN FULL REMISSION: ICD-10-CM

## 2018-08-23 DIAGNOSIS — F98.8 ATTENTION DEFICIT DISORDER (ADD) WITHOUT HYPERACTIVITY: ICD-10-CM

## 2018-08-23 DIAGNOSIS — F41.9 ANXIETY: ICD-10-CM

## 2018-08-23 DIAGNOSIS — F32.A ANXIETY AND DEPRESSION: ICD-10-CM

## 2018-08-23 DIAGNOSIS — F41.9 ANXIETY AND DEPRESSION: ICD-10-CM

## 2018-08-23 DIAGNOSIS — Z00.01 ENCOUNTER FOR GENERAL ADULT MEDICAL EXAMINATION WITH ABNORMAL FINDINGS: Primary | ICD-10-CM

## 2018-08-23 PROBLEM — N91.2 AMENORRHEA: Status: RESOLVED | Noted: 2017-02-02 | Resolved: 2018-08-23

## 2018-08-23 PROBLEM — T83.84XA PAIN DUE TO URETERAL STENT: Status: RESOLVED | Noted: 2017-02-17 | Resolved: 2018-08-23

## 2018-08-23 PROBLEM — N20.1 URETERAL CALCULUS, RIGHT: Status: RESOLVED | Noted: 2017-02-08 | Resolved: 2018-08-23

## 2018-08-23 PROBLEM — K38.9 APPENDIX DISEASE: Status: RESOLVED | Noted: 2017-11-20 | Resolved: 2018-08-23

## 2018-08-23 PROBLEM — Z87.442 HISTORY OF NEPHROLITHIASIS: Status: ACTIVE | Noted: 2018-08-23

## 2018-08-23 PROBLEM — N23 RENAL COLIC ON RIGHT SIDE: Status: RESOLVED | Noted: 2017-02-08 | Resolved: 2018-08-23

## 2018-08-23 PROBLEM — N13.30 HYDRONEPHROSIS OF RIGHT KIDNEY: Status: RESOLVED | Noted: 2017-02-08 | Resolved: 2018-08-23

## 2018-08-23 PROBLEM — N20.0 RENAL CALCULUS, RIGHT: Status: RESOLVED | Noted: 2017-08-17 | Resolved: 2018-08-23

## 2018-08-23 PROBLEM — N23 RENAL COLIC ON LEFT SIDE: Status: RESOLVED | Noted: 2017-09-27 | Resolved: 2018-08-23

## 2018-08-23 PROCEDURE — 99395 PREV VISIT EST AGE 18-39: CPT | Mod: S$GLB,,, | Performed by: FAMILY MEDICINE

## 2018-08-23 PROCEDURE — 99999 PR PBB SHADOW E&M-EST. PATIENT-LVL III: CPT | Mod: PBBFAC,,, | Performed by: FAMILY MEDICINE

## 2018-08-23 RX ORDER — DEXTROAMPHETAMINE SACCHARATE, AMPHETAMINE ASPARTATE MONOHYDRATE, DEXTROAMPHETAMINE SULFATE AND AMPHETAMINE SULFATE 5; 5; 5; 5 MG/1; MG/1; MG/1; MG/1
20 CAPSULE, EXTENDED RELEASE ORAL EVERY MORNING
Qty: 30 CAPSULE | Refills: 0 | Status: SHIPPED | OUTPATIENT
Start: 2018-08-23 | End: 2018-08-23 | Stop reason: SDUPTHER

## 2018-08-23 RX ORDER — SERTRALINE HYDROCHLORIDE 50 MG/1
50 TABLET, FILM COATED ORAL DAILY
Qty: 30 TABLET | Refills: 3 | Status: SHIPPED | OUTPATIENT
Start: 2018-08-23 | End: 2019-02-04 | Stop reason: SDUPTHER

## 2018-08-23 RX ORDER — DEXTROAMPHETAMINE SACCHARATE, AMPHETAMINE ASPARTATE MONOHYDRATE, DEXTROAMPHETAMINE SULFATE AND AMPHETAMINE SULFATE 7.5; 7.5; 7.5; 7.5 MG/1; MG/1; MG/1; MG/1
30 CAPSULE, EXTENDED RELEASE ORAL EVERY MORNING
Qty: 30 CAPSULE | Refills: 0 | Status: SHIPPED | OUTPATIENT
Start: 2018-08-23 | End: 2018-09-25 | Stop reason: SDUPTHER

## 2018-08-23 RX ORDER — DEXTROAMPHETAMINE SACCHARATE, AMPHETAMINE ASPARTATE MONOHYDRATE, DEXTROAMPHETAMINE SULFATE AND AMPHETAMINE SULFATE 5; 5; 5; 5 MG/1; MG/1; MG/1; MG/1
20 CAPSULE, EXTENDED RELEASE ORAL EVERY MORNING
Qty: 30 CAPSULE | Refills: 0 | Status: SHIPPED | OUTPATIENT
Start: 2018-08-23 | End: 2018-08-23

## 2018-08-23 NOTE — PROGRESS NOTES
FAMILY MEDICINE    Patient Active Problem List   Diagnosis    Attention deficit disorder (ADD)    Allergic rhinitis    Anxiety    Depression       CC:   Chief Complaint   Patient presents with    Annual Exam    Medication Refill       HPI: Kan Traore is a 33 y.o. female  - here for annual wellness exam. Last PAP with HPV co-testing 4/6/17. Last Tdap 7/30/18.  - pt reports also due for ADHD and anxiety/depression medication refills. Stable on Zoloft. Reports prior to pregnancy and delivery was on Adderall XR 30 mg daily and 10 mg in PM. Was restarted on Adderall XR 20 mg daily and initially was working well but now notes that medication is out of her system by 3 PM and has difficulty completing tasks. 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   HENT: Negative for congestion and nosebleeds.    Eyes: Negative for photophobia and visual disturbance.   Respiratory: Negative for apnea, chest tightness, wheezing and stridor.    Cardiovascular: Negative for chest pain, palpitations and leg swelling.   Gastrointestinal: Negative for abdominal pain, blood in stool, constipation, diarrhea, nausea and vomiting.   Endocrine: Negative for cold intolerance, heat intolerance, polydipsia, polyphagia and polyuria.   Genitourinary: Negative for difficulty urinating, flank pain, frequency, hematuria and urgency.   Musculoskeletal: Negative for arthralgias, gait problem, myalgias, neck pain and neck stiffness.   Skin: Negative for rash.   Allergic/Immunologic: Negative for immunocompromised state.   Neurological: Negative for dizziness, syncope, weakness, light-headedness and headaches.   Psychiatric/Behavioral: Negative for confusion, hallucinations, sleep disturbance and suicidal ideas. The patient is not nervous/anxious and is not hyperactive.         ALLERGIES:   Review of patient's allergies indicates:  No Known Allergies    MEDS:     Current Outpatient Medications:     norethindrone (MICRONOR) 0.35 mg tablet, TAKE ONE TABLET BY MOUTH EVERY DAY, Disp: 28 tablet, Rfl: 3    tobramycin sulfate 0.3% (TOBREX) 0.3 % ophthalmic solution, Apply two drops to wound site on toe twice daily., Disp: 5 mL, Rfl: 0    clindamycin (CLEOCIN) 300 MG capsule, Take 1 capsule (300 mg total) by mouth 3 (three) times daily., Disp: 30 capsule, Rfl: 0    dextroamphetamine-amphetamine (ADDERALL XR) 30 MG 24 hr capsule, Take 1 capsule (30 mg total) by mouth every morning., Disp: 30 capsule, Rfl: 0    ketorolac (TORADOL) 10 mg tablet, Take 1 tablet (10 mg total) by mouth every 6 (six) hours as needed., Disp: 20 tablet, Rfl: 1    ondansetron (ZOFRAN-ODT) 8 MG TbDL, Take 1 tablet (8 mg total) by mouth every 8 (eight) hours as needed., Disp: 12 tablet, Rfl: 2    sertraline (ZOLOFT) 50 MG tablet, Take 1 tablet (50 mg total) by mouth once daily., Disp: 30 tablet, Rfl: 3    Past Medical History:   Diagnosis Date    Allergic rhinitis     Anxiety     Asthma     Attention deficit disorder (ADD)     Bilateral nephrolithiasis     Depression     Hydronephrosis     Urinary tract infection     Vaginal infection        Past Surgical History:   Procedure Laterality Date    APPENDECTOMY  2017    DILATION AND CURETTAGE OF UTERUS  2014    KIDNEY STONE SURGERY      LITHOTRIPSY      LITHOTRIPSY  2017    TONSILLECTOMY         Family History   Problem Relation Age of Onset    Hypertension Mother 50    Hypothyroidism Father 52    Stroke Maternal Grandfather     Hypertension Maternal Grandfather     Heart disease Maternal Grandfather 56         of AMI    Heart failure Paternal Grandmother 81    No Known Problems Son     No Known Problems Son     No Known Problems Son     Breast cancer Neg Hx     Colon cancer Neg Hx     Ovarian cancer Neg Hx     Kidney  "disease Neg Hx        Social History     Socioeconomic History    Marital status:      Spouse name: Not on file    Number of children: 3    Years of education: Not on file    Highest education level: Not on file   Social Needs    Financial resource strain: Not on file    Food insecurity - worry: Not on file    Food insecurity - inability: Not on file    Transportation needs - medical: Not on file    Transportation needs - non-medical: Not on file   Occupational History     Employer: [employer]   Tobacco Use    Smoking status: Never Smoker    Smokeless tobacco: Never Used   Substance and Sexual Activity    Alcohol use: No    Drug use: No    Sexual activity: Yes     Partners: Male     Comment:    Other Topics Concern    Not on file   Social History Narrative    . 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  travels for work.        OBJECTIVE:   Vitals:    08/23/18 1041   BP: 112/70   BP Location: Left arm   Patient Position: Sitting   BP Method: Medium (Manual)   Pulse: 80   Temp: 98.2 °F (36.8 °C)   TempSrc: Oral   SpO2: 97%   Weight: 53 kg (116 lb 15.3 oz)   Height: 5' 5" (1.651 m)       Physical Exam   Constitutional: No distress.   HENT:   Head: Normocephalic and atraumatic.   Eyes: EOM are normal. Pupils are equal, round, and reactive to light.   Neck: Normal range of motion. Neck supple. No thyromegaly present.   Cardiovascular: Normal rate, regular rhythm, normal heart sounds and intact distal pulses.   No murmur heard.  Pulmonary/Chest: Effort normal and breath sounds normal. She has no wheezes. She has no rales.   Abdominal: Soft. Bowel sounds are normal. She exhibits no distension. There is no tenderness.   Musculoskeletal: She exhibits no edema.   Neurological: She is alert.   Skin: Skin is warm. Capillary refill takes less than 2 seconds.   Psychiatric: She has a normal mood and affect. Her behavior is normal. " Thought content normal.   Vitals reviewed.        PERTINENT RESULTS:   Lab Visit on 08/13/2018   Component Date Value Ref Range Status    WBC 08/13/2018 5.83  3.90 - 12.70 K/uL Final    RBC 08/13/2018 4.75  4.00 - 5.40 M/uL Final    Hemoglobin 08/13/2018 13.4  12.0 - 16.0 g/dL Final    Hematocrit 08/13/2018 40.9  37.0 - 48.5 % Final    MCV 08/13/2018 86  82 - 98 fL Final    MCH 08/13/2018 28.2  27.0 - 31.0 pg Final    MCHC 08/13/2018 32.8  32.0 - 36.0 g/dL Final    RDW 08/13/2018 13.7  11.5 - 14.5 % Final    Platelets 08/13/2018 186  150 - 350 K/uL Final    MPV 08/13/2018 11.5  9.2 - 12.9 fL Final    Gran # (ANC) 08/13/2018 2.4  1.8 - 7.7 K/uL Final    Lymph # 08/13/2018 2.4  1.0 - 4.8 K/uL Final    Mono # 08/13/2018 0.5  0.3 - 1.0 K/uL Final    Eos # 08/13/2018 0.5  0.0 - 0.5 K/uL Final    Baso # 08/13/2018 0.04  0.00 - 0.20 K/uL Final    Gran% 08/13/2018 41.1  38.0 - 73.0 % Final    Lymph% 08/13/2018 41.0  18.0 - 48.0 % Final    Mono% 08/13/2018 9.3  4.0 - 15.0 % Final    Eosinophil% 08/13/2018 7.9  0.0 - 8.0 % Final    Basophil% 08/13/2018 0.7  0.0 - 1.9 % Final    Differential Method 08/13/2018 Automated   Final    Sodium 08/13/2018 143  136 - 145 mmol/L Final    Potassium 08/13/2018 4.1  3.5 - 5.1 mmol/L Final    Chloride 08/13/2018 107  95 - 110 mmol/L Final    CO2 08/13/2018 28  23 - 29 mmol/L Final    Glucose 08/13/2018 81  70 - 110 mg/dL Final    BUN, Bld 08/13/2018 17  7 - 17 mg/dL Final    Creatinine 08/13/2018 0.62  0.50 - 1.40 mg/dL Final    Calcium 08/13/2018 9.9  8.7 - 10.5 mg/dL Final    Total Protein 08/13/2018 8.0  6.0 - 8.4 g/dL Final    Albumin 08/13/2018 4.6  3.5 - 5.2 g/dL Final    Total Bilirubin 08/13/2018 0.8  0.1 - 1.0 mg/dL Final    Comment: For infants and newborns, interpretation of results should be based  on gestational age, weight and in agreement with clinical  observations.  Premature Infant recommended reference ranges:  Up to 24  hours.............<8.0 mg/dL  Up to 48 hours............<12.0 mg/dL  3-5 days..................<15.0 mg/dL  6-29 days.................<15.0 mg/dL      Alkaline Phosphatase 08/13/2018 75  38 - 126 U/L Final    AST 08/13/2018 24  15 - 46 U/L Final    ALT 08/13/2018 25  10 - 44 U/L Final    Anion Gap 08/13/2018 8  8 - 16 mmol/L Final    eGFR if African American 08/13/2018 >60.0  >60 mL/min/1.73 m^2 Final    eGFR if non African American 08/13/2018 >60.0  >60 mL/min/1.73 m^2 Final    Comment: Calculation used to obtain the estimated glomerular filtration  rate (eGFR) is the CKD-EPI equation.       Cholesterol 08/13/2018 144  120 - 199 mg/dL Final    Comment: The National Cholesterol Education Program (NCEP) has set the  following guidelines (reference ranges) for Cholesterol:  Optimal.....................<200 mg/dL  Borderline High.............200-239 mg/dL  High........................> or = 240 mg/dL      Triglycerides 08/13/2018 60  30 - 150 mg/dL Final    Comment: The National Cholesterol Education Program (NCEP) has set the  following guidelines (reference values) for triglycerides:  Normal......................<150 mg/dL  Borderline High.............150-199 mg/dL  High........................200-499 mg/dL      HDL 08/13/2018 51  40 - 75 mg/dL Final    Comment: The National Cholesterol Education Program (NCEP) has set the  following guidelines (reference values) for HDL Cholesterol:  Low...............<40 mg/dL  Optimal...........>60 mg/dL      LDL Cholesterol 08/13/2018 81.0  63.0 - 159.0 mg/dL Final    Comment: The National Cholesterol Education Program (NCEP) has set the  following guidelines (reference values) for LDL Cholesterol:  Optimal.......................<130 mg/dL  Borderline High...............130-159 mg/dL  High..........................160-189 mg/dL  Very High.....................>190 mg/dL      HDL/Chol Ratio 08/13/2018 35.4  20.0 - 50.0 % Final    Total Cholesterol/HDL Ratio 08/13/2018  2.8  2.0 - 5.0 Final    Non-HDL Cholesterol 08/13/2018 93  mg/dL Final    Comment: Risk category and Non-HDL cholesterol goals:  Coronary heart disease (CHD)or equivalent (10-year risk of CHD >20%):  Non-HDL cholesterol goal     <130 mg/dL  Two or more CHD risk factors and 10-year risk of CHD <= 20%:  Non-HDL cholesterol goal     <160 mg/dL  0 to 1 CHD risk factor:  Non-HDL cholesterol goal     <190 mg/dL      TSH 08/13/2018 2.580  0.400 - 4.000 uIU/mL Final    Comment: Warning:  Heterophilic antibodies in serum or plasma of   certain individuals are known to cause interference with   immunoassays. These antibodies may be present in blood samples   from individuals regularly exposed to animal or who have been   treated with animal products.        ASSESSMENT:  Problem List Items Addressed This Visit     Attention deficit disorder (ADD)    Overview     - diagnosed in 4th grade         Current Assessment & Plan     - symptoms not well controlled  - increase Adderall from XR 20 mg to 30 mg daily  - f/u 1 month         Relevant Medications    dextroamphetamine-amphetamine (ADDERALL XR) 30 MG 24 hr capsule    Anxiety    Current Assessment & Plan     - well controlled  - continue same meds         Depression    Current Assessment & Plan     - well controlled  - continue same meds           Other Visit Diagnoses     Encounter for general adult medical examination with abnormal findings    -  Primary    - Tdap UTD  - PAP UTD   - labs reviewed with pt      Anxiety and depression        Relevant Medications    sertraline (ZOLOFT) 50 MG tablet          PLAN:   Orders Placed This Encounter    sertraline (ZOLOFT) 50 MG tablet    dextroamphetamine-amphetamine (ADDERALL XR) 30 MG 24 hr capsule       Follow-up with Madeline Aguilar in 1 month.     Dr. Cielo Mina D.O.   Family Medicine

## 2018-08-24 ENCOUNTER — OFFICE VISIT (OUTPATIENT)
Dept: PODIATRY | Facility: CLINIC | Age: 33
End: 2018-08-24
Payer: COMMERCIAL

## 2018-08-24 VITALS
DIASTOLIC BLOOD PRESSURE: 60 MMHG | HEART RATE: 77 BPM | SYSTOLIC BLOOD PRESSURE: 109 MMHG | BODY MASS INDEX: 19.33 KG/M2 | HEIGHT: 65 IN | WEIGHT: 116 LBS

## 2018-08-24 DIAGNOSIS — M89.8X7 EXOSTOSIS OF TOE: ICD-10-CM

## 2018-08-24 DIAGNOSIS — M79.675 GREAT TOE PAIN, LEFT: Primary | ICD-10-CM

## 2018-08-24 PROCEDURE — 99213 OFFICE O/P EST LOW 20 MIN: CPT | Mod: S$GLB,,, | Performed by: PODIATRIST

## 2018-08-24 PROCEDURE — 99999 PR PBB SHADOW E&M-EST. PATIENT-LVL III: CPT | Mod: PBBFAC,,, | Performed by: PODIATRIST

## 2018-08-24 NOTE — PROGRESS NOTES
"Subjective:      Patient ID: Kan Traore is a 33 y.o. female.    Chief Complaint: Follow-up (Left hallux nail avulsion)    Kan is a 33 y.o. female  has a past medical history of Allergic rhinitis, Anxiety, Asthma, Attention deficit disorder (ADD), Bilateral nephrolithiasis, Depression, Hydronephrosis, Urinary tract infection, and Vaginal infection. who presents to the clinic complaining of painful ingrown toenail on the left foot. History of previous P&A left hallux per Dr. Douglass, however it has recurred per patient. Treated for MRSA infection in past.    18: Pt presents for f/u of left hallux medial partial nail avulsion with P&A. States she still feels pressure to distal aspect. No other pedal complaints at this time.     Vitals:    18 1603   BP: 109/60   Pulse: 77   Weight: 52.6 kg (116 lb)   Height: 5' 5" (1.651 m)   PainSc: 0-No pain      Past Medical History:   Diagnosis Date    Allergic rhinitis     Anxiety     Asthma     Attention deficit disorder (ADD)     Bilateral nephrolithiasis     Depression     Hydronephrosis     Urinary tract infection     Vaginal infection        Past Surgical History:   Procedure Laterality Date    APPENDECTOMY  2017    DILATION AND CURETTAGE OF UTERUS  2014    KIDNEY STONE SURGERY      LITHOTRIPSY  2011    LITHOTRIPSY  2017    TONSILLECTOMY         Family History   Problem Relation Age of Onset    Hypertension Mother 50    Hypothyroidism Father 52    Stroke Maternal Grandfather     Hypertension Maternal Grandfather     Heart disease Maternal Grandfather 56         of AMI    Heart failure Paternal Grandmother 81    No Known Problems Son     No Known Problems Son     No Known Problems Son     Breast cancer Neg Hx     Colon cancer Neg Hx     Ovarian cancer Neg Hx     Kidney disease Neg Hx        Social History     Socioeconomic History    Marital status:      Spouse name: None    Number of children: 3 "    Years of education: None    Highest education level: None   Social Needs    Financial resource strain: None    Food insecurity - worry: None    Food insecurity - inability: None    Transportation needs - medical: None    Transportation needs - non-medical: None   Occupational History     Employer: [employer]   Tobacco Use    Smoking status: Never Smoker    Smokeless tobacco: Never Used   Substance and Sexual Activity    Alcohol use: No    Drug use: No    Sexual activity: Yes     Partners: Male     Comment:    Other Topics Concern    None   Social History Narrative    . 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  travels for work.        Current Outpatient Medications   Medication Sig Dispense Refill    dextroamphetamine-amphetamine (ADDERALL XR) 30 MG 24 hr capsule Take 1 capsule (30 mg total) by mouth every morning. 30 capsule 0    ketorolac (TORADOL) 10 mg tablet Take 1 tablet (10 mg total) by mouth every 6 (six) hours as needed. 20 tablet 1    norethindrone (MICRONOR) 0.35 mg tablet TAKE ONE TABLET BY MOUTH EVERY DAY 28 tablet 3    ondansetron (ZOFRAN-ODT) 8 MG TbDL Take 1 tablet (8 mg total) by mouth every 8 (eight) hours as needed. 12 tablet 2    sertraline (ZOLOFT) 50 MG tablet Take 1 tablet (50 mg total) by mouth once daily. 30 tablet 3    tobramycin sulfate 0.3% (TOBREX) 0.3 % ophthalmic solution Apply two drops to wound site on toe twice daily. 5 mL 0     No current facility-administered medications for this visit.        Review of patient's allergies indicates:  No Known Allergies      Review of Systems   Constitution: Negative for chills, fever, weakness and malaise/fatigue.   Cardiovascular: Negative for chest pain, claudication and leg swelling.   Respiratory: Negative for cough and shortness of breath.    Skin: Positive for nail changes. Negative for itching, poor wound healing and rash.   Musculoskeletal:  Negative for back pain, joint pain, muscle cramps and muscle weakness.   Gastrointestinal: Negative for nausea and vomiting.   Neurological: Negative for numbness and paresthesias.   Psychiatric/Behavioral: Negative for altered mental status.           Objective:      Physical Exam   Constitutional: She is oriented to person, place, and time. She appears well-developed and well-nourished. No distress.   Cardiovascular: Intact distal pulses.   Pulses:       Dorsalis pedis pulses are 2+ on the right side, and 2+ on the left side.        Posterior tibial pulses are 2+ on the right side, and 2+ on the left side.   CFT< 3 secs all toes bilateral foot, skin temp warm bilateral foot, diminished digital hair growth bilateral foot, no lower extremity edema bilateral.     Musculoskeletal:   No pain with ROM or MMT bilateral lower extremity.    The distal portion of left hallux is enlarged with palpable underlying bony prominence and mild localized pain with slight erythema and edema.     Neurological: She is alert and oriented to person, place, and time. She has normal strength. No sensory deficit.   Skin: Skin is warm, dry and intact. Capillary refill takes less than 2 seconds. No ecchymosis and no rash noted. She is not diaphoretic. No cyanosis or erythema. No pallor. Nails show no clubbing.   Left hallux medial nail bed wound healed with slight localized pain.             Assessment:       Encounter Diagnoses   Name Primary?    Great toe pain, left Yes    Exostosis of toe          Plan:       Kan was seen today for follow-up.    Diagnoses and all orders for this visit:    Great toe pain, left  -     X-Ray Foot Complete Left; Future    Exostosis of toe  -     X-Ray Foot Complete Left; Future      I counseled the patient on her conditions, their implications and medical management.    Discussed pain is likely from dorsal bone spur to distal hallux which is creating pressure on the nail folds. Pain may also be likely due  to tight shoes  Pt instructed to change shoes to a wider and longer toe box or wear open toed shoes  Xray ordered  RTC as specified    Adri Coronel, PGY3    I have personally taken the history and examined this patient and agree with the resident's note as stated as above.   Samuel Giron DPM, FACFAS        .

## 2018-09-07 ENCOUNTER — PATIENT MESSAGE (OUTPATIENT)
Dept: PODIATRY | Facility: CLINIC | Age: 33
End: 2018-09-07

## 2018-09-14 ENCOUNTER — HOSPITAL ENCOUNTER (OUTPATIENT)
Dept: RADIOLOGY | Facility: HOSPITAL | Age: 33
Discharge: HOME | End: 2018-09-14
Attending: PODIATRIST
Payer: COMMERCIAL

## 2018-09-14 DIAGNOSIS — M89.8X7 EXOSTOSIS OF TOE: ICD-10-CM

## 2018-09-14 DIAGNOSIS — M79.675 GREAT TOE PAIN, LEFT: ICD-10-CM

## 2018-09-14 PROCEDURE — 73630 X-RAY EXAM OF FOOT: CPT | Mod: 26,LT,, | Performed by: RADIOLOGY

## 2018-09-14 PROCEDURE — 73630 X-RAY EXAM OF FOOT: CPT | Mod: TC,FY,LT

## 2018-09-24 ENCOUNTER — OFFICE VISIT (OUTPATIENT)
Dept: PODIATRY | Facility: CLINIC | Age: 33
End: 2018-09-24
Payer: COMMERCIAL

## 2018-09-24 VITALS
WEIGHT: 116 LBS | SYSTOLIC BLOOD PRESSURE: 106 MMHG | BODY MASS INDEX: 19.33 KG/M2 | HEART RATE: 105 BPM | HEIGHT: 65 IN | DIASTOLIC BLOOD PRESSURE: 76 MMHG

## 2018-09-24 DIAGNOSIS — M79.675 GREAT TOE PAIN, LEFT: ICD-10-CM

## 2018-09-24 DIAGNOSIS — M89.8X7 EXOSTOSIS OF TOE: Primary | ICD-10-CM

## 2018-09-24 DIAGNOSIS — Z01.818 PREOP EXAMINATION: ICD-10-CM

## 2018-09-24 PROCEDURE — 99999 PR PBB SHADOW E&M-EST. PATIENT-LVL V: CPT | Mod: PBBFAC,,, | Performed by: PODIATRIST

## 2018-09-24 PROCEDURE — 99214 OFFICE O/P EST MOD 30 MIN: CPT | Mod: S$GLB,,, | Performed by: PODIATRIST

## 2018-09-24 RX ORDER — TRAMADOL HYDROCHLORIDE 50 MG/1
50 TABLET ORAL EVERY 6 HOURS PRN
Qty: 30 TABLET | Refills: 0 | Status: SHIPPED | OUTPATIENT
Start: 2018-09-24 | End: 2018-10-04

## 2018-09-24 NOTE — PROGRESS NOTES
"Subjective:      Patient ID: Kan Traore is a 33 y.o. female.    Chief Complaint: Follow-up (left big toe)    Kan is a 33 y.o. female  has a past medical history of Allergic rhinitis, Anxiety, Asthma, Attention deficit disorder (ADD), Bilateral nephrolithiasis, Depression, Hydronephrosis, Urinary tract infection, and Vaginal infection. who presents to the clinic complaining of painful ingrown toenail on the left foot. History of previous P&A left hallux per Dr. Douglass, however it has recurred per patient. Treated for MRSA infection in past.    8/24/18: Pt presents for f/u of left hallux medial partial nail avulsion with P&A. States she still feels pressure to distal aspect. No other pedal complaints at this time.     9/24/18: Pt seen today, states she is still having pain to left big toe nail. No other pedal complaints at this time.     Vitals:    09/24/18 1600   BP: 106/76   Pulse: 105   Weight: 52.6 kg (116 lb)   Height: 5' 5" (1.651 m)   PainSc:   6      Past Medical History:   Diagnosis Date    Allergic rhinitis     Anxiety     Asthma     Attention deficit disorder (ADD)     Bilateral nephrolithiasis     Depression     Hydronephrosis     Urinary tract infection     Vaginal infection        Past Surgical History:   Procedure Laterality Date    APPENDECTOMY  11/20/2017    APPENDECTOMY-LAPAROSCOPIC N/A 11/20/2017    [facility] by [facility] at [facility] OR    CYSTOSCOPY WITH STENT REMOVAL Right 2/23/2017    Performed by Kendell Hernandez [facility] at [facility] OR    DILATION AND CURETTAGE OF UTERUS  4/2014    DILATION AND CURETTAGE, UTERUS, USING SUCTION N/A 4/15/2014    [facility] by [facility]    EXTRACTION-STONE-URETEROSCOPY - retrograde pyelogram, laser lithotripsy and possible stent. Right 2/9/2017    Performed by [facility] at [facility] OR    KIDNEY STONE SURGERY      LITHOTRIPSY  2011    LITHOTRIPSY  02/09/2017    LITHOTRIPSY-EXTRACORPOREAL SHOCK WAVE " Right 2017    Performed by [facility] at [facility] OR    LITHOTRIPSY-EXTRACORPOREAL SHOCK WAVE Right 2017    Performed by [facility]ell [facility] at [facility] OR    TONSILLECTOMY         Family History   Problem Relation Age of Onset    Hypertension Mother 50    Hypothyroidism Father 52    Stroke Maternal Grandfather     Hypertension Maternal Grandfather     Heart disease Maternal Grandfather 56         of AMI    Heart failure Paternal Grandmother 81    No Known Problems Son     No Known Problems Son     No Known Problems Son     Breast cancer Neg Hx     Colon cancer Neg Hx     Ovarian cancer Neg Hx     Kidney disease Neg Hx        Social History     Socioeconomic History    Marital status:      Spouse name: Not on file    Number of children: 3    Years of education: Not on file    Highest education level: Not on file   Social Needs    Financial resource strain: Not on file    Food insecurity - worry: Not on file    Food insecurity - inability: Not on file    Transportation needs - medical: Not on file    Transportation needs - non-medical: Not on file   Occupational History     Employer: [employer]   Tobacco Use    Smoking status: Never Smoker    Smokeless tobacco: Never Used   Substance and Sexual Activity    Alcohol use: No    Drug use: No    Sexual activity: Yes     Partners: Male     Comment:    Other Topics Concern    Not on file   Social History Narrative    . 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  travels for work.        Current Outpatient Medications   Medication Sig Dispense Refill    ketorolac (TORADOL) 10 mg tablet Take 1 tablet (10 mg total) by mouth every 6 (six) hours as needed. 20 tablet 1    norethindrone (MICRONOR) 0.35 mg tablet TAKE ONE TABLET BY MOUTH EVERY DAY 28 tablet 3    sertraline (ZOLOFT) 50 MG tablet Take 1 tablet (50 mg total) by mouth once daily. 30 tablet  3    dextroamphetamine-amphetamine (ADDERALL XR) 30 MG 24 hr capsule Take 1 capsule (30 mg total) by mouth every morning. 30 capsule 0    traMADol (ULTRAM) 50 mg tablet Take 1 tablet (50 mg total) by mouth every 6 (six) hours as needed for Pain. 30 tablet 0     No current facility-administered medications for this visit.        Review of patient's allergies indicates:  No Known Allergies      Review of Systems   Constitution: Negative for chills, fever, weakness and malaise/fatigue.   Cardiovascular: Negative for chest pain, claudication and leg swelling.   Respiratory: Negative for cough and shortness of breath.    Skin: Positive for nail changes. Negative for itching, poor wound healing and rash.   Musculoskeletal: Negative for back pain, joint pain, muscle cramps and muscle weakness.   Gastrointestinal: Negative for nausea and vomiting.   Neurological: Negative for numbness and paresthesias.   Psychiatric/Behavioral: Negative for altered mental status.           Objective:      Physical Exam   Constitutional: She is oriented to person, place, and time. She appears well-developed and well-nourished. No distress.   Cardiovascular: Intact distal pulses.   Pulses:       Dorsalis pedis pulses are 2+ on the right side, and 2+ on the left side.        Posterior tibial pulses are 2+ on the right side, and 2+ on the left side.   CFT< 3 secs all toes bilateral foot, skin temp warm bilateral foot, diminished digital hair growth bilateral foot, no lower extremity edema bilateral.     Musculoskeletal:   No pain with ROM or MMT bilateral lower extremity.    The distal portion of left hallux is enlarged with palpable underlying bony prominence and mild localized pain with slight erythema and edema.     Neurological: She is alert and oriented to person, place, and time. She has normal strength. No sensory deficit.   Skin: Skin is warm, dry and intact. Capillary refill takes less than 2 seconds. No ecchymosis and no rash noted.  She is not diaphoretic. No cyanosis or erythema. No pallor. Nails show no clubbing.   Left hallux medial nail bed with localized pain, no wound noted, no SOI, stable.             Assessment:       Encounter Diagnoses   Name Primary?    Exostosis of toe Yes    Great toe pain, left     Preop examination          Plan:       Kan was seen today for follow-up.    Diagnoses and all orders for this visit:    Exostosis of toe  -     Ambulatory referral to Family Practice  -     Case Request Operating Room: EXOSTECTOMY  -     Full code; Standing  -     Place in Outpatient; Standing  -     Full code    Great toe pain, left  -     Ambulatory referral to Family Practice  -     Case Request Operating Room: EXOSTECTOMY  -     Full code; Standing  -     Place in Outpatient; Standing  -     Full code    Preop examination  -     Ambulatory referral to Family Practice    Other orders  -     traMADol (ULTRAM) 50 mg tablet; Take 1 tablet (50 mg total) by mouth every 6 (six) hours as needed for Pain.  -     lidocaine (PF) 10 mg/ml (1%) injection 10 mg; Inject 1 mL (10 mg total) into the skin once.  -     Weight bearing; Standing  -     ceFAZolin (ANCEF) 2 g in dextrose 5 % 50 mL IVPB; Inject 2 g into the vein On call Procedure (Surgery).  -     Weight bearing      I counseled the patient on her conditions, their implications and medical management.    Discussed pain is likely from dorsal bone spur to distal hallux which is creating pressure on the nail folds. Pain may also be likely due to tight shoes  Pt instructed to change shoes to a wider and longer toe box or wear open toed shoes  Xray reviewed  Discussed sx vs conservative treatment option, pt elects for surgical tx at this time  Discussed surgically removing nail and dorsal bone spur to help relieve pressure to medial nail fold of left hallux  Long discussion with patient regarding the procedure in detail. Patient understands all risks, potential complications, and  alternatives, including, but not limited to those listed on the consent form. All questions were answered. No guarantees given or implied as to outcome. Informed verbal and written consent was obtained. Consent forms read, signed, witnessed. Patient for surgery  RTC as specified    Adri Coronel, PGY3    I have personally taken the history and examined this patient and agree with the resident's note as stated as above.   Samuel Giron DPM, FACFAS          .

## 2018-09-25 ENCOUNTER — OFFICE VISIT (OUTPATIENT)
Dept: FAMILY MEDICINE | Facility: CLINIC | Age: 33
End: 2018-09-25
Payer: COMMERCIAL

## 2018-09-25 VITALS
DIASTOLIC BLOOD PRESSURE: 70 MMHG | SYSTOLIC BLOOD PRESSURE: 120 MMHG | RESPIRATION RATE: 16 BRPM | HEART RATE: 95 BPM | BODY MASS INDEX: 19.43 KG/M2 | OXYGEN SATURATION: 98 % | WEIGHT: 116.63 LBS | TEMPERATURE: 98 F | HEIGHT: 65 IN

## 2018-09-25 DIAGNOSIS — F32.A ANXIETY AND DEPRESSION: ICD-10-CM

## 2018-09-25 DIAGNOSIS — F98.8 ATTENTION DEFICIT DISORDER (ADD) WITHOUT HYPERACTIVITY: ICD-10-CM

## 2018-09-25 DIAGNOSIS — F41.9 ANXIETY AND DEPRESSION: ICD-10-CM

## 2018-09-25 PROCEDURE — 99213 OFFICE O/P EST LOW 20 MIN: CPT | Mod: S$GLB,,, | Performed by: FAMILY MEDICINE

## 2018-09-25 PROCEDURE — 99999 PR PBB SHADOW E&M-EST. PATIENT-LVL IV: CPT | Mod: PBBFAC,,, | Performed by: FAMILY MEDICINE

## 2018-09-25 RX ORDER — SERTRALINE HYDROCHLORIDE 50 MG/1
50 TABLET, FILM COATED ORAL DAILY
Qty: 30 TABLET | Refills: 3 | Status: CANCELLED | OUTPATIENT
Start: 2018-09-25

## 2018-09-25 RX ORDER — DEXTROAMPHETAMINE SACCHARATE, AMPHETAMINE ASPARTATE MONOHYDRATE, DEXTROAMPHETAMINE SULFATE AND AMPHETAMINE SULFATE 7.5; 7.5; 7.5; 7.5 MG/1; MG/1; MG/1; MG/1
30 CAPSULE, EXTENDED RELEASE ORAL EVERY MORNING
Qty: 30 CAPSULE | Refills: 0 | Status: SHIPPED | OUTPATIENT
Start: 2018-09-25 | End: 2018-09-25 | Stop reason: SDUPTHER

## 2018-09-25 RX ORDER — LIDOCAINE HYDROCHLORIDE 10 MG/ML
1 INJECTION, SOLUTION EPIDURAL; INFILTRATION; INTRACAUDAL; PERINEURAL ONCE
Status: CANCELLED | OUTPATIENT
Start: 2018-09-25 | End: 2018-09-25

## 2018-09-25 RX ORDER — ACETAMINOPHEN AND CODEINE PHOSPHATE 120; 12 MG/5ML; MG/5ML
1 SOLUTION ORAL DAILY
Qty: 28 TABLET | Refills: 0 | Status: SHIPPED | OUTPATIENT
Start: 2018-09-25 | End: 2019-06-21

## 2018-09-25 RX ORDER — DEXTROAMPHETAMINE SACCHARATE, AMPHETAMINE ASPARTATE MONOHYDRATE, DEXTROAMPHETAMINE SULFATE AND AMPHETAMINE SULFATE 7.5; 7.5; 7.5; 7.5 MG/1; MG/1; MG/1; MG/1
30 CAPSULE, EXTENDED RELEASE ORAL EVERY MORNING
Qty: 30 CAPSULE | Refills: 0 | Status: SHIPPED | OUTPATIENT
Start: 2018-09-25 | End: 2023-08-31 | Stop reason: SDUPTHER

## 2018-09-25 NOTE — PROGRESS NOTES
FAMILY MEDICINE    Patient Active Problem List   Diagnosis    Allergic rhinitis    Anxiety    Depression    History of nephrolithiasis    Attention deficit disorder (ADD) without hyperactivity       CC:   Chief Complaint   Patient presents with    Medication Refill       SUBJECTIVE:  Kan Traore   is a 33 y.o. female  - with ADHD and here for follow-up s/p Adderall XR was increased from 20 mg daily to 30 mg on 8/23/18 visit. Her provider is NP Madeline Aguilar who is still out of the office. 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 Last visit was noting difficulty completing tasks as the day progressed. Now on XR 30 mg daily and tolerating well. Medication effective and no longer having issues with symptoms of poor concentration and difficulty completing her tasks at work or at home. No unwanted side effects        ROS: Review of Systems   Constitutional: Negative for activity change, fatigue and unexpected weight change.   Respiratory: Negative for chest tightness.    Cardiovascular: Negative for chest pain and palpitations.   Gastrointestinal: Negative for abdominal pain and constipation.   Neurological: Negative for dizziness, light-headedness and headaches.   Psychiatric/Behavioral: Negative for sleep disturbance. The patient is not nervous/anxious.        Past Medical History:   Diagnosis Date    Allergic rhinitis     Anxiety     Asthma     Attention deficit disorder (ADD)     Bilateral nephrolithiasis     Depression     Hydronephrosis     Urinary tract infection     Vaginal infection        Past Surgical History:   Procedure Laterality Date    APPENDECTOMY  11/20/2017    APPENDECTOMY-LAPAROSCOPIC N/A 11/20/2017    Performed by [facility]coso DO at [facility] OR    CYSTOSCOPY WITH STENT REMOVAL Right 2/23/2017    [facility] by [facility] Hernandez [facility] at [facility]    DILATION AND CURETTAGE OF UTERUS  4/2014    DILATION AND CURETTAGE, UTERUS, USING SUCTION N/A 4/15/2014  "   Performed by [facility] A. Wiedemann, MD at [facility] OR    EXTRACTION-STONE-URETEROSCOPY - retrograde pyelogram, laser lithotripsy and possible stent. Right 2/9/2017    [facility] at [facility]    KIDNEY STONE SURGERY      LITHOTRIPSY  2011    LITHOTRIPSY  02/09/2017    LITHOTRIPSY-EXTRACORPOREAL SHOCK WAVE Right 8/17/2017    [facility]    LITHOTRIPSY-EXTRACORPOREAL SHOCK WAVE Right 2/23/2017    Performed by Kendell Hernandez [facility] at [facility]    TONSILLECTOMY         ALLERGIES: Review of patient's allergies indicates:  No Known Allergies    MEDS:   Current Outpatient Medications:     ketorolac (TORADOL) 10 mg tablet, Take 1 tablet (10 mg total) by mouth every 6 (six) hours as needed., Disp: 20 tablet, Rfl: 1    norethindrone (MICRONOR) 0.35 mg tablet, TAKE ONE TABLET BY MOUTH EVERY DAY, Disp: 28 tablet, Rfl: 3    sertraline (ZOLOFT) 50 MG tablet, Take 1 tablet (50 mg total) by mouth once daily., Disp: 30 tablet, Rfl: 3    traMADol (ULTRAM) 50 mg tablet, Take 1 tablet (50 mg total) by mouth every 6 (six) hours as needed for Pain., Disp: 30 tablet, Rfl: 0    dextroamphetamine-amphetamine (ADDERALL XR) 30 MG 24 hr capsule, Take 1 capsule (30 mg total) by mouth every morning., Disp: 30 capsule, Rfl: 0    OBJECTIVE:   Vitals:    09/25/18 1016   BP: 120/70   BP Location: Left arm   Patient Position: Sitting   BP Method: Medium (Manual)   Pulse: 95   Resp: 16   Temp: 97.5 °F (36.4 °C)   TempSrc: Oral   SpO2: 98%   Weight: 52.9 kg (116 lb 9.6 oz)   Height: 5' 5" (1.651 m)     Body mass index is 19.4 kg/m².    Physical Exam   Constitutional: No distress.   Neck: Neck supple.   Cardiovascular: Normal rate.   Pulmonary/Chest: Effort normal and breath sounds normal.   Skin: Skin is warm.   Psychiatric: She has a normal mood and affect. Her behavior is normal. Thought content normal.         ASSESSMENT:  Problem List Items Addressed This Visit     Attention deficit disorder " (ADD) without hyperactivity    Current Assessment & Plan     - well controlled on new dose  - continue Adderall XR 30 mg daily (3 Rx's given)         Relevant Medications    dextroamphetamine-amphetamine (ADDERALL XR) 30 MG 24 hr capsule          PLAN:   Orders Placed This Encounter    dextroamphetamine-amphetamine (ADDERALL XR) 30 MG 24 hr capsule     Follow-up with Madeline Aguilar in 3 months.     Dr. Cielo Mina D.O.   [facility]

## 2018-09-25 NOTE — LETTER
September 25, 2018      [place]  [address] [facility]  [address]  [place] [address]           [address] [place]   [facility] [address]  Phone: [phone]  Fax: [phone]          Patient: Kan Traore   MR Number: [id number]   YOB: 1985   Date of Visit: 9/25/2018       Dear Madeline Aguilar:    Thank you for referring Kan Traore to me for evaluation. Attached you will find relevant portions of my assessment and plan of care.    If you have questions, please do not hesitate to call me. I look forward to following Kan Traore along with you.    Sincerely,    Cielo Mina, DO    Enclosure  CC:  No Recipients    If you would like to receive this communication electronically, please contact [email or web address] or [phone] to request more information on [facility] [employer] access.    For providers and/or their staff who would like to refer a patient to [facility], please contact us through our one-stop-shop provider referral line, [facility], at [phone].    If you feel you have received this communication in error or would no longer like to receive these types of communications, please e-mail [email or web address]

## 2018-12-05 ENCOUNTER — PATIENT MESSAGE (OUTPATIENT)
Dept: PODIATRY | Facility: CLINIC | Age: 33
End: 2018-12-05

## 2018-12-26 ENCOUNTER — PATIENT MESSAGE (OUTPATIENT)
Dept: FAMILY MEDICINE | Facility: CLINIC | Age: 33
End: 2018-12-26

## 2018-12-31 ENCOUNTER — OFFICE VISIT (OUTPATIENT)
Dept: FAMILY MEDICINE | Facility: CLINIC | Age: 33
End: 2018-12-31
Payer: COMMERCIAL

## 2018-12-31 VITALS
OXYGEN SATURATION: 98 % | WEIGHT: 118.19 LBS | BODY MASS INDEX: 18.99 KG/M2 | HEIGHT: 66 IN | TEMPERATURE: 98 F | HEART RATE: 92 BPM | DIASTOLIC BLOOD PRESSURE: 70 MMHG | SYSTOLIC BLOOD PRESSURE: 100 MMHG

## 2018-12-31 DIAGNOSIS — F41.9 ANXIETY: ICD-10-CM

## 2018-12-31 DIAGNOSIS — F98.8 ATTENTION DEFICIT DISORDER (ADD) WITHOUT HYPERACTIVITY: Primary | ICD-10-CM

## 2018-12-31 DIAGNOSIS — F33.42 RECURRENT MAJOR DEPRESSIVE DISORDER, IN FULL REMISSION: ICD-10-CM

## 2018-12-31 PROCEDURE — 99213 OFFICE O/P EST LOW 20 MIN: CPT | Mod: S$GLB,,, | Performed by: NURSE PRACTITIONER

## 2018-12-31 PROCEDURE — 99999 PR PBB SHADOW E&M-EST. PATIENT-LVL III: CPT | Mod: PBBFAC,,, | Performed by: NURSE PRACTITIONER

## 2018-12-31 RX ORDER — DEXTROAMPHETAMINE SACCHARATE, AMPHETAMINE ASPARTATE MONOHYDRATE, DEXTROAMPHETAMINE SULFATE AND AMPHETAMINE SULFATE 7.5; 7.5; 7.5; 7.5 MG/1; MG/1; MG/1; MG/1
30 CAPSULE, EXTENDED RELEASE ORAL EVERY MORNING
Qty: 30 CAPSULE | Refills: 0 | Status: SHIPPED | OUTPATIENT
Start: 2019-01-29 | End: 2019-02-18 | Stop reason: SDUPTHER

## 2018-12-31 RX ORDER — DEXTROAMPHETAMINE SACCHARATE, AMPHETAMINE ASPARTATE MONOHYDRATE, DEXTROAMPHETAMINE SULFATE AND AMPHETAMINE SULFATE 7.5; 7.5; 7.5; 7.5 MG/1; MG/1; MG/1; MG/1
30 CAPSULE, EXTENDED RELEASE ORAL EVERY MORNING
Qty: 30 CAPSULE | Refills: 0 | Status: SHIPPED | OUTPATIENT
Start: 2018-12-31 | End: 2019-01-30

## 2018-12-31 RX ORDER — DEXTROAMPHETAMINE SACCHARATE, AMPHETAMINE ASPARTATE MONOHYDRATE, DEXTROAMPHETAMINE SULFATE AND AMPHETAMINE SULFATE 7.5; 7.5; 7.5; 7.5 MG/1; MG/1; MG/1; MG/1
30 CAPSULE, EXTENDED RELEASE ORAL EVERY MORNING
Qty: 30 CAPSULE | Refills: 0 | Status: SHIPPED | OUTPATIENT
Start: 2019-02-27 | End: 2019-02-18 | Stop reason: SDUPTHER

## 2018-12-31 NOTE — PROGRESS NOTES
Subjective:       Patient ID: Kan Tarore is a 33 y.o. female.    Chief Complaint: Medication Refill    32 y/o female present to clinic for ADHD medication refill. Has been on Adderall XR 30 mg since August 2018. PCP NP Madeilne Aguilar.  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 States current dose is working well. No difficulty in concentration or completed tasks. Denies medication side effects.      Hx of anxiety and depression. Takes zoloft 50 mg daily. Denies depressed mood. No HI/SI.         Current Outpatient Medications   Medication Sig Dispense Refill    norethindrone (MICRONOR) 0.35 mg tablet Take 1 tablet (0.35 mg total) by mouth once daily. 28 tablet 0    sertraline (ZOLOFT) 50 MG tablet Take 1 tablet (50 mg total) by mouth once daily. 30 tablet 3    dextroamphetamine-amphetamine (ADDERALL XR) 30 MG 24 hr capsule Take 1 capsule (30 mg total) by mouth every morning. 30 capsule 0    [START ON 1/29/2019] dextroamphetamine-amphetamine (ADDERALL XR) 30 MG 24 hr capsule Take 1 capsule (30 mg total) by mouth every morning. 30 capsule 0    [START ON 2/27/2019] dextroamphetamine-amphetamine (ADDERALL XR) 30 MG 24 hr capsule Take 1 capsule (30 mg total) by mouth every morning. 30 capsule 0    ketorolac (TORADOL) 10 mg tablet Take 1 tablet (10 mg total) by mouth every 6 (six) hours as needed. 20 tablet 1     No current facility-administered medications for this visit.        Past Medical History:   Diagnosis Date    Allergic rhinitis     Anxiety     Asthma     Attention deficit disorder (ADD)     Bilateral nephrolithiasis     Depression     Hydronephrosis     Urinary tract infection     Vaginal infection        Past Surgical History:   Procedure Laterality Date    APPENDECTOMY  11/20/2017    APPENDECTOMY-LAPAROSCOPIC N/A 11/20/2017    [facility]    CYSTOSCOPY WITH STENT REMOVAL Right 2/23/2017    Performed by Kendell Hernandez MD at  [facility] OR    DILATION AND CURETTAGE OF UTERUS  2014    DILATION AND CURETTAGE, UTERUS, USING SUCTION N/A 4/15/2014    [facility] by [facility]emann[facility] at [facility] OR    EXTRACTION-STONE-URETEROSCOPY - retrograde pyelogram, laser lithotripsy and possible stent. Right 2017    Performed by [facility]ell Hernandez MD at [facility] OR    KIDNEY STONE SURGERY      LITHOTRIPSY  2011    LITHOTRIPSY  2017    LITHOTRIPSY-EXTRACORPOREAL SHOCK WAVE Right 2017    Performed by [facility] MD at [facility] OR    LITHOTRIPSY-EXTRACORPOREAL SHOCK WAVE Right 2017    Performed by [facility] at [facility] OR    TONSILLECTOMY         Family History   Problem Relation Age of Onset    Hypertension Mother 50    Hypothyroidism Father 52    Stroke Maternal Grandfather     Hypertension Maternal Grandfather     Heart disease Maternal Grandfather 56         of AMI    Heart failure Paternal Grandmother 81    No Known Problems Son     No Known Problems Son     No Known Problems Son     Breast cancer Neg Hx     Colon cancer Neg Hx     Ovarian cancer Neg Hx     Kidney disease Neg Hx        Social History     Socioeconomic History    Marital status:      Spouse name: None    Number of children: 3    Years of education: None    Highest education level: None   Social Needs    Financial resource strain: None    Food insecurity - worry: None    Food insecurity - inability: None    Transportation needs - medical: None    Transportation needs - non-medical: None   Occupational History     Employer: [employer]   Tobacco Use    Smoking status: Never Smoker    Smokeless tobacco: Never Used   Substance and Sexual Activity    Alcohol use: No    Drug use: No    Sexual activity: Yes     Partners: Male     Comment:    Other Topics Concern    None   Social History Narrative    . 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  travels  "for work.        Review of Systems   Constitutional: Negative for activity change, fatigue and unexpected weight change.   Respiratory: Negative for chest tightness.    Cardiovascular: Negative for chest pain and palpitations.   Gastrointestinal: Negative for abdominal pain and constipation.   Neurological: Negative for dizziness, light-headedness and headaches.   Psychiatric/Behavioral: Negative for sleep disturbance. The patient is not nervous/anxious.          Objective:     Vitals:    12/31/18 1019   BP: 100/70   BP Location: Right arm   Patient Position: Sitting   BP Method: Medium (Manual)   Pulse: 92   Temp: 98.3 °F (36.8 °C)   TempSrc: Oral   SpO2: 98%   Weight: 53.6 kg (118 lb 2.7 oz)   Height: 5' 6" (1.676 m)          Physical Exam   Constitutional: She is oriented to person, place, and time. She appears well-developed and well-nourished.   HENT:   Head: Normocephalic.   Neck: Normal range of motion. Neck supple.   Cardiovascular: Normal rate and regular rhythm.   Pulmonary/Chest: Effort normal and breath sounds normal.   Abdominal: Soft. Bowel sounds are normal.   Neurological: She is alert and oriented to person, place, and time.   Skin: Skin is warm and dry.   Psychiatric: She has a normal mood and affect.         Assessment:         ICD-10-CM ICD-9-CM   1. Attention deficit disorder (ADD) without hyperactivity F98.8 314.00   2. Anxiety F41.9 300.00   3. Recurrent major depressive disorder, in full remission F33.42 296.36       Plan:       Attention deficit disorder (ADD) without hyperactivity  -     dextroamphetamine-amphetamine (ADDERALL XR) 30 MG 24 hr capsule; Take 1 capsule (30 mg total) by mouth every morning.  Dispense: 30 capsule; Refill: 0  -     dextroamphetamine-amphetamine (ADDERALL XR) 30 MG 24 hr capsule; Take 1 capsule (30 mg total) by mouth every morning.  Dispense: 30 capsule; Refill: 0  -     dextroamphetamine-amphetamine (ADDERALL XR) 30 MG 24 hr capsule; Take 1 capsule (30 mg total) " by mouth every morning.  Dispense: 30 capsule; Refill: 0    Anxiety    Recurrent major depressive disorder, in full remission    -Continue zoloft 50 mg daily for anxiety and depression    Follow-up in about 3 months (around 3/31/2019) for medication management.        Medication List           Accurate as of 12/31/18 10:47 AM. If you have any questions, ask your nurse or doctor.               CHANGE how you take these medications    * dextroamphetamine-amphetamine 30 MG 24 hr capsule  Commonly known as:  ADDERALL XR  Take 1 capsule (30 mg total) by mouth every morning.  What changed:  Another medication with the same name was added. Make sure you understand how and when to take each.  Changed by:  RAKAN Crowell dextroamphetamine-amphetamine 30 MG 24 hr capsule  Commonly known as:  ADDERALL XR  Take 1 capsule (30 mg total) by mouth every morning.  What changed:  You were already taking a medication with the same name, and this prescription was added. Make sure you understand how and when to take each.  Changed by:  RAKAN Crowell dextroamphetamine-amphetamine 30 MG 24 hr capsule  Commonly known as:  ADDERALL XR  Take 1 capsule (30 mg total) by mouth every morning.  Start taking on:  1/29/2019  What changed:  You were already taking a medication with the same name, and this prescription was added. Make sure you understand how and when to take each.  Changed by:  RAKAN Crowell dextroamphetamine-amphetamine 30 MG 24 hr capsule  Commonly known as:  ADDERALL XR  Take 1 capsule (30 mg total) by mouth every morning.  Start taking on:  2/27/2019  What changed:  You were already taking a medication with the same name, and this prescription was added. Make sure you understand how and when to take each.  Changed by:  RAKAN Crowell This list has 4 medication(s) that are the same as other medications prescribed for you. Read the directions carefully, and ask your  doctor or other care provider to review them with you.            CONTINUE taking these medications    ketorolac 10 mg tablet  Commonly known as:  TORADOL  Take 1 tablet (10 mg total) by mouth every 6 (six) hours as needed.     norethindrone 0.35 mg tablet  Commonly known as:  MICRONOR  Take 1 tablet (0.35 mg total) by mouth once daily.     sertraline 50 MG tablet  Commonly known as:  ZOLOFT  Take 1 tablet (50 mg total) by mouth once daily.           Where to Get Your Medications      You can get these medications from any pharmacy    Bring a paper prescription for each of these medications  · dextroamphetamine-amphetamine 30 MG 24 hr capsule  · dextroamphetamine-amphetamine 30 MG 24 hr capsule  · dextroamphetamine-amphetamine 30 MG 24 hr capsule

## 2019-01-14 RX ORDER — ACETAMINOPHEN AND CODEINE PHOSPHATE 120; 12 MG/5ML; MG/5ML
SOLUTION ORAL
Qty: 28 TABLET | Refills: 2 | Status: SHIPPED | OUTPATIENT
Start: 2019-01-14 | End: 2019-02-18 | Stop reason: SDUPTHER

## 2019-01-23 ENCOUNTER — PATIENT MESSAGE (OUTPATIENT)
Dept: FAMILY MEDICINE | Facility: CLINIC | Age: 34
End: 2019-01-23

## 2019-02-04 DIAGNOSIS — F32.A ANXIETY AND DEPRESSION: ICD-10-CM

## 2019-02-04 DIAGNOSIS — F41.9 ANXIETY AND DEPRESSION: ICD-10-CM

## 2019-02-04 RX ORDER — SERTRALINE HYDROCHLORIDE 50 MG/1
TABLET, FILM COATED ORAL
Qty: 30 TABLET | Refills: 2 | Status: SHIPPED | OUTPATIENT
Start: 2019-02-04 | End: 2019-02-18

## 2019-02-18 ENCOUNTER — OFFICE VISIT (OUTPATIENT)
Dept: FAMILY MEDICINE | Facility: CLINIC | Age: 34
End: 2019-02-18
Payer: COMMERCIAL

## 2019-02-18 VITALS
OXYGEN SATURATION: 99 % | DIASTOLIC BLOOD PRESSURE: 62 MMHG | WEIGHT: 116.5 LBS | SYSTOLIC BLOOD PRESSURE: 110 MMHG | BODY MASS INDEX: 18.72 KG/M2 | TEMPERATURE: 98 F | HEART RATE: 80 BPM | HEIGHT: 66 IN

## 2019-02-18 DIAGNOSIS — F33.42 RECURRENT MAJOR DEPRESSIVE DISORDER, IN FULL REMISSION: ICD-10-CM

## 2019-02-18 DIAGNOSIS — F98.8 ATTENTION DEFICIT DISORDER (ADD) WITHOUT HYPERACTIVITY: Primary | ICD-10-CM

## 2019-02-18 DIAGNOSIS — F41.9 ANXIETY: ICD-10-CM

## 2019-02-18 DIAGNOSIS — F90.0 ATTENTION DEFICIT HYPERACTIVITY DISORDER (ADHD), PREDOMINANTLY INATTENTIVE TYPE: ICD-10-CM

## 2019-02-18 PROCEDURE — 99999 PR PBB SHADOW E&M-EST. PATIENT-LVL III: CPT | Mod: PBBFAC,,, | Performed by: FAMILY MEDICINE

## 2019-02-18 PROCEDURE — 99214 PR OFFICE/OUTPT VISIT, EST, LEVL IV, 30-39 MIN: ICD-10-PCS | Mod: S$GLB,,, | Performed by: FAMILY MEDICINE

## 2019-02-18 PROCEDURE — 99214 OFFICE O/P EST MOD 30 MIN: CPT | Mod: S$GLB,,, | Performed by: FAMILY MEDICINE

## 2019-02-18 PROCEDURE — 99999 PR PBB SHADOW E&M-EST. PATIENT-LVL III: ICD-10-PCS | Mod: PBBFAC,,, | Performed by: FAMILY MEDICINE

## 2019-02-18 RX ORDER — DEXTROAMPHETAMINE SACCHARATE, AMPHETAMINE ASPARTATE, DEXTROAMPHETAMINE SULFATE AND AMPHETAMINE SULFATE 2.5; 2.5; 2.5; 2.5 MG/1; MG/1; MG/1; MG/1
10 TABLET ORAL DAILY
Qty: 30 TABLET | Refills: 0 | Status: SHIPPED | OUTPATIENT
Start: 2019-02-18 | End: 2019-05-06 | Stop reason: SDUPTHER

## 2019-02-18 RX ORDER — DEXTROAMPHETAMINE SACCHARATE, AMPHETAMINE ASPARTATE, DEXTROAMPHETAMINE SULFATE AND AMPHETAMINE SULFATE 2.5; 2.5; 2.5; 2.5 MG/1; MG/1; MG/1; MG/1
10 TABLET ORAL DAILY
Qty: 30 TABLET | Refills: 0 | Status: SHIPPED | OUTPATIENT
Start: 2019-02-18 | End: 2019-02-18 | Stop reason: SDUPTHER

## 2019-02-18 RX ORDER — CITALOPRAM 20 MG/1
20 TABLET, FILM COATED ORAL DAILY
Qty: 90 TABLET | Refills: 0 | Status: SHIPPED | OUTPATIENT
Start: 2019-02-18 | End: 2019-05-06 | Stop reason: SDUPTHER

## 2019-02-18 RX ORDER — DEXTROAMPHETAMINE SACCHARATE, AMPHETAMINE ASPARTATE MONOHYDRATE, DEXTROAMPHETAMINE SULFATE AND AMPHETAMINE SULFATE 7.5; 7.5; 7.5; 7.5 MG/1; MG/1; MG/1; MG/1
30 CAPSULE, EXTENDED RELEASE ORAL EVERY MORNING
Qty: 90 CAPSULE | Refills: 0 | Status: SHIPPED | OUTPATIENT
Start: 2019-02-27 | End: 2019-05-06 | Stop reason: SDUPTHER

## 2019-02-18 RX ORDER — DEXTROAMPHETAMINE SACCHARATE, AMPHETAMINE ASPARTATE MONOHYDRATE, DEXTROAMPHETAMINE SULFATE AND AMPHETAMINE SULFATE 7.5; 7.5; 7.5; 7.5 MG/1; MG/1; MG/1; MG/1
30 CAPSULE, EXTENDED RELEASE ORAL EVERY MORNING
Qty: 90 CAPSULE | Refills: 0 | Status: SHIPPED | OUTPATIENT
Start: 2019-02-27 | End: 2019-02-18 | Stop reason: SDUPTHER

## 2019-02-18 NOTE — ASSESSMENT & PLAN NOTE
- increased stressors and felt that Celexa was more effective  - okay to transition to Zoloft 50 mg daily to Celexa 20 mg daily  - monitor closely  - rec individual counseling

## 2019-02-18 NOTE — ASSESSMENT & PLAN NOTE
- continue Adderall XR 30 mg daily   - add 1 month of Adderall 10 mg s/p lunch on working days only  - counseling on appropriate use of medication  -  reviewed

## 2019-04-07 ENCOUNTER — PATIENT MESSAGE (OUTPATIENT)
Dept: FAMILY MEDICINE | Facility: CLINIC | Age: 34
End: 2019-04-07

## 2019-04-07 DIAGNOSIS — Z20.828 EXPOSURE TO THE FLU: Primary | ICD-10-CM

## 2019-04-08 RX ORDER — OSELTAMIVIR PHOSPHATE 75 MG/1
75 CAPSULE ORAL DAILY
Qty: 7 CAPSULE | Refills: 0 | Status: SHIPPED | OUTPATIENT
Start: 2019-04-08 | End: 2019-04-15
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
## 2019-05-06 ENCOUNTER — OFFICE VISIT (OUTPATIENT)
Dept: FAMILY MEDICINE | Facility: CLINIC | Age: 34
End: 2019-05-06
Payer: COMMERCIAL

## 2019-05-06 VITALS
BODY MASS INDEX: 17.17 KG/M2 | HEART RATE: 99 BPM | WEIGHT: 106.81 LBS | SYSTOLIC BLOOD PRESSURE: 104 MMHG | TEMPERATURE: 98 F | DIASTOLIC BLOOD PRESSURE: 76 MMHG | HEIGHT: 66 IN | OXYGEN SATURATION: 99 %

## 2019-05-06 DIAGNOSIS — F90.0 ATTENTION DEFICIT HYPERACTIVITY DISORDER (ADHD), PREDOMINANTLY INATTENTIVE TYPE: Primary | ICD-10-CM

## 2019-05-06 DIAGNOSIS — F41.1 GENERALIZED ANXIETY DISORDER: ICD-10-CM

## 2019-05-06 DIAGNOSIS — F33.42 RECURRENT MAJOR DEPRESSIVE DISORDER, IN FULL REMISSION: ICD-10-CM

## 2019-05-06 PROCEDURE — 99214 PR OFFICE/OUTPT VISIT, EST, LEVL IV, 30-39 MIN: ICD-10-PCS | Mod: S$GLB,,, | Performed by: FAMILY MEDICINE

## 2019-05-06 PROCEDURE — 99214 OFFICE O/P EST MOD 30 MIN: CPT | Mod: S$GLB,,, | Performed by: FAMILY MEDICINE

## 2019-05-06 PROCEDURE — 99999 PR PBB SHADOW E&M-EST. PATIENT-LVL III: CPT | Mod: PBBFAC,,, | Performed by: FAMILY MEDICINE

## 2019-05-06 PROCEDURE — 99999 PR PBB SHADOW E&M-EST. PATIENT-LVL III: ICD-10-PCS | Mod: PBBFAC,,, | Performed by: FAMILY MEDICINE

## 2019-05-06 RX ORDER — DEXTROAMPHETAMINE SACCHARATE, AMPHETAMINE ASPARTATE, DEXTROAMPHETAMINE SULFATE AND AMPHETAMINE SULFATE 2.5; 2.5; 2.5; 2.5 MG/1; MG/1; MG/1; MG/1
10 TABLET ORAL DAILY
Qty: 30 TABLET | Refills: 0 | Status: SHIPPED | OUTPATIENT
Start: 2019-05-06 | End: 2019-12-02 | Stop reason: SDUPTHER

## 2019-05-06 RX ORDER — DEXTROAMPHETAMINE SACCHARATE, AMPHETAMINE ASPARTATE MONOHYDRATE, DEXTROAMPHETAMINE SULFATE AND AMPHETAMINE SULFATE 7.5; 7.5; 7.5; 7.5 MG/1; MG/1; MG/1; MG/1
30 CAPSULE, EXTENDED RELEASE ORAL EVERY MORNING
Qty: 90 CAPSULE | Refills: 0 | Status: SHIPPED | OUTPATIENT
Start: 2019-05-06 | End: 2019-05-06 | Stop reason: SDUPTHER

## 2019-05-06 RX ORDER — CITALOPRAM 20 MG/1
20 TABLET, FILM COATED ORAL DAILY
Qty: 90 TABLET | Refills: 0 | Status: CANCELLED | OUTPATIENT
Start: 2019-05-06 | End: 2020-05-05

## 2019-05-06 RX ORDER — DEXTROAMPHETAMINE SACCHARATE, AMPHETAMINE ASPARTATE MONOHYDRATE, DEXTROAMPHETAMINE SULFATE AND AMPHETAMINE SULFATE 7.5; 7.5; 7.5; 7.5 MG/1; MG/1; MG/1; MG/1
30 CAPSULE, EXTENDED RELEASE ORAL EVERY MORNING
Qty: 90 CAPSULE | Refills: 0 | Status: SHIPPED | OUTPATIENT
Start: 2019-05-06 | End: 2019-12-02 | Stop reason: SDUPTHER

## 2019-05-06 RX ORDER — CITALOPRAM 20 MG/1
40 TABLET, FILM COATED ORAL DAILY
Qty: 180 TABLET | Refills: 0 | Status: SHIPPED | OUTPATIENT
Start: 2019-05-06 | End: 2019-07-30 | Stop reason: SDUPTHER

## 2019-05-06 NOTE — ASSESSMENT & PLAN NOTE
- continue current medications  - pt does lock up her medication so that her  cannot misuse her meds  -  reviewed  - 3 Rx adderall XR 30 mg daily and 1 Rx IR 10 mg daily

## 2019-05-06 NOTE — ASSESSMENT & PLAN NOTE
- increased stressors  - good support  - rec counseling  - increase Celexa to 40 mg daily  - discussed resources if she is feeling unsafe with her

## 2019-06-18 ENCOUNTER — PATIENT MESSAGE (OUTPATIENT)
Dept: FAMILY MEDICINE | Facility: CLINIC | Age: 34
End: 2019-06-18

## 2019-06-21 ENCOUNTER — OFFICE VISIT (OUTPATIENT)
Dept: OBSTETRICS AND GYNECOLOGY | Facility: CLINIC | Age: 34
End: 2019-06-21
Payer: COMMERCIAL

## 2019-06-21 VITALS
BODY MASS INDEX: 17.1 KG/M2 | SYSTOLIC BLOOD PRESSURE: 106 MMHG | WEIGHT: 106.38 LBS | DIASTOLIC BLOOD PRESSURE: 62 MMHG | HEIGHT: 66 IN

## 2019-06-21 DIAGNOSIS — Z01.419 ENCOUNTER FOR WELL WOMAN EXAM WITH ROUTINE GYNECOLOGICAL EXAM: Primary | ICD-10-CM

## 2019-06-21 DIAGNOSIS — Z01.419 WELL WOMAN EXAM WITH ROUTINE GYNECOLOGICAL EXAM: ICD-10-CM

## 2019-06-21 PROCEDURE — 88175 CYTOPATH C/V AUTO FLUID REDO: CPT

## 2019-06-21 PROCEDURE — 99999 PR PBB SHADOW E&M-EST. PATIENT-LVL III: ICD-10-PCS | Mod: PBBFAC,,, | Performed by: OBSTETRICS & GYNECOLOGY

## 2019-06-21 PROCEDURE — 99395 PREV VISIT EST AGE 18-39: CPT | Mod: S$GLB,,, | Performed by: OBSTETRICS & GYNECOLOGY

## 2019-06-21 PROCEDURE — 99395 PR PREVENTIVE VISIT,EST,18-39: ICD-10-PCS | Mod: S$GLB,,, | Performed by: OBSTETRICS & GYNECOLOGY

## 2019-06-21 PROCEDURE — 99999 PR PBB SHADOW E&M-EST. PATIENT-LVL III: CPT | Mod: PBBFAC,,, | Performed by: OBSTETRICS & GYNECOLOGY

## 2019-06-21 RX ORDER — DOXYCYCLINE HYCLATE 20 MG
TABLET ORAL
COMMUNITY
Start: 2019-05-23 | End: 2019-06-27

## 2019-06-21 RX ORDER — TRETINOIN 0.6 MG/G
GEL TOPICAL
COMMUNITY
Start: 2019-05-23 | End: 2022-02-17

## 2019-06-21 NOTE — PROGRESS NOTES
"HPI:   33 y.o.   OB History        4    Para   3    Term   3       0    AB   1    Living   3       SAB   1    TAB   0    Ectopic   0    Multiple   0    Live Births   3              Patient's last menstrual period was 06/10/2019.    Patient is  here for her annual gynecologic exam.  She has no complaints.     ROS:  GENERAL: No fever, chills, fatigability or weight loss.  SKIN: No rashes, itching or changes in color or texture of skin.  HEAD: No headaches or recent head trauma.  EYES: Visual acuity fine. No photophobia, ocular pain or diplopia.  EARS: Denies ear pain, discharge or vertigo.  NOSE: No loss of smell, no epistaxis or postnasal drip.  MOUTH & THROAT: No hoarseness or change in voice. No excessive gum bleeding.  NODES: Denies swollen glands.  CHEST: Denies JONES, cyanosis, wheezing, cough and sputum production.  CARDIOVASCULAR: Denies chest pain, PND, orthopnea or reduced exercise tolerance.  ABDOMEN: Appetite fine. No weight loss. Denies diarrhea, abdominal pain, hematemesis or blood in stool.  URINARY: No flank pain, dysuria or hematuria.  PERIPHERAL VASCULAR: No claudication or cyanosis.  MUSCULOSKELETAL: No joint stiffness or swelling. Denies back pain.  NEUROLOGIC: No history of seizures, paralysis, alteration of gait or coordination.    PE:   /62   Ht 5' 6" (1.676 m)   Wt 48.3 kg (106 lb 6.4 oz)   LMP 06/10/2019   BMI 17.17 kg/m²   APPEARANCE: Well nourished, well developed, in no acute distress.  NECK: Neck symmetric without masses or thyromegaly.  BREASTS: Symmetrical, no skin changes or visible lesions. No palpable masses, nipple discharge or adenopathy bilaterally.  ABDOMEN: Flat. Soft. No tenderness or masses. No hepatosplenomegaly. No hernias. No CVA tenderness.  VULVA: No lesions. Normal female genitalia.  URETHRAL MEATUS: Normal size and location, no lesions, no prolapse.  URETHRA: No masses, tenderness, prolapse or scarring.  VAGINA: Moist and well rugated, no discharge, " no significant cystocele or rectocele.  CERVIX: No lesions and discharge. PAP done.  UTERUS: Normal size, regular shape, mobile, non-tender, bladder base nontender.  ADNEXA: No masses, tenderness or CDS nodularity.  ANUS PERINEUM: Normal.    PROCEDURES:  Pap smear    Assessment:  Normal Gynecologic Exam    Plan:  Mammogram and Colonoscopy if indicated by current recommendations.  Return to clinic in one year or for any problems or complaints.  Reg cycles  Off ocp  Waiting nexplanon  Cramps before napoleon

## 2019-06-27 ENCOUNTER — HOSPITAL ENCOUNTER (OUTPATIENT)
Dept: RADIOLOGY | Facility: HOSPITAL | Age: 34
Discharge: HOME | End: 2019-06-27
Attending: ORTHOPAEDIC SURGERY
Payer: COMMERCIAL

## 2019-06-27 ENCOUNTER — OFFICE VISIT (OUTPATIENT)
Dept: ORTHOPEDICS | Facility: CLINIC | Age: 34
End: 2019-06-27
Payer: COMMERCIAL

## 2019-06-27 ENCOUNTER — TELEPHONE (OUTPATIENT)
Dept: ORTHOPEDICS | Facility: CLINIC | Age: 34
End: 2019-06-27

## 2019-06-27 VITALS — HEIGHT: 66 IN | BODY MASS INDEX: 17.04 KG/M2 | WEIGHT: 106 LBS

## 2019-06-27 DIAGNOSIS — M25.532 LEFT WRIST PAIN: Primary | ICD-10-CM

## 2019-06-27 DIAGNOSIS — M77.8 WRIST TENDONITIS: ICD-10-CM

## 2019-06-27 DIAGNOSIS — M25.532 WRIST PAIN, ACUTE, LEFT: Primary | ICD-10-CM

## 2019-06-27 DIAGNOSIS — M25.532 WRIST PAIN, ACUTE, LEFT: ICD-10-CM

## 2019-06-27 PROCEDURE — 20550 NJX 1 TENDON SHEATH/LIGAMENT: CPT | Mod: LT,S$GLB,, | Performed by: ORTHOPAEDIC SURGERY

## 2019-06-27 PROCEDURE — 99203 PR OFFICE/OUTPT VISIT, NEW, LEVL III, 30-44 MIN: ICD-10-PCS | Mod: 25,S$GLB,, | Performed by: ORTHOPAEDIC SURGERY

## 2019-06-27 PROCEDURE — 20550 PR INJECT TENDON SHEATH/LIGAMENT: ICD-10-PCS | Mod: LT,S$GLB,, | Performed by: ORTHOPAEDIC SURGERY

## 2019-06-27 PROCEDURE — 73100 X-RAY EXAM OF WRIST: CPT | Mod: 26,LT,, | Performed by: RADIOLOGY

## 2019-06-27 PROCEDURE — 99999 PR PBB SHADOW E&M-EST. PATIENT-LVL III: CPT | Mod: PBBFAC,,, | Performed by: ORTHOPAEDIC SURGERY

## 2019-06-27 PROCEDURE — 73100 X-RAY EXAM OF WRIST: CPT | Mod: TC,PN,LT

## 2019-06-27 PROCEDURE — 73100 XR WRIST 2 VIEW LEFT: ICD-10-PCS | Mod: 26,LT,, | Performed by: RADIOLOGY

## 2019-06-27 PROCEDURE — 99203 OFFICE O/P NEW LOW 30 MIN: CPT | Mod: 25,S$GLB,, | Performed by: ORTHOPAEDIC SURGERY

## 2019-06-27 PROCEDURE — 99999 PR PBB SHADOW E&M-EST. PATIENT-LVL III: ICD-10-PCS | Mod: PBBFAC,,, | Performed by: ORTHOPAEDIC SURGERY

## 2019-06-27 RX ORDER — TRIAMCINOLONE ACETONIDE 40 MG/ML
20 INJECTION, SUSPENSION INTRA-ARTICULAR; INTRAMUSCULAR
Status: COMPLETED | OUTPATIENT
Start: 2019-06-27 | End: 2019-06-27

## 2019-06-27 RX ADMIN — TRIAMCINOLONE ACETONIDE 20 MG: 40 INJECTION, SUSPENSION INTRA-ARTICULAR; INTRAMUSCULAR at 11:06

## 2019-06-27 NOTE — PROGRESS NOTES
Subjective:      Patient ID: Kan Traore is a 33 y.o. female.    Chief Complaint: Wrist Pain (left )      HPI  Kan Traore is a  33 y.o. female presenting today for left wrist pain.  There was not a history of trauma.  Onset of symptoms began several months ago after doing a lot of lifting  She localizes the pain to the ulnar side of the left wrist  No trauma  No numbness or tingling reported.      Review of patient's allergies indicates:  No Known Allergies      Current Outpatient Medications   Medication Sig Dispense Refill    citalopram (CELEXA) 20 MG tablet Take 2 tablets (40 mg total) by mouth once daily. 180 tablet 0    dextroamphetamine-amphetamine (ADDERALL XR) 30 MG 24 hr capsule Take 1 capsule (30 mg total) by mouth every morning. 90 capsule 0    dextroamphetamine-amphetamine 10 mg Tab Take 1 tablet (10 mg total) by mouth once daily. After lunch 30 tablet 0    RETIN-A MICRO PUMP 0.06 % GlwP        No current facility-administered medications for this visit.        Past Medical History:   Diagnosis Date    Allergic rhinitis     Anxiety     Asthma     Attention deficit disorder (ADD)     Bilateral nephrolithiasis     Depression     Hydronephrosis     Urinary tract infection     Vaginal infection        Past Surgical History:   Procedure Laterality Date    APPENDECTOMY  11/20/2017    APPENDECTOMY-LAPAROSCOPIC N/A 11/20/2017    [facility] OR    CYSTOSCOPY WITH STENT REMOVAL Right 2/23/2017    [facility]    DILATION AND CURETTAGE OF UTERUS  4/2014    DILATION AND CURETTAGE, UTERUS, USING SUCTION N/A 4/15/2014    [facility] OR    EXTRACTION-STONE-URETEROSCOPY - retrograde pyelogram, laser lithotripsy and possible stent. Right 2/9/2017    [facility]    KIDNEY STONE SURGERY      LITHOTRIPSY  2011    LITHOTRIPSY  02/09/2017     "LITHOTRIPSY-EXTRACORPOREAL SHOCK WAVE Right 8/17/2017    [facility]    LITHOTRIPSY-EXTRACORPOREAL SHOCK WAVE Right 2/23/2017    [facility] at [facility] OR    TONSILLECTOMY         Review of Systems:  ROS    OBJECTIVE:     PHYSICAL EXAM:  Height: 5' 6" (167.6 cm) Weight: 48.1 kg (106 lb)  Vitals:    06/27/19 1105   Weight: 48.1 kg (106 lb)   Height: 5' 6" (1.676 m)   PainSc:   6   PainLoc: Wrist     Well developed, well nourished female in no acute distress  Alert and oriented x 3  HEENT- Normal exam  Lungs- Clear to auscultation  Heart- Regular rate and rhythm  Abdomen- Soft nontender  Extremity exam- examination left wrist there is some tenderness on the ulnar side of the left wrist  Mainly over the ECU tendon and ulnar carpal joint  No tenderness radially  Range of motion wrist fingers full  Mild pain with pronation supination  No instability  Tinel sign negative at the wrist    RADIOGRAPHS:  AP and lateral x-rays of left wrist demonstrate no abnormalities  Comments: I have personally reviewed the imaging and I agree with the above radiologist's report.    ASSESSMENT/PLAN:     IMPRESSION:  Left wrist pain ulnar-sided.    PLAN:  I explained the nature of the problem to the patient. I suspect ECU tendinitis  Recommended injection.  After pause for time-out identified the left wrist injected ECU tendon sheath combination Kenalog 20 mg 0.5 cc xylocaine sterile technique  She tolerated the procedure well without complication  I have also given her a wrist brace for use at work and for lifting  Follow-up 4-6 weeks       - We talked at length about the anatomy and pathophysiology of   Encounter Diagnoses   Name Primary?    Left wrist pain Yes    Wrist tendonitis            Disclaimer: This note has been generated using voice-recognition software. There may be typographical errors that have been missed during proof-reading.  "

## 2019-07-11 ENCOUNTER — TELEPHONE (OUTPATIENT)
Dept: OBSTETRICS AND GYNECOLOGY | Facility: CLINIC | Age: 34
End: 2019-07-11

## 2019-07-11 RX ORDER — NAPROXEN SODIUM 550 MG/1
550 TABLET ORAL
Qty: 20 TABLET | Refills: 0 | Status: CANCELLED | OUTPATIENT
Start: 2019-07-11

## 2019-07-18 ENCOUNTER — OFFICE VISIT (OUTPATIENT)
Dept: OBSTETRICS AND GYNECOLOGY | Facility: CLINIC | Age: 34
End: 2019-07-18
Payer: COMMERCIAL

## 2019-07-18 DIAGNOSIS — Z30.017 NEXPLANON INSERTION: Primary | ICD-10-CM

## 2019-07-18 PROCEDURE — 99499 NO LOS: ICD-10-PCS | Mod: S$GLB,,, | Performed by: OBSTETRICS & GYNECOLOGY

## 2019-07-18 PROCEDURE — 99999 PR PBB SHADOW E&M-EST. PATIENT-LVL I: CPT | Mod: PBBFAC,,, | Performed by: OBSTETRICS & GYNECOLOGY

## 2019-07-18 PROCEDURE — 11981 PR INSERT, DRUG DELIVERY IMPLANT, BIORESORB/BIODEGR/NON-BIODEGR: ICD-10-PCS | Mod: S$GLB,,, | Performed by: OBSTETRICS & GYNECOLOGY

## 2019-07-18 PROCEDURE — 11981 INSERTION DRUG DLVR IMPLANT: CPT | Mod: S$GLB,,, | Performed by: OBSTETRICS & GYNECOLOGY

## 2019-07-18 PROCEDURE — 99999 PR PBB SHADOW E&M-EST. PATIENT-LVL I: ICD-10-PCS | Mod: PBBFAC,,, | Performed by: OBSTETRICS & GYNECOLOGY

## 2019-07-18 PROCEDURE — 99499 UNLISTED E&M SERVICE: CPT | Mod: S$GLB,,, | Performed by: OBSTETRICS & GYNECOLOGY

## 2019-07-18 NOTE — PROGRESS NOTES
34 y.o.   OB History        4    Para   3    Term   3       0    AB   1    Living   3       SAB   1    TAB   0    Ectopic   0    Multiple   0    Live Births   3               Comlaining of:  Pt for nexplanon  Discussed, consents done      ROS:  GENERAL: No fever, chills, fatigability or weight loss.  SKIN: No rashes, itching or changes in color or texture of skin.  HEAD: No headaches or recent head trauma.  EYES: Visual acuity fine. No photophobia, ocular pain or diplopia.  EARS: Denies ear pain, discharge or vertigo.  NOSE: No loss of smell, no epistaxis or postnasal drip.  MOUTH & THROAT: No hoarseness or change in voice. No excessive gum bleeding.  NODES: Denies swollen glands.  CHEST: Denies JONES, cyanosis, wheezing, cough and sputum production.  CARDIOVASCULAR: Denies chest pain, PND, orthopnea or reduced exercise tolerance.  ABDOMEN: Appetite fine. No weight loss. Denies diarrhea, abdominal pain, hematemesis or blood in stool.  URINARY: No flank pain, dysuria or hematuria.  PERIPHERAL VASCULAR: No claudication or cyanosis.  MUSCULOSKELETAL: No joint stiffness or swelling. Denies back pain.  NEUROLOGIC: No history of seizures, paralysis, alteration of gait or coordination      PE: There were no vitals taken for this visit.     nexpl insertion;  L arm inspected  Betadine prep  Local lidocaine  nexpl inserted easily  jairo well  Small bruise  Steri and pressure bandage  Discussed care  Fu prn    A/pln nexpl contraceptive, insertion

## 2019-07-29 ENCOUNTER — OFFICE VISIT (OUTPATIENT)
Dept: ORTHOPEDICS | Facility: CLINIC | Age: 34
End: 2019-07-29
Payer: COMMERCIAL

## 2019-07-29 VITALS — HEIGHT: 66 IN | WEIGHT: 106 LBS | BODY MASS INDEX: 17.04 KG/M2

## 2019-07-29 DIAGNOSIS — M77.8 WRIST TENDONITIS: Primary | ICD-10-CM

## 2019-07-29 PROCEDURE — 99999 PR PBB SHADOW E&M-EST. PATIENT-LVL III: CPT | Mod: PBBFAC,,, | Performed by: ORTHOPAEDIC SURGERY

## 2019-07-29 PROCEDURE — 99999 PR PBB SHADOW E&M-EST. PATIENT-LVL III: ICD-10-PCS | Mod: PBBFAC,,, | Performed by: ORTHOPAEDIC SURGERY

## 2019-07-29 PROCEDURE — 99499 UNLISTED E&M SERVICE: CPT | Mod: S$GLB,,, | Performed by: ORTHOPAEDIC SURGERY

## 2019-07-29 PROCEDURE — 99499 NO LOS: ICD-10-PCS | Mod: S$GLB,,, | Performed by: ORTHOPAEDIC SURGERY

## 2019-07-29 NOTE — PROGRESS NOTES
Subjective:      Patient ID: Kan Traore is a 34 y.o. female.  Chief Complaint: Pain and Follow-up of the Left Wrist      HPI  Kan Traore is a  34 y.o. female presenting today for follow up of left wrist pain related to tendinitis.  She reports that she is much improved after the injection really no further pain reported.    Review of patient's allergies indicates:  No Known Allergies      Current Outpatient Medications   Medication Sig Dispense Refill    citalopram (CELEXA) 20 MG tablet Take 2 tablets (40 mg total) by mouth once daily. 180 tablet 0    dextroamphetamine-amphetamine (ADDERALL XR) 30 MG 24 hr capsule Take 1 capsule (30 mg total) by mouth every morning. 90 capsule 0    dextroamphetamine-amphetamine 10 mg Tab Take 1 tablet (10 mg total) by mouth once daily. After lunch 30 tablet 0    naproxen sodium (ANAPROX) 550 MG tablet Take 1 tablet (550 mg total) by mouth 3 (three) times daily with meals. 20 tablet 0    RETIN-A MICRO PUMP 0.06 % GlwP        No current facility-administered medications for this visit.        Past Medical History:   Diagnosis Date    Allergic rhinitis     Anxiety     Asthma     Attention deficit disorder (ADD)     Bilateral nephrolithiasis     Depression     Hydronephrosis     Urinary tract infection     Vaginal infection        Past Surgical History:   Procedure Laterality Date    APPENDECTOMY  11/20/2017    APPENDECTOMY-LAPAROSCOPIC N/A 11/20/2017    [facility]    CYSTOSCOPY WITH STENT REMOVAL Right 2/23/2017    [facility]    DILATION AND CURETTAGE OF UTERUS  4/2014    DILATION AND CURETTAGE, UTERUS, USING SUCTION N/A 4/15/2014    [facility]iedemann[facility] at [facility] OR    EXTRACTION-STONE-URETEROSCOPY - retrograde pyelogram, laser lithotripsy and possible stent. Right 2/9/2017    [facility] OR    KIDNEY STONE SURGERY       "LITHOTRIPSY  2011    LITHOTRIPSY  02/09/2017    LITHOTRIPSY-EXTRACORPOREAL SHOCK WAVE Right 8/17/2017    [facility]    LITHOTRIPSY-EXTRACORPOREAL SHOCK WAVE Right 2/23/2017    [facility]    TONSILLECTOMY         OBJECTIVE:   PHYSICAL EXAM:  Height: 5' 6" (167.6 cm) Weight: 48.1 kg (106 lb)  Vitals:    07/29/19 0814   Weight: 48.1 kg (106 lb)   Height: 5' 6" (1.676 m)   PainSc: 0-No pain     Ortho/SPM Exam  Examination left hand and wrist no tenderness no swelling full range of motion without pain    RADIOGRAPHS:  None  Comments: I have personally reviewed the imaging and I agree with the above radiologist's report.    ASSESSMENT/PLAN:     IMPRESSION:  Left wrist tendonitis improved    PLAN:  Keep an eye on symptoms wrist brace for lifting activities    FOLLOW UP:  As needed    Disclaimer: This note has been generated using voice-recognition software. There may be typographical errors that have been missed during proof-reading.  "

## 2019-07-30 DIAGNOSIS — F41.1 GENERALIZED ANXIETY DISORDER: ICD-10-CM

## 2019-07-30 DIAGNOSIS — F33.42 RECURRENT MAJOR DEPRESSIVE DISORDER, IN FULL REMISSION: ICD-10-CM

## 2019-07-30 RX ORDER — CITALOPRAM 20 MG/1
40 TABLET, FILM COATED ORAL DAILY
Qty: 180 TABLET | Refills: 0 | Status: SHIPPED | OUTPATIENT
Start: 2019-07-30 | End: 2019-12-03 | Stop reason: SDUPTHER

## 2019-09-23 ENCOUNTER — PATIENT MESSAGE (OUTPATIENT)
Dept: UROLOGY | Facility: CLINIC | Age: 34
End: 2019-09-23

## 2019-09-23 ENCOUNTER — OFFICE VISIT (OUTPATIENT)
Dept: OBSTETRICS AND GYNECOLOGY | Facility: CLINIC | Age: 34
End: 2019-09-23
Payer: COMMERCIAL

## 2019-09-23 DIAGNOSIS — Z30.46 NEXPLANON REMOVAL: Primary | ICD-10-CM

## 2019-09-23 PROCEDURE — 99999 PR PBB SHADOW E&M-EST. PATIENT-LVL I: CPT | Mod: PBBFAC,,, | Performed by: OBSTETRICS & GYNECOLOGY

## 2019-09-23 PROCEDURE — 99499 NO LOS: ICD-10-PCS | Mod: S$GLB,,, | Performed by: OBSTETRICS & GYNECOLOGY

## 2019-09-23 PROCEDURE — 11982 REMOVE DRUG IMPLANT DEVICE: CPT | Mod: S$GLB,,, | Performed by: OBSTETRICS & GYNECOLOGY

## 2019-09-23 PROCEDURE — 11982 PR REMOVAL DRUG IMPLANT DEVICE: ICD-10-PCS | Mod: S$GLB,,, | Performed by: OBSTETRICS & GYNECOLOGY

## 2019-09-23 PROCEDURE — 99999 PR PBB SHADOW E&M-EST. PATIENT-LVL I: ICD-10-PCS | Mod: PBBFAC,,, | Performed by: OBSTETRICS & GYNECOLOGY

## 2019-09-23 PROCEDURE — 99499 UNLISTED E&M SERVICE: CPT | Mod: S$GLB,,, | Performed by: OBSTETRICS & GYNECOLOGY

## 2019-09-23 RX ORDER — NORETHINDRONE ACETATE AND ETHINYL ESTRADIOL .02; 1 MG/1; MG/1
1 TABLET ORAL DAILY
Qty: 30 TABLET | Refills: 11 | Status: SHIPPED | OUTPATIENT
Start: 2019-09-23 | End: 2019-12-20 | Stop reason: SDUPTHER

## 2019-09-23 NOTE — PROGRESS NOTES
34 y.o.   OB History        4    Para   3    Term   3       0    AB   1    Living   3       SAB   1    TAB   0    Ectopic   0    Multiple   0    Live Births   3               Comlaining of:  Pt watns nexl removed  irreg bleeding  Discussed/consents    ROS:  GENERAL: No fever, chills, fatigability or weight loss.  SKIN: No rashes, itching or changes in color or texture of skin.  HEAD: No headaches or recent head trauma.  EYES: Visual acuity fine. No photophobia, ocular pain or diplopia.  EARS: Denies ear pain, discharge or vertigo.  NOSE: No loss of smell, no epistaxis or postnasal drip.  MOUTH & THROAT: No hoarseness or change in voice. No excessive gum bleeding.  NODES: Denies swollen glands.  CHEST: Denies JONES, cyanosis, wheezing, cough and sputum production.  CARDIOVASCULAR: Denies chest pain, PND, orthopnea or reduced exercise tolerance.  ABDOMEN: Appetite fine. No weight loss. Denies diarrhea, abdominal pain, hematemesis or blood in stool.  URINARY: No flank pain, dysuria or hematuria.  PERIPHERAL VASCULAR: No claudication or cyanosis.  MUSCULOSKELETAL: No joint stiffness or swelling. Denies back pain.  NEUROLOGIC: No history of seizures, paralysis, alteration of gait or coordination      PE: There were no vitals taken for this visit.     nexpl removal  l arm preped  Local lidocaine  2mm inc over tip  Easy dissection  nexpl removed intact  jairo well  Steri and pressure bandage    Wants ocp toreg cycles    A nexpl removal  Plan, ocp

## 2019-09-26 ENCOUNTER — PATIENT OUTREACH (OUTPATIENT)
Dept: ADMINISTRATIVE | Facility: OTHER | Age: 34
End: 2019-09-26

## 2019-10-15 RX ORDER — ONDANSETRON 4 MG/1
4 TABLET, ORALLY DISINTEGRATING ORAL EVERY 8 HOURS PRN
Qty: 20 TABLET | Refills: 1 | Status: SHIPPED | OUTPATIENT
Start: 2019-10-15 | End: 2019-12-20 | Stop reason: SDUPTHER

## 2019-12-02 PROBLEM — M77.8 WRIST TENDONITIS: Status: RESOLVED | Noted: 2019-06-27 | Resolved: 2019-12-02
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
## 2019-12-03 ENCOUNTER — OFFICE VISIT (OUTPATIENT)
Dept: FAMILY MEDICINE | Facility: CLINIC | Age: 34
End: 2019-12-03
Payer: COMMERCIAL

## 2019-12-03 VITALS
TEMPERATURE: 98 F | DIASTOLIC BLOOD PRESSURE: 82 MMHG | HEIGHT: 66 IN | SYSTOLIC BLOOD PRESSURE: 112 MMHG | BODY MASS INDEX: 19.35 KG/M2 | WEIGHT: 120.38 LBS | HEART RATE: 90 BPM | OXYGEN SATURATION: 99 %

## 2019-12-03 DIAGNOSIS — F33.42 RECURRENT MAJOR DEPRESSIVE DISORDER, IN FULL REMISSION: ICD-10-CM

## 2019-12-03 DIAGNOSIS — F41.1 GENERALIZED ANXIETY DISORDER: Primary | ICD-10-CM

## 2019-12-03 DIAGNOSIS — F90.0 ATTENTION DEFICIT HYPERACTIVITY DISORDER (ADHD), PREDOMINANTLY INATTENTIVE TYPE: ICD-10-CM

## 2019-12-03 PROCEDURE — 99999 PR PBB SHADOW E&M-EST. PATIENT-LVL IV: ICD-10-PCS | Mod: PBBFAC,,, | Performed by: FAMILY MEDICINE

## 2019-12-03 PROCEDURE — 99999 PR PBB SHADOW E&M-EST. PATIENT-LVL IV: CPT | Mod: PBBFAC,,, | Performed by: FAMILY MEDICINE

## 2019-12-03 PROCEDURE — 99214 PR OFFICE/OUTPT VISIT, EST, LEVL IV, 30-39 MIN: ICD-10-PCS | Mod: S$GLB,,, | Performed by: FAMILY MEDICINE

## 2019-12-03 PROCEDURE — 99214 OFFICE O/P EST MOD 30 MIN: CPT | Mod: S$GLB,,, | Performed by: FAMILY MEDICINE

## 2019-12-03 RX ORDER — DEXTROAMPHETAMINE SACCHARATE, AMPHETAMINE ASPARTATE, DEXTROAMPHETAMINE SULFATE AND AMPHETAMINE SULFATE 2.5; 2.5; 2.5; 2.5 MG/1; MG/1; MG/1; MG/1
10 TABLET ORAL DAILY
Qty: 30 TABLET | Refills: 0 | Status: SHIPPED | OUTPATIENT
Start: 2019-12-03 | End: 2020-04-23 | Stop reason: SDUPTHER

## 2019-12-03 RX ORDER — DEXTROAMPHETAMINE SACCHARATE, AMPHETAMINE ASPARTATE MONOHYDRATE, DEXTROAMPHETAMINE SULFATE AND AMPHETAMINE SULFATE 7.5; 7.5; 7.5; 7.5 MG/1; MG/1; MG/1; MG/1
30 CAPSULE, EXTENDED RELEASE ORAL EVERY MORNING
Qty: 90 CAPSULE | Refills: 0 | Status: SHIPPED | OUTPATIENT
Start: 2019-12-03 | End: 2020-03-17 | Stop reason: SDUPTHER

## 2019-12-03 RX ORDER — CITALOPRAM 20 MG/1
40 TABLET, FILM COATED ORAL DAILY
Qty: 180 TABLET | Refills: 1 | Status: SHIPPED | OUTPATIENT
Start: 2019-12-03 | End: 2020-03-17 | Stop reason: SDUPTHER

## 2019-12-20 RX ORDER — ONDANSETRON 4 MG/1
4 TABLET, ORALLY DISINTEGRATING ORAL EVERY 8 HOURS PRN
Qty: 20 TABLET | Refills: 1 | Status: SHIPPED | OUTPATIENT
Start: 2019-12-20 | End: 2020-03-13 | Stop reason: SDUPTHER

## 2019-12-20 RX ORDER — NORETHINDRONE ACETATE AND ETHINYL ESTRADIOL .02; 1 MG/1; MG/1
1 TABLET ORAL DAILY
Qty: 30 TABLET | Refills: 6 | Status: SHIPPED | OUTPATIENT
Start: 2019-12-20 | End: 2021-03-08

## 2020-01-09 ENCOUNTER — TELEPHONE (OUTPATIENT)
Dept: OBSTETRICS AND GYNECOLOGY | Facility: CLINIC | Age: 35
End: 2020-01-09

## 2020-01-09 RX ORDER — AZITHROMYCIN 250 MG/1
TABLET, FILM COATED ORAL
Qty: 6 TABLET | Refills: 0 | Status: SHIPPED | OUTPATIENT
Start: 2020-01-09 | End: 2020-01-14

## 2020-01-09 NOTE — TELEPHONE ENCOUNTER
Patient informed that Dr. De La Paz will listen to her lungs before the end of shift. Patient verbalized understanding.

## 2020-01-13 RX ORDER — AMOXICILLIN AND CLAVULANATE POTASSIUM 875; 125 MG/1; MG/1
1 TABLET, FILM COATED ORAL 2 TIMES DAILY
Qty: 10 TABLET | Refills: 0 | Status: SHIPPED | OUTPATIENT
Start: 2020-01-13 | End: 2020-01-18

## 2020-02-17 ENCOUNTER — OFFICE VISIT (OUTPATIENT)
Dept: ORTHOPEDICS | Facility: CLINIC | Age: 35
End: 2020-02-17
Payer: COMMERCIAL

## 2020-02-17 VITALS
BODY MASS INDEX: 19.29 KG/M2 | WEIGHT: 120 LBS | SYSTOLIC BLOOD PRESSURE: 107 MMHG | HEART RATE: 101 BPM | HEIGHT: 66 IN | DIASTOLIC BLOOD PRESSURE: 74 MMHG

## 2020-02-17 DIAGNOSIS — M25.532 LEFT WRIST PAIN: ICD-10-CM

## 2020-02-17 PROCEDURE — 20550 PR INJECT TENDON SHEATH/LIGAMENT: ICD-10-PCS | Mod: LT,S$GLB,, | Performed by: ORTHOPAEDIC SURGERY

## 2020-02-17 PROCEDURE — 99213 PR OFFICE/OUTPT VISIT, EST, LEVL III, 20-29 MIN: ICD-10-PCS | Mod: 25,S$GLB,, | Performed by: ORTHOPAEDIC SURGERY

## 2020-02-17 PROCEDURE — 99999 PR PBB SHADOW E&M-EST. PATIENT-LVL III: ICD-10-PCS | Mod: PBBFAC,,, | Performed by: ORTHOPAEDIC SURGERY

## 2020-02-17 PROCEDURE — 99999 PR PBB SHADOW E&M-EST. PATIENT-LVL III: CPT | Mod: PBBFAC,,, | Performed by: ORTHOPAEDIC SURGERY

## 2020-02-17 PROCEDURE — 20550 NJX 1 TENDON SHEATH/LIGAMENT: CPT | Mod: LT,S$GLB,, | Performed by: ORTHOPAEDIC SURGERY

## 2020-02-17 PROCEDURE — 99213 OFFICE O/P EST LOW 20 MIN: CPT | Mod: 25,S$GLB,, | Performed by: ORTHOPAEDIC SURGERY

## 2020-02-17 RX ORDER — TRIAMCINOLONE ACETONIDE 40 MG/ML
20 INJECTION, SUSPENSION INTRA-ARTICULAR; INTRAMUSCULAR
Status: COMPLETED | OUTPATIENT
Start: 2020-02-17 | End: 2020-02-17

## 2020-02-17 RX ADMIN — TRIAMCINOLONE ACETONIDE 20 MG: 40 INJECTION, SUSPENSION INTRA-ARTICULAR; INTRAMUSCULAR at 09:02

## 2020-02-17 NOTE — PROGRESS NOTES
"Subjective:      Patient ID: Kan Traore is a 34 y.o. female.  Chief Complaint: Pain of the Left Wrist      HPI  Kan Traore is a  34 y.o. female presenting today for follow up of left wrist pain and ECU tendinitis.  She reports that she is having a flare-up again  She had good results left year after an injection  She does have a wrist brace  Symptoms are worse with lifting and .    Review of patient's allergies indicates:  No Known Allergies      Current Outpatient Medications   Medication Sig Dispense Refill    citalopram (CELEXA) 20 MG tablet Take 2 tablets (40 mg total) by mouth once daily. 180 tablet 1    dextroamphetamine-amphetamine (ADDERALL XR) 30 MG 24 hr capsule Take 1 capsule (30 mg total) by mouth every morning. 90 capsule 0    dextroamphetamine-amphetamine 10 mg Tab Take 1 tablet (10 mg total) by mouth once daily. After lunch 30 tablet 0    naproxen sodium (ANAPROX) 550 MG tablet Take 1 tablet (550 mg total) by mouth 3 (three) times daily. 30 tablet 2    norethindrone-ethinyl estradiol (MICROGESTIN 1/20) 1-20 mg-mcg per tablet Take 1 tablet by mouth once daily. 30 tablet 6    ondansetron (ZOFRAN-ODT) 4 MG TbDL dissolve 1 tablet (4 mg total) by mouth every 8 (eight) hours as needed. 20 tablet 1    RETIN-A MICRO PUMP 0.06 % GlwP        No current facility-administered medications for this visit.        Past Medical History:   Diagnosis Date    Allergic rhinitis     Anxiety     Asthma     Attention deficit disorder (ADD)     Bilateral nephrolithiasis     Depression     Hydronephrosis     Urinary tract infection     Vaginal infection        Past Surgical History:   Procedure Laterality Date    APPENDECTOMY  11/20/2017    DILATION AND CURETTAGE OF UTERUS  4/2014    KIDNEY STONE SURGERY      LITHOTRIPSY  2011    LITHOTRIPSY  02/09/2017    TONSILLECTOMY         OBJECTIVE:   PHYSICAL EXAM:  Height: 5' 6" (167.6 cm) Weight: 54.4 kg (120 lb)  Vitals:    02/17/20 " "0903   BP: 107/74   Pulse: 101   Weight: 54.4 kg (120 lb)   Height: 5' 6" (1.676 m)   PainSc:   5     Ortho/SPM Exam  Examination left hand and wrist there is tenderness over the ECU tendon  Slight swelling  Some pain with ulnar and radial deviation  No instability wrist    RADIOGRAPHS:  None  Comments: I have personally reviewed the imaging and I agree with the above radiologist's report.    ASSESSMENT/PLAN:     IMPRESSION:  ECU tendonitis left wrist    PLAN:  I recommended another injection  After pause for time-out identified the left wrist injected ECU tendon sheath combination Kenalog 20 mg 0.5 cc xylocaine sterile technique  Tolerated the procedure well without complication  Continue wrist splint Advil or Motrin by mouth  Follow-up 4-6 weeks    FOLLOW UP:  4-6 weeks    Disclaimer: This note has been generated using voice-recognition software. There may be typographical errors that have been missed during proof-reading.  "

## 2020-02-19 ENCOUNTER — OFFICE VISIT (OUTPATIENT)
Dept: OBSTETRICS AND GYNECOLOGY | Facility: CLINIC | Age: 35
End: 2020-02-19
Payer: COMMERCIAL

## 2020-02-19 VITALS
DIASTOLIC BLOOD PRESSURE: 72 MMHG | WEIGHT: 119.25 LBS | BODY MASS INDEX: 19.16 KG/M2 | SYSTOLIC BLOOD PRESSURE: 108 MMHG | HEIGHT: 66 IN

## 2020-02-19 DIAGNOSIS — Z11.3 SCREENING EXAMINATION FOR STD (SEXUALLY TRANSMITTED DISEASE): Primary | ICD-10-CM

## 2020-02-19 PROCEDURE — 99999 PR PBB SHADOW E&M-EST. PATIENT-LVL III: CPT | Mod: PBBFAC,,, | Performed by: OBSTETRICS & GYNECOLOGY

## 2020-02-19 PROCEDURE — 99212 PR OFFICE/OUTPT VISIT, EST, LEVL II, 10-19 MIN: ICD-10-PCS | Mod: S$GLB,,, | Performed by: OBSTETRICS & GYNECOLOGY

## 2020-02-19 PROCEDURE — 87491 CHLMYD TRACH DNA AMP PROBE: CPT | Mod: 59

## 2020-02-19 PROCEDURE — 99212 OFFICE O/P EST SF 10 MIN: CPT | Mod: S$GLB,,, | Performed by: OBSTETRICS & GYNECOLOGY

## 2020-02-19 PROCEDURE — 99999 PR PBB SHADOW E&M-EST. PATIENT-LVL III: ICD-10-PCS | Mod: PBBFAC,,, | Performed by: OBSTETRICS & GYNECOLOGY

## 2020-02-19 PROCEDURE — 87481 CANDIDA DNA AMP PROBE: CPT | Mod: 59

## 2020-02-19 PROCEDURE — 87661 TRICHOMONAS VAGINALIS AMPLIF: CPT

## 2020-02-19 NOTE — PROGRESS NOTES
Chief Complaint   Patient presents with    Well Woman       HPI:   Kan Traore 34 y.o.  is here for STD check. No complaints but doesn't think she has ever been checked before.       Patient's last menstrual period was 01/15/2020.     Past Medical History:   Diagnosis Date    Allergic rhinitis     Anxiety     Asthma     Attention deficit disorder (ADD)     Bilateral nephrolithiasis     Depression     Hydronephrosis     Urinary tract infection     Vaginal infection        Past Surgical History:   Procedure Laterality Date    APPENDECTOMY  2017    DILATION AND CURETTAGE OF UTERUS  2014    KIDNEY STONE SURGERY      LITHOTRIPSY  2011    LITHOTRIPSY  2017    TONSILLECTOMY         Family History   Problem Relation Age of Onset    Hypertension Mother 50    Hypothyroidism Father 52    Stroke Maternal Grandfather     Hypertension Maternal Grandfather     Heart disease Maternal Grandfather 56         of AMI    Heart failure Paternal Grandmother 81    No Known Problems Son     No Known Problems Son     No Known Problems Son     Breast cancer Neg Hx     Colon cancer Neg Hx     Ovarian cancer Neg Hx     Kidney disease Neg Hx        Social History     Socioeconomic History    Marital status:      Spouse name: Not on file    Number of children: 3    Years of education: Not on file    Highest education level: Not on file   Occupational History     Employer: [employer]   Social Needs    Financial resource strain: Not on file    Food insecurity:     Worry: Not on file     Inability: Not on file    Transportation needs:     Medical: Not on file     Non-medical: Not on file   Tobacco Use    Smoking status: Never Smoker    Smokeless tobacco: Never Used   Substance and Sexual Activity    Alcohol use: No    Drug use: No    Sexual activity: Yes     Partners: Male     Comment:    Lifestyle    Physical activity:     Days per week: Not on  "file     Minutes per session: Not on file    Stress: Not on file   Relationships    Social connections:     Talks on phone: Not on file     Gets together: Not on file     Attends [employer] service: Not on file     Active member of club or organization: Not on file     Attends meetings of clubs or organizations: Not on file     Relationship status: Not on file   Other Topics Concern    Not on file   Social History Narrative    . 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  travels for work. 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 Feeling well overall.   SKIN: Denies rash or lesions.   HEAD: Denies headache.   CHEST: Denies chest pain   RESPIRATORY: Denies shortness of breath  ABDOMEN: No abdominal pain, constipation, diarrhea, nausea, vomiting or rectal bleeding.   URINARY: No frequency, dysuria, hematuria, or burning on urination.  REPRODUCTIVE: See HPI.   All other ROS negative     PE:   /72   Ht 5' 6" (1.676 m)   Wt 54.1 kg (119 lb 4.3 oz)   LMP 01/15/2020   BMI 19.25 kg/m²     APPEARANCE: Well nourished, well developed, in no acute distress.  NEUROLOGIC: orientated to person, place and time, normal mood and affect     PELVIC:   EXTERNAL GENITALIA/VULVA: No lesions. Normal female genitalia.  URETHRAL MEATUS: Normal size and location, no lesions, no prolapse.  URETHRA: No masses, tenderness, prolapse or scarring.  BLADDER: non-tender, no masses  VAGINA: Moist and well rugated, no discharge, no significant cystocele or rectocele.  CERVIX: No lesions and discharge.  UTERUS: normal size, regular shape, mobile, non-tender, bladder base nontender.  ADNEXA: No masses or " tenderness.  PERINEUM: normal in appearance, no external hemorrhoids         1. Screening examination for STD (sexually transmitted disease)        Plan:  1. STD screening done.

## 2020-02-20 LAB
BACTERIAL VAGINOSIS DNA: NEGATIVE
CANDIDA GLABRATA DNA: NEGATIVE
CANDIDA KRUSEI DNA: NEGATIVE
CANDIDA RRNA VAG QL PROBE: NEGATIVE
T VAGINALIS RRNA GENITAL QL PROBE: NEGATIVE

## 2020-02-21 LAB
C TRACH DNA SPEC QL NAA+PROBE: NOT DETECTED
N GONORRHOEA DNA SPEC QL NAA+PROBE: NOT DETECTED

## 2020-02-26 ENCOUNTER — PATIENT MESSAGE (OUTPATIENT)
Dept: OBSTETRICS AND GYNECOLOGY | Facility: CLINIC | Age: 35
End: 2020-02-26

## 2020-03-13 ENCOUNTER — PATIENT MESSAGE (OUTPATIENT)
Dept: OBSTETRICS AND GYNECOLOGY | Facility: CLINIC | Age: 35
End: 2020-03-13

## 2020-03-13 RX ORDER — NORETHINDRONE ACETATE AND ETHINYL ESTRADIOL .02; 1 MG/1; MG/1
1 TABLET ORAL DAILY
Qty: 30 TABLET | Refills: 6 | Status: CANCELLED | OUTPATIENT
Start: 2020-03-13 | End: 2021-03-13

## 2020-03-13 RX ORDER — ALPRAZOLAM 0.5 MG/1
0.5 TABLET ORAL 3 TIMES DAILY PRN
Qty: 30 TABLET | Refills: 0 | Status: SHIPPED | OUTPATIENT
Start: 2020-03-13 | End: 2021-10-26 | Stop reason: SDUPTHER

## 2020-03-13 RX ORDER — ONDANSETRON 4 MG/1
4 TABLET, ORALLY DISINTEGRATING ORAL EVERY 8 HOURS PRN
Qty: 20 TABLET | Refills: 1 | Status: SHIPPED | OUTPATIENT
Start: 2020-03-13 | End: 2020-08-10

## 2020-03-17 ENCOUNTER — PATIENT MESSAGE (OUTPATIENT)
Dept: FAMILY MEDICINE | Facility: CLINIC | Age: 35
End: 2020-03-17

## 2020-03-17 DIAGNOSIS — F33.42 RECURRENT MAJOR DEPRESSIVE DISORDER, IN FULL REMISSION: ICD-10-CM

## 2020-03-17 DIAGNOSIS — F90.0 ATTENTION DEFICIT HYPERACTIVITY DISORDER (ADHD), PREDOMINANTLY INATTENTIVE TYPE: ICD-10-CM

## 2020-03-17 DIAGNOSIS — F41.1 GENERALIZED ANXIETY DISORDER: ICD-10-CM

## 2020-03-17 RX ORDER — DEXTROAMPHETAMINE SACCHARATE, AMPHETAMINE ASPARTATE MONOHYDRATE, DEXTROAMPHETAMINE SULFATE AND AMPHETAMINE SULFATE 7.5; 7.5; 7.5; 7.5 MG/1; MG/1; MG/1; MG/1
30 CAPSULE, EXTENDED RELEASE ORAL EVERY MORNING
Qty: 30 CAPSULE | Refills: 0 | Status: SHIPPED | OUTPATIENT
Start: 2020-03-17 | End: 2020-04-23 | Stop reason: SDUPTHER

## 2020-03-17 RX ORDER — CITALOPRAM 20 MG/1
40 TABLET, FILM COATED ORAL DAILY
Qty: 180 TABLET | Refills: 1 | Status: SHIPPED | OUTPATIENT
Start: 2020-03-17 | End: 2020-04-23

## 2020-04-21 DIAGNOSIS — Z01.84 ANTIBODY RESPONSE EXAMINATION: ICD-10-CM

## 2020-04-23 ENCOUNTER — OFFICE VISIT (OUTPATIENT)
Dept: FAMILY MEDICINE | Facility: CLINIC | Age: 35
End: 2020-04-23
Payer: COMMERCIAL

## 2020-04-23 ENCOUNTER — PATIENT MESSAGE (OUTPATIENT)
Dept: FAMILY MEDICINE | Facility: CLINIC | Age: 35
End: 2020-04-23

## 2020-04-23 ENCOUNTER — LAB VISIT (OUTPATIENT)
Dept: LAB | Facility: HOSPITAL | Age: 35
End: 2020-04-23
Attending: INTERNAL MEDICINE
Payer: COMMERCIAL

## 2020-04-23 DIAGNOSIS — F41.1 GENERALIZED ANXIETY DISORDER: ICD-10-CM

## 2020-04-23 DIAGNOSIS — F33.42 RECURRENT MAJOR DEPRESSIVE DISORDER, IN FULL REMISSION: ICD-10-CM

## 2020-04-23 DIAGNOSIS — J45.20 MILD INTERMITTENT ASTHMA WITHOUT COMPLICATION: ICD-10-CM

## 2020-04-23 DIAGNOSIS — Z01.84 ANTIBODY RESPONSE EXAMINATION: ICD-10-CM

## 2020-04-23 DIAGNOSIS — F90.0 ATTENTION DEFICIT HYPERACTIVITY DISORDER (ADHD), PREDOMINANTLY INATTENTIVE TYPE: Primary | ICD-10-CM

## 2020-04-23 PROBLEM — M25.532 LEFT WRIST PAIN: Status: RESOLVED | Noted: 2020-02-17 | Resolved: 2020-04-23

## 2020-04-23 LAB — SARS-COV-2 IGG SERPL QL IA: POSITIVE

## 2020-04-23 PROCEDURE — 36415 COLL VENOUS BLD VENIPUNCTURE: CPT

## 2020-04-23 PROCEDURE — 86769 SARS-COV-2 COVID-19 ANTIBODY: CPT

## 2020-04-23 PROCEDURE — 99214 OFFICE O/P EST MOD 30 MIN: CPT | Mod: 95,,, | Performed by: FAMILY MEDICINE

## 2020-04-23 PROCEDURE — 99214 PR OFFICE/OUTPT VISIT, EST, LEVL IV, 30-39 MIN: ICD-10-PCS | Mod: 95,,, | Performed by: FAMILY MEDICINE

## 2020-04-23 RX ORDER — DEXTROAMPHETAMINE SACCHARATE, AMPHETAMINE ASPARTATE, DEXTROAMPHETAMINE SULFATE AND AMPHETAMINE SULFATE 2.5; 2.5; 2.5; 2.5 MG/1; MG/1; MG/1; MG/1
10 TABLET ORAL DAILY
Qty: 30 TABLET | Refills: 0 | Status: SHIPPED | OUTPATIENT
Start: 2020-04-23 | End: 2020-05-27 | Stop reason: SDUPTHER

## 2020-04-23 RX ORDER — ALBUTEROL SULFATE 90 UG/1
2 AEROSOL, METERED RESPIRATORY (INHALATION) EVERY 6 HOURS PRN
Qty: 8.5 G | Refills: 0 | Status: SHIPPED | OUTPATIENT
Start: 2020-04-23 | End: 2021-10-26 | Stop reason: SDUPTHER

## 2020-04-23 RX ORDER — CITALOPRAM 40 MG/1
40 TABLET, FILM COATED ORAL DAILY
Qty: 90 TABLET | Refills: 1 | Status: SHIPPED | OUTPATIENT
Start: 2020-04-23 | End: 2020-10-19 | Stop reason: SDUPTHER

## 2020-04-23 RX ORDER — DEXTROAMPHETAMINE SACCHARATE, AMPHETAMINE ASPARTATE MONOHYDRATE, DEXTROAMPHETAMINE SULFATE AND AMPHETAMINE SULFATE 7.5; 7.5; 7.5; 7.5 MG/1; MG/1; MG/1; MG/1
30 CAPSULE, EXTENDED RELEASE ORAL EVERY MORNING
Qty: 30 CAPSULE | Refills: 0 | Status: SHIPPED | OUTPATIENT
Start: 2020-04-23 | End: 2020-05-27 | Stop reason: SDUPTHER
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
## 2020-05-27 DIAGNOSIS — F90.0 ATTENTION DEFICIT HYPERACTIVITY DISORDER (ADHD), PREDOMINANTLY INATTENTIVE TYPE: ICD-10-CM

## 2020-05-27 RX ORDER — DEXTROAMPHETAMINE SACCHARATE, AMPHETAMINE ASPARTATE, DEXTROAMPHETAMINE SULFATE AND AMPHETAMINE SULFATE 2.5; 2.5; 2.5; 2.5 MG/1; MG/1; MG/1; MG/1
10 TABLET ORAL DAILY
Qty: 30 TABLET | Refills: 0 | Status: SHIPPED | OUTPATIENT
Start: 2020-05-27 | End: 2020-08-10 | Stop reason: SDUPTHER

## 2020-05-27 RX ORDER — DEXTROAMPHETAMINE SACCHARATE, AMPHETAMINE ASPARTATE MONOHYDRATE, DEXTROAMPHETAMINE SULFATE AND AMPHETAMINE SULFATE 7.5; 7.5; 7.5; 7.5 MG/1; MG/1; MG/1; MG/1
30 CAPSULE, EXTENDED RELEASE ORAL EVERY MORNING
Qty: 30 CAPSULE | Refills: 0 | Status: CANCELLED | OUTPATIENT
Start: 2020-05-27

## 2020-05-27 RX ORDER — DEXTROAMPHETAMINE SACCHARATE, AMPHETAMINE ASPARTATE, DEXTROAMPHETAMINE SULFATE AND AMPHETAMINE SULFATE 2.5; 2.5; 2.5; 2.5 MG/1; MG/1; MG/1; MG/1
10 TABLET ORAL DAILY
Qty: 30 TABLET | Refills: 0 | Status: CANCELLED | OUTPATIENT
Start: 2020-05-27

## 2020-05-27 RX ORDER — DEXTROAMPHETAMINE SACCHARATE, AMPHETAMINE ASPARTATE MONOHYDRATE, DEXTROAMPHETAMINE SULFATE AND AMPHETAMINE SULFATE 7.5; 7.5; 7.5; 7.5 MG/1; MG/1; MG/1; MG/1
30 CAPSULE, EXTENDED RELEASE ORAL EVERY MORNING
Qty: 30 CAPSULE | Refills: 0 | Status: SHIPPED | OUTPATIENT
Start: 2020-05-27 | End: 2020-07-06 | Stop reason: SDUPTHER

## 2020-05-27 NOTE — TELEPHONE ENCOUNTER
----- Message from Yumiko Campos sent at 5/27/2020  9:13 AM CDT -----  Hey patient would like a refill on her adderal xr 30mg and adderal 10mg sent to [facility] please

## 2020-06-22 ENCOUNTER — PATIENT MESSAGE (OUTPATIENT)
Dept: OBSTETRICS AND GYNECOLOGY | Facility: CLINIC | Age: 35
End: 2020-06-22

## 2020-06-22 RX ORDER — ETONOGESTREL AND ETHINYL ESTRADIOL VAGINAL RING .015; .12 MG/D; MG/D
RING VAGINAL
Qty: 3 EACH | Refills: 3 | Status: SHIPPED | OUTPATIENT
Start: 2020-06-22 | End: 2020-11-27

## 2020-06-30 ENCOUNTER — PATIENT MESSAGE (OUTPATIENT)
Dept: OBSTETRICS AND GYNECOLOGY | Facility: CLINIC | Age: 35
End: 2020-06-30

## 2020-06-30 RX ORDER — ONDANSETRON 4 MG/1
4 TABLET, ORALLY DISINTEGRATING ORAL EVERY 8 HOURS PRN
Qty: 20 TABLET | Refills: 0 | Status: SHIPPED | OUTPATIENT
Start: 2020-06-30 | End: 2021-03-09 | Stop reason: SDUPTHER

## 2020-07-06 ENCOUNTER — PATIENT MESSAGE (OUTPATIENT)
Dept: FAMILY MEDICINE | Facility: CLINIC | Age: 35
End: 2020-07-06

## 2020-07-06 DIAGNOSIS — F90.0 ATTENTION DEFICIT HYPERACTIVITY DISORDER (ADHD), PREDOMINANTLY INATTENTIVE TYPE: ICD-10-CM

## 2020-07-06 RX ORDER — DEXTROAMPHETAMINE SACCHARATE, AMPHETAMINE ASPARTATE MONOHYDRATE, DEXTROAMPHETAMINE SULFATE AND AMPHETAMINE SULFATE 7.5; 7.5; 7.5; 7.5 MG/1; MG/1; MG/1; MG/1
30 CAPSULE, EXTENDED RELEASE ORAL EVERY MORNING
Qty: 30 CAPSULE | Refills: 0 | Status: CANCELLED | OUTPATIENT
Start: 2020-07-06

## 2020-07-06 RX ORDER — DEXTROAMPHETAMINE SACCHARATE, AMPHETAMINE ASPARTATE MONOHYDRATE, DEXTROAMPHETAMINE SULFATE AND AMPHETAMINE SULFATE 7.5; 7.5; 7.5; 7.5 MG/1; MG/1; MG/1; MG/1
30 CAPSULE, EXTENDED RELEASE ORAL EVERY MORNING
Qty: 30 CAPSULE | Refills: 0 | Status: SHIPPED | OUTPATIENT
Start: 2020-07-06 | End: 2020-08-10 | Stop reason: SDUPTHER

## 2020-08-04 ENCOUNTER — OFFICE VISIT (OUTPATIENT)
Dept: OBSTETRICS AND GYNECOLOGY | Facility: CLINIC | Age: 35
End: 2020-08-04
Payer: COMMERCIAL

## 2020-08-04 VITALS — SYSTOLIC BLOOD PRESSURE: 122 MMHG | DIASTOLIC BLOOD PRESSURE: 82 MMHG

## 2020-08-04 DIAGNOSIS — Z01.419 ENCOUNTER FOR ANNUAL ROUTINE GYNECOLOGICAL EXAMINATION: Primary | ICD-10-CM

## 2020-08-04 DIAGNOSIS — Z12.4 PAP SMEAR FOR CERVICAL CANCER SCREENING: ICD-10-CM

## 2020-08-04 PROCEDURE — 88141 PR  CYTOPATH CERV/VAG INTERPRET: ICD-10-PCS | Mod: ,,, | Performed by: PATHOLOGY

## 2020-08-04 PROCEDURE — 99999 PR PBB SHADOW E&M-EST. PATIENT-LVL II: ICD-10-PCS | Mod: PBBFAC,,, | Performed by: OBSTETRICS & GYNECOLOGY

## 2020-08-04 PROCEDURE — 88141 CYTOPATH C/V INTERPRET: CPT | Mod: ,,, | Performed by: PATHOLOGY

## 2020-08-04 PROCEDURE — 88175 CYTOPATH C/V AUTO FLUID REDO: CPT | Performed by: PATHOLOGY

## 2020-08-04 PROCEDURE — 99999 PR PBB SHADOW E&M-EST. PATIENT-LVL II: CPT | Mod: PBBFAC,,, | Performed by: OBSTETRICS & GYNECOLOGY

## 2020-08-04 PROCEDURE — 99395 PR PREVENTIVE VISIT,EST,18-39: ICD-10-PCS | Mod: S$GLB,,, | Performed by: OBSTETRICS & GYNECOLOGY

## 2020-08-04 PROCEDURE — 99395 PREV VISIT EST AGE 18-39: CPT | Mod: S$GLB,,, | Performed by: OBSTETRICS & GYNECOLOGY

## 2020-08-04 PROCEDURE — 87624 HPV HI-RISK TYP POOLED RSLT: CPT

## 2020-08-04 NOTE — PROGRESS NOTES
Chief Complaint   Patient presents with    Annual Exam       HISTORY OF PRESENT ILLNESS:   Kan Traore is a 35 y.o. female  who presents for well woman exam.  No LMP recorded (lmp unknown)..  She has complains of may have retained nuvaring.  Cycles are regular. Declines STD testing. Desires Nuvaring for birth control.      Past Medical History:   Diagnosis Date    Allergic rhinitis     Anxiety     Asthma     Attention deficit disorder (ADD)     Bilateral nephrolithiasis     Depression     Hydronephrosis     Urinary tract infection     Vaginal infection           Past Surgical History:   Procedure Laterality Date    APPENDECTOMY  11/20/2017    DILATION AND CURETTAGE OF UTERUS  4/2014    KIDNEY STONE SURGERY      LITHOTRIPSY  2011    LITHOTRIPSY  02/09/2017    TONSILLECTOMY           Social History     Socioeconomic History    Marital status:      Spouse name: Not on file    Number of children: 3    Years of education: Not on file    Highest education level: Not on file   Occupational History     Employer: [employer]   Social Needs    Financial resource strain: Not on file    Food insecurity     Worry: Not on file     Inability: Not on file    Transportation needs     Medical: Not on file     Non-medical: Not on file   Tobacco Use    Smoking status: Never Smoker    Smokeless tobacco: Never Used   Substance and Sexual Activity    Alcohol use: No    Drug use: No    Sexual activity: Yes     Partners: Male     Comment:    Lifestyle    Physical activity     Days per week: Not on file     Minutes per session: Not on file    Stress: Not on file   Relationships    Social connections     Talks on phone: Not on file     Gets together: Not on file     Attends Religion service: Not on file     Active member of club or organization: Not on file     Attends meetings of clubs or organizations: Not on file     Relationship status: Not on file   Other Topics  Concern    Not on file   Social History Narrative    .  Myron filed for divorce 2020.  Nurse at [employer]. . 3 sons ( 8yo 6 yo and 3 yo).  travels for work.        Family History   Problem Relation Age of Onset    Hypertension Mother 50    Hypothyroidism Father 52    Stroke Maternal Grandfather     Hypertension Maternal Grandfather     Heart disease Maternal Grandfather 56         of AMI    Heart failure Paternal Grandmother 81    No Known Problems Son     No Known Problems Son     No Known Problems Son     Breast cancer Neg Hx     Colon cancer Neg Hx     Ovarian cancer Neg Hx     Kidney disease Neg Hx          OB History    Para Term  AB Living   4 3 3 0 1 3   SAB TAB Ectopic Multiple Live Births   1 0 0 0 3      # Outcome Date GA Lbr Raúl/2nd Weight Sex Delivery Anes PTL Lv   4 Term 17 39w0d 07:00 / 00:29 3.7 kg (8 lb 2.5 oz) M Vag-Spont Spinal, EPI N TEAGAN   3 Term 07/28/15 39w0d  3.221 kg (7 lb 1.6 oz) M Vag-Spont EPI, Spinal N TEAGAN      Birth Comments: Hep B given 7/28/15  Passed hearing   2 Term 12/06/10 38w0d  3.317 kg (7 lb 5 oz) M Vag-Spont  N TEAGAN   1 SAB                ROS:  GENERAL: Denies weight gain or weight loss. Feeling well overall.   SKIN: Denies rash or lesions.   HEAD: Denies headache.   NODES: Denies enlarged lymph nodes.   CHEST: Denies shortness of breath.   ABDOMEN: No abdominal pain, constipation, diarrhea, nausea, vomiting or rectal bleeding.   URINARY: No frequency, dysuria, hematuria, or burning on urination.  REPRODUCTIVE: See HPI.   BREASTS: The patient denies pain, lumps, or nipple discharge.       /82   LMP  (LMP Unknown)     APPEARANCE: Well nourished, well developed, in no acute distress.  NECK: Neck symmetric without  thyromegaly.  NODES: No inguinal, cervical lymph node enlargement.  CHEST: Lungs clear to auscultation.  HEART: Regular rate and rhythm, no murmurs, rubs or gallops.  ABDOMEN: Soft. No  tenderness or masses. No hernias. No hepatosplenomegaly.  BREASTS: Symmetrical, no skin changes or visible lesions. No palpable masses, nipple discharge or adenopathy bilaterally.  PELVIC:   VULVA: No lesions. Normal female genitalia.  URETHRAL MEATUS: Normal size and location, no lesions, no prolapse.  URETHRA: No masses, tenderness, prolapse or scarring.  VAGINA: Moist and well rugated, no discharge, no significant cystocele or rectocele.  CERVIX: No lesions and discharge.  UTERUS: Normal size, regular shape, mobile, non-tender, bladder base nontender.  ADNEXA: No masses or tenderness.        1. Encounter for annual routine gynecological examination    2. Pap smear for cervical cancer screening        Plan:  Routine gyn s/p normal breast exam. Pap With HPV cotesting ordered,declines spacing them. STD testing: GC/CT/trich, syphilis, HBV/HCV and HIV declined. Counseled on contraception and desires  Nuvaring. No nuvaring seen retained.       F/u in 1 yr or PRN

## 2020-08-10 ENCOUNTER — PATIENT MESSAGE (OUTPATIENT)
Dept: FAMILY MEDICINE | Facility: CLINIC | Age: 35
End: 2020-08-10

## 2020-08-10 ENCOUNTER — OFFICE VISIT (OUTPATIENT)
Dept: FAMILY MEDICINE | Facility: CLINIC | Age: 35
End: 2020-08-10
Payer: COMMERCIAL

## 2020-08-10 DIAGNOSIS — S29.012A STRAIN OF RHOMBOID MUSCLE, INITIAL ENCOUNTER: ICD-10-CM

## 2020-08-10 DIAGNOSIS — F33.42 RECURRENT MAJOR DEPRESSIVE DISORDER, IN FULL REMISSION: ICD-10-CM

## 2020-08-10 DIAGNOSIS — F90.0 ATTENTION DEFICIT HYPERACTIVITY DISORDER (ADHD), PREDOMINANTLY INATTENTIVE TYPE: Primary | ICD-10-CM

## 2020-08-10 DIAGNOSIS — F41.1 GENERALIZED ANXIETY DISORDER: ICD-10-CM

## 2020-08-10 PROCEDURE — 99214 PR OFFICE/OUTPT VISIT, EST, LEVL IV, 30-39 MIN: ICD-10-PCS | Mod: 95,,, | Performed by: FAMILY MEDICINE

## 2020-08-10 PROCEDURE — 99214 OFFICE O/P EST MOD 30 MIN: CPT | Mod: 95,,, | Performed by: FAMILY MEDICINE

## 2020-08-10 RX ORDER — DEXTROAMPHETAMINE SACCHARATE, AMPHETAMINE ASPARTATE MONOHYDRATE, DEXTROAMPHETAMINE SULFATE AND AMPHETAMINE SULFATE 7.5; 7.5; 7.5; 7.5 MG/1; MG/1; MG/1; MG/1
30 CAPSULE, EXTENDED RELEASE ORAL EVERY MORNING
Qty: 30 CAPSULE | Refills: 0 | Status: SHIPPED | OUTPATIENT
Start: 2020-08-10 | End: 2020-09-10 | Stop reason: SDUPTHER

## 2020-08-10 RX ORDER — CYCLOBENZAPRINE HCL 5 MG
5 TABLET ORAL NIGHTLY PRN
Qty: 10 TABLET | Refills: 0 | Status: SHIPPED | OUTPATIENT
Start: 2020-08-10 | End: 2020-08-20

## 2020-08-10 RX ORDER — DEXTROAMPHETAMINE SACCHARATE, AMPHETAMINE ASPARTATE, DEXTROAMPHETAMINE SULFATE AND AMPHETAMINE SULFATE 2.5; 2.5; 2.5; 2.5 MG/1; MG/1; MG/1; MG/1
10 TABLET ORAL DAILY
Qty: 30 TABLET | Refills: 0 | Status: SHIPPED | OUTPATIENT
Start: 2020-08-10 | End: 2021-02-05 | Stop reason: ALTCHOICE

## 2020-08-10 NOTE — ASSESSMENT & PLAN NOTE
- appears muscular  - okay to continue NSAIDs and add muscle relaxant at bedtime  - warm compression  - recommend be seen in clinic if does not improve
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
## 2020-08-10 NOTE — TELEPHONE ENCOUNTER
Please see If patient would like to be seen today. If yes, then okay to overbook 12:30 slot. If not then next available, okay  Dr. Cielo Mina D.O.   [facility]

## 2020-08-13 LAB
HPV HR 12 DNA SPEC QL NAA+PROBE: POSITIVE
HPV16 AG SPEC QL: NEGATIVE
HPV18 DNA SPEC QL NAA+PROBE: NEGATIVE

## 2020-08-24 LAB
FINAL PATHOLOGIC DIAGNOSIS: ABNORMAL
Lab: ABNORMAL

## 2020-08-25 ENCOUNTER — TELEPHONE (OUTPATIENT)
Dept: OBSTETRICS AND GYNECOLOGY | Facility: HOSPITAL | Age: 35
End: 2020-08-25

## 2020-08-25 NOTE — TELEPHONE ENCOUNTER
Talked to patient about abnormal pap smear and HPV +. Recommend colposcopy.     Can you please set up for around 1 pm this afternoon? tHanks

## 2020-09-04 ENCOUNTER — PROCEDURE VISIT (OUTPATIENT)
Dept: OBSTETRICS AND GYNECOLOGY | Facility: CLINIC | Age: 35
End: 2020-09-04
Payer: COMMERCIAL

## 2020-09-04 VITALS
SYSTOLIC BLOOD PRESSURE: 110 MMHG | DIASTOLIC BLOOD PRESSURE: 78 MMHG | BODY MASS INDEX: 19.71 KG/M2 | WEIGHT: 122.13 LBS

## 2020-09-04 DIAGNOSIS — R87.610 ASCUS WITH POSITIVE HIGH RISK HPV CERVICAL: Primary | ICD-10-CM

## 2020-09-04 DIAGNOSIS — R87.810 ASCUS WITH POSITIVE HIGH RISK HPV CERVICAL: Primary | ICD-10-CM

## 2020-09-04 PROCEDURE — 88342 IMHCHEM/IMCYTCHM 1ST ANTB: CPT | Mod: 26,,, | Performed by: PATHOLOGY

## 2020-09-04 PROCEDURE — 57454 PR COLPOSC,CERVIX W/ADJ VAG,W/BX & CURRETAG: ICD-10-PCS | Mod: S$GLB,,, | Performed by: OBSTETRICS & GYNECOLOGY

## 2020-09-04 PROCEDURE — 99499 UNLISTED E&M SERVICE: CPT | Mod: S$GLB,,, | Performed by: OBSTETRICS & GYNECOLOGY

## 2020-09-04 PROCEDURE — 99499 NO LOS: ICD-10-PCS | Mod: S$GLB,,, | Performed by: OBSTETRICS & GYNECOLOGY

## 2020-09-04 PROCEDURE — 88305 TISSUE EXAM BY PATHOLOGIST: ICD-10-PCS | Mod: 26,,, | Performed by: PATHOLOGY

## 2020-09-04 PROCEDURE — 57454 BX/CURETT OF CERVIX W/SCOPE: CPT | Mod: S$GLB,,, | Performed by: OBSTETRICS & GYNECOLOGY

## 2020-09-04 PROCEDURE — 88305 TISSUE EXAM BY PATHOLOGIST: CPT | Mod: 59 | Performed by: PATHOLOGY

## 2020-09-04 PROCEDURE — 88342 IMHCHEM/IMCYTCHM 1ST ANTB: CPT | Mod: 59 | Performed by: PATHOLOGY

## 2020-09-04 PROCEDURE — 88342 CHG IMMUNOCYTOCHEMISTRY: ICD-10-PCS | Mod: 26,,, | Performed by: PATHOLOGY

## 2020-09-04 PROCEDURE — 88305 TISSUE EXAM BY PATHOLOGIST: CPT | Mod: 26,,, | Performed by: PATHOLOGY

## 2020-09-04 NOTE — PROCEDURES
Colposcopy    Date/Time: 9/4/2020 10:30 AM  Performed by: Kacy Elias MD  Authorized by: Kacy Elias MD     Consent Done?:  Yes (Written)  Assistants?: No      Colposcopy Site:  Cervix  Acrowhite Lesion? Yes    Atypical Vessels: No    Transformation Zone Adequate?: Yes    Biopsy?: No    ECC Performed?: Yes    LEEP Performed?: No    Estimated blood loss (cc):  3   Patient tolerated the procedure well with no immediate complications.   Post-operative instructions were provided for the patient.   Patient was discharged and will follow up if any complications occur

## 2020-09-10 DIAGNOSIS — F90.0 ATTENTION DEFICIT HYPERACTIVITY DISORDER (ADHD), PREDOMINANTLY INATTENTIVE TYPE: ICD-10-CM

## 2020-09-10 LAB
FINAL PATHOLOGIC DIAGNOSIS: NORMAL
GROSS: NORMAL

## 2020-09-10 RX ORDER — DEXTROAMPHETAMINE SACCHARATE, AMPHETAMINE ASPARTATE MONOHYDRATE, DEXTROAMPHETAMINE SULFATE AND AMPHETAMINE SULFATE 7.5; 7.5; 7.5; 7.5 MG/1; MG/1; MG/1; MG/1
30 CAPSULE, EXTENDED RELEASE ORAL EVERY MORNING
Qty: 30 CAPSULE | Refills: 0 | Status: SHIPPED | OUTPATIENT
Start: 2020-09-10 | End: 2020-10-19 | Stop reason: SDUPTHER

## 2020-09-10 RX ORDER — DEXTROAMPHETAMINE SACCHARATE, AMPHETAMINE ASPARTATE MONOHYDRATE, DEXTROAMPHETAMINE SULFATE AND AMPHETAMINE SULFATE 7.5; 7.5; 7.5; 7.5 MG/1; MG/1; MG/1; MG/1
30 CAPSULE, EXTENDED RELEASE ORAL EVERY MORNING
Qty: 30 CAPSULE | Refills: 0 | OUTPATIENT
Start: 2020-09-10

## 2020-09-10 NOTE — TELEPHONE ENCOUNTER
----- Message from Yumiko Campos sent at 9/10/2020 10:15 AM CDT -----  Hey patient would like a refill on her adderal 30mg sent to [facility] please

## 2020-09-11 ENCOUNTER — TELEPHONE (OUTPATIENT)
Dept: OBSTETRICS AND GYNECOLOGY | Facility: HOSPITAL | Age: 35
End: 2020-09-11

## 2020-09-11 DIAGNOSIS — N30.00 ACUTE CYSTITIS WITHOUT HEMATURIA: Primary | ICD-10-CM

## 2020-09-11 RX ORDER — NITROFURANTOIN 25; 75 MG/1; MG/1
100 CAPSULE ORAL 2 TIMES DAILY
Qty: 10 CAPSULE | Refills: 0 | Status: SHIPPED | OUTPATIENT
Start: 2020-09-11 | End: 2020-09-16

## 2020-09-11 NOTE — PROGRESS NOTES
Patient complaining of abdominal pain. Urine with 2+ leuks. Low suspicion for pyelo; likely simple UTI.    Lino Lee MD

## 2020-09-18 DIAGNOSIS — Z00.00 ROUTINE GENERAL MEDICAL EXAMINATION AT A HEALTH CARE FACILITY: Primary | ICD-10-CM

## 2020-09-29 ENCOUNTER — LAB VISIT (OUTPATIENT)
Dept: FAMILY MEDICINE | Facility: CLINIC | Age: 35
End: 2020-09-29
Payer: COMMERCIAL

## 2020-09-29 DIAGNOSIS — Z00.00 ROUTINE GENERAL MEDICAL EXAMINATION AT A HEALTH CARE FACILITY: ICD-10-CM

## 2020-09-29 PROCEDURE — U0003 INFECTIOUS AGENT DETECTION BY NUCLEIC ACID (DNA OR RNA); SEVERE ACUTE RESPIRATORY SYNDROME CORONAVIRUS 2 (SARS-COV-2) (CORONAVIRUS DISEASE [COVID-19]), AMPLIFIED PROBE TECHNIQUE, MAKING USE OF HIGH THROUGHPUT TECHNOLOGIES AS DESCRIBED BY CMS-2020-01-R: HCPCS

## 2020-09-30 LAB — SARS-COV-2 RNA RESP QL NAA+PROBE: NOT DETECTED

## 2020-10-02 ENCOUNTER — ANESTHESIA (OUTPATIENT)
Dept: SURGERY | Facility: HOSPITAL | Age: 35
End: 2020-10-02
Payer: COMMERCIAL

## 2020-10-02 ENCOUNTER — HOSPITAL ENCOUNTER (OUTPATIENT)
Facility: HOSPITAL | Age: 35
Discharge: HOME | End: 2020-10-02
Attending: OBSTETRICS & GYNECOLOGY | Admitting: OBSTETRICS & GYNECOLOGY
Payer: COMMERCIAL

## 2020-10-02 ENCOUNTER — ANESTHESIA EVENT (OUTPATIENT)
Dept: SURGERY | Facility: HOSPITAL | Age: 35
End: 2020-10-02
Payer: COMMERCIAL

## 2020-10-02 VITALS
TEMPERATURE: 98 F | SYSTOLIC BLOOD PRESSURE: 117 MMHG | HEIGHT: 66 IN | BODY MASS INDEX: 19.29 KG/M2 | RESPIRATION RATE: 20 BRPM | HEART RATE: 65 BPM | DIASTOLIC BLOOD PRESSURE: 76 MMHG | OXYGEN SATURATION: 98 % | WEIGHT: 120 LBS

## 2020-10-02 DIAGNOSIS — N87.9 CERVICAL DYSPLASIA: ICD-10-CM

## 2020-10-02 DIAGNOSIS — Z98.890 STATUS POST LEEP (LOOP ELECTROSURGICAL EXCISION PROCEDURE) OF CERVIX: Primary | ICD-10-CM

## 2020-10-02 PROCEDURE — 88305 TISSUE EXAM BY PATHOLOGIST: CPT | Mod: 26,,, | Performed by: PATHOLOGY

## 2020-10-02 PROCEDURE — 71000016 HC POSTOP RECOV ADDL HR: Performed by: OBSTETRICS & GYNECOLOGY

## 2020-10-02 PROCEDURE — 63600175 PHARM REV CODE 636 W HCPCS: Performed by: STUDENT IN AN ORGANIZED HEALTH CARE EDUCATION/TRAINING PROGRAM

## 2020-10-02 PROCEDURE — 88305 TISSUE EXAM BY PATHOLOGIST: CPT | Performed by: PATHOLOGY

## 2020-10-02 PROCEDURE — 25000003 PHARM REV CODE 250: Performed by: STUDENT IN AN ORGANIZED HEALTH CARE EDUCATION/TRAINING PROGRAM

## 2020-10-02 PROCEDURE — 63600175 PHARM REV CODE 636 W HCPCS: Performed by: OBSTETRICS & GYNECOLOGY

## 2020-10-02 PROCEDURE — 36000706: Performed by: OBSTETRICS & GYNECOLOGY

## 2020-10-02 PROCEDURE — 57522 CONIZATION OF CERVIX: CPT | Mod: ,,, | Performed by: OBSTETRICS & GYNECOLOGY

## 2020-10-02 PROCEDURE — 88307 PR  SURG PATH,LEVEL V: ICD-10-PCS | Mod: 26,,, | Performed by: PATHOLOGY

## 2020-10-02 PROCEDURE — 36000707: Performed by: OBSTETRICS & GYNECOLOGY

## 2020-10-02 PROCEDURE — 88305 TISSUE EXAM BY PATHOLOGIST: ICD-10-PCS | Mod: 26,,, | Performed by: PATHOLOGY

## 2020-10-02 PROCEDURE — 57522 PR CONIZATION CERVIX,LOOP ELECTRD: ICD-10-PCS | Mod: ,,, | Performed by: OBSTETRICS & GYNECOLOGY

## 2020-10-02 PROCEDURE — 88307 TISSUE EXAM BY PATHOLOGIST: CPT | Mod: 26,,, | Performed by: PATHOLOGY

## 2020-10-02 PROCEDURE — 71000015 HC POSTOP RECOV 1ST HR: Performed by: OBSTETRICS & GYNECOLOGY

## 2020-10-02 PROCEDURE — 25000003 PHARM REV CODE 250: Performed by: OBSTETRICS & GYNECOLOGY

## 2020-10-02 PROCEDURE — 71000033 HC RECOVERY, INTIAL HOUR: Performed by: OBSTETRICS & GYNECOLOGY

## 2020-10-02 PROCEDURE — 88307 TISSUE EXAM BY PATHOLOGIST: CPT | Performed by: PATHOLOGY

## 2020-10-02 PROCEDURE — 37000009 HC ANESTHESIA EA ADD 15 MINS: Performed by: OBSTETRICS & GYNECOLOGY

## 2020-10-02 PROCEDURE — 37000008 HC ANESTHESIA 1ST 15 MINUTES: Performed by: OBSTETRICS & GYNECOLOGY

## 2020-10-02 RX ORDER — HYDROCODONE BITARTRATE AND ACETAMINOPHEN 10; 325 MG/1; MG/1
1 TABLET ORAL EVERY 4 HOURS PRN
Status: DISCONTINUED | OUTPATIENT
Start: 2020-10-02 | End: 2020-10-02 | Stop reason: HOSPADM

## 2020-10-02 RX ORDER — MUPIROCIN 20 MG/G
OINTMENT TOPICAL
Status: DISCONTINUED | OUTPATIENT
Start: 2020-10-02 | End: 2020-10-02 | Stop reason: HOSPADM

## 2020-10-02 RX ORDER — ONDANSETRON 8 MG/1
8 TABLET, ORALLY DISINTEGRATING ORAL EVERY 8 HOURS PRN
Status: DISCONTINUED | OUTPATIENT
Start: 2020-10-02 | End: 2020-10-02 | Stop reason: HOSPADM

## 2020-10-02 RX ORDER — SODIUM CHLORIDE 0.9 % (FLUSH) 0.9 %
10 SYRINGE (ML) INJECTION
Status: DISCONTINUED | OUTPATIENT
Start: 2020-10-02 | End: 2020-10-02 | Stop reason: HOSPADM

## 2020-10-02 RX ORDER — IBUPROFEN 800 MG/1
800 TABLET ORAL EVERY 6 HOURS PRN
Qty: 30 TABLET | Refills: 2 | Status: SHIPPED | OUTPATIENT
Start: 2020-10-02 | End: 2021-03-09

## 2020-10-02 RX ORDER — NORGESTIMATE AND ETHINYL ESTRADIOL 0.25-0.035
1 KIT ORAL DAILY
Qty: 28 TABLET | Refills: 0 | Status: SHIPPED | OUTPATIENT
Start: 2020-10-02 | End: 2021-03-09

## 2020-10-02 RX ORDER — LIDOCAINE HYDROCHLORIDE AND EPINEPHRINE 10; 10 MG/ML; UG/ML
INJECTION, SOLUTION INFILTRATION; PERINEURAL
Status: DISCONTINUED | OUTPATIENT
Start: 2020-10-02 | End: 2020-10-02 | Stop reason: HOSPADM

## 2020-10-02 RX ORDER — IBUPROFEN 600 MG/1
600 TABLET ORAL EVERY 6 HOURS PRN
Status: DISCONTINUED | OUTPATIENT
Start: 2020-10-02 | End: 2020-10-02 | Stop reason: HOSPADM

## 2020-10-02 RX ORDER — DIPHENHYDRAMINE HCL 25 MG
25 CAPSULE ORAL EVERY 4 HOURS PRN
Status: DISCONTINUED | OUTPATIENT
Start: 2020-10-02 | End: 2020-10-02 | Stop reason: HOSPADM

## 2020-10-02 RX ORDER — OXYCODONE AND ACETAMINOPHEN 5; 325 MG/1; MG/1
1 TABLET ORAL EVERY 4 HOURS PRN
Qty: 5 TABLET | Refills: 0 | Status: SHIPPED | OUTPATIENT
Start: 2020-10-02 | End: 2020-11-27

## 2020-10-02 RX ORDER — SODIUM CHLORIDE, SODIUM LACTATE, POTASSIUM CHLORIDE, CALCIUM CHLORIDE 600; 310; 30; 20 MG/100ML; MG/100ML; MG/100ML; MG/100ML
INJECTION, SOLUTION INTRAVENOUS CONTINUOUS PRN
Status: DISCONTINUED | OUTPATIENT
Start: 2020-10-02 | End: 2020-10-02

## 2020-10-02 RX ORDER — KETOROLAC TROMETHAMINE 30 MG/ML
INJECTION, SOLUTION INTRAMUSCULAR; INTRAVENOUS
Status: DISCONTINUED | OUTPATIENT
Start: 2020-10-02 | End: 2020-10-02

## 2020-10-02 RX ORDER — DIPHENHYDRAMINE HYDROCHLORIDE 50 MG/ML
25 INJECTION INTRAMUSCULAR; INTRAVENOUS EVERY 4 HOURS PRN
Status: DISCONTINUED | OUTPATIENT
Start: 2020-10-02 | End: 2020-10-02 | Stop reason: HOSPADM

## 2020-10-02 RX ORDER — HYDROMORPHONE HYDROCHLORIDE 2 MG/ML
0.5 INJECTION, SOLUTION INTRAMUSCULAR; INTRAVENOUS; SUBCUTANEOUS EVERY 5 MIN PRN
Status: DISCONTINUED | OUTPATIENT
Start: 2020-10-02 | End: 2020-10-02 | Stop reason: HOSPADM

## 2020-10-02 RX ORDER — SODIUM CHLORIDE 9 MG/ML
INJECTION, SOLUTION INTRAVENOUS CONTINUOUS
Status: DISCONTINUED | OUTPATIENT
Start: 2020-10-02 | End: 2020-10-02 | Stop reason: HOSPADM

## 2020-10-02 RX ORDER — OXYCODONE AND ACETAMINOPHEN 5; 325 MG/1; MG/1
1 TABLET ORAL
Status: DISCONTINUED | OUTPATIENT
Start: 2020-10-02 | End: 2020-10-02 | Stop reason: HOSPADM

## 2020-10-02 RX ORDER — LIDOCAINE HCL/PF 100 MG/5ML
SYRINGE (ML) INTRAVENOUS
Status: DISCONTINUED | OUTPATIENT
Start: 2020-10-02 | End: 2020-10-02

## 2020-10-02 RX ORDER — HYDROCODONE BITARTRATE AND ACETAMINOPHEN 5; 325 MG/1; MG/1
1 TABLET ORAL EVERY 4 HOURS PRN
Status: DISCONTINUED | OUTPATIENT
Start: 2020-10-02 | End: 2020-10-02 | Stop reason: HOSPADM

## 2020-10-02 RX ORDER — ONDANSETRON 2 MG/ML
INJECTION INTRAMUSCULAR; INTRAVENOUS
Status: DISCONTINUED | OUTPATIENT
Start: 2020-10-02 | End: 2020-10-02

## 2020-10-02 RX ORDER — MIDAZOLAM HYDROCHLORIDE 1 MG/ML
INJECTION, SOLUTION INTRAMUSCULAR; INTRAVENOUS
Status: DISCONTINUED | OUTPATIENT
Start: 2020-10-02 | End: 2020-10-02

## 2020-10-02 RX ORDER — FENTANYL CITRATE 50 UG/ML
INJECTION, SOLUTION INTRAMUSCULAR; INTRAVENOUS
Status: DISCONTINUED | OUTPATIENT
Start: 2020-10-02 | End: 2020-10-02

## 2020-10-02 RX ORDER — PROPOFOL 10 MG/ML
INJECTION, EMULSION INTRAVENOUS
Status: DISCONTINUED | OUTPATIENT
Start: 2020-10-02 | End: 2020-10-02

## 2020-10-02 RX ADMIN — SODIUM CHLORIDE, SODIUM LACTATE, POTASSIUM CHLORIDE, AND CALCIUM CHLORIDE: .6; .31; .03; .02 INJECTION, SOLUTION INTRAVENOUS at 12:10

## 2020-10-02 RX ADMIN — PROPOFOL 180 MG: 10 INJECTION, EMULSION INTRAVENOUS at 12:10

## 2020-10-02 RX ADMIN — ONDANSETRON 4 MG: 2 INJECTION, SOLUTION INTRAMUSCULAR; INTRAVENOUS at 12:10

## 2020-10-02 RX ADMIN — MIDAZOLAM 2 MG: 1 INJECTION INTRAMUSCULAR; INTRAVENOUS at 12:10

## 2020-10-02 RX ADMIN — LIDOCAINE HYDROCHLORIDE 100 MG: 20 INJECTION, SOLUTION INTRAVENOUS at 12:10

## 2020-10-02 RX ADMIN — KETOROLAC TROMETHAMINE 30 MG: 30 INJECTION, SOLUTION INTRAMUSCULAR; INTRAVENOUS at 12:10

## 2020-10-02 RX ADMIN — HYDROMORPHONE HYDROCHLORIDE 0.5 MG: 2 INJECTION, SOLUTION INTRAMUSCULAR; INTRAVENOUS; SUBCUTANEOUS at 01:10

## 2020-10-02 RX ADMIN — PROMETHAZINE HYDROCHLORIDE 12.5 MG: 25 INJECTION INTRAMUSCULAR; INTRAVENOUS at 02:10

## 2020-10-02 RX ADMIN — FENTANYL CITRATE 100 MCG: 50 INJECTION, SOLUTION INTRAMUSCULAR; INTRAVENOUS at 12:10

## 2020-10-02 NOTE — ANESTHESIA POSTPROCEDURE EVALUATION
Anesthesia Post Evaluation    Patient: Kan Traore    Procedure(s) Performed: Procedure(s) (LRB):  LEEP (LOOP ELECTROSURGICAL EXCISION PROCEDURE) (N/A)    Final Anesthesia Type: general    Patient location during evaluation: PACU  Patient participation: Yes- Able to Participate  Level of consciousness: awake and alert  Post-procedure vital signs: reviewed and stable  Pain management: adequate  Airway patency: patent    PONV status at discharge: No PONV  Anesthetic complications: no      Cardiovascular status: blood pressure returned to baseline and hemodynamically stable  Respiratory status: unassisted  Hydration status: euvolemic  Follow-up not needed.          Vitals Value Taken Time   /82 10/02/20 1350   Temp 36.7 °C (98 °F) 10/02/20 1345   Pulse 86 10/02/20 1354   Resp 11 10/02/20 1354   SpO2 98 % 10/02/20 1354   Vitals shown include unvalidated device data.      Event Time   Out of Recovery 13:54:10         Pain/Alberto Score: Pain Rating Prior to Med Admin: 6 (10/2/2020  1:26 PM)  Pain Rating Post Med Admin: 2 (10/2/2020  1:40 PM)

## 2020-10-02 NOTE — H&P
Diagnosis: severe cervical dysplasia   Planned Procedure: leep   Date of Planned Procedure: today     Cc: I am here for preop for my surgery    HPI: Kan Traore is a 35 y.o. female with history of severe cervical dysplasia.     ROS:  GENERAL: Denies weight gain or weight loss. Feeling well overall.   SKIN: Denies rash or lesions.   HEAD: Denies head injury or headache.   CHEST: Denies chest pain or shortness of breath.   CARDIOVASCULAR: Denies palpitations or left sided chest pain.   ABDOMEN: No abdominal pain, constipation, diarrhea, nausea, vomiting or rectal bleeding.   URINARY: No frequency, dysuria, hematuria, or burning on urination.  REPRODUCTIVE: See HPI.   HEMATOLOGIC: No easy bruisability or excessive bleeding.   MUSCULOSKELETAL: Denies joint pain or swelling.   NEUROLOGIC: Denies syncope or weakness.   PSYCHIATRIC: Denies depression, anxiety or mood swings.     PMHx:   Past Medical History:   Diagnosis Date    Allergic rhinitis     Anxiety     Asthma     Attention deficit disorder (ADD)     Bilateral nephrolithiasis     Depression     Hydronephrosis     Urinary tract infection     Vaginal infection        Surgical hx:   Past Surgical History:   Procedure Laterality Date    APPENDECTOMY  2017    DILATION AND CURETTAGE OF UTERUS  2014    KIDNEY STONE SURGERY      LITHOTRIPSY  2011    LITHOTRIPSY  2017    TONSILLECTOMY         GYNhx: No LMP recorded.    Obhx:     Review of patient's allergies indicates:  No Known Allergies    MEDS: Reviewed, reconciled    No current facility-administered medications for this encounter.     Social hx:    Social History     Socioeconomic History    Marital status:      Spouse name: Not on file    Number of children: 3    Years of education: Not on file    Highest education level: Not on file   Occupational History     Employer: [employer]   Social Needs    Financial resource strain: Not on file     Food insecurity     Worry: Not on file     Inability: Not on file    Transportation needs     Medical: Not on file     Non-medical: Not on file   Tobacco Use    Smoking status: Never Smoker    Smokeless tobacco: Never Used   Substance and Sexual Activity    Alcohol use: No    Drug use: No    Sexual activity: Yes     Partners: Male     Comment:    Lifestyle    Physical activity     Days per week: Not on file     Minutes per session: Not on file    Stress: Not on file   Relationships    Social connections     Talks on phone: Not on file     Gets together: Not on file     Attends Restoration service: Not on file     Active member of club or organization: Not on file     Attends meetings of clubs or organizations: Not on file     Relationship status: Not on file   Other Topics Concern    Not on file   Social History Narrative    .  Myron filed for divorce 2020.  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  travels for work.        Family hx:    Family History   Problem Relation Age of Onset    Hypertension Mother 50    Hypothyroidism Father 52    Stroke Maternal Grandfather     Hypertension Maternal Grandfather     Heart disease Maternal Grandfather 56         of AMI    Heart failure Paternal Grandmother 81    No Known Problems Son     No Known Problems Son     No Known Problems Son     Breast cancer Neg Hx     Colon cancer Neg Hx     Ovarian cancer Neg Hx     Kidney disease Neg Hx        PE:   Vitals: There were no vitals taken for this visit.  APPEARANCE: Well nourished, well developed, in no acute distress.  SKIN: Normal skin turgor, no lesions.  CHEST: Lungs clear to auscultation.  HEART: Regular rate and rhythm, no murmurs, rubs or gallops.  ABDOMEN: Soft. No tenderness or masses. No hepatosplenomegaly. No hernias.    EXTREMITIES: No clubbing cyanosis or edema.    1.  Cervix, 5:00, biopsy:   Low-grade squamous intraepithelial lesion (CONSUELO-1)    P16 immunostain with appropriate controls fails to a highlight high-grade   lesion   Transformation zone is present   Acute and chronic cervicitis   2.  Cervix, 12:00, biopsy:   High-grade squamous intraepithelial lesion (CONSUELO 2-3)   P16 immunostain with appropriate controls shows focal strong block positivity   and supports the diagnosis   Transformation zone is present   Acute and chronic cervicitis   3.  Endocervix, curettage:  Like   Scant fragments endocervical epithelium   No definitive squamous component identified   No definitive dysplasia or malignancy     A/P: Kan Traore is a 35 y.o. female who presents for preop evaluation.      1) Surgery:   -Risks and benefits of surgery discussed with the patient.  All questions were answered.  The risks, benefits and conization surgery in detail were discussed.  The potential for injury to rectum, vagina, and bladder was discussed in addition to the risk of bleeding and infection. The possible need for a blood transfusion discussed.  The potential of residual disease, recurrent disease and need for more surgery was discussed.  After the pros, cons risks and benefits were discussed the patient decided to have the surgery and she was consented for the procedures in usual fashion. All questions were answered and written informed consent has been obtained.   Consents for surgery and blood were signed by the patient today.

## 2020-10-02 NOTE — TRANSFER OF CARE
"Anesthesia Transfer of Care Note    Patient: Kan Traore    Procedure(s) Performed: Procedure(s) (LRB):  LEEP (LOOP ELECTROSURGICAL EXCISION PROCEDURE) (N/A)    Patient location: PACU    Anesthesia Type: general    Transport from OR: Transported from OR on 2-3 L/min O2 by NC with adequate spontaneous ventilation    Post pain: adequate analgesia    Post assessment: no apparent anesthetic complications    Post vital signs: stable    Level of consciousness: awake, alert and oriented    Nausea/Vomiting: no nausea/vomiting    Complications: none    Transfer of care protocol was followed      Last vitals:   Visit Vitals  /78 (BP Location: Left arm, Patient Position: Lying)   Pulse 83   Temp 37.3 °C (99.1 °F) (Skin)   Resp 16   Ht 5' 6" (1.676 m)   Wt 54.4 kg (120 lb)   LMP 09/02/2020   SpO2 100%   Breastfeeding No   BMI 19.37 kg/m²     "

## 2020-10-02 NOTE — DISCHARGE SUMMARY
Admitting Diagnosis: severe dysplasia  Discharge Diagnosis: s/p LEEP/ECC  Procedure: LEEP/ECC  Service: OB-GYN, Kacy Elias   Consults: none   Disposition: home  Condition as Discharge: Stable   Hospital Course: Patient was transferred to outpatient surgery after her procedure.  Her recovery was uncomplicated and by post-op day 0 she was tolerating PO without N/V, ambulating without difficulty, her pain was well controlled with PO pain medications and she had passed flatus. She was stable and ready for discharge.   Medications: OTC ibuprofen, Percocet  Follow up: in 2 week in clinic to recheck cervix  Instructions:  continue ambulation, take medications as needed and take stool softener as needed, pelvic rest until instructed otherwise by physician  Call or return to ED for fever >100.4, foul smelling vaginal discharge, heavy vaginal bleeding, abdominal pain not responsive to medications or other concerns.

## 2020-10-02 NOTE — OP NOTE
10/02/2020    Surgeon: Kacy Elias    Assistant: None     Procedure: LEEP    Pre-Op Diagnosis: CONSUELO 3, severe dysplasia     Post-Op Diagnosis: same     Anesthesia: MAC    Complications: None    Condition: Stable    EBL: 5cc    Findings: large cervix with non-staining area from 9 to 12 oclock     Specimen: anterior cone bed marked at 12 and posterior cone bed marked at 6 and endocervical curetting     TECHNIQUE: The patient was taken to the Operating Room where her MAC anesthesia  was found to be adequate. Her legs were placed in Clay stirrups. She was   then prepared and draped in normal sterile fashion.     An insulated speculum was placed into the vagina and the cervix was visualized. Single tooth tenaculum was placed at the anterior cervix.     Paracervical block was completed in which 5 mL of 1% lidocaine were injected at the vaginal cervical junction at the 5 o'clock and 7 o'clock areas.     Lugol's solution was placed on the cervix and the Lugol's changes were noted as described above; The loop was too small to do the entire cervix in one pass so  The LEEP loop was used to enter the anterior cervix near the 9 o'clock region and then was moved sideways towards the 3 o'clock region and then removed. The repeated on the posterior cervix in the same fashion. The specimen was grasped with pickups with teeth and removed from the vagina. The specimen was tagged as described above. A kevorkian curettage was preformed and endocervical tissue was collected and sent to pathology.     Attention was turned to the cervix where a light bleeding was noted. The   rollerball was used to ensure hemostasis. Monsel's solution was then placed at   this area to ensure hemostasis as well.     The patient tolerated the procedure well. All instruments were then removed   from the vagina. The patient was then taken to the PACU in stable condition.

## 2020-10-02 NOTE — ANESTHESIA PREPROCEDURE EVALUATION
10/02/2020  Kan Traore is a 35 y.o., female scheduled for LEEP under General     METs>4  NPO >12hrs   No recent CP/SOB  Pt denies any inhaler use     Patient Active Problem List   Diagnosis    Allergic rhinitis    Generalized anxiety disorder    Depression    History of nephrolithiasis    Attention deficit hyperactivity disorder (ADHD), predominantly inattentive type    Mild intermittent asthma without complication    Strain of rhomboid muscle       Past Medical History:   Diagnosis Date    Allergic rhinitis     Anxiety     Asthma     Attention deficit disorder (ADD)     Bilateral nephrolithiasis     Depression     Hydronephrosis     Urinary tract infection     Vaginal infection         Past Surgical History:   Procedure Laterality Date    APPENDECTOMY  11/20/2017    DILATION AND CURETTAGE OF UTERUS  4/2014    KIDNEY STONE SURGERY      LITHOTRIPSY  2011    LITHOTRIPSY  02/09/2017    TONSILLECTOMY       Lab Results   Component Value Date    WBC 5.83 08/13/2018    HGB 13.4 08/13/2018    HCT 40.9 08/13/2018     08/13/2018    CHOL 144 08/13/2018    TRIG 60 08/13/2018    HDL 51 08/13/2018    ALT 25 08/13/2018    AST 24 08/13/2018     08/13/2018    K 4.1 08/13/2018     08/13/2018    CREATININE 0.62 08/13/2018    BUN 17 08/13/2018    CO2 28 08/13/2018    TSH 2.580 08/13/2018         Pre-op Assessment    I have reviewed the Patient Summary Reports.     I have reviewed the Nursing Notes. I have reviewed the NPO Status.   I have reviewed the Medications.     Review of Systems  Anesthesia Hx:  No problems with previous Anesthesia  History of prior surgery of interest to airway management or planning: Previous anesthesia: General Denies Family Hx of Anesthesia complications.   Denies Personal Hx of Anesthesia complications.   Social:  Non-Smoker     Cardiovascular:  Cardiovascular Normal Exercise tolerance: good     Pulmonary:   Asthma asymptomatic    Renal/:   renal calculi    Hepatic/GI:  Hepatic/GI Normal    Neurological:  Neurology Normal    Endocrine:  Endocrine Normal    Psych:   ADD         Physical Exam  General:  Well nourished    Airway/Jaw/Neck:  Airway Findings: Mouth Opening: Normal Tongue: Normal  General Airway Assessment: Adult  Mallampati: II  TM Distance: Normal, at least 6 cm  Jaw/Neck Findings:  Neck ROM: Normal ROM      Dental:  DENTAL FINDINGS: Normal   Chest/Lungs:  Chest/Lungs Findings: Normal Respiratory Rate     Heart/Vascular:  Heart Findings: Rate: Normal  Rhythm: Regular Rhythm        Mental Status:  Mental Status Findings:  Alert and Oriented         Anesthesia Plan  Type of Anesthesia, risks & benefits discussed:  Anesthesia Type:  general  Patient's Preference: General   Intra-op Monitoring Plan: standard ASA monitors  Intra-op Monitoring Plan Comments:   Post Op Pain Control Plan: multimodal analgesia  Post Op Pain Control Plan Comments:   Induction:   IV  Beta Blocker:  Patient is not currently on a Beta-Blocker (No further documentation required).       Informed Consent: Patient understands risks and agrees with Anesthesia plan.  Questions answered. Anesthesia consent signed with patient.  ASA Score: 2     Day of Surgery Review of History & Physical:    H&P update referred to the surgeon.         Ready For Surgery From Anesthesia Perspective.

## 2020-10-02 NOTE — PLAN OF CARE
Discharge criteria met,voicing desire to go home. Discharge instructions given to patient & mother; verbalized understanding. Discharge home via wheelchair in care of mother.

## 2020-10-02 NOTE — DISCHARGE INSTRUCTIONS
LEEP  LEEP stands for loop electrosurgical excision procedure. It is used to treat dysplasia (abnormal cell growth). A fine wire loop is used to remove a small amount of tissue from your cervix. This can be done in the healthcare providers office. You can go back to your routine the same day. Schedule your LEEP for a time when you are not menstruating.    After the procedure  You may have a watery, pink discharge and mild cramping following the procedure. Also, the solution used to decrease bleeding may cause a dark, vaginal discharge for a few days. Do not place anything in your vagina or have intercourse until your healthcare provider tells you it is OK. Your cervix should heal completely within a few weeks.  When to call your healthcare provider  Call your healthcare provider right away if you have any of the following:  · Heavy bleeding or bleeding with clots  · Severe belly pain  · Fever   ANESTHESIA  -For the first 24 hours after surgery:  Do not drive, use heavy equipment, make important decisions, or drink alcohol  -It is normal to feel sleepy for several hours.  Rest until you are more awake.  -Have someone stay with you, if needed.  They can watch for problems and help keep you safe.  -Some possible post anesthesia side effects include: nausea and vomiting, sore throat and hoarseness, sleepiness, and dizziness.    PAIN  -If you have pain after surgery, pain medicine will help you feel better.  Take it as directed, before pain becomes severe.  Most pain relievers taken by mouth need at least 20-30 minutes to start working.  -Do not drive or drink alcohol while taking pain medicine.  -Pain medication can upset your stomach.  Taking them with a little food may help.  -Other ways to help control pain: elevation, ice, and relaxation  -Call your surgeon if still having unmanageable pain an hour after taking pain medicine.  -Pain medicine can cause constipation.  Taking an over-the counter stool softener while  on prescription pain medicine and drinking plenty of fluids can prevent this side effect.  -Call your surgeon if you have severe side effects like: breathing problems, trouble waking up, dizziness, confusion, or severe constipation.    NAUSEA  -Some people have nausea after surgery.  This is often because of anesthesia, pain, pain medicine, or the stress of surgery.  -Do not push yourself to eat.  Start off with clear liquids and soup.  Slowly move to solid foods.  Don't eat fatty, rich, spicy foods at first.  Eat smaller amounts.  -If you develop persistent nausea and vomiting please notify your surgeon immediately.    BLEEDING  -Different types of surgery require different types of care and dressing changes.  It is important to follow all instructions and advice from your surgeon.  Change dressing as directed.  Call your surgeon for any concerns regarding postop bleeding.    SIGNS OF INFECTION  -Signs of infection include: fever, swelling, drainage, and redness  -Notify your surgeon if you have a fever of 100.4 F (38.0 C) or higher.  -Notify your surgeon if you notice redness, swelling, increased pain, pus, or a foul smell at the incision site.      Acetaminophen; Oxycodone tablets  What is this medicine?  ACETAMINOPHEN; OXYCODONE (a set a ZAKI yadi fen; ox i KOE done) is a pain reliever. It is used to treat moderate to severe pain.  How should I use this medicine?  Take this medicine by mouth with a full glass of water. Follow the directions on the prescription label. You can take it with or without food. If it upsets your stomach, take it with food. Take your medicine at regular intervals. Do not take it more often than directed.  A special MedGuide will be given to you by the pharmacist with each prescription and refill. Be sure to read this information carefully each time.  Talk to your pediatrician regarding the use of this medicine in children. Special care may be needed.  What side effects may I notice from  receiving this medicine?  Side effects that you should report to your doctor or health care professional as soon as possible:  · allergic reactions like skin rash, itching or hives, swelling of the face, lips, or tongue  · breathing problems  · confusion  · redness, blistering, peeling or loosening of the skin, including inside the mouth  · signs and symptoms of liver injury like dark yellow or brown urine; general ill feeling or flu-like symptoms; light-colored stools; loss of appetite; nausea; right upper belly pain; unusually weak or tired; yellowing of the eyes or skin  · signs and symptoms of low blood pressure like dizziness; feeling faint or lightheaded, falls; unusually weak or tired  · trouble passing urine or change in the amount of urine  Side effects that usually do not require medical attention (report to your doctor or health care professional if they continue or are bothersome):  · constipation  · dry mouth  · nausea, vomiting  · tiredness  What may interact with this medicine?  This medicine may interact with the following medications:  · alcohol  · antihistamines for allergy, cough and cold  · antiviral medicines for HIV or AIDS  · atropine  · certain antibiotics like clarithromycin, erythromycin, linezolid, rifampin  · certain medicines for anxiety or sleep  · certain medicines for bladder problems like oxybutynin, tolterodine  · certain medicines for depression like amitriptyline, fluoxetine, sertraline  · certain medicines for fungal infections like ketoconazole, itraconazole, voriconazole  · certain medicines for migraine headache like almotriptan, eletriptan, frovatriptan, naratriptan, rizatriptan, sumatriptan, zolmitriptan  · certain medicines for nausea or vomiting like dolasetron, ondansetron, palonosetron  · certain medicines for Parkinson's disease like benztropine, trihexyphenidyl  · certain medicines for seizures like phenobarbital, phenytoin, primidone  · certain medicines for stomach  problems like dicyclomine, hyoscyamine  · certain medicines for travel sickness like scopolamine  · diuretics  · general anesthetics like halothane, isoflurane, methoxyflurane, propofol  · ipratropium  · local anesthetics like lidocaine, pramoxine, tetracaine  · MAOIs like Carbex, Eldepryl, Marplan, Nardil, and Parnate  · medicines that relax muscles for surgery  · methylene blue  · nilotinib  · other medicines with acetaminophen  · other narcotic medicines for pain or cough  · phenothiazines like chlorpromazine, mesoridazine, prochlorperazine, thioridazine  What if I miss a dose?  If you miss a dose, take it as soon as you can. If it is almost time for your next dose, take only that dose. Do not take double or extra doses.  Where should I keep my medicine?  Keep out of the reach of children. This medicine can be abused. Keep your medicine in a safe place to protect it from theft. Do not share this medicine with anyone. Selling or giving away this medicine is dangerous and against the law.  This medicine may cause accidental overdose and death if it taken by other adults, children, or pets. Mix any unused medicine with a substance like cat litter or coffee grounds. Then throw the medicine away in a sealed container like a sealed bag or a coffee can with a lid. Do not use the medicine after the expiration date.  Store at room temperature between 20 and 25 degrees C (68 and 77 degrees F).  What should I tell my health care provider before I take this medicine?  They need to know if you have any of these conditions:  · brain tumor  · Crohn's disease, inflammatory bowel disease, or ulcerative colitis  · drug abuse or addiction  · head injury  · heart or circulation problems  · if you often drink alcohol  · kidney disease or problems going to the bathroom  · liver disease  · lung disease, asthma, or breathing problems  · an unusual or allergic reaction to acetaminophen, oxycodone, other opioid analgesics, other  medicines, foods, dyes, or preservatives  · pregnant or trying to get pregnant  · breast-feeding  What should I watch for while using this medicine?  Tell your doctor or health care professional if your pain does not go away, if it gets worse, or if you have new or a different type of pain. You may develop tolerance to the medicine. Tolerance means that you will need a higher dose of the medication for pain relief. Tolerance is normal and is expected if you take this medicine for a long time.  Do not suddenly stop taking your medicine because you may develop a severe reaction. Your body becomes used to the medicine. This does NOT mean you are addicted. Addiction is a behavior related to getting and using a drug for a non-medical reason. If you have pain, you have a medical reason to take pain medicine. Your doctor will tell you how much medicine to take. If your doctor wants you to stop the medicine, the dose will be slowly lowered over time to avoid any side effects.  There are different types of narcotic medicines (opiates). If you take more than one type at the same time or if you are taking another medicine that also causes drowsiness, you may have more side effects. Give your health care provider a list of all medicines you use. Your doctor will tell you how much medicine to take. Do not take more medicine than directed. Call emergency for help if you have problems breathing or unusual sleepiness.  Do not take other medicines that contain acetaminophen with this medicine. Always read labels carefully. If you have questions, ask your doctor or pharmacist.  If you take too much acetaminophen get medical help right away. Too much acetaminophen can be very dangerous and cause liver damage. Even if you do not have symptoms, it is important to get help right away.  You may get drowsy or dizzy. Do not drive, use machinery, or do anything that needs mental alertness until you know how this medicine affects you. Do not  stand or sit up quickly, especially if you are an older patient. This reduces the risk of dizzy or fainting spells. Alcohol may interfere with the effect of this medicine. Avoid alcoholic drinks.  The medicine will cause constipation. Try to have a bowel movement at least every 2 to 3 days. If you do not have a bowel movement for 3 days, call your doctor or health care professional.  Your mouth may get dry. Chewing sugarless gum or sucking hard candy, and drinking plenty or water may help. Contact your doctor if the problem does not go away or is severe.  NOTE:This sheet is a summary. It may not cover all possible information. If you have questions about this medicine, talk to your doctor, pharmacist, or health care provider. Copyright© 2017 [employer]

## 2020-10-02 NOTE — PLAN OF CARE
Patient has met PACU discharge criteria, VSS, pain well controlled. Family updated by phone. Released from PACU by Dr. Wharton

## 2020-10-07 ENCOUNTER — PATIENT MESSAGE (OUTPATIENT)
Dept: OBSTETRICS AND GYNECOLOGY | Facility: CLINIC | Age: 35
End: 2020-10-07

## 2020-10-07 LAB
FINAL PATHOLOGIC DIAGNOSIS: NORMAL
GROSS: NORMAL
Lab: NORMAL

## 2020-10-13 ENCOUNTER — PATIENT MESSAGE (OUTPATIENT)
Dept: FAMILY MEDICINE | Facility: CLINIC | Age: 35
End: 2020-10-13

## 2020-10-15 ENCOUNTER — OFFICE VISIT (OUTPATIENT)
Dept: OBSTETRICS AND GYNECOLOGY | Facility: CLINIC | Age: 35
End: 2020-10-15
Payer: COMMERCIAL

## 2020-10-15 VITALS
WEIGHT: 122.56 LBS | DIASTOLIC BLOOD PRESSURE: 74 MMHG | SYSTOLIC BLOOD PRESSURE: 120 MMHG | HEIGHT: 66 IN | BODY MASS INDEX: 19.7 KG/M2

## 2020-10-15 DIAGNOSIS — Z98.890 H/O LEEP: Primary | ICD-10-CM

## 2020-10-15 PROCEDURE — 99024 PR POST-OP FOLLOW-UP VISIT: ICD-10-PCS | Mod: S$GLB,,, | Performed by: OBSTETRICS & GYNECOLOGY

## 2020-10-15 PROCEDURE — 99024 POSTOP FOLLOW-UP VISIT: CPT | Mod: S$GLB,,, | Performed by: OBSTETRICS & GYNECOLOGY

## 2020-10-15 PROCEDURE — 99999 PR PBB SHADOW E&M-EST. PATIENT-LVL III: CPT | Mod: PBBFAC,,, | Performed by: OBSTETRICS & GYNECOLOGY

## 2020-10-15 PROCEDURE — 99999 PR PBB SHADOW E&M-EST. PATIENT-LVL III: ICD-10-PCS | Mod: PBBFAC,,, | Performed by: OBSTETRICS & GYNECOLOGY

## 2020-10-15 RX ORDER — ETONOGESTREL AND ETHINYL ESTRADIOL VAGINAL RING .015; .12 MG/D; MG/D
1 RING VAGINAL
Qty: 1 EACH | Refills: 11 | Status: SHIPPED | OUTPATIENT
Start: 2020-10-15 | End: 2020-11-14

## 2020-10-15 RX ORDER — METRONIDAZOLE 500 MG/1
500 TABLET ORAL EVERY 12 HOURS
Qty: 14 TABLET | Refills: 0 | Status: SHIPPED | OUTPATIENT
Start: 2020-10-15 | End: 2020-10-22

## 2020-10-15 RX ORDER — METRONIDAZOLE 7.5 MG/G
1 GEL VAGINAL NIGHTLY
Qty: 70 G | Refills: 0 | Status: SHIPPED | OUTPATIENT
Start: 2020-10-15 | End: 2020-10-21

## 2020-10-15 NOTE — PROGRESS NOTES
CC: s/p leep    HPI:   Kan Traore is a 35 y.o. here for f/u conization on 10/2/20. She reports normal bowel movements and urination. Pain is controlled with OTC medications.  Desires nuvaring for contraception.     Vitals:    10/15/20 0839   BP: 120/74       PHYSICAL EXAM:   APPEARANCE: Well nourished, well developed, in no acute distress.  ABDOMEN: Soft. No tenderness or masses. No hernias.   PELVIC:   VULVA: No lesions. Normal female genitalia.  URETHRAL MEATUS: Normal size and location, no lesions, no prolapse.  URETHRA: No masses, tenderness, prolapse or scarring.  VAGINA: Moist and well rugated, no discharge, no significant cystocele or rectocele.  CERVIX: No lesions and discharge. Well healed cone bed, discharge with slight odor c/w BV   UTERUS: Normal size, regular shape, mobile, non-tender, bladder base nontender.  ADNEXA: No masses or tenderness.    Pathology: 1.  CERVICAL LEEP CONE BIOPSY, ANTERIOR AT 12:00 O'CLOCK SHOWS FOCAL MILD   SQUAMOUS DYSPLASIA AND KOILOCYTOTIC ATYPIA (CIN1).  LOW-GRADE DYSPLASIA IS   PRESENT AT THE INKED 12:00 O'CLOCK MARGIN.   2.  CERVICAL LEEP CONE BIOPSY, ANTERIOR AT 6:00 O'CLOCK SHOWS FOCAL MILD   SQUAMOUS DYSPLASIA AND KOILOCYTOTIC ATYPIA (CIN1).  DYSPLASIA DOES NOT   INVOLVE THE INKED MARGIN OF THIS CONE BIOPSY.   3.  ENDOCERVICAL CURETTAGE SHOWS FRAGMENTS OF BENIGN ENDOCERVICAL TISSUE WITH   FOCAL SQUAMOUS METAPLASIA ADMIXED WITH MUCUS, NO DYSPLASIA IDENTIFIED.     1. H/O LEEP          PLAN:   1. Repeat pap smear in a 1 year  2. Sent in tx for BV  3. RTC for final check in 2-4 weeks

## 2020-10-19 ENCOUNTER — PATIENT MESSAGE (OUTPATIENT)
Dept: FAMILY MEDICINE | Facility: CLINIC | Age: 35
End: 2020-10-19

## 2020-10-19 DIAGNOSIS — F33.42 RECURRENT MAJOR DEPRESSIVE DISORDER, IN FULL REMISSION: ICD-10-CM

## 2020-10-19 DIAGNOSIS — F90.0 ATTENTION DEFICIT HYPERACTIVITY DISORDER (ADHD), PREDOMINANTLY INATTENTIVE TYPE: ICD-10-CM

## 2020-10-19 DIAGNOSIS — F41.1 GENERALIZED ANXIETY DISORDER: ICD-10-CM

## 2020-10-19 RX ORDER — DEXTROAMPHETAMINE SACCHARATE, AMPHETAMINE ASPARTATE MONOHYDRATE, DEXTROAMPHETAMINE SULFATE AND AMPHETAMINE SULFATE 7.5; 7.5; 7.5; 7.5 MG/1; MG/1; MG/1; MG/1
30 CAPSULE, EXTENDED RELEASE ORAL EVERY MORNING
Qty: 30 CAPSULE | Refills: 0 | Status: SHIPPED | OUTPATIENT
Start: 2020-10-19 | End: 2020-11-27 | Stop reason: SDUPTHER

## 2020-10-19 RX ORDER — CITALOPRAM 40 MG/1
40 TABLET, FILM COATED ORAL DAILY
Qty: 90 TABLET | Refills: 1 | Status: SHIPPED | OUTPATIENT
Start: 2020-10-19 | End: 2021-03-09 | Stop reason: SDUPTHER

## 2020-11-25 PROBLEM — S29.012A STRAIN OF RHOMBOID MUSCLE: Status: RESOLVED | Noted: 2020-08-10 | Resolved: 2020-11-25

## 2020-11-25 NOTE — PROGRESS NOTES
VIRTUAL VISIT/TELEMEDICINE VISIT  FAMILY MEDICINE  [facility]    The patient location is: [place]  The chief complaint leading to consultation is: followed ADHD  Visit type: Virtual visit with synchronous audio and video with Doximity  Total time spent with patient: 25 mins  Each patient to whom he or she provides medical services by telemedicine is:  (1) informed of the relationship between the physician and patient and the respective role of any other health care provider with respect to management of the patient; and (2) notified that he or she may decline to receive medical services by telemedicine and may withdraw from such care at any time.    CC:   Chief Complaint   Patient presents with    Follow-up    ADHD       SUBJECTIVE:  Kan Traore   is a 35 y.o. female  - with ADHD, mild intermittent asthma, depression and anxiety presents for follow-up ADHD    1. 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          ROS: Review of Systems   Constitutional: Negative.    HENT: Negative.    Eyes: Negative.    Respiratory: Negative.    Cardiovascular: Negative.    Gastrointestinal: Negative.    Endocrine: Negative.    Genitourinary: Negative.    Musculoskeletal: Negative.    Skin: Negative.    Allergic/Immunologic: Negative.    Neurological: Negative.    Hematological: Negative.     Psychiatric/Behavioral: Negative.        Past Medical History:   Diagnosis Date    Allergic rhinitis     Anxiety     Asthma     Attention deficit disorder (ADD)     Bilateral nephrolithiasis     Depression     Hydronephrosis     Urinary tract infection     Vaginal infection        Past Surgical History:   Procedure Laterality Date    APPENDECTOMY  2017    DILATION AND CURETTAGE OF UTERUS  2014    KIDNEY STONE SURGERY      LITHOTRIPSY      LITHOTRIPSY  2017    LOOP ELECTROSURGICAL EXCISION PROCEDURE (LEEP) N/A 10/2/2020    Procedure: LEEP (LOOP ELECTROSURGICAL EXCISION PROCEDURE);  Surgeon: Kacy Elias MD;  Location: [facility] OR;  Service: OB/GYN;  Laterality: N/A;    TONSILLECTOMY         Family History   Problem Relation Age of Onset    Hypertension Mother 50    Hypothyroidism Father 52    Stroke Maternal Grandfather     Hypertension Maternal Grandfather     Heart disease Maternal Grandfather 56         of AMI    Heart failure Paternal Grandmother 81    No Known Problems Son     No Known Problems Son     No Known Problems Son     Breast cancer Neg Hx     Colon cancer Neg Hx     Ovarian cancer Neg Hx     Kidney disease Neg Hx        Social History     Tobacco Use    Smoking status: Never Smoker    Smokeless tobacco: Never Used   Substance Use Topics    Alcohol use: No    Drug use: No       Social History     Social History Narrative    .  Myron filed for divorce 2020.  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  travels for work.        ALLERGIES: Review of patient's allergies indicates:  No Known Allergies    MEDS:   Current Outpatient Medications:     albuterol (PROVENTIL/VENTOLIN HFA) 90 mcg/actuation inhaler, Inhale 2 puffs into the lungs every 6 (six) hours as needed for Wheezing. Rescue, Disp: 8.5 g, Rfl: 0    ALPRAZolam (XANAX) 0.5 MG tablet, Take 1 tablet (0.5 mg total) by mouth 3 (three) times daily as needed  for Insomnia or Anxiety., Disp: 30 tablet, Rfl: 0    citalopram (CELEXA) 40 MG tablet, Take 1 tablet (40 mg total) by mouth once daily., Disp: 90 tablet, Rfl: 1    dextroamphetamine-amphetamine (ADDERALL XR) 30 MG 24 hr capsule, Take 1 capsule (30 mg total) by mouth every morning., Disp: 30 capsule, Rfl: 0    dextroamphetamine-amphetamine 10 mg Tab, Take 1 tablet (10 mg total) by mouth once daily. After lunch, Disp: 30 tablet, Rfl: 0    ibuprofen (ADVIL,MOTRIN) 800 MG tablet, Take 1 tablet (800 mg total) by mouth every 6 (six) hours as needed for Pain., Disp: 30 tablet, Rfl: 2    naproxen sodium (ANAPROX) 550 MG tablet, Take 1 tablet (550 mg total) by mouth 3 (three) times daily., Disp: 30 tablet, Rfl: 2    norethindrone-ethinyl estradiol (MICROGESTIN 1/20) 1-20 mg-mcg per tablet, Take 1 tablet by mouth once daily. (Patient not taking: Reported on 10/15/2020), Disp: 30 tablet, Rfl: 6    norgestimate-ethinyl estradioL (ORTHO-CYCLEN) 0.25-35 mg-mcg per tablet, Take 1 tablet by mouth once daily., Disp: 28 tablet, Rfl: 0    ondansetron (ZOFRAN-ODT) 4 MG TbDL, Dissolve 1 tablet (4 mg total) by mouth every 8 (eight) hours as needed., Disp: 20 tablet, Rfl: 0    RETIN-A MICRO PUMP 0.06 % GlwP, , Disp: , Rfl:     OBJECTIVE:   There were no vitals filed for this visit.  There is no height or weight on file to calculate BMI.    Physical Exam  Pulmonary:      Effort: Pulmonary effort is normal.   Neurological:      Mental Status: She is alert.   Psychiatric:         Mood and Affect: Mood and affect normal.         Speech: Speech normal.         Behavior: Behavior normal.         ASSESSMENT/PLAN:  Problem List Items Addressed This Visit        Psychiatric    Attention deficit hyperactivity disorder (ADHD), predominantly inattentive type - Primary    Overview     -  reviewed  - well controlled  - continue current medication         Relevant Medications    dextroamphetamine-amphetamine (ADDERALL XR) 30 MG 24 hr capsule     Depression    Overview     - well controlled  - continue current medication         Generalized anxiety disorder    Overview     - well controlled  - continue current medication           Other Visit Diagnoses     Need for hepatitis C screening test        Relevant Orders    Hepatitis C Antibody    Encounter for lipid screening for cardiovascular disease        Relevant Orders    Lipid Panel    Screening for metabolic disorder        Relevant Orders    Comprehensive Metabolic Panel    Screening, iron deficiency anemia        Relevant Orders    CBC Without Differential    Screening for thyroid disorder        Relevant Orders    TSH    T4, Free          ORDERS:   Orders Placed This Encounter    Comprehensive Metabolic Panel    CBC Without Differential    Lipid Panel    TSH    T4, Free    Hepatitis C Antibody    dextroamphetamine-amphetamine (ADDERALL XR) 30 MG 24 hr capsule       Follow-up in 3 months with labs and physical (in office) or sooner if any concerns    Dr. Cielo Mina D.O.   Family Medicine

## 2020-11-27 ENCOUNTER — OFFICE VISIT (OUTPATIENT)
Dept: FAMILY MEDICINE | Facility: CLINIC | Age: 35
End: 2020-11-27
Payer: COMMERCIAL

## 2020-11-27 DIAGNOSIS — Z13.228 SCREENING FOR METABOLIC DISORDER: ICD-10-CM

## 2020-11-27 DIAGNOSIS — F90.0 ATTENTION DEFICIT HYPERACTIVITY DISORDER (ADHD), PREDOMINANTLY INATTENTIVE TYPE: Primary | ICD-10-CM

## 2020-11-27 DIAGNOSIS — Z13.29 SCREENING FOR THYROID DISORDER: ICD-10-CM

## 2020-11-27 DIAGNOSIS — Z13.220 ENCOUNTER FOR LIPID SCREENING FOR CARDIOVASCULAR DISEASE: ICD-10-CM

## 2020-11-27 DIAGNOSIS — F41.1 GENERALIZED ANXIETY DISORDER: ICD-10-CM

## 2020-11-27 DIAGNOSIS — Z13.0 SCREENING, IRON DEFICIENCY ANEMIA: ICD-10-CM

## 2020-11-27 DIAGNOSIS — Z11.59 NEED FOR HEPATITIS C SCREENING TEST: ICD-10-CM

## 2020-11-27 DIAGNOSIS — Z13.6 ENCOUNTER FOR LIPID SCREENING FOR CARDIOVASCULAR DISEASE: ICD-10-CM

## 2020-11-27 DIAGNOSIS — F33.42 RECURRENT MAJOR DEPRESSIVE DISORDER, IN FULL REMISSION: ICD-10-CM

## 2020-11-27 PROCEDURE — 99214 OFFICE O/P EST MOD 30 MIN: CPT | Mod: 95,,, | Performed by: FAMILY MEDICINE

## 2020-11-27 PROCEDURE — 99214 PR OFFICE/OUTPT VISIT, EST, LEVL IV, 30-39 MIN: ICD-10-PCS | Mod: 95,,, | Performed by: FAMILY MEDICINE

## 2020-11-27 RX ORDER — DEXTROAMPHETAMINE SACCHARATE, AMPHETAMINE ASPARTATE MONOHYDRATE, DEXTROAMPHETAMINE SULFATE AND AMPHETAMINE SULFATE 7.5; 7.5; 7.5; 7.5 MG/1; MG/1; MG/1; MG/1
30 CAPSULE, EXTENDED RELEASE ORAL EVERY MORNING
Qty: 30 CAPSULE | Refills: 0 | Status: SHIPPED | OUTPATIENT
Start: 2020-11-27 | End: 2020-12-22

## 2020-12-22 ENCOUNTER — PATIENT MESSAGE (OUTPATIENT)
Dept: FAMILY MEDICINE | Facility: CLINIC | Age: 35
End: 2020-12-22

## 2020-12-22 NOTE — TELEPHONE ENCOUNTER
Refill already authorized earlier today from pharmacy request  Dr. Cielo Mina D.O.   [facility]

## 2021-01-21 ENCOUNTER — PATIENT MESSAGE (OUTPATIENT)
Dept: FAMILY MEDICINE | Facility: CLINIC | Age: 36
End: 2021-01-21

## 2021-01-21 DIAGNOSIS — F90.0 ATTENTION DEFICIT HYPERACTIVITY DISORDER (ADHD), PREDOMINANTLY INATTENTIVE TYPE: ICD-10-CM

## 2021-01-21 RX ORDER — DEXTROAMPHETAMINE SACCHARATE, AMPHETAMINE ASPARTATE MONOHYDRATE, DEXTROAMPHETAMINE SULFATE AND AMPHETAMINE SULFATE 7.5; 7.5; 7.5; 7.5 MG/1; MG/1; MG/1; MG/1
30 CAPSULE, EXTENDED RELEASE ORAL EVERY MORNING
Qty: 30 CAPSULE | Refills: 0 | Status: SHIPPED | OUTPATIENT
Start: 2021-01-21 | End: 2021-02-05 | Stop reason: SDUPTHER

## 2021-02-01 ENCOUNTER — PATIENT MESSAGE (OUTPATIENT)
Dept: FAMILY MEDICINE | Facility: CLINIC | Age: 36
End: 2021-02-01

## 2021-02-05 ENCOUNTER — PATIENT MESSAGE (OUTPATIENT)
Dept: FAMILY MEDICINE | Facility: CLINIC | Age: 36
End: 2021-02-05

## 2021-02-05 DIAGNOSIS — F90.0 ATTENTION DEFICIT HYPERACTIVITY DISORDER (ADHD), PREDOMINANTLY INATTENTIVE TYPE: ICD-10-CM

## 2021-02-05 RX ORDER — DEXTROAMPHETAMINE SACCHARATE, AMPHETAMINE ASPARTATE MONOHYDRATE, DEXTROAMPHETAMINE SULFATE AND AMPHETAMINE SULFATE 7.5; 7.5; 7.5; 7.5 MG/1; MG/1; MG/1; MG/1
30 CAPSULE, EXTENDED RELEASE ORAL EVERY MORNING
Qty: 30 CAPSULE | Refills: 0 | Status: SHIPPED | OUTPATIENT
Start: 2021-02-05 | End: 2021-03-08 | Stop reason: SDUPTHER

## 2021-02-08 ENCOUNTER — TELEPHONE (OUTPATIENT)
Dept: PHARMACY | Facility: CLINIC | Age: 36
End: 2021-02-08

## 2021-03-09 ENCOUNTER — OFFICE VISIT (OUTPATIENT)
Dept: FAMILY MEDICINE | Facility: CLINIC | Age: 36
End: 2021-03-09
Payer: COMMERCIAL

## 2021-03-09 VITALS
HEART RATE: 86 BPM | RESPIRATION RATE: 18 BRPM | BODY MASS INDEX: 19.63 KG/M2 | HEIGHT: 66 IN | SYSTOLIC BLOOD PRESSURE: 118 MMHG | TEMPERATURE: 97 F | OXYGEN SATURATION: 96 % | WEIGHT: 122.13 LBS | DIASTOLIC BLOOD PRESSURE: 70 MMHG

## 2021-03-09 DIAGNOSIS — F90.0 ATTENTION DEFICIT HYPERACTIVITY DISORDER (ADHD), PREDOMINANTLY INATTENTIVE TYPE: ICD-10-CM

## 2021-03-09 DIAGNOSIS — R11.0 NAUSEA: ICD-10-CM

## 2021-03-09 DIAGNOSIS — F33.42 RECURRENT MAJOR DEPRESSIVE DISORDER, IN FULL REMISSION: ICD-10-CM

## 2021-03-09 DIAGNOSIS — F41.1 GENERALIZED ANXIETY DISORDER: ICD-10-CM

## 2021-03-09 DIAGNOSIS — E03.8 SUBCLINICAL HYPOTHYROIDISM: ICD-10-CM

## 2021-03-09 DIAGNOSIS — Z00.01 ENCOUNTER FOR GENERAL ADULT MEDICAL EXAMINATION WITH ABNORMAL FINDINGS: Primary | ICD-10-CM

## 2021-03-09 DIAGNOSIS — R73.9 HYPERGLYCEMIA: ICD-10-CM

## 2021-03-09 PROCEDURE — 99395 PREV VISIT EST AGE 18-39: CPT | Mod: S$GLB,,, | Performed by: FAMILY MEDICINE

## 2021-03-09 PROCEDURE — 99395 PR PREVENTIVE VISIT,EST,18-39: ICD-10-PCS | Mod: S$GLB,,, | Performed by: FAMILY MEDICINE

## 2021-03-09 PROCEDURE — 99999 PR PBB SHADOW E&M-EST. PATIENT-LVL IV: CPT | Mod: PBBFAC,,, | Performed by: FAMILY MEDICINE

## 2021-03-09 PROCEDURE — 99999 PR PBB SHADOW E&M-EST. PATIENT-LVL IV: ICD-10-PCS | Mod: PBBFAC,,, | Performed by: FAMILY MEDICINE

## 2021-03-09 RX ORDER — ETONOGESTREL AND ETHINYL ESTRADIOL VAGINAL RING .015; .12 MG/D; MG/D
RING VAGINAL
COMMUNITY
Start: 2020-12-22 | End: 2021-09-15 | Stop reason: SDUPTHER

## 2021-03-09 RX ORDER — DEXTROAMPHETAMINE SACCHARATE, AMPHETAMINE ASPARTATE MONOHYDRATE, DEXTROAMPHETAMINE SULFATE AND AMPHETAMINE SULFATE 7.5; 7.5; 7.5; 7.5 MG/1; MG/1; MG/1; MG/1
30 CAPSULE, EXTENDED RELEASE ORAL EVERY MORNING
Qty: 30 CAPSULE | Refills: 0 | Status: SHIPPED | OUTPATIENT
Start: 2021-03-09 | End: 2021-03-09

## 2021-03-09 RX ORDER — DEXTROAMPHETAMINE SACCHARATE, AMPHETAMINE ASPARTATE, DEXTROAMPHETAMINE SULFATE AND AMPHETAMINE SULFATE 2.5; 2.5; 2.5; 2.5 MG/1; MG/1; MG/1; MG/1
1 TABLET ORAL DAILY
Qty: 30 TABLET | Refills: 0 | Status: SHIPPED | OUTPATIENT
Start: 2021-03-09 | End: 2021-03-09

## 2021-03-09 RX ORDER — CITALOPRAM 20 MG/1
20 TABLET, FILM COATED ORAL DAILY
Qty: 90 TABLET | Refills: 3 | Status: SHIPPED | OUTPATIENT
Start: 2021-03-09 | End: 2021-08-13 | Stop reason: SDUPTHER

## 2021-03-09 RX ORDER — DEXTROAMPHETAMINE SACCHARATE, AMPHETAMINE ASPARTATE, DEXTROAMPHETAMINE SULFATE AND AMPHETAMINE SULFATE 2.5; 2.5; 2.5; 2.5 MG/1; MG/1; MG/1; MG/1
1 TABLET ORAL DAILY
Qty: 30 TABLET | Refills: 0 | Status: SHIPPED | OUTPATIENT
Start: 2021-03-09 | End: 2021-04-08 | Stop reason: SDUPTHER

## 2021-03-09 RX ORDER — ONDANSETRON 4 MG/1
4 TABLET, ORALLY DISINTEGRATING ORAL EVERY 8 HOURS PRN
Qty: 20 TABLET | Refills: 0 | Status: SHIPPED | OUTPATIENT
Start: 2021-03-09 | End: 2021-04-06

## 2021-03-09 RX ORDER — DEXTROAMPHETAMINE SACCHARATE, AMPHETAMINE ASPARTATE MONOHYDRATE, DEXTROAMPHETAMINE SULFATE AND AMPHETAMINE SULFATE 7.5; 7.5; 7.5; 7.5 MG/1; MG/1; MG/1; MG/1
30 CAPSULE, EXTENDED RELEASE ORAL EVERY MORNING
Qty: 30 CAPSULE | Refills: 0 | Status: SHIPPED | OUTPATIENT
Start: 2021-03-09 | End: 2021-04-08 | Stop reason: SDUPTHER

## 2021-03-09 RX ORDER — DEXTROAMPHETAMINE SACCHARATE, AMPHETAMINE ASPARTATE, DEXTROAMPHETAMINE SULFATE AND AMPHETAMINE SULFATE 2.5; 2.5; 2.5; 2.5 MG/1; MG/1; MG/1; MG/1
1 TABLET ORAL DAILY
Qty: 30 TABLET | Refills: 0 | Status: SHIPPED | OUTPATIENT
Start: 2021-03-09 | End: 2021-03-09 | Stop reason: SDUPTHER

## 2021-04-08 ENCOUNTER — PATIENT MESSAGE (OUTPATIENT)
Dept: FAMILY MEDICINE | Facility: CLINIC | Age: 36
End: 2021-04-08

## 2021-04-08 DIAGNOSIS — F90.0 ATTENTION DEFICIT HYPERACTIVITY DISORDER (ADHD), PREDOMINANTLY INATTENTIVE TYPE: ICD-10-CM

## 2021-04-08 RX ORDER — DEXTROAMPHETAMINE SACCHARATE, AMPHETAMINE ASPARTATE MONOHYDRATE, DEXTROAMPHETAMINE SULFATE AND AMPHETAMINE SULFATE 7.5; 7.5; 7.5; 7.5 MG/1; MG/1; MG/1; MG/1
30 CAPSULE, EXTENDED RELEASE ORAL EVERY MORNING
Qty: 30 CAPSULE | Refills: 0 | Status: SHIPPED | OUTPATIENT
Start: 2021-04-08 | End: 2021-05-11 | Stop reason: SDUPTHER

## 2021-04-08 RX ORDER — DEXTROAMPHETAMINE SACCHARATE, AMPHETAMINE ASPARTATE, DEXTROAMPHETAMINE SULFATE AND AMPHETAMINE SULFATE 2.5; 2.5; 2.5; 2.5 MG/1; MG/1; MG/1; MG/1
1 TABLET ORAL DAILY
Qty: 30 TABLET | Refills: 0 | Status: SHIPPED | OUTPATIENT
Start: 2021-04-08 | End: 2021-06-09 | Stop reason: SDUPTHER

## 2021-05-11 ENCOUNTER — PATIENT MESSAGE (OUTPATIENT)
Dept: FAMILY MEDICINE | Facility: CLINIC | Age: 36
End: 2021-05-11

## 2021-05-11 DIAGNOSIS — F90.0 ATTENTION DEFICIT HYPERACTIVITY DISORDER (ADHD), PREDOMINANTLY INATTENTIVE TYPE: ICD-10-CM

## 2021-05-11 RX ORDER — DEXTROAMPHETAMINE SACCHARATE, AMPHETAMINE ASPARTATE MONOHYDRATE, DEXTROAMPHETAMINE SULFATE AND AMPHETAMINE SULFATE 7.5; 7.5; 7.5; 7.5 MG/1; MG/1; MG/1; MG/1
30 CAPSULE, EXTENDED RELEASE ORAL EVERY MORNING
Qty: 30 CAPSULE | Refills: 0 | Status: SHIPPED | OUTPATIENT
Start: 2021-05-11 | End: 2021-05-11

## 2021-05-11 RX ORDER — DEXTROAMPHETAMINE SACCHARATE, AMPHETAMINE ASPARTATE MONOHYDRATE, DEXTROAMPHETAMINE SULFATE AND AMPHETAMINE SULFATE 7.5; 7.5; 7.5; 7.5 MG/1; MG/1; MG/1; MG/1
30 CAPSULE, EXTENDED RELEASE ORAL EVERY MORNING
Qty: 30 CAPSULE | Refills: 0 | Status: SHIPPED | OUTPATIENT
Start: 2021-05-11 | End: 2021-06-09 | Stop reason: SDUPTHER

## 2021-06-09 ENCOUNTER — PATIENT MESSAGE (OUTPATIENT)
Dept: FAMILY MEDICINE | Facility: CLINIC | Age: 36
End: 2021-06-09

## 2021-06-09 DIAGNOSIS — F90.0 ATTENTION DEFICIT HYPERACTIVITY DISORDER (ADHD), PREDOMINANTLY INATTENTIVE TYPE: ICD-10-CM

## 2021-06-09 RX ORDER — DEXTROAMPHETAMINE SACCHARATE, AMPHETAMINE ASPARTATE, DEXTROAMPHETAMINE SULFATE AND AMPHETAMINE SULFATE 2.5; 2.5; 2.5; 2.5 MG/1; MG/1; MG/1; MG/1
1 TABLET ORAL DAILY
Qty: 8 TABLET | Refills: 0 | Status: SHIPPED | OUTPATIENT
Start: 2021-06-09 | End: 2021-06-09 | Stop reason: SDUPTHER

## 2021-06-09 RX ORDER — DEXTROAMPHETAMINE SACCHARATE, AMPHETAMINE ASPARTATE, DEXTROAMPHETAMINE SULFATE AND AMPHETAMINE SULFATE 2.5; 2.5; 2.5; 2.5 MG/1; MG/1; MG/1; MG/1
1 TABLET ORAL DAILY
Qty: 8 TABLET | Refills: 0 | Status: SHIPPED | OUTPATIENT
Start: 2021-06-09 | End: 2021-06-09

## 2021-06-09 RX ORDER — DEXTROAMPHETAMINE SACCHARATE, AMPHETAMINE ASPARTATE, DEXTROAMPHETAMINE SULFATE AND AMPHETAMINE SULFATE 2.5; 2.5; 2.5; 2.5 MG/1; MG/1; MG/1; MG/1
1 TABLET ORAL DAILY
Qty: 8 TABLET | Refills: 0 | Status: SHIPPED | OUTPATIENT
Start: 2021-06-09 | End: 2021-06-17 | Stop reason: SDUPTHER

## 2021-06-09 RX ORDER — DEXTROAMPHETAMINE SACCHARATE, AMPHETAMINE ASPARTATE MONOHYDRATE, DEXTROAMPHETAMINE SULFATE AND AMPHETAMINE SULFATE 7.5; 7.5; 7.5; 7.5 MG/1; MG/1; MG/1; MG/1
30 CAPSULE, EXTENDED RELEASE ORAL EVERY MORNING
Qty: 8 CAPSULE | Refills: 0 | Status: SHIPPED | OUTPATIENT
Start: 2021-06-09 | End: 2021-06-09

## 2021-06-09 RX ORDER — DEXTROAMPHETAMINE SACCHARATE, AMPHETAMINE ASPARTATE MONOHYDRATE, DEXTROAMPHETAMINE SULFATE AND AMPHETAMINE SULFATE 7.5; 7.5; 7.5; 7.5 MG/1; MG/1; MG/1; MG/1
30 CAPSULE, EXTENDED RELEASE ORAL EVERY MORNING
Qty: 8 CAPSULE | Refills: 0 | Status: SHIPPED | OUTPATIENT
Start: 2021-06-09 | End: 2021-06-17 | Stop reason: SDUPTHER

## 2021-06-09 RX ORDER — DEXTROAMPHETAMINE SACCHARATE, AMPHETAMINE ASPARTATE MONOHYDRATE, DEXTROAMPHETAMINE SULFATE AND AMPHETAMINE SULFATE 7.5; 7.5; 7.5; 7.5 MG/1; MG/1; MG/1; MG/1
30 CAPSULE, EXTENDED RELEASE ORAL EVERY MORNING
Qty: 8 CAPSULE | Refills: 0 | Status: SHIPPED | OUTPATIENT
Start: 2021-06-09 | End: 2021-06-09 | Stop reason: SDUPTHER

## 2021-06-13 ENCOUNTER — PATIENT MESSAGE (OUTPATIENT)
Dept: FAMILY MEDICINE | Facility: CLINIC | Age: 36
End: 2021-06-13

## 2021-06-13 DIAGNOSIS — E03.8 SUBCLINICAL HYPOTHYROIDISM: ICD-10-CM

## 2021-06-13 DIAGNOSIS — R73.9 HYPERGLYCEMIA: ICD-10-CM

## 2021-06-14 PROBLEM — Z00.01 ENCOUNTER FOR GENERAL ADULT MEDICAL EXAMINATION WITH ABNORMAL FINDINGS: Status: RESOLVED | Noted: 2021-03-09 | Resolved: 2021-06-14

## 2021-06-17 ENCOUNTER — TELEPHONE (OUTPATIENT)
Dept: FAMILY MEDICINE | Facility: CLINIC | Age: 36
End: 2021-06-17

## 2021-06-17 ENCOUNTER — OFFICE VISIT (OUTPATIENT)
Dept: FAMILY MEDICINE | Facility: CLINIC | Age: 36
End: 2021-06-17
Payer: COMMERCIAL

## 2021-06-17 DIAGNOSIS — F90.0 ATTENTION DEFICIT HYPERACTIVITY DISORDER (ADHD), PREDOMINANTLY INATTENTIVE TYPE: ICD-10-CM

## 2021-06-17 DIAGNOSIS — F33.42 RECURRENT MAJOR DEPRESSIVE DISORDER, IN FULL REMISSION: ICD-10-CM

## 2021-06-17 DIAGNOSIS — E03.8 SUBCLINICAL HYPOTHYROIDISM: ICD-10-CM

## 2021-06-17 DIAGNOSIS — R73.9 HYPERGLYCEMIA: ICD-10-CM

## 2021-06-17 DIAGNOSIS — F41.1 GENERALIZED ANXIETY DISORDER: Primary | ICD-10-CM

## 2021-06-17 PROCEDURE — 99214 OFFICE O/P EST MOD 30 MIN: CPT | Mod: 95,,, | Performed by: FAMILY MEDICINE

## 2021-06-17 PROCEDURE — 99214 PR OFFICE/OUTPT VISIT, EST, LEVL IV, 30-39 MIN: ICD-10-PCS | Mod: 95,,, | Performed by: FAMILY MEDICINE

## 2021-06-17 RX ORDER — DEXTROAMPHETAMINE SACCHARATE, AMPHETAMINE ASPARTATE, DEXTROAMPHETAMINE SULFATE AND AMPHETAMINE SULFATE 2.5; 2.5; 2.5; 2.5 MG/1; MG/1; MG/1; MG/1
1 TABLET ORAL DAILY
Qty: 30 TABLET | Refills: 0 | Status: SHIPPED | OUTPATIENT
Start: 2021-06-17 | End: 2021-06-21 | Stop reason: SDUPTHER

## 2021-06-17 RX ORDER — DEXTROAMPHETAMINE SACCHARATE, AMPHETAMINE ASPARTATE MONOHYDRATE, DEXTROAMPHETAMINE SULFATE AND AMPHETAMINE SULFATE 7.5; 7.5; 7.5; 7.5 MG/1; MG/1; MG/1; MG/1
30 CAPSULE, EXTENDED RELEASE ORAL EVERY MORNING
Qty: 30 CAPSULE | Refills: 0 | Status: SHIPPED | OUTPATIENT
Start: 2021-06-17 | End: 2021-06-21 | Stop reason: SDUPTHER

## 2021-06-21 ENCOUNTER — PATIENT MESSAGE (OUTPATIENT)
Dept: FAMILY MEDICINE | Facility: CLINIC | Age: 36
End: 2021-06-21

## 2021-06-21 DIAGNOSIS — F90.0 ATTENTION DEFICIT HYPERACTIVITY DISORDER (ADHD), PREDOMINANTLY INATTENTIVE TYPE: ICD-10-CM

## 2021-06-21 RX ORDER — DEXTROAMPHETAMINE SACCHARATE, AMPHETAMINE ASPARTATE MONOHYDRATE, DEXTROAMPHETAMINE SULFATE AND AMPHETAMINE SULFATE 7.5; 7.5; 7.5; 7.5 MG/1; MG/1; MG/1; MG/1
30 CAPSULE, EXTENDED RELEASE ORAL EVERY MORNING
Qty: 30 CAPSULE | Refills: 0 | Status: SHIPPED | OUTPATIENT
Start: 2021-06-21 | End: 2021-07-16 | Stop reason: SDUPTHER

## 2021-06-21 RX ORDER — DEXTROAMPHETAMINE SACCHARATE, AMPHETAMINE ASPARTATE, DEXTROAMPHETAMINE SULFATE AND AMPHETAMINE SULFATE 2.5; 2.5; 2.5; 2.5 MG/1; MG/1; MG/1; MG/1
1 TABLET ORAL DAILY
Qty: 30 TABLET | Refills: 0 | Status: SHIPPED | OUTPATIENT
Start: 2021-06-21 | End: 2021-07-16 | Stop reason: SDUPTHER

## 2021-07-16 ENCOUNTER — PATIENT MESSAGE (OUTPATIENT)
Dept: FAMILY MEDICINE | Facility: CLINIC | Age: 36
End: 2021-07-16

## 2021-07-16 DIAGNOSIS — F90.0 ATTENTION DEFICIT HYPERACTIVITY DISORDER (ADHD), PREDOMINANTLY INATTENTIVE TYPE: ICD-10-CM

## 2021-07-16 RX ORDER — DEXTROAMPHETAMINE SACCHARATE, AMPHETAMINE ASPARTATE, DEXTROAMPHETAMINE SULFATE AND AMPHETAMINE SULFATE 2.5; 2.5; 2.5; 2.5 MG/1; MG/1; MG/1; MG/1
1 TABLET ORAL DAILY
Qty: 30 TABLET | Refills: 0 | Status: SHIPPED | OUTPATIENT
Start: 2021-07-16 | End: 2021-08-13 | Stop reason: SDUPTHER

## 2021-07-16 RX ORDER — DEXTROAMPHETAMINE SACCHARATE, AMPHETAMINE ASPARTATE MONOHYDRATE, DEXTROAMPHETAMINE SULFATE AND AMPHETAMINE SULFATE 7.5; 7.5; 7.5; 7.5 MG/1; MG/1; MG/1; MG/1
30 CAPSULE, EXTENDED RELEASE ORAL EVERY MORNING
Qty: 30 CAPSULE | Refills: 0 | Status: SHIPPED | OUTPATIENT
Start: 2021-07-16 | End: 2021-08-13 | Stop reason: SDUPTHER

## 2021-08-13 ENCOUNTER — PATIENT MESSAGE (OUTPATIENT)
Dept: FAMILY MEDICINE | Facility: CLINIC | Age: 36
End: 2021-08-13

## 2021-08-13 DIAGNOSIS — R11.0 NAUSEA: ICD-10-CM

## 2021-08-13 DIAGNOSIS — F41.1 GENERALIZED ANXIETY DISORDER: ICD-10-CM

## 2021-08-13 DIAGNOSIS — F33.42 RECURRENT MAJOR DEPRESSIVE DISORDER, IN FULL REMISSION: ICD-10-CM

## 2021-08-13 DIAGNOSIS — F90.0 ATTENTION DEFICIT HYPERACTIVITY DISORDER (ADHD), PREDOMINANTLY INATTENTIVE TYPE: ICD-10-CM

## 2021-08-13 RX ORDER — ONDANSETRON 4 MG/1
4 TABLET, ORALLY DISINTEGRATING ORAL EVERY 8 HOURS PRN
Qty: 30 TABLET | Refills: 5 | Status: SHIPPED | OUTPATIENT
Start: 2021-08-13 | End: 2021-09-14 | Stop reason: SDUPTHER

## 2021-08-13 RX ORDER — CITALOPRAM 20 MG/1
20 TABLET, FILM COATED ORAL DAILY
Qty: 90 TABLET | Refills: 3 | Status: SHIPPED | OUTPATIENT
Start: 2021-08-13 | End: 2021-09-14 | Stop reason: SDUPTHER

## 2021-08-13 RX ORDER — DEXTROAMPHETAMINE SACCHARATE, AMPHETAMINE ASPARTATE, DEXTROAMPHETAMINE SULFATE AND AMPHETAMINE SULFATE 2.5; 2.5; 2.5; 2.5 MG/1; MG/1; MG/1; MG/1
1 TABLET ORAL DAILY
Qty: 30 TABLET | Refills: 0 | Status: SHIPPED | OUTPATIENT
Start: 2021-08-13 | End: 2021-09-14 | Stop reason: SDUPTHER

## 2021-08-13 RX ORDER — DEXTROAMPHETAMINE SACCHARATE, AMPHETAMINE ASPARTATE MONOHYDRATE, DEXTROAMPHETAMINE SULFATE AND AMPHETAMINE SULFATE 7.5; 7.5; 7.5; 7.5 MG/1; MG/1; MG/1; MG/1
30 CAPSULE, EXTENDED RELEASE ORAL EVERY MORNING
Qty: 30 CAPSULE | Refills: 0 | Status: SHIPPED | OUTPATIENT
Start: 2021-08-13 | End: 2021-09-12

## 2021-08-30 NOTE — TELEPHONE ENCOUNTER
HPI   Chief Complaint   Patient presents with    Dizziness     58-year-old female with history of diabetes and morbid obesity presents with dizziness. Patient reports since this morning she has been feeling lightheaded, both at rest and during exertion although exertion makes lightheadedness worse. Patient also feels bilateral foot pain, worse with walking. Right knee is also hurting, right knee feels tight and is painful to move, and as a result difficulty walking. 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 No fevers, no recent illness, no neck or back pain. Past Medical History:   Diagnosis Date    Diabetes [facility])        History reviewed. No pertinent surgical history. History reviewed. No pertinent family history. Social History     Socioeconomic History    Marital status: SINGLE     Spouse name: Not on file    Number of children: Not on file    Years of education: Not on file    Highest education level: Not on file   Occupational History    Not on file   Tobacco Use    Smoking status: Current Every Day Smoker     Packs/day: 0.25    Smokeless tobacco: Never Used   Substance and Sexual Activity    Alcohol use: Not on file    Drug use: Not on file    Sexual activity: Not on file   Other Topics Concern    Not on file   Social History Narrative    Not on file     Social Determinants of Health     Financial Resource Strain:     Difficulty of Paying Living Expenses:    Food Insecurity:     Worried About Running Out of Food in the Last Year:     [address] in the Last Year:    Transportation Needs:     Lack of Transportation (Medical):      Lack of Transportation (Non-Medical):    Physical Activity:     Days of Exercise per Week:     Minutes of Exercise per Session:    Stress:     Feeling of Stress :    Social Connections:     Frequency of Communication with Friends and Family:     Frequency of Social Gatherings with Friends and Patient is complaining of a bad cough and some crackling sound in her lungs. Patient would like to know if you could listen to her lungs and maybe prescribe some steriods for her or give her a shot. Please advise   Family:     Attends Zoroastrianism Services:     Active Member of Clubs or Organizations:     Attends Club or Organization Meetings:     Marital Status:    Intimate Partner Violence:     Fear of Current or Ex-Partner:     Emotionally Abused:     Physically Abused:     Sexually Abused: ALLERGIES: Patient has no known allergies. Review of Systems   Musculoskeletal: Positive for gait problem. Right knee pain   Neurological: Positive for light-headedness. All other systems reviewed and are negative. Vitals:    08/30/21 1211 08/30/21 1535   BP: 115/69 132/78   Pulse: 92 89   Resp: 16 16   Temp: 98.5 °F (36.9 °C)    SpO2: 97% 98%   Weight: (!) 201.9 kg (445 lb)    Height: 5' 8\" (1.727 m)             Physical Exam   Patient Vitals for the past 8 hrs:   Temp Pulse Resp BP SpO2   08/30/21 1535  89 16 132/78 98 %   08/30/21 1211 98.5 °F (36.9 °C) 92 16 115/69 97 %        Nursing note and vitals reviewed. Constitutional: NAD. Head: Normocephalic and atraumatic. Mouth/Throat: Airway patent. Moist mucous membranes. Eyes: EOMI. No scleral icterus. Neck: Neck supple. Cardiovascular: Normal rate, regular rhythm. Normal heart sounds. No murmur, rub, or gallop. Good pulses throughout. Pulmonary/Chest: No respiratory distress. Clear to auscultation bilaterally. No wheezes, rales, or rhonchi. Abdominal/GI: BS normal, Soft, non-tender, obese. No rebound or guarding. Musculoskeletal: No gross injuries or deformities. Right knee tender, passive ROM intact although triggering pain. Gait with right leg dragging due to pain. B/l foot warm well perfused. No gross leg swelling b/l although difficult to discern due to body habitus. Neurological: Alert and oriented to person, place, and time. Cranial Nerves 2-12 intact. Moving all extremities. No gross deficits. Psych: Pleasant, cooperative. Skin: Skin is warm and dry. No rash noted.     MDM   Ddx = anemia, renal dysfunction, ACS, dysrhythmia, PE/DVT (although pre-test probability for PE is low as pt is not SOB and not tachy/hypoxic/not having hemoptysis/no hx of VTE; essentially PERC negative although evaluation of leg swelling is limited by body habitus), right knee fracture, right knee OA. EKG was obtained for dizziness. My preliminary interpretation at 1226 showing normal sinus rhythm, rate 91, normal EKG, no STEMI. D-dimer is high. I advised pt that she needs DVT duplex which is not available here but available at [facility] ED. She would rather go POA than to take ambulance. Rx diclofenac for right knee OA pain. Sent POA to [facility] ED for DVT duplex. Labs Reviewed   D DIMER - Abnormal; Notable for the following components:       Result Value    D-dimer 1.43 (*)     All other components within normal limits   CBC WITH AUTOMATED DIFF - Abnormal; Notable for the following components:    HGB 11.3 (*)     All other components within normal limits   METABOLIC PANEL, COMPREHENSIVE - Abnormal; Notable for the following components:    Potassium 3.4 (*)     Glucose 109 (*)     Globulin 4.2 (*)     A-G Ratio 0.9 (*)     All other components within normal limits   TROPONIN I   GLUCOSE, POC     XR KNEE RT 3 V   Final Result   At least moderate grade DJD right knee, medial and patellofemoral   compartment predominance. Medications - No data to display    Diagnosis:    ICD-10-CM ICD-9-CM    1. Bilateral leg pain  M79.604 729.5     M79.605     2. Osteoarthritis of right knee, unspecified osteoarthritis type  M17.11 715.96    3. Light headed  R42 780.4        Rizwan Jones MD, am  the first and primary ED provider for this patient.           Procedures

## 2021-09-14 ENCOUNTER — PATIENT MESSAGE (OUTPATIENT)
Dept: FAMILY MEDICINE | Facility: CLINIC | Age: 36
End: 2021-09-14

## 2021-09-14 ENCOUNTER — PATIENT MESSAGE (OUTPATIENT)
Dept: OBSTETRICS AND GYNECOLOGY | Facility: CLINIC | Age: 36
End: 2021-09-14

## 2021-09-14 DIAGNOSIS — F33.42 RECURRENT MAJOR DEPRESSIVE DISORDER, IN FULL REMISSION: ICD-10-CM

## 2021-09-14 DIAGNOSIS — F90.0 ATTENTION DEFICIT HYPERACTIVITY DISORDER (ADHD), PREDOMINANTLY INATTENTIVE TYPE: Primary | ICD-10-CM

## 2021-09-14 DIAGNOSIS — F41.1 GENERALIZED ANXIETY DISORDER: ICD-10-CM

## 2021-09-14 DIAGNOSIS — R11.0 NAUSEA: ICD-10-CM

## 2021-09-14 DIAGNOSIS — F90.0 ATTENTION DEFICIT HYPERACTIVITY DISORDER (ADHD), PREDOMINANTLY INATTENTIVE TYPE: ICD-10-CM

## 2021-09-14 RX ORDER — DEXTROAMPHETAMINE SACCHARATE, AMPHETAMINE ASPARTATE, DEXTROAMPHETAMINE SULFATE AND AMPHETAMINE SULFATE 2.5; 2.5; 2.5; 2.5 MG/1; MG/1; MG/1; MG/1
1 TABLET ORAL DAILY
Qty: 30 TABLET | Refills: 0 | Status: SHIPPED | OUTPATIENT
Start: 2021-09-14 | End: 2021-10-21

## 2021-09-14 RX ORDER — CITALOPRAM 20 MG/1
20 TABLET, FILM COATED ORAL DAILY
Qty: 90 TABLET | Refills: 3 | Status: SHIPPED | OUTPATIENT
Start: 2021-09-14 | End: 2021-10-26 | Stop reason: SDUPTHER

## 2021-09-14 RX ORDER — DEXTROAMPHETAMINE SACCHARATE, AMPHETAMINE ASPARTATE, DEXTROAMPHETAMINE SULFATE AND AMPHETAMINE SULFATE 2.5; 2.5; 2.5; 2.5 MG/1; MG/1; MG/1; MG/1
1 TABLET ORAL DAILY
Qty: 30 TABLET | Refills: 0 | Status: SHIPPED | OUTPATIENT
Start: 2021-09-14 | End: 2021-09-14 | Stop reason: SDUPTHER

## 2021-09-14 RX ORDER — ONDANSETRON 4 MG/1
4 TABLET, ORALLY DISINTEGRATING ORAL EVERY 8 HOURS PRN
Qty: 30 TABLET | Refills: 5 | Status: SHIPPED | OUTPATIENT
Start: 2021-09-14 | End: 2021-10-26 | Stop reason: SDUPTHER

## 2021-09-15 RX ORDER — ETONOGESTREL AND ETHINYL ESTRADIOL VAGINAL RING .015; .12 MG/D; MG/D
1 RING VAGINAL
Qty: 3 EACH | Refills: 11 | Status: SHIPPED | OUTPATIENT
Start: 2021-09-15 | End: 2022-01-24 | Stop reason: SDUPTHER

## 2021-09-22 RX ORDER — DEXTROAMPHETAMINE SACCHARATE, AMPHETAMINE ASPARTATE MONOHYDRATE, DEXTROAMPHETAMINE SULFATE AND AMPHETAMINE SULFATE 7.5; 7.5; 7.5; 7.5 MG/1; MG/1; MG/1; MG/1
30 CAPSULE, EXTENDED RELEASE ORAL EVERY MORNING
Qty: 30 CAPSULE | Refills: 0 | Status: SHIPPED | OUTPATIENT
Start: 2021-09-22 | End: 2021-10-26 | Stop reason: SDUPTHER

## 2021-09-29 ENCOUNTER — TELEPHONE (OUTPATIENT)
Dept: FAMILY MEDICINE | Facility: CLINIC | Age: 36
End: 2021-09-29

## 2021-09-29 ENCOUNTER — OFFICE VISIT (OUTPATIENT)
Dept: FAMILY MEDICINE | Facility: CLINIC | Age: 36
End: 2021-09-29
Payer: COMMERCIAL

## 2021-09-29 DIAGNOSIS — F90.0 ATTENTION DEFICIT HYPERACTIVITY DISORDER (ADHD), PREDOMINANTLY INATTENTIVE TYPE: Primary | ICD-10-CM

## 2021-09-29 PROCEDURE — 99214 OFFICE O/P EST MOD 30 MIN: CPT | Mod: 95,,, | Performed by: FAMILY MEDICINE

## 2021-09-29 PROCEDURE — 99214 PR OFFICE/OUTPT VISIT, EST, LEVL IV, 30-39 MIN: ICD-10-PCS | Mod: 95,,, | Performed by: FAMILY MEDICINE

## 2021-10-26 DIAGNOSIS — R11.0 NAUSEA: ICD-10-CM

## 2021-10-26 DIAGNOSIS — F90.0 ATTENTION DEFICIT HYPERACTIVITY DISORDER (ADHD), PREDOMINANTLY INATTENTIVE TYPE: ICD-10-CM

## 2021-10-26 DIAGNOSIS — F41.1 GENERALIZED ANXIETY DISORDER: ICD-10-CM

## 2021-10-26 DIAGNOSIS — J45.20 MILD INTERMITTENT ASTHMA WITHOUT COMPLICATION: ICD-10-CM

## 2021-10-26 DIAGNOSIS — F33.42 RECURRENT MAJOR DEPRESSIVE DISORDER, IN FULL REMISSION: ICD-10-CM

## 2021-10-26 RX ORDER — ONDANSETRON 4 MG/1
4 TABLET, ORALLY DISINTEGRATING ORAL EVERY 8 HOURS PRN
Qty: 30 TABLET | Refills: 5 | Status: SHIPPED | OUTPATIENT
Start: 2021-10-26 | End: 2023-01-31 | Stop reason: SDUPTHER

## 2021-10-26 RX ORDER — DEXTROAMPHETAMINE SACCHARATE, AMPHETAMINE ASPARTATE MONOHYDRATE, DEXTROAMPHETAMINE SULFATE AND AMPHETAMINE SULFATE 7.5; 7.5; 7.5; 7.5 MG/1; MG/1; MG/1; MG/1
30 CAPSULE, EXTENDED RELEASE ORAL EVERY MORNING
Qty: 30 CAPSULE | Refills: 0 | Status: SHIPPED | OUTPATIENT
Start: 2021-10-26 | End: 2021-11-30 | Stop reason: SDUPTHER

## 2021-10-26 RX ORDER — ALPRAZOLAM 0.5 MG/1
0.5 TABLET ORAL 3 TIMES DAILY PRN
Qty: 30 TABLET | Refills: 0 | Status: SHIPPED | OUTPATIENT
Start: 2021-10-26 | End: 2023-03-29

## 2021-10-26 RX ORDER — ALBUTEROL SULFATE 90 UG/1
2 AEROSOL, METERED RESPIRATORY (INHALATION) EVERY 6 HOURS PRN
Qty: 8.5 G | Refills: 11 | Status: SHIPPED | OUTPATIENT
Start: 2021-10-26 | End: 2022-12-29

## 2021-10-26 RX ORDER — CITALOPRAM 20 MG/1
20 TABLET, FILM COATED ORAL DAILY
Qty: 90 TABLET | Refills: 3 | Status: SHIPPED | OUTPATIENT
Start: 2021-10-26 | End: 2022-12-09 | Stop reason: SDUPTHER

## 2021-11-30 ENCOUNTER — PATIENT MESSAGE (OUTPATIENT)
Dept: FAMILY MEDICINE | Facility: CLINIC | Age: 36
End: 2021-11-30
Payer: COMMERCIAL

## 2021-11-30 DIAGNOSIS — F90.0 ATTENTION DEFICIT HYPERACTIVITY DISORDER (ADHD), PREDOMINANTLY INATTENTIVE TYPE: ICD-10-CM

## 2021-11-30 RX ORDER — DEXTROAMPHETAMINE SACCHARATE, AMPHETAMINE ASPARTATE MONOHYDRATE, DEXTROAMPHETAMINE SULFATE AND AMPHETAMINE SULFATE 7.5; 7.5; 7.5; 7.5 MG/1; MG/1; MG/1; MG/1
30 CAPSULE, EXTENDED RELEASE ORAL EVERY MORNING
Qty: 30 CAPSULE | Refills: 0 | Status: SHIPPED | OUTPATIENT
Start: 2021-11-30 | End: 2021-12-29 | Stop reason: SDUPTHER

## 2021-11-30 RX ORDER — ETONOGESTREL AND ETHINYL ESTRADIOL VAGINAL RING .015; .12 MG/D; MG/D
1 RING VAGINAL
Qty: 3 EACH | Refills: 11 | Status: CANCELLED | OUTPATIENT
Start: 2021-11-30

## 2021-12-01 ENCOUNTER — PATIENT MESSAGE (OUTPATIENT)
Dept: FAMILY MEDICINE | Facility: CLINIC | Age: 36
End: 2021-12-01
Payer: COMMERCIAL

## 2021-12-16 ENCOUNTER — PATIENT MESSAGE (OUTPATIENT)
Dept: FAMILY MEDICINE | Facility: CLINIC | Age: 36
End: 2021-12-16
Payer: COMMERCIAL

## 2021-12-17 ENCOUNTER — PATIENT MESSAGE (OUTPATIENT)
Dept: DERMATOLOGY | Facility: CLINIC | Age: 36
End: 2021-12-17

## 2021-12-17 ENCOUNTER — OFFICE VISIT (OUTPATIENT)
Dept: DERMATOLOGY | Facility: CLINIC | Age: 36
End: 2021-12-17
Payer: COMMERCIAL

## 2021-12-17 DIAGNOSIS — L30.9 ECZEMA, UNSPECIFIED TYPE: Primary | ICD-10-CM

## 2021-12-17 PROCEDURE — 99203 PR OFFICE/OUTPT VISIT, NEW, LEVL III, 30-44 MIN: ICD-10-PCS | Mod: 95,,, | Performed by: STUDENT IN AN ORGANIZED HEALTH CARE EDUCATION/TRAINING PROGRAM

## 2021-12-17 PROCEDURE — 99203 OFFICE O/P NEW LOW 30 MIN: CPT | Mod: 95,,, | Performed by: STUDENT IN AN ORGANIZED HEALTH CARE EDUCATION/TRAINING PROGRAM

## 2021-12-17 RX ORDER — TRIAMCINOLONE ACETONIDE 1 MG/G
CREAM TOPICAL 2 TIMES DAILY
Qty: 80 G | Refills: 1 | Status: SHIPPED | OUTPATIENT
Start: 2021-12-17 | End: 2022-03-09 | Stop reason: SDUPTHER

## 2021-12-29 ENCOUNTER — PATIENT MESSAGE (OUTPATIENT)
Dept: FAMILY MEDICINE | Facility: CLINIC | Age: 36
End: 2021-12-29
Payer: COMMERCIAL

## 2021-12-29 DIAGNOSIS — F90.0 ATTENTION DEFICIT HYPERACTIVITY DISORDER (ADHD), PREDOMINANTLY INATTENTIVE TYPE: ICD-10-CM

## 2021-12-30 RX ORDER — DEXTROAMPHETAMINE SACCHARATE, AMPHETAMINE ASPARTATE MONOHYDRATE, DEXTROAMPHETAMINE SULFATE AND AMPHETAMINE SULFATE 7.5; 7.5; 7.5; 7.5 MG/1; MG/1; MG/1; MG/1
30 CAPSULE, EXTENDED RELEASE ORAL EVERY MORNING
Qty: 30 CAPSULE | Refills: 0 | Status: SHIPPED | OUTPATIENT
Start: 2021-12-30 | End: 2022-02-01 | Stop reason: SDUPTHER

## 2022-01-03 NOTE — PROGRESS NOTES
VIRTUAL/TELEMEDICINE VISIT   FAMILY MEDICINE   [facility]     The patient location is: [place]  The chief complaint leading to consultation is: follow-up ADHD  Visit type: Virtual visit with synchronous audio and video Doximity   Total time spent: 30 minute  Each patient to whom he or she provides medical services by telemedicine is:  (1) informed of the relationship between the physician and patient and the respective role of any other health care provider with respect to management of the patient; and (2) notified that he or she may decline to receive medical services by telemedicine and may withdraw from such care at any time.    Reason for visit:   Chief Complaint   Patient presents with    ADHD       SUBJECTIVE: Kan Traore is a 36 y.o. female  - with ADHD, mild intermittent asthma, depression and anxiety presents for ADHD follow-uup     Gynecology Dr. Kacy Elias MD  Dermatology Dr. Lulu Pak MD    Today she reports that she is doing well. She recently saw Dermatology for a recurrent rash since her Covid-19 vaccination and appears that she has eczema related with vaccination. It is well controlled with topical medications     1. ADHD predominantly inattentive     Age diagnosed: 4 th grade  Diagnosed by: Psychiatrist  Initial evaluation present in chart: none     Past medications: Adderal 10 mg afternoon with her long acting medication  Reasons for changing past medications: stopped working     Current medications: Adderall XR 30 mg daily and Adderall 10 mg in afternoon (rarely and for longer days)       reviewed: 11/30/21 XR and 9/17/21 IR      Concerns with medication: denies  AM medication: 7:30 AM  Lunch medication: denies   Afternoon medication: Adderall IR 10 mg PRN     Daily Activity:  nurse        Review of Systems   All other systems reviewed and are negative.        HISTORY:   Past Medical History:   Diagnosis Date    Allergic rhinitis     Anxiety     Asthma     Attention  deficit disorder (ADD)     Bilateral nephrolithiasis     Depression     Hydronephrosis     Hyperglycemia 3/9/2021    - glucose 131 on recent labs - patient was nonfasting Lab Results Component Value Date  HGBA1C 5.1 06/10/2021      Urinary tract infection     Vaginal infection        Past Surgical History:   Procedure Laterality Date    APPENDECTOMY  2017    DILATION AND CURETTAGE OF UTERUS  2014    KIDNEY STONE SURGERY      LITHOTRIPSY      LITHOTRIPSY  2017    LOOP ELECTROSURGICAL EXCISION PROCEDURE (LEEP) N/A 10/2/2020    Procedure: LEEP (LOOP ELECTROSURGICAL EXCISION PROCEDURE);  Surgeon: Kacy Elias MD;  Location: [facility] OR;  Service: OB/GYN;  Laterality: N/A;    TONSILLECTOMY         Family History   Problem Relation Age of Onset    Hypertension Mother 50    Hypothyroidism Father 52    Stroke Maternal Grandfather     Hypertension Maternal Grandfather     Heart disease Maternal Grandfather 56         of AMI    Heart failure Paternal Grandmother 81    No Known Problems Son     No Known Problems Son     No Known Problems Son     Breast cancer Neg Hx     Colon cancer Neg Hx     Ovarian cancer Neg Hx     Kidney disease Neg Hx        Social History     Tobacco Use    Smoking status: Never Smoker    Smokeless tobacco: Never Used   Substance Use Topics    Alcohol use: No    Drug use: No       Social History     Social History Narrative    .  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   from .       ALLERGIES:   Review of patient's allergies indicates:  No Known Allergies    MEDS:     Current Outpatient Medications:     citalopram (CELEXA) 20 MG tablet, Take 1 tablet (20 mg total) by mouth once daily., Disp: 90 tablet, Rfl: 3    dextroamphetamine-amphetamine (ADDERALL XR) 30 MG 24 hr capsule, Take 1 capsule (30 mg total) by mouth every morning., Disp: 30 capsule, Rfl: 0     etonogestreL-ethinyl estradioL (NUVARING) 0.12-0.015 mg/24 hr vaginal ring, Place 1 each vaginally every 28 days., Disp: 3 each, Rfl: 11    triamcinolone acetonide 0.1% (KENALOG) 0.1 % cream, Apply topically 2 (two) times daily., Disp: 80 g, Rfl: 1    albuterol (PROVENTIL/VENTOLIN HFA) 90 mcg/actuation inhaler, Inhale 2 puffs into the lungs every 6 (six) hours as needed for Wheezing. Rescue (Patient not taking: Reported on 1/4/2022), Disp: 8.5 g, Rfl: 11    ALPRAZolam (XANAX) 0.5 MG tablet, Take 1 tablet (0.5 mg total) by mouth 3 (three) times daily as needed for Insomnia or Anxiety. (Patient not taking: Reported on 1/4/2022), Disp: 30 tablet, Rfl: 0    dextroamphetamine-amphetamine (ADDERALL) 10 mg Tab, Take 1 tablet (10 mg total) by mouth once daily., Disp: 30 tablet, Rfl: 0    ondansetron (ZOFRAN-ODT) 4 MG TbDL, Take 1 tablet (4 mg total) by mouth every 8 (eight) hours as needed (nausea/migraine). (Patient not taking: Reported on 1/4/2022), Disp: 30 tablet, Rfl: 5    RETIN-A MICRO PUMP 0.06 % GlwP, , Disp: , Rfl:     Vital signs:   There were no vitals filed for this visit.  There is no height or weight on file to calculate BMI.    PHYSICAL EXAM:     Physical Exam  Constitutional:       General: She is not in acute distress.  Pulmonary:      Effort: Pulmonary effort is normal. No respiratory distress.   Neurological:      Mental Status: She is alert.   Psychiatric:         Mood and Affect: Mood and affect normal.         Speech: Speech normal.           PHQ4 = No data recorded    PERTINENT RESULTS:   No visits with results within 1 Week(s) from this visit.   Latest known visit with results is:   Lab Visit on 06/10/2021   Component Date Value Ref Range Status    T3, Free 06/10/2021 2.9  2.3 - 4.2 pg/mL Final    Free T4 06/10/2021 0.87  0.71 - 1.51 ng/dL Final    TSH 06/10/2021 4.610* 0.400 - 4.000 uIU/mL Final    Comment: Warning:  Heterophilic antibodies in serum or plasma of   certain individuals are  known to cause interference with   immunoassays. These antibodies may be present in blood samples   from individuals regularly exposed to animal or who have been   treated with animal products.     Patients taking high doses of supplemental biotin may have  negatively biased results.       Thyroperoxidase Antibodies 06/10/2021 <6.0  <6.0 IU/mL Final    Hemoglobin A1C 06/10/2021 5.1  4.0 - 5.6 % Final    Comment: ADA Screening Guidelines:  5.7-6.4%  Consistent with prediabetes  >or=6.5%  Consistent with diabetes    High levels of fetal hemoglobin interfere with the HbA1C  assay. Heterozygous hemoglobin variants (HbS, HgC, etc)do  not significantly interfere with this assay.   However, presence of multiple variants may affect accuracy.      Estimated Avg Glucose 06/10/2021 100  68 - 131 mg/dL Final       ASSESSMENT/PLAN:  Problem List Items Addressed This Visit        Psychiatric    Attention deficit hyperactivity disorder (ADHD), predominantly inattentive type - Primary    Overview     -  reviewed  - well controlled  - continue current medications         Relevant Medications    dextroamphetamine-amphetamine (ADDERALL) 10 mg Tab          Vaccines recommended:flu vaccine    Follow-up in 3 months or sooner if any concerns.      This note is dictated using the M*Modal Fluency Direct word recognition program. There are word recognition mistakes that are occasionally missed on review.    Dr. Cielo Mina D.O.   Family Medicine

## 2022-01-04 ENCOUNTER — TELEPHONE (OUTPATIENT)
Dept: FAMILY MEDICINE | Facility: CLINIC | Age: 37
End: 2022-01-04

## 2022-01-04 ENCOUNTER — PATIENT MESSAGE (OUTPATIENT)
Dept: FAMILY MEDICINE | Facility: CLINIC | Age: 37
End: 2022-01-04

## 2022-01-04 ENCOUNTER — OFFICE VISIT (OUTPATIENT)
Dept: FAMILY MEDICINE | Facility: CLINIC | Age: 37
End: 2022-01-04
Payer: COMMERCIAL

## 2022-01-04 DIAGNOSIS — F90.0 ATTENTION DEFICIT HYPERACTIVITY DISORDER (ADHD), PREDOMINANTLY INATTENTIVE TYPE: Primary | ICD-10-CM

## 2022-01-04 PROCEDURE — 99214 OFFICE O/P EST MOD 30 MIN: CPT | Mod: 95,,, | Performed by: FAMILY MEDICINE

## 2022-01-04 PROCEDURE — 1160F PR REVIEW ALL MEDS BY PRESCRIBER/CLIN PHARMACIST DOCUMENTED: ICD-10-PCS | Mod: CPTII,95,, | Performed by: FAMILY MEDICINE

## 2022-01-04 PROCEDURE — 1159F PR MEDICATION LIST DOCUMENTED IN MEDICAL RECORD: ICD-10-PCS | Mod: CPTII,95,, | Performed by: FAMILY MEDICINE

## 2022-01-04 PROCEDURE — 99214 PR OFFICE/OUTPT VISIT, EST, LEVL IV, 30-39 MIN: ICD-10-PCS | Mod: 95,,, | Performed by: FAMILY MEDICINE

## 2022-01-04 PROCEDURE — 1159F MED LIST DOCD IN RCRD: CPT | Mod: CPTII,95,, | Performed by: FAMILY MEDICINE

## 2022-01-04 PROCEDURE — 1160F RVW MEDS BY RX/DR IN RCRD: CPT | Mod: CPTII,95,, | Performed by: FAMILY MEDICINE

## 2022-01-04 RX ORDER — DEXTROAMPHETAMINE SACCHARATE, AMPHETAMINE ASPARTATE, DEXTROAMPHETAMINE SULFATE AND AMPHETAMINE SULFATE 2.5; 2.5; 2.5; 2.5 MG/1; MG/1; MG/1; MG/1
1 TABLET ORAL DAILY
Qty: 30 TABLET | Refills: 0 | Status: SHIPPED | OUTPATIENT
Start: 2022-01-04 | End: 2022-02-01 | Stop reason: SDUPTHER

## 2022-01-04 NOTE — Clinical Note
Please schedule 3 month ADHD follow-up virtual. She says okay to pick any day 1 pm  and MyChart message her the date and time

## 2022-01-04 NOTE — TELEPHONE ENCOUNTER
----- Message from Cielo Mina DO sent at 1/4/2022  1:11 PM CST -----  Please schedule 3 month ADHD follow-up virtual. She says okay to pick any day 1 pm  and MyChart message her the date and time

## 2022-01-24 ENCOUNTER — PATIENT MESSAGE (OUTPATIENT)
Dept: OBSTETRICS AND GYNECOLOGY | Facility: CLINIC | Age: 37
End: 2022-01-24
Payer: COMMERCIAL

## 2022-02-01 ENCOUNTER — PATIENT MESSAGE (OUTPATIENT)
Dept: FAMILY MEDICINE | Facility: CLINIC | Age: 37
End: 2022-02-01
Payer: COMMERCIAL

## 2022-02-01 DIAGNOSIS — F90.0 ATTENTION DEFICIT HYPERACTIVITY DISORDER (ADHD), PREDOMINANTLY INATTENTIVE TYPE: ICD-10-CM

## 2022-02-01 RX ORDER — DEXTROAMPHETAMINE SACCHARATE, AMPHETAMINE ASPARTATE, DEXTROAMPHETAMINE SULFATE AND AMPHETAMINE SULFATE 2.5; 2.5; 2.5; 2.5 MG/1; MG/1; MG/1; MG/1
1 TABLET ORAL DAILY
Qty: 30 TABLET | Refills: 0 | Status: SHIPPED | OUTPATIENT
Start: 2022-02-01 | End: 2022-02-04 | Stop reason: SDUPTHER

## 2022-02-01 RX ORDER — DEXTROAMPHETAMINE SACCHARATE, AMPHETAMINE ASPARTATE MONOHYDRATE, DEXTROAMPHETAMINE SULFATE AND AMPHETAMINE SULFATE 7.5; 7.5; 7.5; 7.5 MG/1; MG/1; MG/1; MG/1
30 CAPSULE, EXTENDED RELEASE ORAL EVERY MORNING
Qty: 30 CAPSULE | Refills: 0 | Status: SHIPPED | OUTPATIENT
Start: 2022-02-01 | End: 2022-02-04 | Stop reason: SDUPTHER

## 2022-02-01 NOTE — TELEPHONE ENCOUNTER
No new care gaps identified.  Powered by [employer]. Reference number: [id number].   2/01/2022 9:49:50 AM CST

## 2022-02-07 ENCOUNTER — TELEPHONE (OUTPATIENT)
Dept: SPORTS MEDICINE | Facility: CLINIC | Age: 37
End: 2022-02-07
Payer: COMMERCIAL

## 2022-02-07 DIAGNOSIS — M79.672 LEFT FOOT PAIN: Primary | ICD-10-CM

## 2022-02-07 RX ORDER — DEXTROAMPHETAMINE SACCHARATE, AMPHETAMINE ASPARTATE, DEXTROAMPHETAMINE SULFATE AND AMPHETAMINE SULFATE 2.5; 2.5; 2.5; 2.5 MG/1; MG/1; MG/1; MG/1
1 TABLET ORAL DAILY
Qty: 30 TABLET | Refills: 0 | Status: SHIPPED | OUTPATIENT
Start: 2022-02-07 | End: 2022-04-05 | Stop reason: SDUPTHER

## 2022-02-07 RX ORDER — DEXTROAMPHETAMINE SACCHARATE, AMPHETAMINE ASPARTATE MONOHYDRATE, DEXTROAMPHETAMINE SULFATE AND AMPHETAMINE SULFATE 7.5; 7.5; 7.5; 7.5 MG/1; MG/1; MG/1; MG/1
30 CAPSULE, EXTENDED RELEASE ORAL EVERY MORNING
Qty: 30 CAPSULE | Refills: 0 | Status: SHIPPED | OUTPATIENT
Start: 2022-02-07 | End: 2022-04-05 | Stop reason: SDUPTHER

## 2022-02-07 NOTE — TELEPHONE ENCOUNTER
Spoke to the patient about which foot she is having the pain in as well as discussed that we will need her to come in about 20 mins before the appt so that she can get some xrays for the visit with the doctor. She confirmed.

## 2022-02-14 ENCOUNTER — PATIENT MESSAGE (OUTPATIENT)
Dept: SPORTS MEDICINE | Facility: CLINIC | Age: 37
End: 2022-02-14

## 2022-02-14 ENCOUNTER — OFFICE VISIT (OUTPATIENT)
Dept: SPORTS MEDICINE | Facility: CLINIC | Age: 37
End: 2022-02-14
Payer: COMMERCIAL

## 2022-02-14 VITALS
HEIGHT: 66 IN | SYSTOLIC BLOOD PRESSURE: 144 MMHG | WEIGHT: 122 LBS | BODY MASS INDEX: 19.61 KG/M2 | DIASTOLIC BLOOD PRESSURE: 94 MMHG

## 2022-02-14 DIAGNOSIS — M77.8 LEFT WRIST TENDINITIS: ICD-10-CM

## 2022-02-14 DIAGNOSIS — M25.532 LEFT WRIST PAIN: Primary | ICD-10-CM

## 2022-02-14 DIAGNOSIS — M79.672 LEFT FOOT PAIN: Primary | ICD-10-CM

## 2022-02-14 PROCEDURE — 1160F RVW MEDS BY RX/DR IN RCRD: CPT | Mod: CPTII,S$GLB,, | Performed by: ORTHOPAEDIC SURGERY

## 2022-02-14 PROCEDURE — 97110 THERAPEUTIC EXERCISES: CPT | Mod: S$GLB,,, | Performed by: ORTHOPAEDIC SURGERY

## 2022-02-14 PROCEDURE — 99204 PR OFFICE/OUTPT VISIT, NEW, LEVL IV, 45-59 MIN: ICD-10-PCS | Mod: S$GLB,,, | Performed by: ORTHOPAEDIC SURGERY

## 2022-02-14 PROCEDURE — 99999 PR PBB SHADOW E&M-EST. PATIENT-LVL III: ICD-10-PCS | Mod: PBBFAC,,, | Performed by: ORTHOPAEDIC SURGERY

## 2022-02-14 PROCEDURE — 3008F PR BODY MASS INDEX (BMI) DOCUMENTED: ICD-10-PCS | Mod: CPTII,S$GLB,, | Performed by: ORTHOPAEDIC SURGERY

## 2022-02-14 PROCEDURE — 99999 PR PBB SHADOW E&M-EST. PATIENT-LVL III: CPT | Mod: PBBFAC,,, | Performed by: ORTHOPAEDIC SURGERY

## 2022-02-14 PROCEDURE — 1159F PR MEDICATION LIST DOCUMENTED IN MEDICAL RECORD: ICD-10-PCS | Mod: CPTII,S$GLB,, | Performed by: ORTHOPAEDIC SURGERY

## 2022-02-14 PROCEDURE — 3077F PR MOST RECENT SYSTOLIC BLOOD PRESSURE >= 140 MM HG: ICD-10-PCS | Mod: CPTII,S$GLB,, | Performed by: ORTHOPAEDIC SURGERY

## 2022-02-14 PROCEDURE — 97110 PR THERAPEUTIC EXERCISES: ICD-10-PCS | Mod: S$GLB,,, | Performed by: ORTHOPAEDIC SURGERY

## 2022-02-14 PROCEDURE — 99204 OFFICE O/P NEW MOD 45 MIN: CPT | Mod: S$GLB,,, | Performed by: ORTHOPAEDIC SURGERY

## 2022-02-14 PROCEDURE — 3077F SYST BP >= 140 MM HG: CPT | Mod: CPTII,S$GLB,, | Performed by: ORTHOPAEDIC SURGERY

## 2022-02-14 PROCEDURE — 3080F DIAST BP >= 90 MM HG: CPT | Mod: CPTII,S$GLB,, | Performed by: ORTHOPAEDIC SURGERY

## 2022-02-14 PROCEDURE — 3008F BODY MASS INDEX DOCD: CPT | Mod: CPTII,S$GLB,, | Performed by: ORTHOPAEDIC SURGERY

## 2022-02-14 PROCEDURE — 3080F PR MOST RECENT DIASTOLIC BLOOD PRESSURE >= 90 MM HG: ICD-10-PCS | Mod: CPTII,S$GLB,, | Performed by: ORTHOPAEDIC SURGERY

## 2022-02-14 PROCEDURE — 1159F MED LIST DOCD IN RCRD: CPT | Mod: CPTII,S$GLB,, | Performed by: ORTHOPAEDIC SURGERY

## 2022-02-14 PROCEDURE — 1160F PR REVIEW ALL MEDS BY PRESCRIBER/CLIN PHARMACIST DOCUMENTED: ICD-10-PCS | Mod: CPTII,S$GLB,, | Performed by: ORTHOPAEDIC SURGERY

## 2022-02-14 RX ORDER — MELOXICAM 15 MG/1
15 TABLET ORAL DAILY
Qty: 30 TABLET | Refills: 0 | Status: SHIPPED | OUTPATIENT
Start: 2022-02-14 | End: 2022-03-14 | Stop reason: SDUPTHER

## 2022-02-14 NOTE — PROGRESS NOTES
CC: left foot pain    36 y.o. Female presents today for evaluation of her left foot pain. She admits to left forefoot pain worsening approximately 6 weeks ago without known mechanism of injury. She feels as though her pain was exacerbated 4 weeks ago when she stubbed her toes while walking on a doorway. She appreciated increased bruising after this event that has since improved. She gestures to the 2nd-4th metatarsal heads when asked where she hurts.  When asked, she describes the pain as if she is stepping on something that is requiring her to offload her foot toward her big toe.  How long: Admits to left foot pain beginning 6 weeks ago.   What makes it better: Admits to improved pain with ibuprofen 800 mg and while at rest.    What makes it worse: Admits to increased pain during weight bearing, kneeling and first thing in the morning.   Does it radiate: Denies radiating pain.   Attempted treatments: She has been taking ibuprofen 800 mg as needed.   Pain score: 4/10  History of trauma/injury: Denies prior history of foot trauma/injury.   Affecting ADLs: Admits to this problem affecting her ability to perform ADLs.   Any mechanical symptoms: Denies mechanical symptoms.   Feelings of instability: Denies feelings of instability.     She also notes left ulnar sided wrist pain that was exacerbated by the way she picked up her child 02/13/2022. She has previously seen Dr. Kim for this problem and had received injections from him which she states helped her symptoms.  Her last injection was 2-3 years ago..     REVIEW OF SYSTEMS:   Constitution: Patient denies fever, chills, night sweats, and weight changes.  Eyes: Patient denies eye pain or vision changes.  HENT: Patient denies headache, ear pain, sore throat, or nasal discharge.  CVS: Patient denies chest pain.  Lungs: Patient denies shortness of breath or cough.  Abd: Patient denies stomach pain, nausea, or vomiting.  Skin: Patient denies skin rash or itching.     Hematologic/Lymphatic: Patient denies easy bruising.   Musculoskeletal: Patient denies recent falls. See HPI.  Psych: Patient denies any current anxiety or nervousness.    PAST MEDICAL HISTORY:   Past Medical History:   Diagnosis Date    Allergic rhinitis     Anxiety     Asthma     Attention deficit disorder (ADD)     Bilateral nephrolithiasis     Depression     Hydronephrosis     Hyperglycemia 3/9/2021    - glucose 131 on recent labs - patient was nonfasting Lab Results Component Value Date  HGBA1C 5.1 06/10/2021      Urinary tract infection     Vaginal infection        PAST SURGICAL HISTORY:   Past Surgical History:   Procedure Laterality Date    APPENDECTOMY  2017    DILATION AND CURETTAGE OF UTERUS  2014    KIDNEY STONE SURGERY      LITHOTRIPSY      LITHOTRIPSY  2017    LOOP ELECTROSURGICAL EXCISION PROCEDURE (LEEP) N/A 10/2/2020    Procedure: LEEP (LOOP ELECTROSURGICAL EXCISION PROCEDURE);  Surgeon: Kacy Elias MD;  Location: [facility];  Service: OB/GYN;  Laterality: N/A;    TONSILLECTOMY         FAMILY HISTORY:   Family History   Problem Relation Age of Onset    Hypertension Mother 50    Hypothyroidism Father 52    Stroke Maternal Grandfather     Hypertension Maternal Grandfather     Heart disease Maternal Grandfather 56         of AMI    Heart failure Paternal Grandmother 81    No Known Problems Son     No Known Problems Son     No Known Problems Son     Breast cancer Neg Hx     Colon cancer Neg Hx     Ovarian cancer Neg Hx     Kidney disease Neg Hx        SOCIAL HISTORY:   Social History     Socioeconomic History    Marital status:     Number of children: 3   Occupational History     Employer: [employer]   Tobacco Use    Smoking status: Never Smoker    Smokeless tobacco: Never Used   Substance and Sexual Activity    Alcohol use: No    Drug use: No    Sexual activity: Yes     Partners: Male     Birth control/protection:  "OCP   Social History Narrative    .  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   from .       MEDICATIONS:     Current Outpatient Medications:     albuterol (PROVENTIL/VENTOLIN HFA) 90 mcg/actuation inhaler, Inhale 2 puffs into the lungs every 6 (six) hours as needed for Wheezing. Rescue, Disp: 8.5 g, Rfl: 11    ALPRAZolam (XANAX) 0.5 MG tablet, Take 1 tablet (0.5 mg total) by mouth 3 (three) times daily as needed for Insomnia or Anxiety., Disp: 30 tablet, Rfl: 0    citalopram (CELEXA) 20 MG tablet, Take 1 tablet (20 mg total) by mouth once daily., Disp: 90 tablet, Rfl: 3    dextroamphetamine-amphetamine (ADDERALL XR) 30 MG 24 hr capsule, Take 1 capsule (30 mg total) by mouth every morning., Disp: 30 capsule, Rfl: 0    dextroamphetamine-amphetamine (ADDERALL) 10 mg Tab, Take 1 tablet (10 mg total) by mouth once daily., Disp: 30 tablet, Rfl: 0    etonogestreL-ethinyl estradioL (NUVARING) 0.12-0.015 mg/24 hr vaginal ring, Place 1 each vaginally every 28 days., Disp: 3 each, Rfl: 1    ondansetron (ZOFRAN-ODT) 4 MG TbDL, Take 1 tablet (4 mg total) by mouth every 8 (eight) hours as needed (nausea/migraine)., Disp: 30 tablet, Rfl: 5    RETIN-A MICRO PUMP 0.06 % GlwP, , Disp: , Rfl:     triamcinolone acetonide 0.1% (KENALOG) 0.1 % cream, Apply topically 2 (two) times daily., Disp: 80 g, Rfl: 1    meloxicam (MOBIC) 15 MG tablet, Take 1 tablet (15 mg total) by mouth once daily., Disp: 30 tablet, Rfl: 0    ALLERGIES:   Review of patient's allergies indicates:  No Known Allergies     PHYSICAL EXAMINATION:  BP (!) 144/94   Ht 5' 6" (1.676 m)   Wt 55.3 kg (122 lb)   BMI 19.69 kg/m²   Vitals signs and nursing note have been reviewed.  General: In no acute distress, well developed, well nourished, no diaphoresis  Eyes: EOM full and smooth, no eye redness or discharge  HENT: normocephalic and atraumatic, neck supple, trachea " midline, no nasal discharge, no external ear redness or discharge  Cardiovascular: 2+ and symmetric radial and DP pulses bilaterally, no LE edema  Lungs: respirations non-labored, no conversational dyspnea   Abd: non-distended, no rigidity  MSK: no amputation or deformity, no swelling of extremities  Neuro: AAOx3, CN2-12 grossly intact  Skin: No rashes, warm and dry  Psychiatric: cooperative, pleasant, mood and affect appropriate for age    ANKLE: left   The affected ankle is compared to the contralateral ankle.    Observation:    There is no edema, erythema, or ecchymosis.   Shoes reveal a normal wear pattern.  No structural deformities including toe deformities.   Pes planus bilaterally  Negative too-many toes sign.  Normal callus pattern on the feet bilaterally.    Achilles tendon and calcaneal insertion reveals no deformities  No leg or intrinsic foot muscle atrophy.  Squatting reveals symmetric pronation of the bilateral feet.   Able to rise on toes with symmetric supination.    Normal gait without evidence of antalgia.    ROM: (expected degree)  Active dorsiflexion to 20° bilaterally.    Active plantarflexion to 50° bilaterally.    Active ankle inversion to 35° bilaterally.    Active ankle eversion to 15° bilaterally.    Full active flexion/extension of the toes bilaterally.   Heel cords without tightness bilaterally.    Tenderness To Palpation:  No tenderness at the ATFL, CFL, PTFL, or deltoid ligaments  No tenderness over the distal anterior syndesmosis, distal tibia/fibula, fibular head/shaft  No tenderness at medial or lateral malleoli   No tenderness at navicular, cuboid, cuneiforms, talus, or calcaneous  + tenderness at the distal third metatarsal and in the webspace between the second and third metatarsal heads  No tenderness along the other metatarsals or phalanges  No tenderness at the Achilles tendon calcaneal insertion  No tenderness at the tibialis posterior, flexor digitorum longus, flexor  hallucis longus   No tenderness at the peroneal tendons    Strength Testing:  Dorsiflexion - 5/5  Platarflexion - 5/5  Resisted Inversion - 5/5  Resisted Eversion - 5/5  Great Toe Extension - 5/5  Great Toe Flexion - 5/5    Special Tests:  Anterior talar drawer - negative and without dimpling  Talar tilt - negative    Heel tap test - negative  Distal tib/fib squeeze test - negative  Hines squeeze test - negative    Metatarsal squeeze test - positive  Midfoot stress test - negative  Calcaneal squeeze test - negative    No subluxation of the peroneal tendons with resisted eversion.    Vascular/Sensory Exam:  Lower extremity  DP and PT pulses intact BL  No skin changes, no abnormal hair distribution  Sensation intact to light touch throughout the saphenous, sural, superficial peroneal, deep peroneal, and tibial nerve distributions  Capillary refill intact <2 seconds in all toes bilaterally.      Wrist: left   The affected Wrist is compared to the contralateral Wrist.    Observation:  No evidence of edema, erythema, ecchymosis, or effusion.  No asymmetries; muscle atrophy; ganglion cysts; or bony abnormalities including ulnar deviation,   No Heberdens or Brouchards nodes,   No swan-neck or boutonnière deformities.    No clubbing of the digits.    Tenderness:  No tenderness at radial styloid, Megan's tubercle, ulnar styloid.  No tenderness at anatomic snuff box, scaphoid tubercle, scapholunate junction.  No tenderness at TFCC  No tenderness at pisiform, hamate, metacarpals and phalanges.  No tenderness over median nerve at the carpal tunnel.  + tenderness along the extensor carpi ulnaris tendon at the lateral aspect of the distal lift forearm    Range of Motion:  Active wrist extension to 70° bilaterally.    Active wrist flexion to 80° bilaterally.    Active radial deviation to 20° bilaterally.    Active ulnar deviation to 30° bilaterally.    Active pronation to 80° bilaterally.    Active supination to 80°  bilaterally.    Full active flexion and extension at the MCP, PIP, and DIP bilaterally.   Active thumb motion full in all planes.    Strength Testing:  Wrist extension - 5/5, mild pain on the left  Wrist flexion - 5/5, mild pain on the left  Ulnar deviation - 5/5, mild pain on the left  Radial deviation - 5/5, mild pain on the left  Pronation - 5/5  Supination - 5/5    Finger flexion - 5/5  Finger extension - 5/5  Finger abduction - 5/5  Finger adduction - 5/5      Special Tests:  No laxity with distal radioulnar joint translation.  Negative Ariass test.     Finkelsteins test - negative    Axial grind of first CMC joint - negative  No laxity at the MCP UCL of the thumb    Normal fist alignment of the phalanges  No laxity at the RCL and UCL of the PIPs and DIPs of the phalanges.  Normal finger flexion of the FDP and FDS in all phalanges bilaterally.  Able to perform OK sign.    Neurovascular Exam:  Upper extremity  Sensation intact to light touch in the median, ulnar, and radial distributions on the hands bilaterally.  Radial and ulnar pulses intact and symmetric.  Capillary refill intact to <2 seconds in all digits.        IMAGIN. X-ray ordered due to left foot pain   2. X-ray images were reviewed personally by me and then directly with patient.  3. FINDINGS: X-ray images obtained demonstrate no fracture, no dislocation  4. IMPRESSION: As above.     1. X-ray ordered due to left wrist pain  2. X-ray images were reviewed personally by me and then directly with patient.  3. FINDINGS: X-ray images obtained demonstrate no fracture, no dislocation  4. IMPRESSION: As above.       ASSESSMENT:      ICD-10-CM ICD-9-CM   1. Left foot pain  M79.672 729.5   2. Left wrist tendinitis  M77.8 727.05         PLAN:  1. Left foot pain -   2. Left wrist tendinitis -     - Kan admits to left forefoot pain at the 2nd-3rd metatarsal heads that has been worsening for the past 6 weeks without known mechanism of injury that was  exacerbated when she stubbed her toes on a doorway 4 weeks ago.     - XRs ordered in the office today and images were personally reviewed with the patient. See above for further detail.    - MRI left forefoot needed as symptoms and exam are concerning for Abarca's neuroma.    - Mobic 15 mg to be taken daily for two weeks followed by as needed.      2. Left wrist tendinitis -     - She also notes intermittent left ulnar sided wrist pain that was increased 02/13/2022 after picking up one of her children.    - HEP for wrist tendonitis prescribed today. Handouts provided, explained, and exercises were demonstrated as needed. Encouraged to do daily. [id number] HOME EXERCISE PROGRAM (HEP):  The patient was taught a homegoing physical therapy regimen as described above by provider with assistance of sports medicine assistant. The patient demonstrated understanding of the exercises and proper technique of their execution. This interaction took 15 minutes.     - Patient fitted for left neutral wrist splint and advised to wear during activity that causes her pain.    - Mobic likely to help with this as well.  Advised trying the brace, oral anti-inflammatory, and exercises prior to a repeat tendon sheath injection and she is on board with this plan.      Future planning includes - next steps pending MRI results    All questions were answered to the best of my ability and all concerns were addressed at this time.    Follow up after MRI for results and next steps      This note is dictated using the M*Modal Fluency Direct word recognition program. There are word recognition mistakes that are occasionally missed on review.

## 2022-02-16 ENCOUNTER — PATIENT OUTREACH (OUTPATIENT)
Dept: ADMINISTRATIVE | Facility: OTHER | Age: 37
End: 2022-02-16
Payer: COMMERCIAL

## 2022-02-16 NOTE — PROGRESS NOTES
Health Maintenance Due   Topic Date Due    Influenza Vaccine (1) 09/01/2021    COVID-19 Vaccine (3 - Booster for Pfizer series) 02/20/2022     Updates were requested from care everywhere.  Chart was reviewed for overdue Proactive [facility] Encounters ([facility]) topics (CRS, Breast Cancer Screening, Eye exam)  Health Maintenance has been updated.  LINKS immunization registry triggered.  Immunizations were reconciled.

## 2022-02-17 ENCOUNTER — OFFICE VISIT (OUTPATIENT)
Dept: OBSTETRICS AND GYNECOLOGY | Facility: CLINIC | Age: 37
End: 2022-02-17
Payer: COMMERCIAL

## 2022-02-17 VITALS — WEIGHT: 130.5 LBS | BODY MASS INDEX: 21.06 KG/M2 | DIASTOLIC BLOOD PRESSURE: 78 MMHG | SYSTOLIC BLOOD PRESSURE: 120 MMHG

## 2022-02-17 DIAGNOSIS — Z98.890 STATUS POST CONIZATION OF CERVIX: ICD-10-CM

## 2022-02-17 DIAGNOSIS — Z12.4 PAP SMEAR FOR CERVICAL CANCER SCREENING: ICD-10-CM

## 2022-02-17 DIAGNOSIS — Z01.419 ENCOUNTER FOR ANNUAL ROUTINE GYNECOLOGICAL EXAMINATION: Primary | ICD-10-CM

## 2022-02-17 DIAGNOSIS — Z87.42 HISTORY OF ABNORMAL CERVICAL PAP SMEAR: ICD-10-CM

## 2022-02-17 PROCEDURE — 3008F PR BODY MASS INDEX (BMI) DOCUMENTED: ICD-10-PCS | Mod: CPTII,S$GLB,, | Performed by: OBSTETRICS & GYNECOLOGY

## 2022-02-17 PROCEDURE — 99999 PR PBB SHADOW E&M-EST. PATIENT-LVL III: CPT | Mod: PBBFAC,,, | Performed by: OBSTETRICS & GYNECOLOGY

## 2022-02-17 PROCEDURE — 1159F PR MEDICATION LIST DOCUMENTED IN MEDICAL RECORD: ICD-10-PCS | Mod: CPTII,S$GLB,, | Performed by: OBSTETRICS & GYNECOLOGY

## 2022-02-17 PROCEDURE — 3074F SYST BP LT 130 MM HG: CPT | Mod: CPTII,S$GLB,, | Performed by: OBSTETRICS & GYNECOLOGY

## 2022-02-17 PROCEDURE — 87624 HPV HI-RISK TYP POOLED RSLT: CPT | Performed by: OBSTETRICS & GYNECOLOGY

## 2022-02-17 PROCEDURE — 3008F BODY MASS INDEX DOCD: CPT | Mod: CPTII,S$GLB,, | Performed by: OBSTETRICS & GYNECOLOGY

## 2022-02-17 PROCEDURE — 3074F PR MOST RECENT SYSTOLIC BLOOD PRESSURE < 130 MM HG: ICD-10-PCS | Mod: CPTII,S$GLB,, | Performed by: OBSTETRICS & GYNECOLOGY

## 2022-02-17 PROCEDURE — 99395 PREV VISIT EST AGE 18-39: CPT | Mod: S$GLB,,, | Performed by: OBSTETRICS & GYNECOLOGY

## 2022-02-17 PROCEDURE — 88141 PR  CYTOPATH CERV/VAG INTERPRET: ICD-10-PCS | Mod: ,,, | Performed by: PATHOLOGY

## 2022-02-17 PROCEDURE — 88175 CYTOPATH C/V AUTO FLUID REDO: CPT | Performed by: PATHOLOGY

## 2022-02-17 PROCEDURE — 1159F MED LIST DOCD IN RCRD: CPT | Mod: CPTII,S$GLB,, | Performed by: OBSTETRICS & GYNECOLOGY

## 2022-02-17 PROCEDURE — 3078F DIAST BP <80 MM HG: CPT | Mod: CPTII,S$GLB,, | Performed by: OBSTETRICS & GYNECOLOGY

## 2022-02-17 PROCEDURE — 99999 PR PBB SHADOW E&M-EST. PATIENT-LVL III: ICD-10-PCS | Mod: PBBFAC,,, | Performed by: OBSTETRICS & GYNECOLOGY

## 2022-02-17 PROCEDURE — 99395 PR PREVENTIVE VISIT,EST,18-39: ICD-10-PCS | Mod: S$GLB,,, | Performed by: OBSTETRICS & GYNECOLOGY

## 2022-02-17 PROCEDURE — 3078F PR MOST RECENT DIASTOLIC BLOOD PRESSURE < 80 MM HG: ICD-10-PCS | Mod: CPTII,S$GLB,, | Performed by: OBSTETRICS & GYNECOLOGY

## 2022-02-17 PROCEDURE — 88141 CYTOPATH C/V INTERPRET: CPT | Mod: ,,, | Performed by: PATHOLOGY

## 2022-02-17 RX ORDER — ETONOGESTREL AND ETHINYL ESTRADIOL VAGINAL RING .015; .12 MG/D; MG/D
1 RING VAGINAL
Qty: 3 EACH | Refills: 5 | Status: SHIPPED | OUTPATIENT
Start: 2022-02-17 | End: 2022-07-22 | Stop reason: SDUPTHER

## 2022-02-17 NOTE — PROGRESS NOTES
Chief Complaint   Patient presents with    Well Woman       HISTORY OF PRESENT ILLNESS:   Kan Traore is a 36 y.o. female  who presents for well woman exam.  No LMP recorded. (Menstrual status: Birth Control)..  She has no complaints.  Cycles are regular. Declines STD testing. Desires Nuvaring for birth control.      Past Medical History:   Diagnosis Date    Allergic rhinitis     Anxiety     Asthma     Attention deficit disorder (ADD)     Bilateral nephrolithiasis     Depression     Hydronephrosis     Hyperglycemia 3/9/2021    - glucose 131 on recent labs - patient was nonfasting Lab Results Component Value Date  HGBA1C 5.1 06/10/2021      Urinary tract infection     Vaginal infection           Past Surgical History:   Procedure Laterality Date    APPENDECTOMY  11/20/2017    DILATION AND CURETTAGE OF UTERUS  4/2014    KIDNEY STONE SURGERY      LITHOTRIPSY  2011    LITHOTRIPSY  02/09/2017    LOOP ELECTROSURGICAL EXCISION PROCEDURE (LEEP) N/A 10/2/2020    Procedure: LEEP (LOOP ELECTROSURGICAL EXCISION PROCEDURE);  Surgeon: Kacy Elias MD;  Location: [facility];  Service: OB/GYN;  Laterality: N/A;    TONSILLECTOMY           Social History     Socioeconomic History    Marital status:     Number of children: 3   Occupational History     Employer: [employer]   Tobacco Use    Smoking status: Never Smoker    Smokeless tobacco: Never Used   Substance and Sexual Activity    Alcohol use: No    Drug use: No    Sexual activity: Yes     Partners: Male     Birth control/protection: OCP   Social History Narrative    .  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   from .       Family History   Problem Relation Age of Onset    Hypertension Mother 50    Hypothyroidism Father 52    Stroke Maternal Grandfather     Hypertension Maternal Grandfather     Heart disease Maternal Grandfather 56          of AMI    Heart failure Paternal Grandmother 81    No Known Problems Son     No Known Problems Son     No Known Problems Son     Breast cancer Neg Hx     Colon cancer Neg Hx     Ovarian cancer Neg Hx     Kidney disease Neg Hx          OB History    Para Term  AB Living   4 3 3 0 1 3   SAB IAB Ectopic Multiple Live Births   1 0 0 0 3      # Outcome Date GA Lbr Raúl/2nd Weight Sex Delivery Anes PTL Lv   4 Term 17 39w0d 07:00 / 00:29 3.7 kg (8 lb 2.5 oz) M Vag-Spont Spinal, EPI N TEAGAN   3 Term 07/28/15 39w0d  3.221 kg (7 lb 1.6 oz) M Vag-Spont EPI, Spinal N TEAGAN      Birth Comments: Hep B given 7/28/15  Passed hearing   2 Term 12/06/10 38w0d  3.317 kg (7 lb 5 oz) M Vag-Spont  N TEAGAN   1 SAB                  COMPREHENSIVE GYN HISTORY:  PAP History: had abnormal Paps 2020 ASCUS/HPv + then lisa 2-3 on colpo then LISA 1 on leep   Infection History: hpv . Denies PID.  Benign History:Denies uterine fibroids. Denies ovarian cysts. Denies endometriosis Denies other conditions.  Cancer History: Denies cervical cancer. Denies uterine cancer or hyperplasia. Denies ovarian cancer. Denies vulvar cancer or pre-cancer. Denies vaginal cancer or pre-cancer. Denies breast cancer. Denies colon cancer.  Cycle: nl/mon/but heavy and painful   On nuvaring     ROS:  GENERAL: Denies weight gain or weight loss. Feeling well overall.   SKIN: Denies rash or lesions.   HEAD: Denies headache.   NODES: Denies enlarged lymph nodes.   CHEST: Denies shortness of breath.   ABDOMEN: No abdominal pain, constipation, diarrhea, nausea, vomiting or rectal bleeding.   URINARY: No frequency, dysuria, hematuria, or burning on urination.  REPRODUCTIVE: See HPI.   BREASTS: The patient denies pain, lumps, or nipple discharge.       There were no vitals taken for this visit.    APPEARANCE: Well nourished, well developed, in no acute distress.  NECK: Neck symmetric   BREASTS: Symmetrical, no skin changes or visible lesions. No  palpable masses, nipple discharge or adenopathy bilaterally.  PELVIC:   VULVA: No lesions. Normal female genitalia.  URETHRAL MEATUS: Normal size and location, no lesions, no prolapse.  URETHRA: No masses, tenderness, prolapse or scarring.  VAGINA: Moist and well rugated, no discharge, no significant cystocele or rectocele.  CERVIX: No lesions and discharge.  UTERUS: Normal size, regular shape, mobile, non-tender, bladder base nontender.  ADNEXA: No masses or tenderness.        1. Encounter for annual routine gynecological examination    2. Pap smear for cervical cancer screening    3. Status post conization of cervix    4. History of abnormal cervical Pap smear        Plan:  Routine gyn s/p normal breast exam. Pap With HPV cotesting ordered . STD testing: GC/CT/trich, syphilis, HBV/HCV and HIV declined. Counseled on contraception and desires  Nuvaring, does continously.      F/u in 1 yr or PRN

## 2022-02-21 ENCOUNTER — HOSPITAL ENCOUNTER (OUTPATIENT)
Dept: RADIOLOGY | Facility: HOSPITAL | Age: 37
Discharge: HOME | End: 2022-02-21
Attending: ORTHOPAEDIC SURGERY
Payer: COMMERCIAL

## 2022-02-21 DIAGNOSIS — M79.672 LEFT FOOT PAIN: ICD-10-CM

## 2022-02-21 PROCEDURE — 73718 MRI FOOT (FOREFOOT) LEFT WITHOUT CONTRAST: ICD-10-PCS | Mod: 26,LT,, | Performed by: RADIOLOGY

## 2022-02-21 PROCEDURE — 73718 MRI LOWER EXTREMITY W/O DYE: CPT | Mod: TC,LT

## 2022-02-21 PROCEDURE — 73718 MRI LOWER EXTREMITY W/O DYE: CPT | Mod: 26,LT,, | Performed by: RADIOLOGY

## 2022-02-28 ENCOUNTER — OFFICE VISIT (OUTPATIENT)
Dept: SPORTS MEDICINE | Facility: CLINIC | Age: 37
End: 2022-02-28
Payer: COMMERCIAL

## 2022-02-28 VITALS
DIASTOLIC BLOOD PRESSURE: 81 MMHG | SYSTOLIC BLOOD PRESSURE: 124 MMHG | BODY MASS INDEX: 20.89 KG/M2 | WEIGHT: 130 LBS | HEIGHT: 66 IN

## 2022-02-28 DIAGNOSIS — G57.62 MORTON'S NEUROMA, LEFT: ICD-10-CM

## 2022-02-28 DIAGNOSIS — S92.515A CLOSED NONDISPLACED FRACTURE OF PROXIMAL PHALANX OF LESSER TOE OF LEFT FOOT, INITIAL ENCOUNTER: ICD-10-CM

## 2022-02-28 DIAGNOSIS — M77.8 LEFT WRIST TENDINITIS: ICD-10-CM

## 2022-02-28 DIAGNOSIS — M79.672 LEFT FOOT PAIN: Primary | ICD-10-CM

## 2022-02-28 DIAGNOSIS — M25.532 LEFT WRIST PAIN: ICD-10-CM

## 2022-02-28 PROCEDURE — 99999 PR PBB SHADOW E&M-EST. PATIENT-LVL III: CPT | Mod: PBBFAC,,, | Performed by: ORTHOPAEDIC SURGERY

## 2022-02-28 PROCEDURE — 3079F DIAST BP 80-89 MM HG: CPT | Mod: CPTII,S$GLB,, | Performed by: ORTHOPAEDIC SURGERY

## 2022-02-28 PROCEDURE — 1160F RVW MEDS BY RX/DR IN RCRD: CPT | Mod: CPTII,S$GLB,, | Performed by: ORTHOPAEDIC SURGERY

## 2022-02-28 PROCEDURE — 99214 PR OFFICE/OUTPT VISIT, EST, LEVL IV, 30-39 MIN: ICD-10-PCS | Mod: S$GLB,,, | Performed by: ORTHOPAEDIC SURGERY

## 2022-02-28 PROCEDURE — 3079F PR MOST RECENT DIASTOLIC BLOOD PRESSURE 80-89 MM HG: ICD-10-PCS | Mod: CPTII,S$GLB,, | Performed by: ORTHOPAEDIC SURGERY

## 2022-02-28 PROCEDURE — 1159F PR MEDICATION LIST DOCUMENTED IN MEDICAL RECORD: ICD-10-PCS | Mod: CPTII,S$GLB,, | Performed by: ORTHOPAEDIC SURGERY

## 2022-02-28 PROCEDURE — 3074F PR MOST RECENT SYSTOLIC BLOOD PRESSURE < 130 MM HG: ICD-10-PCS | Mod: CPTII,S$GLB,, | Performed by: ORTHOPAEDIC SURGERY

## 2022-02-28 PROCEDURE — 1159F MED LIST DOCD IN RCRD: CPT | Mod: CPTII,S$GLB,, | Performed by: ORTHOPAEDIC SURGERY

## 2022-02-28 PROCEDURE — 3008F PR BODY MASS INDEX (BMI) DOCUMENTED: ICD-10-PCS | Mod: CPTII,S$GLB,, | Performed by: ORTHOPAEDIC SURGERY

## 2022-02-28 PROCEDURE — 99999 PR PBB SHADOW E&M-EST. PATIENT-LVL III: ICD-10-PCS | Mod: PBBFAC,,, | Performed by: ORTHOPAEDIC SURGERY

## 2022-02-28 PROCEDURE — 3074F SYST BP LT 130 MM HG: CPT | Mod: CPTII,S$GLB,, | Performed by: ORTHOPAEDIC SURGERY

## 2022-02-28 PROCEDURE — 1160F PR REVIEW ALL MEDS BY PRESCRIBER/CLIN PHARMACIST DOCUMENTED: ICD-10-PCS | Mod: CPTII,S$GLB,, | Performed by: ORTHOPAEDIC SURGERY

## 2022-02-28 PROCEDURE — 99214 OFFICE O/P EST MOD 30 MIN: CPT | Mod: S$GLB,,, | Performed by: ORTHOPAEDIC SURGERY

## 2022-02-28 PROCEDURE — 3008F BODY MASS INDEX DOCD: CPT | Mod: CPTII,S$GLB,, | Performed by: ORTHOPAEDIC SURGERY

## 2022-02-28 NOTE — PROGRESS NOTES
CC: left foot pain    Kan is here today for follow up evaluation of her left foot pain. Patient reports her pain is 4/10 today. She appreciated significant improvement in her foot pain with taking mobic daily.  She did notice her pain returned on the day that she missed her Mobic dose. She admits to wearing the wrist splint on occasion and has been somewhat compliant with her HEP. She denies new injury or trauma since her last visit. When asked where she hurts she gestures the ulnar aspect of the left wrist.     Recall from visit on 02/14/2022  36 y.o. Female presents today for evaluation of her left foot pain. She admits to left forefoot pain worsening approximately 6 weeks ago without known mechanism of injury. She feels as though her pain was exacerbated 4 weeks ago when she stubbed her toes while walking on a doorway. She appreciated increased bruising after this event that has since improved. She gestures to the 2nd-4th metatarsal heads when asked where she hurts.  When asked, she describes the pain as if she is stepping on something that is requiring her to offload her foot toward her big toe.  How long: Admits to left foot pain beginning 6 weeks ago.   What makes it better: Admits to improved pain with ibuprofen 800 mg and while at rest.    What makes it worse: Admits to increased pain during weight bearing, kneeling and first thing in the morning.   Does it radiate: Denies radiating pain.   Attempted treatments: She has been taking ibuprofen 800 mg as needed.   Pain score: 4/10  History of trauma/injury: Denies prior history of foot trauma/injury.   Affecting ADLs: Admits to this problem affecting her ability to perform ADLs.   Any mechanical symptoms: Denies mechanical symptoms.   Feelings of instability: Denies feelings of instability.     She also notes left ulnar sided wrist pain that was exacerbated by the way she picked up her child 02/13/2022. She has previously seen Dr. Kim for this problem and  had received injections from him which she states helped her symptoms.  Her last injection was 2-3 years ago..     REVIEW OF SYSTEMS:   Constitution: Patient denies fever, chills, night sweats, and weight changes.  Eyes: Patient denies eye pain or vision changes.  HENT: Patient denies headache, ear pain, sore throat, or nasal discharge.  CVS: Patient denies chest pain.  Lungs: Patient denies shortness of breath or cough.  Abd: Patient denies stomach pain, nausea, or vomiting.  Skin: Patient denies skin rash or itching.    Hematologic/Lymphatic: Patient denies easy bruising.   Musculoskeletal: Patient denies recent falls. See HPI.  Psych: Patient denies any current anxiety or nervousness.    PAST MEDICAL HISTORY:   Past Medical History:   Diagnosis Date    Allergic rhinitis     Anxiety     Asthma     Attention deficit disorder (ADD)     Bilateral nephrolithiasis     Depression     Hydronephrosis     Hyperglycemia 3/9/2021    - glucose 131 on recent labs - patient was nonfasting Lab Results Component Value Date  HGBA1C 5.1 06/10/2021      Urinary tract infection     Vaginal infection        PAST SURGICAL HISTORY:   Past Surgical History:   Procedure Laterality Date    APPENDECTOMY  2017    DILATION AND CURETTAGE OF UTERUS  2014    KIDNEY STONE SURGERY      LITHOTRIPSY  2011    LITHOTRIPSY  2017    LOOP ELECTROSURGICAL EXCISION PROCEDURE (LEEP) N/A 10/2/2020    Procedure: LEEP (LOOP ELECTROSURGICAL EXCISION PROCEDURE);  Surgeon: Kacy Elias MD;  Location: [facility];  Service: OB/GYN;  Laterality: N/A;    TONSILLECTOMY         FAMILY HISTORY:   Family History   Problem Relation Age of Onset    Hypertension Mother 50    Hypothyroidism Father 52    Stroke Maternal Grandfather     Hypertension Maternal Grandfather     Heart disease Maternal Grandfather 56         of AMI    Heart failure Paternal Grandmother 81    No Known Problems Son     No Known Problems Son     No Known  Problems Son     Breast cancer Neg Hx     Colon cancer Neg Hx     Ovarian cancer Neg Hx     Kidney disease Neg Hx        SOCIAL HISTORY:   Social History     Socioeconomic History    Marital status:     Number of children: 3   Occupational History     Employer: [employer]   Tobacco Use    Smoking status: Never Smoker    Smokeless tobacco: Never Used   Substance and Sexual Activity    Alcohol use: No    Drug use: No    Sexual activity: Yes     Partners: Male     Birth control/protection: OCP   Social History Narrative    .  Myron filed for divorce 2/2020 in still pending.  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   from .       MEDICATIONS:     Current Outpatient Medications:     albuterol (PROVENTIL/VENTOLIN HFA) 90 mcg/actuation inhaler, Inhale 2 puffs into the lungs every 6 (six) hours as needed for Wheezing. Rescue, Disp: 8.5 g, Rfl: 11    ALPRAZolam (XANAX) 0.5 MG tablet, Take 1 tablet (0.5 mg total) by mouth 3 (three) times daily as needed for Insomnia or Anxiety., Disp: 30 tablet, Rfl: 0    citalopram (CELEXA) 20 MG tablet, Take 1 tablet (20 mg total) by mouth once daily., Disp: 90 tablet, Rfl: 3    dextroamphetamine-amphetamine (ADDERALL XR) 30 MG 24 hr capsule, Take 1 capsule (30 mg total) by mouth every morning., Disp: 30 capsule, Rfl: 0    dextroamphetamine-amphetamine (ADDERALL) 10 mg Tab, Take 1 tablet (10 mg total) by mouth once daily., Disp: 30 tablet, Rfl: 0    etonogestreL-ethinyl estradioL (NUVARING) 0.12-0.015 mg/24 hr vaginal ring, Place 1 each vaginally every 21 days. Change ring every 21 days, doing continously, Disp: 3 each, Rfl: 5    meloxicam (MOBIC) 15 MG tablet, Take 1 tablet (15 mg total) by mouth once daily., Disp: 30 tablet, Rfl: 0    ondansetron (ZOFRAN-ODT) 4 MG TbDL, Take 1 tablet (4 mg total) by mouth every 8 (eight) hours as needed (nausea/migraine)., Disp: 30 tablet, Rfl: 5    triamcinolone  "acetonide 0.1% (KENALOG) 0.1 % cream, Apply topically 2 (two) times daily., Disp: 80 g, Rfl: 1    ALLERGIES:   Review of patient's allergies indicates:  No Known Allergies     PHYSICAL EXAMINATION:  /81   Ht 5' 6" (1.676 m)   Wt 59 kg (130 lb)   BMI 20.98 kg/m²   Vitals signs and nursing note have been reviewed.  General: In no acute distress, well developed, well nourished, no diaphoresis  Eyes: EOM full and smooth, no eye redness or discharge  HENT: normocephalic and atraumatic, neck supple, trachea midline, no nasal discharge, no external ear redness or discharge  Cardiovascular: 2+ and symmetric radial and DP pulses bilaterally, no LE edema  Lungs: respirations non-labored, no conversational dyspnea   Abd: non-distended, no rigidity  MSK: no amputation or deformity, no swelling of extremities  Neuro: AAOx3, CN2-12 grossly intact  Skin: No rashes, warm and dry  Psychiatric: cooperative, pleasant, mood and affect appropriate for age    ANKLE: left   The affected ankle is compared to the contralateral ankle.    Observation:    There is no edema, erythema, or ecchymosis.   Shoes reveal a normal wear pattern.  No structural deformities including toe deformities.   Pes planus bilaterally  Negative too-many toes sign.  Normal callus pattern on the feet bilaterally.    Achilles tendon and calcaneal insertion reveals no deformities  No leg or intrinsic foot muscle atrophy.  Squatting reveals symmetric pronation of the bilateral feet.   Able to rise on toes with symmetric supination.    Normal gait without evidence of antalgia.    ROM: (expected degree)  Active dorsiflexion to 20° bilaterally.    Active plantarflexion to 50° bilaterally.    Active ankle inversion to 35° bilaterally.    Active ankle eversion to 15° bilaterally.    Full active flexion/extension of the toes bilaterally.   Heel cords without tightness bilaterally.    Tenderness To Palpation:  No tenderness at the ATFL, CFL, PTFL, or deltoid " ligaments  No tenderness over the distal anterior syndesmosis, distal tibia/fibula, fibular head/shaft  No tenderness at medial or lateral malleoli   No tenderness at navicular, cuboid, cuneiforms, talus, or calcaneous  + tenderness at the distal third metatarsal and in the webspace between the second and third metatarsal heads  + tenderness over the distal third through fifth metatarsals and phalanges  No tenderness at the Achilles tendon calcaneal insertion  No tenderness at the tibialis posterior, flexor digitorum longus, flexor hallucis longus   No tenderness at the peroneal tendons    Strength Testing:  Dorsiflexion - 5/5  Platarflexion - 5/5  Resisted Inversion - 5/5  Resisted Eversion - 5/5  Great Toe Extension - 5/5  Great Toe Flexion - 5/5    Special Tests:  Anterior talar drawer - negative and without dimpling  Talar tilt - negative    Heel tap test - negative  Distal tib/fib squeeze test - negative  Hines squeeze test - negative    Metatarsal squeeze test - positive  Midfoot stress test - negative  Calcaneal squeeze test - negative    No subluxation of the peroneal tendons with resisted eversion.    Vascular/Sensory Exam:  Lower extremity  DP and PT pulses intact BL  No skin changes, no abnormal hair distribution  Sensation intact to light touch throughout the saphenous, sural, superficial peroneal, deep peroneal, and tibial nerve distributions  Capillary refill intact <2 seconds in all toes bilaterally.      Wrist: left   The affected Wrist is compared to the contralateral Wrist.    Observation:  No evidence of edema, erythema, ecchymosis, or effusion.  No asymmetries; muscle atrophy; ganglion cysts; or bony abnormalities including ulnar deviation,   No Heberdens or Brouchards nodes,   No swan-neck or boutonnière deformities.    No clubbing of the digits.    Tenderness:  No tenderness at radial styloid, Megan's tubercle, ulnar styloid.  No tenderness at anatomic snuff box, scaphoid tubercle,  scapholunate junction.  No tenderness at TFCC  No tenderness at pisiform, hamate, metacarpals and phalanges.  No tenderness over median nerve at the carpal tunnel.  + tenderness along the extensor carpi ulnaris tendon at the lateral aspect of the distal lift forearm    Range of Motion:  Active wrist extension to 70° bilaterally.    Active wrist flexion to 80° bilaterally.    Active radial deviation to 20° bilaterally.    Active ulnar deviation to 30° bilaterally.    Active pronation to 80° bilaterally.    Active supination to 80° bilaterally.    Full active flexion and extension at the MCP, PIP, and DIP bilaterally.   Active thumb motion full in all planes.    Strength Testing:  Wrist extension - 5/5, mild pain on the left  Wrist flexion - 5/5, mild pain on the left  Ulnar deviation - 5/5, mild pain on the left  Radial deviation - 5/5, mild pain on the left  Pronation - 5/5  Supination - 5/5    Finger flexion - 5/5  Finger extension - 5/5  Finger abduction - 5/5  Finger adduction - 5/5    Special Tests:  No laxity with distal radioulnar joint translation.  Negative Ariass test.     Finkelsteins test - negative    Axial grind of first CMC joint - negative  No laxity at the MCP UCL of the thumb    Normal fist alignment of the phalanges  No laxity at the RCL and UCL of the PIPs and DIPs of the phalanges.  Normal finger flexion of the FDP and FDS in all phalanges bilaterally.  Able to perform OK sign.    Neurovascular Exam:  Upper extremity  Sensation intact to light touch in the median, ulnar, and radial distributions on the hands bilaterally.  Radial and ulnar pulses intact and symmetric.  Capillary refill intact to <2 seconds in all digits.        IMAGIN. X-ray obtained 2022 due to left foot pain   2. X-ray images were reviewed personally by me and then directly with patient.  3. FINDINGS: X-ray images obtained demonstrate no fracture, no dislocation  4. IMPRESSION: As above.     1. X-ray obtained  02/14/2022 due to left wrist pain  2. X-ray images were reviewed personally by me and then directly with patient.  3. FINDINGS: X-ray images obtained demonstrate no fracture, no dislocation  4. IMPRESSION: As above.     1. MRI obtained 02/21/2022 due to left foot pain   2. MRI images were reviewed personally by me and then directly with patient.  3. FINDINGS: MRI images obtained demonstrate bone marrow edema at the 3rd and 4th proximal and distal phalanges and at the distal 4th and 5th metatarsals. Possible nondisplaced fracture of the 3rd and 4th metatarsal head. Abarca's neuroma between 2nd and 3rd metatarsal heads.  4. IMPRESSION: As above.       ASSESSMENT:      ICD-10-CM ICD-9-CM   1. Left foot pain  M79.672 729.5   2. Abarca's neuroma, left  G57.62 355.6   3. Closed nondisplaced fracture of proximal phalanx of lesser toe of left foot, initial encounter  S92.515A 826.0   4. Left wrist pain  M25.532 719.43   5. Left wrist tendinitis  M77.8 727.05         PLAN:  1-3. Left foot pain/Abarca's neuroma/closed fracture - stable    - Kan admits to left forefoot pain at the 2nd-3rd metatarsal heads that has been worsening for the past 2 months without known mechanism of injury that was exacerbated when she stubbed her toes on a doorway 6 weeks ago.     - MRI obtained 02/21/2022 and images were personally reviewed with the patient. See above for further detail.    - Patient fitted in clinic today with short walking boot and encouraged to wear at all times for the next two weeks until her follow up with me.  Will likely continue for at least 4 weeks and this may help with both the neuroma and the fractures at the lateral toes.      4-5. Left wrist tendinitis - improved    - She also notes intermittent left ulnar sided wrist pain that was increased 02/13/2022 after picking up one of her children. She has been wearing her brace on occasion and been somewhat compliant with her HEP.  She has found improvement with  Mobic.    - Continue with HEP for wrist tendonitis prescribed at initial visit.     - Continue with left neutral wrist splint during activity that causes her pain.    - Continue with Mobic daily as needed.        Future planning includes - physical therapy for the wrist if not improved, ultrasound-guided tendon sheath injection at the [facility]    All questions were answered to the best of my ability and all concerns were addressed at this time.    Follow up in 2 weeks for above or sooner if needed.       This note is dictated using the M*Modal Fluency Direct word recognition program. There are word recognition mistakes that are occasionally missed on review.

## 2022-03-02 ENCOUNTER — PATIENT MESSAGE (OUTPATIENT)
Dept: SPORTS MEDICINE | Facility: CLINIC | Age: 37
End: 2022-03-02
Payer: COMMERCIAL

## 2022-03-02 LAB
FINAL PATHOLOGIC DIAGNOSIS: ABNORMAL
Lab: ABNORMAL

## 2022-03-03 ENCOUNTER — TELEPHONE (OUTPATIENT)
Dept: SPORTS MEDICINE | Facility: CLINIC | Age: 37
End: 2022-03-03
Payer: COMMERCIAL

## 2022-03-03 ENCOUNTER — TELEPHONE (OUTPATIENT)
Dept: OBSTETRICS AND GYNECOLOGY | Facility: HOSPITAL | Age: 37
End: 2022-03-03
Payer: COMMERCIAL

## 2022-03-03 NOTE — TELEPHONE ENCOUNTER
Spoke to the patient about her question and was able to fax over the document over to the [employer] to the number she provided.

## 2022-03-03 NOTE — TELEPHONE ENCOUNTER
Called patient and discussed pap smear is abnormal so recommend colpo. All questions answered and we discussed getting HPV vaccine as well.     Can you please set up for colpo?

## 2022-03-03 NOTE — TELEPHONE ENCOUNTER
----- Message from Althea Ham sent at 3/3/2022 11:01 AM CST -----  Regarding: pt  Pt is calling to speak with the nurse pt is at the [facility] office they need to email the paper for the handicap tag if you can fax it over to [phone] can you please call pt at [phone]. Pt is waiting at the [facility] office     MANAN

## 2022-03-14 ENCOUNTER — OFFICE VISIT (OUTPATIENT)
Dept: SPORTS MEDICINE | Facility: CLINIC | Age: 37
End: 2022-03-14
Payer: COMMERCIAL

## 2022-03-14 ENCOUNTER — PATIENT MESSAGE (OUTPATIENT)
Dept: SPORTS MEDICINE | Facility: CLINIC | Age: 37
End: 2022-03-14

## 2022-03-14 VITALS
WEIGHT: 130 LBS | SYSTOLIC BLOOD PRESSURE: 124 MMHG | BODY MASS INDEX: 20.89 KG/M2 | HEIGHT: 66 IN | DIASTOLIC BLOOD PRESSURE: 90 MMHG

## 2022-03-14 DIAGNOSIS — M25.532 LEFT WRIST PAIN: ICD-10-CM

## 2022-03-14 DIAGNOSIS — M79.672 LEFT FOOT PAIN: Primary | ICD-10-CM

## 2022-03-14 DIAGNOSIS — G57.62 MORTON'S NEUROMA, LEFT: ICD-10-CM

## 2022-03-14 DIAGNOSIS — M77.8 LEFT WRIST TENDINITIS: ICD-10-CM

## 2022-03-14 DIAGNOSIS — S92.515D CLOSED NONDISPLACED FRACTURE OF PROXIMAL PHALANX OF LESSER TOE OF LEFT FOOT WITH ROUTINE HEALING, SUBSEQUENT ENCOUNTER: ICD-10-CM

## 2022-03-14 PROCEDURE — 3080F PR MOST RECENT DIASTOLIC BLOOD PRESSURE >= 90 MM HG: ICD-10-PCS | Mod: CPTII,S$GLB,, | Performed by: ORTHOPAEDIC SURGERY

## 2022-03-14 PROCEDURE — 1159F PR MEDICATION LIST DOCUMENTED IN MEDICAL RECORD: ICD-10-PCS | Mod: CPTII,S$GLB,, | Performed by: ORTHOPAEDIC SURGERY

## 2022-03-14 PROCEDURE — 3074F SYST BP LT 130 MM HG: CPT | Mod: CPTII,S$GLB,, | Performed by: ORTHOPAEDIC SURGERY

## 2022-03-14 PROCEDURE — 1160F PR REVIEW ALL MEDS BY PRESCRIBER/CLIN PHARMACIST DOCUMENTED: ICD-10-PCS | Mod: CPTII,S$GLB,, | Performed by: ORTHOPAEDIC SURGERY

## 2022-03-14 PROCEDURE — 3074F PR MOST RECENT SYSTOLIC BLOOD PRESSURE < 130 MM HG: ICD-10-PCS | Mod: CPTII,S$GLB,, | Performed by: ORTHOPAEDIC SURGERY

## 2022-03-14 PROCEDURE — 3080F DIAST BP >= 90 MM HG: CPT | Mod: CPTII,S$GLB,, | Performed by: ORTHOPAEDIC SURGERY

## 2022-03-14 PROCEDURE — 1160F RVW MEDS BY RX/DR IN RCRD: CPT | Mod: CPTII,S$GLB,, | Performed by: ORTHOPAEDIC SURGERY

## 2022-03-14 PROCEDURE — 99214 OFFICE O/P EST MOD 30 MIN: CPT | Mod: S$GLB,,, | Performed by: ORTHOPAEDIC SURGERY

## 2022-03-14 PROCEDURE — 3008F BODY MASS INDEX DOCD: CPT | Mod: CPTII,S$GLB,, | Performed by: ORTHOPAEDIC SURGERY

## 2022-03-14 PROCEDURE — 99214 PR OFFICE/OUTPT VISIT, EST, LEVL IV, 30-39 MIN: ICD-10-PCS | Mod: S$GLB,,, | Performed by: ORTHOPAEDIC SURGERY

## 2022-03-14 PROCEDURE — 3008F PR BODY MASS INDEX (BMI) DOCUMENTED: ICD-10-PCS | Mod: CPTII,S$GLB,, | Performed by: ORTHOPAEDIC SURGERY

## 2022-03-14 PROCEDURE — 1159F MED LIST DOCD IN RCRD: CPT | Mod: CPTII,S$GLB,, | Performed by: ORTHOPAEDIC SURGERY

## 2022-03-14 PROCEDURE — 99999 PR PBB SHADOW E&M-EST. PATIENT-LVL II: CPT | Mod: PBBFAC,,, | Performed by: ORTHOPAEDIC SURGERY

## 2022-03-14 PROCEDURE — 99999 PR PBB SHADOW E&M-EST. PATIENT-LVL II: ICD-10-PCS | Mod: PBBFAC,,, | Performed by: ORTHOPAEDIC SURGERY

## 2022-03-14 RX ORDER — MELOXICAM 15 MG/1
15 TABLET ORAL DAILY
Qty: 30 TABLET | Refills: 0 | Status: SHIPPED | OUTPATIENT
Start: 2022-03-14 | End: 2022-05-03 | Stop reason: SDUPTHER

## 2022-03-14 NOTE — PROGRESS NOTES
CC: left foot pain    Kan is here today for follow up evaluation of her left foot pain. Patient reports her pain is 5/10 today. She is approximately 8 weeks out from her original date of injury. She has been compliant with her short walking boot, but feels as though her foot will swell towards the end of the day. She feels as though her wrist continues to bother her intermittently and is improved when taking Mobic. She denies new falls or trauma since her last visit.     Recall from visit on 02/28/2022  Kan is here today for follow up evaluation of her left foot pain. Patient reports her pain is 4/10 today. She appreciated significant improvement in her foot pain with taking mobic daily.  She did notice her pain returned on the day that she missed her Mobic dose. She admits to wearing the wrist splint on occasion and has been somewhat compliant with her HEP. She denies new injury or trauma since her last visit. When asked where she hurts she gestures the ulnar aspect of the left wrist.     Recall from visit on 02/14/2022  36 y.o. Female presents today for evaluation of her left foot pain. She admits to left forefoot pain worsening approximately 6 weeks ago without known mechanism of injury. She feels as though her pain was exacerbated 4 weeks ago when she stubbed her toes while walking on a doorway. She appreciated increased bruising after this event that has since improved. She gestures to the 2nd-4th metatarsal heads when asked where she hurts.  When asked, she describes the pain as if she is stepping on something that is requiring her to offload her foot toward her big toe.  How long: Admits to left foot pain beginning 6 weeks ago.   What makes it better: Admits to improved pain with ibuprofen 800 mg and while at rest.    What makes it worse: Admits to increased pain during weight bearing, kneeling and first thing in the morning.   Does it radiate: Denies radiating pain.   Attempted treatments: She has been  taking ibuprofen 800 mg as needed.   Pain score: 4/10  History of trauma/injury: Denies prior history of foot trauma/injury.   Affecting ADLs: Admits to this problem affecting her ability to perform ADLs.   Any mechanical symptoms: Denies mechanical symptoms.   Feelings of instability: Denies feelings of instability.     She also notes left ulnar sided wrist pain that was exacerbated by the way she picked up her child 02/13/2022. She has previously seen Dr. Kim for this problem and had received injections from him which she states helped her symptoms.  Her last injection was 2-3 years ago..     REVIEW OF SYSTEMS:   Constitution: Patient denies fever, chills, night sweats, and weight changes.  Eyes: Patient denies eye pain or vision changes.  HENT: Patient denies headache, ear pain, sore throat, or nasal discharge.  CVS: Patient denies chest pain.  Lungs: Patient denies shortness of breath or cough.  Abd: Patient denies stomach pain, nausea, or vomiting.  Skin: Patient denies skin rash or itching.    Hematologic/Lymphatic: Patient denies easy bruising.   Musculoskeletal: Patient denies recent falls. See HPI.  Psych: Patient denies any current anxiety or nervousness.    PAST MEDICAL HISTORY:   Past Medical History:   Diagnosis Date    Allergic rhinitis     Anxiety     Asthma     Attention deficit disorder (ADD)     Bilateral nephrolithiasis     Depression     Hydronephrosis     Hyperglycemia 3/9/2021    - glucose 131 on recent labs - patient was nonfasting Lab Results Component Value Date  HGBA1C 5.1 06/10/2021      Urinary tract infection     Vaginal infection        PAST SURGICAL HISTORY:   Past Surgical History:   Procedure Laterality Date    APPENDECTOMY  11/20/2017    DILATION AND CURETTAGE OF UTERUS  4/2014    KIDNEY STONE SURGERY      LITHOTRIPSY  2011    LITHOTRIPSY  02/09/2017    LOOP ELECTROSURGICAL EXCISION PROCEDURE (LEEP) N/A 10/2/2020    Procedure: LEEP (LOOP ELECTROSURGICAL EXCISION  PROCEDURE);  Surgeon: Kacy Elias MD;  Location: [facility] OR;  Service: OB/GYN;  Laterality: N/A;    TONSILLECTOMY         FAMILY HISTORY:   Family History   Problem Relation Age of Onset    Hypertension Mother 50    Hypothyroidism Father 52    Stroke Maternal Grandfather     Hypertension Maternal Grandfather     Heart disease Maternal Grandfather 56         of AMI    Heart failure Paternal Grandmother 81    No Known Problems Son     No Known Problems Son     No Known Problems Son     Breast cancer Neg Hx     Colon cancer Neg Hx     Ovarian cancer Neg Hx     Kidney disease Neg Hx        SOCIAL HISTORY:   Social History     Socioeconomic History    Marital status:     Number of children: 3   Occupational History     Employer: [employer]   Tobacco Use    Smoking status: Never Smoker    Smokeless tobacco: Never Used   Substance and Sexual Activity    Alcohol use: No    Drug use: No    Sexual activity: Yes     Partners: Male     Birth control/protection: OCP   Social History Narrative    .  Myron filed for divorce 2020 in still pending.  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   from .       MEDICATIONS:     Current Outpatient Medications:     albuterol (PROVENTIL/VENTOLIN HFA) 90 mcg/actuation inhaler, Inhale 2 puffs into the lungs every 6 (six) hours as needed for Wheezing. Rescue, Disp: 8.5 g, Rfl: 11    ALPRAZolam (XANAX) 0.5 MG tablet, Take 1 tablet (0.5 mg total) by mouth 3 (three) times daily as needed for Insomnia or Anxiety., Disp: 30 tablet, Rfl: 0    citalopram (CELEXA) 20 MG tablet, Take 1 tablet (20 mg total) by mouth once daily., Disp: 90 tablet, Rfl: 3    dextroamphetamine-amphetamine (ADDERALL XR) 30 MG 24 hr capsule, Take 1 capsule (30 mg total) by mouth every morning., Disp: 30 capsule, Rfl: 0    dextroamphetamine-amphetamine (ADDERALL) 10 mg Tab, Take 1 tablet (10 mg total) by mouth once daily., Disp:  "30 tablet, Rfl: 0    etonogestreL-ethinyl estradioL (NUVARING) 0.12-0.015 mg/24 hr vaginal ring, Place 1 each vaginally every 21 days. Change ring every 21 days, doing continously, Disp: 3 each, Rfl: 5    meloxicam (MOBIC) 15 MG tablet, Take 1 tablet (15 mg total) by mouth once daily., Disp: 30 tablet, Rfl: 0    ondansetron (ZOFRAN-ODT) 4 MG TbDL, Take 1 tablet (4 mg total) by mouth every 8 (eight) hours as needed (nausea/migraine)., Disp: 30 tablet, Rfl: 5    triamcinolone acetonide 0.1% (KENALOG) 0.1 % cream, Apply topically 2 (two) times daily., Disp: 80 g, Rfl: 1    ALLERGIES:   Review of patient's allergies indicates:  No Known Allergies     PHYSICAL EXAMINATION:  BP (!) 124/90   Ht 5' 6" (1.676 m)   Wt 59 kg (130 lb)   BMI 20.98 kg/m²   Vitals signs and nursing note have been reviewed.  General: In no acute distress, well developed, well nourished, no diaphoresis  Eyes: EOM full and smooth, no eye redness or discharge  HENT: normocephalic and atraumatic, neck supple, trachea midline, no nasal discharge, no external ear redness or discharge  Cardiovascular: 2+ and symmetric radial and DP pulses bilaterally, no LE edema  Lungs: respirations non-labored, no conversational dyspnea   Abd: non-distended, no rigidity  MSK: no amputation or deformity, no swelling of extremities  Neuro: AAOx3, CN2-12 grossly intact  Skin: No rashes, warm and dry  Psychiatric: cooperative, pleasant, mood and affect appropriate for age    ANKLE: left   The affected ankle is compared to the contralateral ankle.    Observation:    There is no erythema or ecchymosis. + trace edema at the ankle  Shoes reveal a normal wear pattern.  No structural deformities including toe deformities.   Pes planus bilaterally  Negative too-many toes sign.  Normal callus pattern on the feet bilaterally.    Achilles tendon and calcaneal insertion reveals no deformities  No leg or intrinsic foot muscle atrophy.  Squatting reveals symmetric pronation of " the bilateral feet.   Able to rise on toes with symmetric supination.    Normal gait without evidence of antalgia.    ROM: (expected degree)  Active dorsiflexion to 20° bilaterally.    Active plantarflexion to 50° bilaterally.    Active ankle inversion to 35° bilaterally.    Active ankle eversion to 15° bilaterally.    Full active flexion/extension of the toes bilaterally.   Heel cords without tightness bilaterally.    Tenderness To Palpation:  No tenderness at the ATFL, CFL, PTFL, or deltoid ligaments  No tenderness over the distal anterior syndesmosis, distal tibia/fibula, fibular head/shaft  No tenderness at medial or lateral malleoli   No tenderness at navicular, cuboid, cuneiforms, talus, or calcaneous  + mild tenderness at the distal third metatarsal and in the webspace between the second and third metatarsal heads  + mild tenderness over the distal third through fifth metatarsals and phalanges  No tenderness at the Achilles tendon calcaneal insertion  No tenderness at the tibialis posterior, flexor digitorum longus, flexor hallucis longus   No tenderness at the peroneal tendons    Strength Testing:  Dorsiflexion - 5/5  Platarflexion - 5/5  Resisted Inversion - 5/5  Resisted Eversion - 5/5  Great Toe Extension - 5/5  Great Toe Flexion - 5/5    Special Tests:  Anterior talar drawer - negative and without dimpling  Talar tilt - negative    Heel tap test - negative  Distal tib/fib squeeze test - negative  Hines squeeze test - negative    Metatarsal squeeze test - positive  Midfoot stress test - negative  Calcaneal squeeze test - negative    No subluxation of the peroneal tendons with resisted eversion.    Vascular/Sensory Exam:  Lower extremity  DP and PT pulses intact BL  No skin changes, no abnormal hair distribution  Sensation intact to light touch throughout the saphenous, sural, superficial peroneal, deep peroneal, and tibial nerve distributions  Capillary refill intact <2 seconds in all toes  bilaterally.      Wrist: left   The affected Wrist is compared to the contralateral Wrist.    Observation:  No evidence of edema, erythema, ecchymosis, or effusion.  No asymmetries; muscle atrophy; ganglion cysts; or bony abnormalities including ulnar deviation,   No Heberdens or Brouchards nodes,   No swan-neck or boutonnière deformities.    No clubbing of the digits.    Tenderness:  No tenderness at radial styloid, Megan's tubercle, ulnar styloid.  No tenderness at anatomic snuff box, scaphoid tubercle, scapholunate junction.  No tenderness at TFCC  No tenderness at pisiform, hamate, metacarpals and phalanges.  No tenderness over median nerve at the carpal tunnel.  + mild tenderness along the extensor carpi ulnaris tendon at the lateral aspect of the distal lift forearm    Range of Motion:  Active wrist extension to 70° bilaterally.    Active wrist flexion to 80° bilaterally.    Active radial deviation to 20° bilaterally.    Active ulnar deviation to 30° bilaterally.    Active pronation to 80° bilaterally.    Active supination to 80° bilaterally.    Full active flexion and extension at the MCP, PIP, and DIP bilaterally.   Active thumb motion full in all planes.    Strength Testing:  Wrist extension - 5/5, mild pain on the left  Wrist flexion - 5/5, mild pain on the left  Ulnar deviation - 5/5, mild pain on the left  Radial deviation - 5/5, mild pain on the left  Pronation - 5/5  Supination - 5/5    Finger flexion - 5/5  Finger extension - 5/5  Finger abduction - 5/5  Finger adduction - 5/5    Special Tests:  No laxity with distal radioulnar joint translation.  Negative Ariass test.     Finkelsteins test - negative    Axial grind of first CMC joint - negative  No laxity at the MCP UCL of the thumb    Normal fist alignment of the phalanges  No laxity at the RCL and UCL of the PIPs and DIPs of the phalanges.  Normal finger flexion of the FDP and FDS in all phalanges bilaterally.  Able to perform OK  sign.    Neurovascular Exam:  Upper extremity  Sensation intact to light touch in the median, ulnar, and radial distributions on the hands bilaterally.  Radial and ulnar pulses intact and symmetric.  Capillary refill intact to <2 seconds in all digits.        IMAGIN. X-ray obtained 2022 due to left foot pain   2. X-ray images were reviewed personally by me and then directly with patient.  3. FINDINGS: X-ray images obtained demonstrate no fracture, no dislocation  4. IMPRESSION: As above.     1. X-ray obtained 2022 due to left wrist pain  2. X-ray images were reviewed personally by me and then directly with patient.  3. FINDINGS: X-ray images obtained demonstrate no fracture, no dislocation  4. IMPRESSION: As above.     1. MRI obtained 2022 due to left foot pain   2. MRI images were reviewed personally by me and then directly with patient.  3. FINDINGS: MRI images obtained demonstrate bone marrow edema at the 3rd and 4th proximal and distal phalanges and at the distal 4th and 5th metatarsals. Possible nondisplaced fracture of the 3rd and 4th metatarsal head. Abarca's neuroma between 2nd and 3rd metatarsal heads.  4. IMPRESSION: As above.       ASSESSMENT:      ICD-10-CM ICD-9-CM   1. Left foot pain  M79.672 729.5   2. Abarca's neuroma, left  G57.62 355.6   3. Closed nondisplaced fracture of proximal phalanx of lesser toe of left foot with routine healing, subsequent encounter  S92.515D V54.19   4. Left wrist pain  M25.532 719.43   5. Left wrist tendinitis  M77.8 727.05         PLAN:  1-3. Left foot pain/Abarca's neuroma/closed fracture - improved    - Kan admits to left forefoot pain at the 2nd-3rd metatarsal heads that has been worsening for the past 2 months without known mechanism of injury that was exacerbated when she stubbed her toes on a doorway 8 weeks ago.     - She has been compliant with her walking boot and denies pain during ambulation in the boot. She has appreciated increased  pain and swelling after working and is interested in possibly working at home until her follow up.     - Continue with short walking boot.  Will likely continue for at least 4 weeks and this should help with both the neuroma and the fractures. At next evaluation, we will see if a neuroma injection makes sense as right now it is unclear if it is the fractures or the neuroma that is her pain .    - Added HEP for ankle range of motion today. Exercises e-mailed to her.       4-5. Left wrist tendinitis - improved    - She also notes intermittent left ulnar sided wrist pain that was increased 02/13/2022 after picking up one of her children. She has been wearing her brace on occasion and been somewhat compliant with her HEP.  She has found improvement with Mobic.    - Continue with HEP for wrist tendonitis prescribed at initial visit.     - Continue with left neutral wrist splint during activity that causes her pain.  As she will be home from work for the next estimated 4 weeks, she states she can be more consistent with home exercises and I am optimistic this will provide her with increased relief.    - Continue with Mobic daily as needed.  This was refilled for her today.        Future planning includes - physical therapy for the wrist if not improved, ultrasound-guided tendon sheath injection at the [facility]    All questions were answered to the best of my ability and all concerns were addressed at this time.    Follow up in 4 weeks for above or sooner if needed.       This note is dictated using the M*Modal Fluency Direct word recognition program. There are word recognition mistakes that are occasionally missed on review.      Total time spent with patient: see X nishant on chart below.   More than half of the time was spent counseling or coordinating care including prognosis, differential diagnosis, risks and benefits of treatment, instructions, compliance risk reductions.    EST MINUTES X   [id number] 5    [id number] 10     [id number] 15    [id number] 25 X   [id number] 40    NEW     12351 10    87985 20    67176 30    98395 45    38985 60    PHONE      5-10    [id number] 11-20    45772 21-30

## 2022-03-14 NOTE — LETTER
March 14, 2022      [facility]  [address] [place] [id number]  Phone: [phone]  Fax: [phone]       Patient: Kan Traore   YOB: 1985  Date of Visit: 03/14/2022    To Whom It May Concern:    Dione Traore  was at [facility] on 03/14/2022. Please allow her to work from home until her follow up appointment 04/11/2022. If you have any questions or concerns, or if I can be of further assistance, please do not hesitate to contact me.    Sincerely,      Dr. Brian Marcial, DO

## 2022-03-31 ENCOUNTER — PATIENT MESSAGE (OUTPATIENT)
Dept: FAMILY MEDICINE | Facility: CLINIC | Age: 37
End: 2022-03-31
Payer: COMMERCIAL

## 2022-03-31 ENCOUNTER — PATIENT MESSAGE (OUTPATIENT)
Dept: OBSTETRICS AND GYNECOLOGY | Facility: CLINIC | Age: 37
End: 2022-03-31
Payer: COMMERCIAL

## 2022-03-31 RX ORDER — ETONOGESTREL AND ETHINYL ESTRADIOL VAGINAL RING .015; .12 MG/D; MG/D
1 RING VAGINAL
Qty: 3 EACH | Refills: 5 | Status: CANCELLED | OUTPATIENT
Start: 2022-03-31

## 2022-04-04 ENCOUNTER — PATIENT MESSAGE (OUTPATIENT)
Dept: OBSTETRICS AND GYNECOLOGY | Facility: CLINIC | Age: 37
End: 2022-04-04
Payer: COMMERCIAL

## 2022-04-04 ENCOUNTER — PATIENT OUTREACH (OUTPATIENT)
Dept: ADMINISTRATIVE | Facility: OTHER | Age: 37
End: 2022-04-04
Payer: COMMERCIAL

## 2022-04-04 NOTE — PROGRESS NOTES
Health Maintenance Due   Topic Date Due    Influenza Vaccine (1) 09/01/2021    COVID-19 Vaccine (3 - Booster for Pfizer series) 01/20/2022     Updates were requested from care everywhere.  Chart was reviewed for overdue Proactive [facility] Encounters ([facility]) topics (CRS, Breast Cancer Screening, Eye exam)  Health Maintenance has been updated.  LINKS immunization registry triggered.  Immunizations were reconciled.
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
## 2022-04-05 NOTE — PATIENT INSTRUCTIONS
Covid-19 vaccination scheduling  You can schedule on your MyChart  Go to Visits  Schedule visit  Scroll to the bottom and select Covid-19 vaccine/booster  Answer questions  Select location   You can also call [phone]  The Moderna vaccine is available at any [facility] pharmacy for walk in  Local pharmacies also have the booster available

## 2022-04-06 ENCOUNTER — OFFICE VISIT (OUTPATIENT)
Dept: FAMILY MEDICINE | Facility: CLINIC | Age: 37
End: 2022-04-06
Payer: COMMERCIAL

## 2022-04-06 DIAGNOSIS — Z00.01 ENCOUNTER FOR GENERAL ADULT MEDICAL EXAMINATION WITH ABNORMAL FINDINGS: ICD-10-CM

## 2022-04-06 DIAGNOSIS — F33.42 RECURRENT MAJOR DEPRESSIVE DISORDER, IN FULL REMISSION: ICD-10-CM

## 2022-04-06 DIAGNOSIS — F41.1 GENERALIZED ANXIETY DISORDER: Primary | ICD-10-CM

## 2022-04-06 DIAGNOSIS — F90.0 ATTENTION DEFICIT HYPERACTIVITY DISORDER (ADHD), PREDOMINANTLY INATTENTIVE TYPE: ICD-10-CM

## 2022-04-06 DIAGNOSIS — E03.8 SUBCLINICAL HYPOTHYROIDISM: ICD-10-CM

## 2022-04-06 PROCEDURE — 99214 OFFICE O/P EST MOD 30 MIN: CPT | Mod: 95,,, | Performed by: FAMILY MEDICINE

## 2022-04-06 PROCEDURE — 1159F PR MEDICATION LIST DOCUMENTED IN MEDICAL RECORD: ICD-10-PCS | Mod: CPTII,95,, | Performed by: FAMILY MEDICINE

## 2022-04-06 PROCEDURE — 1160F PR REVIEW ALL MEDS BY PRESCRIBER/CLIN PHARMACIST DOCUMENTED: ICD-10-PCS | Mod: CPTII,95,, | Performed by: FAMILY MEDICINE

## 2022-04-06 PROCEDURE — 99214 PR OFFICE/OUTPT VISIT, EST, LEVL IV, 30-39 MIN: ICD-10-PCS | Mod: 95,,, | Performed by: FAMILY MEDICINE

## 2022-04-06 PROCEDURE — 1159F MED LIST DOCD IN RCRD: CPT | Mod: CPTII,95,, | Performed by: FAMILY MEDICINE

## 2022-04-06 PROCEDURE — 1160F RVW MEDS BY RX/DR IN RCRD: CPT | Mod: CPTII,95,, | Performed by: FAMILY MEDICINE

## 2022-04-06 RX ORDER — DEXTROAMPHETAMINE SACCHARATE, AMPHETAMINE ASPARTATE MONOHYDRATE, DEXTROAMPHETAMINE SULFATE AND AMPHETAMINE SULFATE 7.5; 7.5; 7.5; 7.5 MG/1; MG/1; MG/1; MG/1
30 CAPSULE, EXTENDED RELEASE ORAL EVERY MORNING
Qty: 30 CAPSULE | Refills: 0 | Status: SHIPPED | OUTPATIENT
Start: 2022-04-06 | End: 2022-06-01 | Stop reason: SDUPTHER

## 2022-04-06 RX ORDER — DEXTROAMPHETAMINE SACCHARATE, AMPHETAMINE ASPARTATE MONOHYDRATE, DEXTROAMPHETAMINE SULFATE AND AMPHETAMINE SULFATE 7.5; 7.5; 7.5; 7.5 MG/1; MG/1; MG/1; MG/1
30 CAPSULE, EXTENDED RELEASE ORAL EVERY MORNING
Qty: 30 CAPSULE | Refills: 0 | Status: SHIPPED | OUTPATIENT
Start: 2022-04-06 | End: 2022-04-06 | Stop reason: SDUPTHER

## 2022-04-06 RX ORDER — DEXTROAMPHETAMINE SACCHARATE, AMPHETAMINE ASPARTATE, DEXTROAMPHETAMINE SULFATE AND AMPHETAMINE SULFATE 2.5; 2.5; 2.5; 2.5 MG/1; MG/1; MG/1; MG/1
1 TABLET ORAL DAILY
Qty: 30 TABLET | Refills: 0 | Status: SHIPPED | OUTPATIENT
Start: 2022-04-06 | End: 2022-04-06 | Stop reason: SDUPTHER

## 2022-04-06 RX ORDER — DEXTROAMPHETAMINE SACCHARATE, AMPHETAMINE ASPARTATE, DEXTROAMPHETAMINE SULFATE AND AMPHETAMINE SULFATE 2.5; 2.5; 2.5; 2.5 MG/1; MG/1; MG/1; MG/1
1 TABLET ORAL DAILY
Qty: 30 TABLET | Refills: 0 | Status: SHIPPED | OUTPATIENT
Start: 2022-04-06 | End: 2022-05-06

## 2022-04-07 ENCOUNTER — PATIENT MESSAGE (OUTPATIENT)
Dept: FAMILY MEDICINE | Facility: CLINIC | Age: 37
End: 2022-04-07
Payer: COMMERCIAL

## 2022-04-11 ENCOUNTER — OFFICE VISIT (OUTPATIENT)
Dept: SPORTS MEDICINE | Facility: CLINIC | Age: 37
End: 2022-04-11
Payer: COMMERCIAL

## 2022-04-11 VITALS
BODY MASS INDEX: 20.89 KG/M2 | SYSTOLIC BLOOD PRESSURE: 132 MMHG | HEIGHT: 66 IN | WEIGHT: 130 LBS | DIASTOLIC BLOOD PRESSURE: 90 MMHG

## 2022-04-11 DIAGNOSIS — M25.532 LEFT WRIST PAIN: ICD-10-CM

## 2022-04-11 DIAGNOSIS — M79.672 LEFT FOOT PAIN: Primary | ICD-10-CM

## 2022-04-11 DIAGNOSIS — G57.62 MORTON'S NEUROMA, LEFT: ICD-10-CM

## 2022-04-11 DIAGNOSIS — M77.8 LEFT WRIST TENDINITIS: ICD-10-CM

## 2022-04-11 DIAGNOSIS — S92.515D CLOSED NONDISPLACED FRACTURE OF PROXIMAL PHALANX OF LESSER TOE OF LEFT FOOT WITH ROUTINE HEALING, SUBSEQUENT ENCOUNTER: ICD-10-CM

## 2022-04-11 PROCEDURE — 3080F PR MOST RECENT DIASTOLIC BLOOD PRESSURE >= 90 MM HG: ICD-10-PCS | Mod: CPTII,S$GLB,, | Performed by: ORTHOPAEDIC SURGERY

## 2022-04-11 PROCEDURE — 99999 PR PBB SHADOW E&M-EST. PATIENT-LVL III: CPT | Mod: PBBFAC,,, | Performed by: ORTHOPAEDIC SURGERY

## 2022-04-11 PROCEDURE — 99214 PR OFFICE/OUTPT VISIT, EST, LEVL IV, 30-39 MIN: ICD-10-PCS | Mod: S$GLB,,, | Performed by: ORTHOPAEDIC SURGERY

## 2022-04-11 PROCEDURE — 99999 PR PBB SHADOW E&M-EST. PATIENT-LVL III: ICD-10-PCS | Mod: PBBFAC,,, | Performed by: ORTHOPAEDIC SURGERY

## 2022-04-11 PROCEDURE — 99214 OFFICE O/P EST MOD 30 MIN: CPT | Mod: S$GLB,,, | Performed by: ORTHOPAEDIC SURGERY

## 2022-04-11 PROCEDURE — 3008F BODY MASS INDEX DOCD: CPT | Mod: CPTII,S$GLB,, | Performed by: ORTHOPAEDIC SURGERY

## 2022-04-11 PROCEDURE — 3080F DIAST BP >= 90 MM HG: CPT | Mod: CPTII,S$GLB,, | Performed by: ORTHOPAEDIC SURGERY

## 2022-04-11 PROCEDURE — 3075F PR MOST RECENT SYSTOLIC BLOOD PRESS GE 130-139MM HG: ICD-10-PCS | Mod: CPTII,S$GLB,, | Performed by: ORTHOPAEDIC SURGERY

## 2022-04-11 PROCEDURE — 1159F MED LIST DOCD IN RCRD: CPT | Mod: CPTII,S$GLB,, | Performed by: ORTHOPAEDIC SURGERY

## 2022-04-11 PROCEDURE — 1159F PR MEDICATION LIST DOCUMENTED IN MEDICAL RECORD: ICD-10-PCS | Mod: CPTII,S$GLB,, | Performed by: ORTHOPAEDIC SURGERY

## 2022-04-11 PROCEDURE — 1160F RVW MEDS BY RX/DR IN RCRD: CPT | Mod: CPTII,S$GLB,, | Performed by: ORTHOPAEDIC SURGERY

## 2022-04-11 PROCEDURE — 3075F SYST BP GE 130 - 139MM HG: CPT | Mod: CPTII,S$GLB,, | Performed by: ORTHOPAEDIC SURGERY

## 2022-04-11 PROCEDURE — 3008F PR BODY MASS INDEX (BMI) DOCUMENTED: ICD-10-PCS | Mod: CPTII,S$GLB,, | Performed by: ORTHOPAEDIC SURGERY

## 2022-04-11 PROCEDURE — 1160F PR REVIEW ALL MEDS BY PRESCRIBER/CLIN PHARMACIST DOCUMENTED: ICD-10-PCS | Mod: CPTII,S$GLB,, | Performed by: ORTHOPAEDIC SURGERY

## 2022-04-11 NOTE — PROGRESS NOTES
CC: left foot pain    Kan is here today for follow up evaluation of her left foot pain. Patient reports her pain is 0/10 today. She admits to continuing with her walking boot during ambulation and feels as though her pain has improved.  She has now been in the boot for 7 weeks. She continues to appreciate increased pain when walking barefoot. She has been compliant with her wrist HEP and feels as though her wrist pain has improved. She denies new falls or trauma since her last visit.     Recall from visit on 03/14/2022  Kan is here today for follow up evaluation of her left foot pain. Patient reports her pain is 5/10 today. She is approximately 8 weeks out from her original date of injury. She has been compliant with her short walking boot, but feels as though her foot will swell towards the end of the day. She feels as though her wrist continues to bother her intermittently and is improved when taking Mobic. She denies new falls or trauma since her last visit.     Recall from visit on 02/28/2022  Kan is here today for follow up evaluation of her left foot pain. Patient reports her pain is 4/10 today. She appreciated significant improvement in her foot pain with taking mobic daily.  She did notice her pain returned on the day that she missed her Mobic dose. She admits to wearing the wrist splint on occasion and has been somewhat compliant with her HEP. She denies new injury or trauma since her last visit. When asked where she hurts she gestures the ulnar aspect of the left wrist.     Recall from visit on 02/14/2022  36 y.o. Female presents today for evaluation of her left foot pain. She admits to left forefoot pain worsening approximately 6 weeks ago without known mechanism of injury. She feels as though her pain was exacerbated 4 weeks ago when she stubbed her toes while walking on a doorway. She appreciated increased bruising after this event that has since improved. She gestures to the 2nd-4th metatarsal  heads when asked where she hurts.  When asked, she describes the pain as if she is stepping on something that is requiring her to offload her foot toward her big toe.  How long: Admits to left foot pain beginning 6 weeks ago.   What makes it better: Admits to improved pain with ibuprofen 800 mg and while at rest.    What makes it worse: Admits to increased pain during weight bearing, kneeling and first thing in the morning.   Does it radiate: Denies radiating pain.   Attempted treatments: She has been taking ibuprofen 800 mg as needed.   Pain score: 4/10  History of trauma/injury: Denies prior history of foot trauma/injury.   Affecting ADLs: Admits to this problem affecting her ability to perform ADLs.   Any mechanical symptoms: Denies mechanical symptoms.   Feelings of instability: Denies feelings of instability.     She also notes left ulnar sided wrist pain that was exacerbated by the way she picked up her child 02/13/2022. She has previously seen Dr. Kim for this problem and had received injections from him which she states helped her symptoms.  Her last injection was 2-3 years ago..     REVIEW OF SYSTEMS:   Constitution: Patient denies fever, chills, night sweats, and weight changes.  Eyes: Patient denies eye pain or vision changes.  HENT: Patient denies headache, ear pain, sore throat, or nasal discharge.  CVS: Patient denies chest pain.  Lungs: Patient denies shortness of breath or cough.  Abd: Patient denies stomach pain, nausea, or vomiting.  Skin: Patient denies skin rash or itching.    Hematologic/Lymphatic: Patient denies easy bruising.   Musculoskeletal: Patient denies recent falls. See HPI.  Psych: Patient denies any current anxiety or nervousness.    PAST MEDICAL HISTORY:   Past Medical History:   Diagnosis Date    Allergic rhinitis     Anxiety     Asthma     Attention deficit disorder (ADD)     Bilateral nephrolithiasis     Depression     Hydronephrosis     Hyperglycemia 3/9/2021    -  glucose 131 on recent labs - patient was nonfasting Lab Results Component Value Date  HGBA1C 5.1 06/10/2021      Urinary tract infection     Vaginal infection        PAST SURGICAL HISTORY:   Past Surgical History:   Procedure Laterality Date    APPENDECTOMY  2017    DILATION AND CURETTAGE OF UTERUS  2014    KIDNEY STONE SURGERY      LITHOTRIPSY      LITHOTRIPSY  2017    LOOP ELECTROSURGICAL EXCISION PROCEDURE (LEEP) N/A 10/2/2020    Procedure: LEEP (LOOP ELECTROSURGICAL EXCISION PROCEDURE);  Surgeon: Kacy Elias MD;  [facility]: [facility];  Service: OB/GYN;  Laterality: N/A;    TONSILLECTOMY         FAMILY HISTORY:   Family History   Problem Relation Age of Onset    Hypertension Mother 50    Hypothyroidism Father 52    Stroke Maternal Grandfather     Hypertension Maternal Grandfather     Heart disease Maternal Grandfather 56         of AMI    Heart failure Paternal Grandmother 81    No Known Problems Son     No Known Problems Son     No Known Problems Son     Breast cancer Neg Hx     Colon cancer Neg Hx     Ovarian cancer Neg Hx     Kidney disease Neg Hx        SOCIAL HISTORY:   Social History     Socioeconomic History    Marital status:     Number of children: 3   Occupational History     Employer: [employer]   Tobacco Use    Smoking status: Never Smoker    Smokeless tobacco: Never Used   Substance and Sexual Activity    Alcohol use: No    Drug use: No    Sexual activity: Yes     Partners: Male     Birth control/protection: OCP   Social History Narrative    .  Myron filed for divorce 2020 in still pending.  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   from .       MEDICATIONS:     Current Outpatient Medications:     albuterol (PROVENTIL/VENTOLIN HFA) 90 mcg/actuation inhaler, Inhale 2 puffs into the lungs every 6 (six) hours as needed for Wheezing. Rescue, Disp: 8.5 g, Rfl: 11    ALPRAZolam  "(XANAX) 0.5 MG tablet, Take 1 tablet (0.5 mg total) by mouth 3 (three) times daily as needed for Insomnia or Anxiety., Disp: 30 tablet, Rfl: 0    citalopram (CELEXA) 20 MG tablet, Take 1 tablet (20 mg total) by mouth once daily., Disp: 90 tablet, Rfl: 3    dextroamphetamine-amphetamine (ADDERALL XR) 30 MG 24 hr capsule, Take 1 capsule (30 mg total) by mouth every morning., Disp: 30 capsule, Rfl: 0    dextroamphetamine-amphetamine (ADDERALL) 10 mg Tab, Take 1 tablet (10 mg total) by mouth once daily., Disp: 30 tablet, Rfl: 0    etonogestreL-ethinyl estradioL (NUVARING) 0.12-0.015 mg/24 hr vaginal ring, Place 1 each vaginally every 21 days. Change ring every 21 days, doing continously, Disp: 3 each, Rfl: 5    meloxicam (MOBIC) 15 MG tablet, Take 1 tablet (15 mg total) by mouth once daily., Disp: 30 tablet, Rfl: 0    ondansetron (ZOFRAN-ODT) 4 MG TbDL, Take 1 tablet (4 mg total) by mouth every 8 (eight) hours as needed (nausea/migraine)., Disp: 30 tablet, Rfl: 5    triamcinolone acetonide 0.1% (KENALOG) 0.1 % cream, Apply topically 2 (two) times daily., Disp: 80 g, Rfl: 1    ALLERGIES:   Review of patient's allergies indicates:  No Known Allergies     PHYSICAL EXAMINATION:  BP (!) 132/90   Ht 5' 6" (1.676 m)   Wt 59 kg (130 lb)   BMI 20.98 kg/m²   Vitals signs and nursing note have been reviewed.  General: In no acute distress, well developed, well nourished, no diaphoresis  Eyes: EOM full and smooth, no eye redness or discharge  HENT: normocephalic and atraumatic, neck supple, trachea midline, no nasal discharge, no external ear redness or discharge  Cardiovascular: 2+ and symmetric radial and DP pulses bilaterally, no LE edema  Lungs: respirations non-labored, no conversational dyspnea   Abd: non-distended, no rigidity  MSK: no amputation or deformity, no swelling of extremities  Neuro: AAOx3, CN2-12 grossly intact  Skin: No rashes, warm and dry  Psychiatric: cooperative, pleasant, mood and affect " appropriate for age    ANKLE: left   The affected ankle is compared to the contralateral ankle.    Observation:    There is no erythema or ecchymosis. No edema at the ankle  Shoes reveal a normal wear pattern.  No structural deformities including toe deformities.   Pes planus bilaterally  Negative too-many toes sign.  Normal callus pattern on the feet bilaterally.    Achilles tendon and calcaneal insertion reveals no deformities  No leg or intrinsic foot muscle atrophy.  Squatting reveals symmetric pronation of the bilateral feet.   Able to rise on toes with symmetric supination.    Normal gait without evidence of antalgia.    ROM: (expected degree)  Active dorsiflexion to 20° bilaterally.    Active plantarflexion to 50° bilaterally.    Active ankle inversion to 35° bilaterally.    Active ankle eversion to 15° bilaterally.    Full active flexion/extension of the toes bilaterally.   Heel cords without tightness bilaterally.    Tenderness To Palpation:  No tenderness at the ATFL, CFL, PTFL, or deltoid ligaments  No tenderness over the distal anterior syndesmosis, distal tibia/fibula, fibular head/shaft  No tenderness at medial or lateral malleoli   No tenderness at navicular, cuboid, cuneiforms, talus, or calcaneous  + minimal tenderness at the distal third metatarsal and in the webspace between the second and third metatarsal heads  No tenderness over the distal third through fifth metatarsals and phalanges  No tenderness at the Achilles tendon calcaneal insertion  No tenderness at the tibialis posterior, flexor digitorum longus, flexor hallucis longus   No tenderness at the peroneal tendons    Strength Testing:  Dorsiflexion - 5/5  Platarflexion - 5/5  Resisted Inversion - 5/5  Resisted Eversion - 5/5  Great Toe Extension - 5/5  Great Toe Flexion - 5/5    Special Tests:  Anterior talar drawer - negative and without dimpling  Talar tilt - negative    Heel tap test - negative  Distal tib/fib squeeze test -  negative  Hines squeeze test - negative    Metatarsal squeeze test - positive  Midfoot stress test - negative  Calcaneal squeeze test - negative    No subluxation of the peroneal tendons with resisted eversion.    Vascular/Sensory Exam:  Lower extremity  DP and PT pulses intact BL  No skin changes, no abnormal hair distribution  Sensation intact to light touch throughout the saphenous, sural, superficial peroneal, deep peroneal, and tibial nerve distributions  Capillary refill intact <2 seconds in all toes bilaterally.      Wrist: left   The affected Wrist is compared to the contralateral Wrist.    Observation:  No evidence of edema, erythema, ecchymosis, or effusion.  No asymmetries; muscle atrophy; ganglion cysts; or bony abnormalities including ulnar deviation,   No Heberdens or Brouchards nodes,   No swan-neck or boutonnière deformities.    No clubbing of the digits.    Tenderness:  No tenderness at radial styloid, Megan's tubercle, ulnar styloid.  No tenderness at anatomic snuff box, scaphoid tubercle, scapholunate junction.  No tenderness at TFCC  No tenderness at pisiform, hamate, metacarpals and phalanges.  No tenderness over median nerve at the carpal tunnel.  No tenderness along the extensor carpi ulnaris tendon at the lateral aspect of the distal lift forearm    Range of Motion:  Active wrist extension to 70° bilaterally.    Active wrist flexion to 80° bilaterally.    Active radial deviation to 20° bilaterally.    Active ulnar deviation to 30° bilaterally.    Active pronation to 80° bilaterally.    Active supination to 80° bilaterally.    Full active flexion and extension at the MCP, PIP, and DIP bilaterally.   Active thumb motion full in all planes.    Strength Testing:  Wrist extension - 5/5, mild pain on the left  Wrist flexion - 5/5, mild pain on the left  Ulnar deviation - 5/5, mild pain on the left  Radial deviation - 5/5, mild pain on the left  Pronation - 5/5  Supination - 5/5    Finger  flexion - 5/5  Finger extension - 5/5  Finger abduction - 5/5  Finger adduction - 5/5    Special Tests:  No laxity with distal radioulnar joint translation.  Negative Ariass test.     Finkelsteins test - negative    Axial grind of first CMC joint - negative  No laxity at the MCP UCL of the thumb    Normal fist alignment of the phalanges  No laxity at the RCL and UCL of the PIPs and DIPs of the phalanges.  Normal finger flexion of the FDP and FDS in all phalanges bilaterally.  Able to perform OK sign.    Neurovascular Exam:  Upper extremity  Sensation intact to light touch in the median, ulnar, and radial distributions on the hands bilaterally.  Radial and ulnar pulses intact and symmetric.  Capillary refill intact to <2 seconds in all digits.        IMAGIN. X-ray obtained 2022 due to left foot pain   2. X-ray images were reviewed personally by me and then directly with patient.  3. FINDINGS: X-ray images obtained demonstrate no fracture, no dislocation  4. IMPRESSION: As above.     1. X-ray obtained 2022 due to left wrist pain  2. X-ray images were reviewed personally by me and then directly with patient.  3. FINDINGS: X-ray images obtained demonstrate no fracture, no dislocation  4. IMPRESSION: As above.     1. MRI obtained 2022 due to left foot pain   2. MRI images were reviewed personally by me and then directly with patient.  3. FINDINGS: MRI images obtained demonstrate bone marrow edema at the 3rd and 4th proximal and distal phalanges and at the distal 4th and 5th metatarsals. Possible nondisplaced fracture of the 3rd and 4th metatarsal head. Abarca's neuroma between 2nd and 3rd metatarsal heads.  4. IMPRESSION: As above.       ASSESSMENT:      ICD-10-CM ICD-9-CM   1. Left foot pain  M79.672 729.5   2. Abarca's neuroma, left  G57.62 355.6   3. Closed nondisplaced fracture of proximal phalanx of lesser toe of left foot with routine healing, subsequent encounter  S92.515D V54.19   4.  Left wrist pain  M25.532 719.43   5. Left wrist tendinitis  M77.8 727.05         PLAN:  1-3. Left foot pain/Abarca's neuroma/closed fracture - improved    - Kan admits to left forefoot pain at the 2nd-3rd metatarsal heads that has been worsening for the past several months without known mechanism of injury that was exacerbated when she stubbed her toes on a doorway 12 weeks ago.     - She has been compliant with her walking boot and denies pain during ambulation in the boot. She feels as though working from home has been beneficial for her, and she would like to continue this for the next one week.     - Advised for her to transition into a supportive shoe from the walking boot over the next week.  Within 1 week she is able to discontinue the walking boot completely.    - Continue with HEP for ankle range of motion.     - At this time we will be able to see how much the Abarca's neuroma is involved in her pain.  If symptoms worsen with the transition back to normal shoes and is located in the area of the neuroma, an ultrasound-guided injection is a reasonable place to start.      4-5. Left wrist tendinitis - improved    - She also notes intermittent left ulnar sided wrist pain that was increased 02/13/2022 after picking up one of her children. She has been wearing her brace on occasion and been compliant with her HEP.  She has found improvement with Mobic.    - Continue with HEP for wrist tendonitis prescribed at initial visit.     - Continue with left neutral wrist splint during activity that causes her pain.      - Continue with Mobic daily as needed.       Future planning includes - physical therapy for the wrist if not improved, ultrasound-guided tendon sheath injection at the [facility], ultrasound-guided Abarca's neuroma injection    All questions were answered to the best of my ability and all concerns were addressed at this time.    Follow up as needed.      This note is dictated using the M*Modal Fluency Direct  word recognition program. There are word recognition mistakes that are occasionally missed on review.      Total time spent with patient: see X nishant on chart below.   More than half of the time was spent counseling or coordinating care including prognosis, differential diagnosis, risks and benefits of treatment, instructions, compliance risk reductions.    EST MINUTES X   15256 5    90766 10    34138 15    73843 25 X   [id number] 40    NEW     71944 10    77011 20    03980 30    78231 45    82357 60    PHONE      5-10    55043 11-20    [id number] 21-30

## 2022-04-11 NOTE — LETTER
April 11, 2022      [facility]  [address] [place] [address]  Phone: [phone]  Fax: [phone]       Patient: Kan Traore   YOB: 1985  Date of Visit: 04/11/2022    To Whom It May Concern:    Dione Traore  was at [facility] on 04/11/2022. Please allow her to continue working from home for the next one week as this has been beneficial for her healing. If you have any questions or concerns, or if I can be of further assistance, please do not hesitate to contact me.    Sincerely,      Dr. Brian Marcial, DO

## 2022-04-18 ENCOUNTER — PROCEDURE VISIT (OUTPATIENT)
Dept: OBSTETRICS AND GYNECOLOGY | Facility: CLINIC | Age: 37
End: 2022-04-18
Payer: COMMERCIAL

## 2022-04-18 VITALS
SYSTOLIC BLOOD PRESSURE: 130 MMHG | WEIGHT: 126.69 LBS | BODY MASS INDEX: 20.45 KG/M2 | DIASTOLIC BLOOD PRESSURE: 88 MMHG

## 2022-04-18 DIAGNOSIS — R87.612 LGSIL ON PAP SMEAR OF CERVIX: Primary | ICD-10-CM

## 2022-04-18 PROCEDURE — 57454 COLPOSCOPY: ICD-10-PCS | Mod: S$GLB,,, | Performed by: OBSTETRICS & GYNECOLOGY

## 2022-04-18 PROCEDURE — 99499 UNLISTED E&M SERVICE: CPT | Mod: S$GLB,,, | Performed by: OBSTETRICS & GYNECOLOGY

## 2022-04-18 PROCEDURE — 88305 TISSUE EXAM BY PATHOLOGIST: CPT | Mod: 26,,, | Performed by: PATHOLOGY

## 2022-04-18 PROCEDURE — 99499 NO LOS: ICD-10-PCS | Mod: S$GLB,,, | Performed by: OBSTETRICS & GYNECOLOGY

## 2022-04-18 PROCEDURE — 57454 BX/CURETT OF CERVIX W/SCOPE: CPT | Mod: S$GLB,,, | Performed by: OBSTETRICS & GYNECOLOGY

## 2022-04-18 PROCEDURE — 88305 TISSUE EXAM BY PATHOLOGIST: CPT | Mod: 59 | Performed by: PATHOLOGY

## 2022-04-18 PROCEDURE — 88305 TISSUE EXAM BY PATHOLOGIST: ICD-10-PCS | Mod: 26,,, | Performed by: PATHOLOGY

## 2022-04-18 NOTE — PROCEDURES
Colposcopy    Date/Time: 4/18/2022 9:00 AM  Performed by: Kacy Elias MD  Authorized by: Kacy Elias MD     Consent Done?:  Yes (Written)  Timeout:Immediately prior to procedure a time out was called to verify the correct patient, procedure, equipment, support staff and site/side marked as required  Prep:Patient was prepped and draped in the usual sterile fashion  Assistants?: No      Colposcopy Site:  Cervix  Position:  Supine  Acrowhite Lesion? Yes    Atypical Vessels: No    Transformation Zone Adequate?: Yes    Biopsy?: Yes         Location:  Cervix ((11 00))  ECC Performed?: Yes    LEEP Performed?: No    Estimated blood loss (cc):  2   Patient tolerated the procedure well with no immediate complications.   Post-operative instructions were provided for the patient.   Patient was discharged and will follow up if any complications occur

## 2022-04-26 ENCOUNTER — PATIENT MESSAGE (OUTPATIENT)
Dept: SPORTS MEDICINE | Facility: CLINIC | Age: 37
End: 2022-04-26
Payer: COMMERCIAL

## 2022-04-26 ENCOUNTER — PATIENT MESSAGE (OUTPATIENT)
Dept: OBSTETRICS AND GYNECOLOGY | Facility: CLINIC | Age: 37
End: 2022-04-26
Payer: COMMERCIAL

## 2022-04-26 LAB
FINAL PATHOLOGIC DIAGNOSIS: NORMAL
Lab: NORMAL

## 2022-05-02 ENCOUNTER — OFFICE VISIT (OUTPATIENT)
Dept: SPORTS MEDICINE | Facility: CLINIC | Age: 37
End: 2022-05-02
Payer: COMMERCIAL

## 2022-05-02 VITALS
SYSTOLIC BLOOD PRESSURE: 134 MMHG | WEIGHT: 126 LBS | BODY MASS INDEX: 20.25 KG/M2 | DIASTOLIC BLOOD PRESSURE: 78 MMHG | HEIGHT: 66 IN

## 2022-05-02 DIAGNOSIS — G57.62 MORTON'S NEUROMA, LEFT: ICD-10-CM

## 2022-05-02 DIAGNOSIS — M79.672 LEFT FOOT PAIN: Primary | ICD-10-CM

## 2022-05-02 DIAGNOSIS — M25.50 POLYARTHRALGIA: ICD-10-CM

## 2022-05-02 DIAGNOSIS — M79.10 MYALGIA: ICD-10-CM

## 2022-05-02 DIAGNOSIS — R76.8 CYCLIC CITRULLINATED PEPTIDE (CCP) ANTIBODY POSITIVE: ICD-10-CM

## 2022-05-02 DIAGNOSIS — R76.8 RHEUMATOID FACTOR POSITIVE: ICD-10-CM

## 2022-05-02 PROCEDURE — 1160F PR REVIEW ALL MEDS BY PRESCRIBER/CLIN PHARMACIST DOCUMENTED: ICD-10-PCS | Mod: CPTII,S$GLB,, | Performed by: ORTHOPAEDIC SURGERY

## 2022-05-02 PROCEDURE — 1160F RVW MEDS BY RX/DR IN RCRD: CPT | Mod: CPTII,S$GLB,, | Performed by: ORTHOPAEDIC SURGERY

## 2022-05-02 PROCEDURE — 3078F DIAST BP <80 MM HG: CPT | Mod: CPTII,S$GLB,, | Performed by: ORTHOPAEDIC SURGERY

## 2022-05-02 PROCEDURE — 64455 NJX AA&/STRD PLTR COM DG NRV: CPT | Mod: LT,S$GLB,, | Performed by: ORTHOPAEDIC SURGERY

## 2022-05-02 PROCEDURE — 99214 PR OFFICE/OUTPT VISIT, EST, LEVL IV, 30-39 MIN: ICD-10-PCS | Mod: 25,S$GLB,, | Performed by: ORTHOPAEDIC SURGERY

## 2022-05-02 PROCEDURE — 99214 OFFICE O/P EST MOD 30 MIN: CPT | Mod: 25,S$GLB,, | Performed by: ORTHOPAEDIC SURGERY

## 2022-05-02 PROCEDURE — 3008F PR BODY MASS INDEX (BMI) DOCUMENTED: ICD-10-PCS | Mod: CPTII,S$GLB,, | Performed by: ORTHOPAEDIC SURGERY

## 2022-05-02 PROCEDURE — 3078F PR MOST RECENT DIASTOLIC BLOOD PRESSURE < 80 MM HG: ICD-10-PCS | Mod: CPTII,S$GLB,, | Performed by: ORTHOPAEDIC SURGERY

## 2022-05-02 PROCEDURE — 76942 ECHO GUIDE FOR BIOPSY: CPT | Mod: 26,S$GLB,, | Performed by: ORTHOPAEDIC SURGERY

## 2022-05-02 PROCEDURE — 3075F SYST BP GE 130 - 139MM HG: CPT | Mod: CPTII,S$GLB,, | Performed by: ORTHOPAEDIC SURGERY

## 2022-05-02 PROCEDURE — 64455 PR INJECT ANES/STEROID PLANTAR COMMON DIGITAL NERVE: ICD-10-PCS | Mod: LT,S$GLB,, | Performed by: ORTHOPAEDIC SURGERY

## 2022-05-02 PROCEDURE — 3008F BODY MASS INDEX DOCD: CPT | Mod: CPTII,S$GLB,, | Performed by: ORTHOPAEDIC SURGERY

## 2022-05-02 PROCEDURE — 1159F MED LIST DOCD IN RCRD: CPT | Mod: CPTII,S$GLB,, | Performed by: ORTHOPAEDIC SURGERY

## 2022-05-02 PROCEDURE — 1159F PR MEDICATION LIST DOCUMENTED IN MEDICAL RECORD: ICD-10-PCS | Mod: CPTII,S$GLB,, | Performed by: ORTHOPAEDIC SURGERY

## 2022-05-02 PROCEDURE — 99999 PR PBB SHADOW E&M-EST. PATIENT-LVL III: CPT | Mod: PBBFAC,,, | Performed by: ORTHOPAEDIC SURGERY

## 2022-05-02 PROCEDURE — 76942 PR U/S GUIDANCE FOR NEEDLE GUIDANCE: ICD-10-PCS | Mod: 26,S$GLB,, | Performed by: ORTHOPAEDIC SURGERY

## 2022-05-02 PROCEDURE — 3075F PR MOST RECENT SYSTOLIC BLOOD PRESS GE 130-139MM HG: ICD-10-PCS | Mod: CPTII,S$GLB,, | Performed by: ORTHOPAEDIC SURGERY

## 2022-05-02 PROCEDURE — 99999 PR PBB SHADOW E&M-EST. PATIENT-LVL III: ICD-10-PCS | Mod: PBBFAC,,, | Performed by: ORTHOPAEDIC SURGERY

## 2022-05-02 RX ORDER — TRIAMCINOLONE ACETONIDE 40 MG/ML
40 INJECTION, SUSPENSION INTRA-ARTICULAR; INTRAMUSCULAR
Status: DISCONTINUED | OUTPATIENT
Start: 2022-05-02 | End: 2022-06-23

## 2022-05-02 NOTE — LETTER
May 2, 2022      [facility]  [address] [place] [id number]  Phone: [phone]  Fax: [phone]       Patient: Kan Traore   YOB: 1985  Date of Visit: 05/02/2022    To Whom It May Concern:    Dione Traore  was at [facility] on 05/02/2022. Please allow her to work from home 5/2/2022 and 5/3/2022. If you have any questions or concerns, or if I can be of further assistance, please do not hesitate to contact me.    Sincerely,      Dr. Brian Marcial, DO

## 2022-05-02 NOTE — PROGRESS NOTES
CC: left foot pain    Kan is here today for follow up evaluation of her left foot pain. Patient reports her pain is 5/10 today. She is interested in a Abarca's neuroma CSI today as her pain has continued to persist even with consistently wearing a walking boot. She also states she has been experiencing increased multi-joint pain when not taking Mobic and is concerned about her diffuse pain. She denies new injury or trauma since her last visit.     Recall from visit on 04/11/2022  Kan is here today for follow up evaluation of her left foot pain. Patient reports her pain is 0/10 today. She admits to continuing with her walking boot during ambulation and feels as though her pain has improved.  She has now been in the boot for 7 weeks. She continues to appreciate increased pain when walking barefoot. She has been compliant with her wrist HEP and feels as though her wrist pain has improved. She denies new falls or trauma since her last visit.     Recall from visit on 03/14/2022  Kan is here today for follow up evaluation of her left foot pain. Patient reports her pain is 5/10 today. She is approximately 8 weeks out from her original date of injury. She has been compliant with her short walking boot, but feels as though her foot will swell towards the end of the day. She feels as though her wrist continues to bother her intermittently and is improved when taking Mobic. She denies new falls or trauma since her last visit.     Recall from visit on 02/28/2022  Kan is here today for follow up evaluation of her left foot pain. Patient reports her pain is 4/10 today. She appreciated significant improvement in her foot pain with taking mobic daily.  She did notice her pain returned on the day that she missed her Mobic dose. She admits to wearing the wrist splint on occasion and has been somewhat compliant with her HEP. She denies new injury or trauma since her last visit. When asked where she hurts she gestures the  ulnar aspect of the left wrist.     Recall from visit on 02/14/2022  36 y.o. Female presents today for evaluation of her left foot pain. She admits to left forefoot pain worsening approximately 6 weeks ago without known mechanism of injury. She feels as though her pain was exacerbated 4 weeks ago when she stubbed her toes while walking on a doorway. She appreciated increased bruising after this event that has since improved. She gestures to the 2nd-4th metatarsal heads when asked where she hurts.  When asked, she describes the pain as if she is stepping on something that is requiring her to offload her foot toward her big toe.  How long: Admits to left foot pain beginning 6 weeks ago.   What makes it better: Admits to improved pain with ibuprofen 800 mg and while at rest.    What makes it worse: Admits to increased pain during weight bearing, kneeling and first thing in the morning.   Does it radiate: Denies radiating pain.   Attempted treatments: She has been taking ibuprofen 800 mg as needed.   Pain score: 4/10  History of trauma/injury: Denies prior history of foot trauma/injury.   Affecting ADLs: Admits to this problem affecting her ability to perform ADLs.   Any mechanical symptoms: Denies mechanical symptoms.   Feelings of instability: Denies feelings of instability.     She also notes left ulnar sided wrist pain that was exacerbated by the way she picked up her child 02/13/2022. She has previously seen Dr. Kim for this problem and had received injections from him which she states helped her symptoms.  Her last injection was 2-3 years ago.     REVIEW OF SYSTEMS:   Constitution: Patient denies fever, chills, night sweats, and weight changes.  Eyes: Patient denies eye pain or vision changes.  HENT: Patient denies headache, ear pain, sore throat, or nasal discharge.  CVS: Patient denies chest pain.  Lungs: Patient denies shortness of breath or cough.  Abd: Patient denies stomach pain, nausea, or  vomiting.  Skin: Patient denies skin rash or itching.    Hematologic/Lymphatic: Patient denies easy bruising.   Musculoskeletal: Patient denies recent falls. See HPI.  Psych: Patient denies any current anxiety or nervousness.    PAST MEDICAL HISTORY:   Past Medical History:   Diagnosis Date    Allergic rhinitis     Anxiety     Asthma     Attention deficit disorder (ADD)     Bilateral nephrolithiasis     Depression     Hydronephrosis     Hyperglycemia 3/9/2021    - glucose 131 on recent labs - patient was nonfasting Lab Results Component Value Date  HGBA1C 5.1 06/10/2021      Urinary tract infection     Vaginal infection        PAST SURGICAL HISTORY:   Past Surgical History:   Procedure Laterality Date    APPENDECTOMY  2017    DILATION AND CURETTAGE OF UTERUS  2014    KIDNEY STONE SURGERY      LITHOTRIPSY      LITHOTRIPSY  2017    LOOP ELECTROSURGICAL EXCISION PROCEDURE (LEEP) N/A 10/2/2020    Procedure: LEEP (LOOP ELECTROSURGICAL EXCISION PROCEDURE);  Surgeon: Kacy Elias MD;  Location: [facility] OR;  Service: OB/GYN;  Laterality: N/A;    TONSILLECTOMY         FAMILY HISTORY:   Family History   Problem Relation Age of Onset    Hypertension Mother 50    Hypothyroidism Father 52    Stroke Maternal Grandfather     Hypertension Maternal Grandfather     Heart disease Maternal Grandfather 56         of AMI    Heart failure Paternal Grandmother 81    No Known Problems Son     No Known Problems Son     No Known Problems Son     Breast cancer Neg Hx     Colon cancer Neg Hx     Ovarian cancer Neg Hx     Kidney disease Neg Hx        SOCIAL HISTORY:   Social History     Socioeconomic History    Marital status:     Number of children: 3   Occupational History     Employer: [employer]   Tobacco Use    Smoking status: Never Smoker    Smokeless tobacco: Never Used   Substance and Sexual Activity    Alcohol use: No    Drug use: No    Sexual activity:  "Yes     Partners: Male     Birth control/protection: OCP   Social History Narrative    .  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   from .       MEDICATIONS:     Current Outpatient Medications:     albuterol (PROVENTIL/VENTOLIN HFA) 90 mcg/actuation inhaler, Inhale 2 puffs into the lungs every 6 (six) hours as needed for Wheezing. Rescue, Disp: 8.5 g, Rfl: 11    ALPRAZolam (XANAX) 0.5 MG tablet, Take 1 tablet (0.5 mg total) by mouth 3 (three) times daily as needed for Insomnia or Anxiety., Disp: 30 tablet, Rfl: 0    citalopram (CELEXA) 20 MG tablet, Take 1 tablet (20 mg total) by mouth once daily., Disp: 90 tablet, Rfl: 3    dextroamphetamine-amphetamine (ADDERALL XR) 30 MG 24 hr capsule, Take 1 capsule (30 mg total) by mouth every morning., Disp: 30 capsule, Rfl: 0    dextroamphetamine-amphetamine (ADDERALL) 10 mg Tab, Take 1 tablet (10 mg total) by mouth once daily., Disp: 30 tablet, Rfl: 0    etonogestreL-ethinyl estradioL (NUVARING) 0.12-0.015 mg/24 hr vaginal ring, Place 1 each vaginally every 21 days. Change ring every 21 days, doing continously, Disp: 3 each, Rfl: 5    ondansetron (ZOFRAN-ODT) 4 MG TbDL, Take 1 tablet (4 mg total) by mouth every 8 (eight) hours as needed (nausea/migraine)., Disp: 30 tablet, Rfl: 5    triamcinolone acetonide 0.1% (KENALOG) 0.1 % cream, Apply topically 2 (two) times daily., Disp: 80 g, Rfl: 1    meloxicam (MOBIC) 15 MG tablet, Take 1 tablet (15 mg total) by mouth once daily., Disp: 30 tablet, Rfl: 2    Current Facility-Administered Medications:     triamcinolone acetonide injection 40 mg, 40 mg, Other, 1 time in Clinic/HOD, Brian Marcial DO    ALLERGIES:   Review of patient's allergies indicates:  No Known Allergies     PHYSICAL EXAMINATION:  /78   Ht 5' 6" (1.676 m)   Wt 57.2 kg (126 lb)   LMP  (LMP Unknown)   BMI 20.34 kg/m²   Vitals signs and nursing note have " been reviewed.  General: In no acute distress, well developed, well nourished, no diaphoresis  Eyes: EOM full and smooth, no eye redness or discharge  HENT: normocephalic and atraumatic, neck supple, trachea midline, no nasal discharge, no external ear redness or discharge  Cardiovascular: 2+ and symmetric radial and DP pulses bilaterally, no LE edema  Lungs: respirations non-labored, no conversational dyspnea   Abd: non-distended, no rigidity  MSK: no amputation or deformity, no swelling of extremities  Neuro: AAOx3, CN2-12 grossly intact  Skin: No rashes, warm and dry  Psychiatric: cooperative, pleasant, mood and affect appropriate for age    ANKLE: left   The affected ankle is compared to the contralateral ankle.    Observation:    There is no erythema or ecchymosis. No edema at the ankle  Shoes reveal a normal wear pattern.  No structural deformities including toe deformities.   Pes planus bilaterally  Negative too-many toes sign.  Normal callus pattern on the feet bilaterally.    Achilles tendon and calcaneal insertion reveals no deformities  No leg or intrinsic foot muscle atrophy.  Squatting reveals symmetric pronation of the bilateral feet.   Able to rise on toes with symmetric supination.    Normal gait without evidence of antalgia.    ROM: (expected degree)  Active dorsiflexion to 20° bilaterally.    Active plantarflexion to 50° bilaterally.    Active ankle inversion to 35° bilaterally.    Active ankle eversion to 15° bilaterally.    Full active flexion/extension of the toes bilaterally.   Heel cords without tightness bilaterally.    Tenderness To Palpation:  No tenderness at the ATFL, CFL, PTFL, or deltoid ligaments  No tenderness over the distal anterior syndesmosis, distal tibia/fibula, fibular head/shaft  No tenderness at medial or lateral malleoli   No tenderness at navicular, cuboid, cuneiforms, talus, or calcaneous  + tenderness at the distal third metatarsal and in the webspace between the second  and third metatarsal heads  No tenderness over the distal third through fifth metatarsals and phalanges  No tenderness at the Achilles tendon calcaneal insertion  No tenderness at the tibialis posterior, flexor digitorum longus, flexor hallucis longus   No tenderness at the peroneal tendons    Strength Testing:  Dorsiflexion - 5/5  Platarflexion - 5/5  Resisted Inversion - 5/5  Resisted Eversion - 5/5  Great Toe Extension - 5/5  Great Toe Flexion - 5/5    Special Tests:  Anterior talar drawer - negative and without dimpling  Talar tilt - negative    Heel tap test - negative  Distal tib/fib squeeze test - negative  Hines squeeze test - negative    Metatarsal squeeze test - positive  Midfoot stress test - negative  Calcaneal squeeze test - negative    No subluxation of the peroneal tendons with resisted eversion.    Vascular/Sensory Exam:  Lower extremity  DP and PT pulses intact BL  No skin changes, no abnormal hair distribution  Sensation intact to light touch throughout the saphenous, sural, superficial peroneal, deep peroneal, and tibial nerve distributions  Capillary refill intact <2 seconds in all toes bilaterally.      IMAGIN. X-ray obtained 2022 due to left foot pain   2. X-ray images were reviewed personally by me and then directly with patient.  3. FINDINGS: X-ray images obtained demonstrate no fracture, no dislocation  4. IMPRESSION: As above.     1. X-ray obtained 2022 due to left wrist pain  2. X-ray images were reviewed personally by me and then directly with patient.  3. FINDINGS: X-ray images obtained demonstrate no fracture, no dislocation  4. IMPRESSION: As above.     1. MRI obtained 2022 due to left foot pain   2. MRI images were reviewed personally by me and then directly with patient.  3. FINDINGS: MRI images obtained demonstrate bone marrow edema at the 3rd and 4th proximal and distal phalanges and at the distal 4th and 5th metatarsals. Possible nondisplaced fracture of  "the 3rd and 4th metatarsal head. Abarca's neuroma between 2nd and 3rd metatarsal heads.  4. IMPRESSION: As above.     US guided injection  Abarca's neuroma, left  Performed by: LEANDRA QUIROGA.  Authorized by: LEANDRA QUIROGA  Consent Done?: Yes (Verbal and written)  Indications: Pain  Site marked: The procedure site was marked   Timeout: Prior to procedure the correct patient, procedure, and site was verified   Location: Abarca's neuroma located between the second and third metatarsal heads in the dorsal web space  Prep: Patient was prepped and draped in usual sterile fashion using alcohol and chlorhexidine solution.   Skin anesthetic: Ethyl Chloride spray was used prior to skin puncture  Ultrasonic Guidance for needle placement: yes  Needle size: 25 G, 1.5  Procedure: After skin anesthetic was applied, the 25G, 1.5 needle was used to enter the neuroma through the plantar aspect of the metatarsal heads under ultrasound guidance. A 2 cc mixture of 1 cc of 40 mg/ml triamcinolone acetonide and 1 cc of 0.2% ropivacaine was injected into the neuroma.   Approach: Plantar  Medications: 40 mg triamcinolone acetonide 40 mg/mL  Patient tolerance: Patient tolerated the procedure well with no immediate complications    Description of ultrasound utilization for needle guidance:    Ultrasound guidance was used for needle localization with SonoSite Edge 2, 9-L MHz linear probe(s). Images were saved and stored for documentation. The Abarca's neuroma was visualized. Dynamic visualization of the 25g 1.5" needle(s) was continuous throughout the procedure and maintained good position and correct needle placement.      Triamcinolone:  NDC: [address]-5   LOT: [id number]  EXP: 10/2023    ASSESSMENT:      ICD-10-CM ICD-9-CM   1. Left foot pain  M79.672 729.5   2. Abarca's neuroma, left  G57.62 355.6   3. Polyarthralgia  M25.50 719.49   4. Myalgia  M79.10 729.1   5. Rheumatoid factor positive  R76.8 795.79   6. Cyclic citrullinated " peptide (CCP) antibody positive  R79.89 796.9         PLAN:  1-2. Left foot pain/Abarca's neuroma - unchanged    - Kan admits to left forefoot pain at the 2nd-3rd metatarsal heads that has been worsening for the past several months without known mechanism of injury that was exacerbated when she stubbed her toes on a doorway several months ago.     - As she is continuing to appreciate pain at the left forefoot, she is interested in a Abarca's neuroma CSI today if indicated. After discussing options, she elects to proceed with ultrasound guided Abarca's neuroma CSI. See above for procedure detail.      - Continue with HEP for ankle range of motion.       3-6.  Polyarthralgia/myalgia/rheumatoid factor positive/CCP antibody positive - new issues    - She mentioned in increased stiffness and achiness when stopping meloxicam. Based on the symptoms, I discussed further workup and she is interested in doing so today.    - Labs ordered - CBC, ESR, CRP, rheumatoid factor, PADMINI, HLA B27, anti CCP.    - Her rheumatoid factor and anti CCP came back positive.  Rheumatology referral placed after discussing these results over the phone.    - Mobic 15 mg daily as needed refilled.      Future planning includes - referral to foot and ankle specialist if not improved following injection, next steps pending lab results    All questions were answered to the best of my ability and all concerns were addressed at this time.    Follow up as needed.      This note is dictated using the M*Modal Fluency Direct word recognition program. There are word recognition mistakes that are occasionally missed on review.      Total time spent with patient: see X nishant on chart below.   More than half of the time was spent counseling or coordinating care including prognosis, differential diagnosis, risks and benefits of treatment, instructions, compliance risk reductions.    EST MINUTES X   [id number] 5    [id number] 10    90338 15    [id number] 25 X   [id number] 40    NEW      87553 10    75248 20    01079 30    23244 45    64168 60    PHONE      5-10    [id number] 11-20    [id number] 21-30

## 2022-05-03 ENCOUNTER — PATIENT MESSAGE (OUTPATIENT)
Dept: FAMILY MEDICINE | Facility: CLINIC | Age: 37
End: 2022-05-03
Payer: COMMERCIAL

## 2022-05-03 RX ORDER — MELOXICAM 15 MG/1
15 TABLET ORAL DAILY
Qty: 30 TABLET | Refills: 2 | Status: SHIPPED | OUTPATIENT
Start: 2022-05-03 | End: 2023-01-10

## 2022-06-01 DIAGNOSIS — F90.0 ATTENTION DEFICIT HYPERACTIVITY DISORDER (ADHD), PREDOMINANTLY INATTENTIVE TYPE: ICD-10-CM

## 2022-06-01 RX ORDER — ETONOGESTREL AND ETHINYL ESTRADIOL VAGINAL RING .015; .12 MG/D; MG/D
1 RING VAGINAL
Qty: 3 EACH | Refills: 5 | Status: CANCELLED | OUTPATIENT
Start: 2022-06-01

## 2022-06-01 RX ORDER — DEXTROAMPHETAMINE SACCHARATE, AMPHETAMINE ASPARTATE MONOHYDRATE, DEXTROAMPHETAMINE SULFATE AND AMPHETAMINE SULFATE 7.5; 7.5; 7.5; 7.5 MG/1; MG/1; MG/1; MG/1
30 CAPSULE, EXTENDED RELEASE ORAL EVERY MORNING
Qty: 30 CAPSULE | Refills: 0 | Status: SHIPPED | OUTPATIENT
Start: 2022-06-01 | End: 2022-07-01 | Stop reason: SDUPTHER

## 2022-06-01 NOTE — TELEPHONE ENCOUNTER
No new care gaps identified.  [facility] Embedded Care Gaps. Reference number: [id number]. 6/01/2022   12:07:30 PM CDT

## 2022-06-07 ENCOUNTER — PATIENT MESSAGE (OUTPATIENT)
Dept: SPORTS MEDICINE | Facility: CLINIC | Age: 37
End: 2022-06-07
Payer: COMMERCIAL

## 2022-06-07 ENCOUNTER — PATIENT MESSAGE (OUTPATIENT)
Dept: FAMILY MEDICINE | Facility: CLINIC | Age: 37
End: 2022-06-07
Payer: COMMERCIAL

## 2022-06-08 ENCOUNTER — OFFICE VISIT (OUTPATIENT)
Dept: FAMILY MEDICINE | Facility: CLINIC | Age: 37
End: 2022-06-08
Payer: COMMERCIAL

## 2022-06-08 VITALS
DIASTOLIC BLOOD PRESSURE: 68 MMHG | SYSTOLIC BLOOD PRESSURE: 118 MMHG | OXYGEN SATURATION: 99 % | BODY MASS INDEX: 20.27 KG/M2 | HEART RATE: 106 BPM | HEIGHT: 66 IN | WEIGHT: 126.13 LBS

## 2022-06-08 DIAGNOSIS — M25.521 RIGHT ELBOW PAIN: Primary | ICD-10-CM

## 2022-06-08 DIAGNOSIS — M25.421 ELBOW SWELLING, RIGHT: ICD-10-CM

## 2022-06-08 DIAGNOSIS — R76.8 POSITIVE ANA (ANTINUCLEAR ANTIBODY): ICD-10-CM

## 2022-06-08 PROCEDURE — 3008F BODY MASS INDEX DOCD: CPT | Mod: CPTII,S$GLB,, | Performed by: FAMILY MEDICINE

## 2022-06-08 PROCEDURE — 3074F PR MOST RECENT SYSTOLIC BLOOD PRESSURE < 130 MM HG: ICD-10-PCS | Mod: CPTII,S$GLB,, | Performed by: FAMILY MEDICINE

## 2022-06-08 PROCEDURE — 1160F RVW MEDS BY RX/DR IN RCRD: CPT | Mod: CPTII,S$GLB,, | Performed by: FAMILY MEDICINE

## 2022-06-08 PROCEDURE — 99214 OFFICE O/P EST MOD 30 MIN: CPT | Mod: S$GLB,,, | Performed by: FAMILY MEDICINE

## 2022-06-08 PROCEDURE — 3078F PR MOST RECENT DIASTOLIC BLOOD PRESSURE < 80 MM HG: ICD-10-PCS | Mod: CPTII,S$GLB,, | Performed by: FAMILY MEDICINE

## 2022-06-08 PROCEDURE — 1159F PR MEDICATION LIST DOCUMENTED IN MEDICAL RECORD: ICD-10-PCS | Mod: CPTII,S$GLB,, | Performed by: FAMILY MEDICINE

## 2022-06-08 PROCEDURE — 1159F MED LIST DOCD IN RCRD: CPT | Mod: CPTII,S$GLB,, | Performed by: FAMILY MEDICINE

## 2022-06-08 PROCEDURE — 3074F SYST BP LT 130 MM HG: CPT | Mod: CPTII,S$GLB,, | Performed by: FAMILY MEDICINE

## 2022-06-08 PROCEDURE — 1160F PR REVIEW ALL MEDS BY PRESCRIBER/CLIN PHARMACIST DOCUMENTED: ICD-10-PCS | Mod: CPTII,S$GLB,, | Performed by: FAMILY MEDICINE

## 2022-06-08 PROCEDURE — 3008F PR BODY MASS INDEX (BMI) DOCUMENTED: ICD-10-PCS | Mod: CPTII,S$GLB,, | Performed by: FAMILY MEDICINE

## 2022-06-08 PROCEDURE — 99214 PR OFFICE/OUTPT VISIT, EST, LEVL IV, 30-39 MIN: ICD-10-PCS | Mod: S$GLB,,, | Performed by: FAMILY MEDICINE

## 2022-06-08 PROCEDURE — 99999 PR PBB SHADOW E&M-EST. PATIENT-LVL IV: ICD-10-PCS | Mod: PBBFAC,,, | Performed by: FAMILY MEDICINE

## 2022-06-08 PROCEDURE — 3078F DIAST BP <80 MM HG: CPT | Mod: CPTII,S$GLB,, | Performed by: FAMILY MEDICINE

## 2022-06-08 PROCEDURE — 99999 PR PBB SHADOW E&M-EST. PATIENT-LVL IV: CPT | Mod: PBBFAC,,, | Performed by: FAMILY MEDICINE

## 2022-06-08 RX ORDER — NAPROXEN 500 MG/1
500 TABLET ORAL 2 TIMES DAILY WITH MEALS
Qty: 20 TABLET | Refills: 0 | Status: CANCELLED | OUTPATIENT
Start: 2022-06-08 | End: 2022-06-18

## 2022-06-08 NOTE — PROGRESS NOTES
"FAMILY MEDICINE  [facility]    Reason for visit:   Chief Complaint   Patient presents with    Edema    Elbow Pain     Right.  Poss Gout        HPI: Kan Traore is a 36 y.o. female  -  with ADHD, mild intermittent asthma, depression and anxiety presents for right elbow pain     Gynecology Dr. Kacy Elias MD  Dermatology Dr. Lulu Pak MD  Sports Medicine Dr. Brian Marcial    Today Kan Traore presents with right elbow pain, swelling and tenderness. She reports that she looked up the symptoms online and thinks that she has gout. She has never had a history of gout in the past.     She reports that pain and swelling started yesterday and she woke in the middle of the night. She note redness and swelling with throbbing pain constant for about 2 hours which improved when she iced the area and took her Meloxicam 15 mg daily. She notes that then pain was only with palpation "when I accidentally leaned on it." The swelling has resolved. There is still some mild erythema but today she notes greatly improved. Initially she reports pain was 6/10 and today 3/10. She denies any decreased range of motion.         Review of Systems   All other systems reviewed and are negative.      HISTORY:   Past Medical History:   Diagnosis Date    Allergic rhinitis     Anxiety     Asthma     Attention deficit disorder (ADD)     Bilateral nephrolithiasis     Depression     Hydronephrosis     Hyperglycemia 3/9/2021    - glucose 131 on recent labs - patient was nonfasting Lab Results Component Value Date  HGBA1C 5.1 06/10/2021      Urinary tract infection     Vaginal infection        Past Surgical History:   Procedure Laterality Date    APPENDECTOMY  11/20/2017    DILATION AND CURETTAGE OF UTERUS  4/2014    KIDNEY STONE SURGERY      LITHOTRIPSY  2011    LITHOTRIPSY  02/09/2017    LOOP ELECTROSURGICAL EXCISION PROCEDURE (LEEP) N/A 10/2/2020    Procedure: LEEP (LOOP ELECTROSURGICAL EXCISION " PROCEDURE);  Surgeon: Kacy Elias MD;  Location: [facility] OR;  Service: OB/GYN;  Laterality: N/A;    TONSILLECTOMY         Family History   Problem Relation Age of Onset    Hypertension Mother 50    Hypothyroidism Father 52    Stroke Maternal Grandfather     Hypertension Maternal Grandfather     Heart disease Maternal Grandfather 56         of AMI    Heart failure Paternal Grandmother 81    No Known Problems Son     No Known Problems Son     No Known Problems Son     Breast cancer Neg Hx     Colon cancer Neg Hx     Ovarian cancer Neg Hx     Kidney disease Neg Hx        Social History     Tobacco Use    Smoking status: Never Smoker    Smokeless tobacco: Never Used   Substance Use Topics    Alcohol use: No    Drug use: No       Social History     Social History Narrative    .  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   from .       ALLERGIES:   Review of patient's allergies indicates:  No Known Allergies    MEDS:     Current Outpatient Medications:     albuterol (PROVENTIL/VENTOLIN HFA) 90 mcg/actuation inhaler, Inhale 2 puffs into the lungs every 6 (six) hours as needed for Wheezing. Rescue, Disp: 8.5 g, Rfl: 11    ALPRAZolam (XANAX) 0.5 MG tablet, Take 1 tablet (0.5 mg total) by mouth 3 (three) times daily as needed for Insomnia or Anxiety., Disp: 30 tablet, Rfl: 0    citalopram (CELEXA) 20 MG tablet, Take 1 tablet (20 mg total) by mouth once daily., Disp: 90 tablet, Rfl: 3    dextroamphetamine-amphetamine (ADDERALL XR) 30 MG 24 hr capsule, Take 1 capsule (30 mg total) by mouth every morning., Disp: 30 capsule, Rfl: 0    etonogestreL-ethinyl estradioL (NUVARING) 0.12-0.015 mg/24 hr vaginal ring, Place 1 each vaginally every 21 days. Change ring every 21 days, doing continously, Disp: 3 each, Rfl: 5    meloxicam (MOBIC) 15 MG tablet, Take 1 tablet (15 mg total) by mouth once daily., Disp: 30 tablet,  "Rfl: 2    ondansetron (ZOFRAN-ODT) 4 MG TbDL, Take 1 tablet (4 mg total) by mouth every 8 (eight) hours as needed (nausea/migraine)., Disp: 30 tablet, Rfl: 5    triamcinolone acetonide 0.1% (KENALOG) 0.1 % cream, Apply topically 2 (two) times daily., Disp: 80 g, Rfl: 1    Current Facility-Administered Medications:     triamcinolone acetonide injection 40 mg, 40 mg, Other, 1 time in Clinic/HOD, Brian Marcial DO    Vital signs:   Vitals:    06/08/22 1007   BP: 118/68   BP Location: Left arm   Patient Position: Sitting   BP Method: Medium (Manual)   Pulse: 106   SpO2: 99%   Weight: 57.2 kg (126 lb 1.6 oz)   Height: 5' 6" (1.676 m)     Body mass index is 20.35 kg/m².    PHYSICAL EXAM:     Physical Exam  Constitutional:       General: She is not in acute distress.  Pulmonary:      Effort: Pulmonary effort is normal. No respiratory distress.   Musculoskeletal:      Right elbow: No swelling or effusion. Normal range of motion. No tenderness.      Left elbow: Normal.        Arms:    Neurological:      Mental Status: She is alert.   Psychiatric:         Speech: Speech normal.           PHQ4 = No data recorded    PERTINENT RESULTS:   No visits with results within 1 Week(s) from this visit.   Latest known visit with results is:   Lab Visit on 05/02/2022   Component Date Value Ref Range Status    WBC 05/02/2022 5.54  3.90 - 12.70 K/uL Final    RBC 05/02/2022 4.69  4.00 - 5.40 M/uL Final    Hemoglobin 05/02/2022 13.2  12.0 - 16.0 g/dL Final    Hematocrit 05/02/2022 40.7  37.0 - 48.5 % Final    MCV 05/02/2022 87  82 - 98 fL Final    MCH 05/02/2022 28.1  27.0 - 31.0 pg Final    MCHC 05/02/2022 32.4  32.0 - 36.0 g/dL Final    RDW 05/02/2022 13.0  11.5 - 14.5 % Final    Platelets 05/02/2022 214  150 - 450 K/uL Final    MPV 05/02/2022 12.2  9.2 - 12.9 fL Final    Immature Granulocytes 05/02/2022 0.2  0.0 - 0.5 % Final    Gran # (ANC) 05/02/2022 2.8  1.8 - 7.7 K/uL Final    Immature Grans (Abs) 05/02/2022 0.01  0.00 " - 0.04 K/uL Final    Comment: Mild elevation in immature granulocytes is non specific and   can be seen in a variety of conditions including stress response,   acute inflammation, trauma and pregnancy. Correlation with other   laboratory and clinical findings is essential.      Lymph # 05/02/2022 1.9  1.0 - 4.8 K/uL Final    Mono # 05/02/2022 0.5  0.3 - 1.0 K/uL Final    Eos # 05/02/2022 0.3  0.0 - 0.5 K/uL Final    Baso # 05/02/2022 0.05  0.00 - 0.20 K/uL Final    nRBC 05/02/2022 0  0 /100 WBC Final    Gran % 05/02/2022 50.0  38.0 - 73.0 % Final    Lymph % 05/02/2022 34.7  18.0 - 48.0 % Final    Mono % 05/02/2022 9.0  4.0 - 15.0 % Final    Eosinophil % 05/02/2022 5.2  0.0 - 8.0 % Final    Basophil % 05/02/2022 0.9  0.0 - 1.9 % Final    Differential Method 05/02/2022 Automated   Final    Sed Rate 05/02/2022 5  0 - 20 mm/Hr Final    CRP 05/02/2022 2.5  0.0 - 8.2 mg/L Final    PADMINI Screen 05/02/2022 Positive (A) Negative <1:80 Final    PADMINI test was performed by Immunofluorescence on HEP2 cells.       CCP Antibodies 05/02/2022 358.6 (A) <5.0 U/mL Final    Rheumatoid Factor 05/02/2022 79.0 (A) 0.0 - 15.0 IU/mL Final    HLA B27 Interpretation 05/02/2022    Final                    Value:SAPE: 207  TAQ: [id number]      B27 Testing Date 05/02/2022 05/06/2022 10:32 AM   Final    HLA B27 Result 05/02/2022 Negative   Final    Comment: These tests are not cleared or approved by the U.S. FDA, but such   approval is not required since this laboratory is certified by CLIA   (#[id number]) and the American Society for Histocompatibility and   Immunogenetics (15-0-RZ-02-01) to perform high complexity testing.    [facility]   Laboratory is under the direction of MEGAN Bocanegra MD, KIM.   Details of test procedures may be obtained by calling the Laboratory   at  [phone].  Test performed using immunofluorescent detection - SSO. This test was   developed by [employer] a division of [employer] and   its performance characteristics determined by the [facility].      PADMINI PATTERN 1 05/02/2022 Homogeneous   Final    Anti Sm Antibody 05/02/2022 0.06  0.00 - 0.99 Ratio Final    Anti-Sm Interpretation 05/02/2022 Negative  Negative Final    Anti-SSA Antibody 05/02/2022 4.43 (A) 0.00 - 0.99 Ratio Final    Anti-SSA Interpretation 05/02/2022 Positive (A) Negative Final    Anti-SSB Antibody 05/02/2022 0.44  0.00 - 0.99 Ratio Final    Anti-SSB Interpretation 05/02/2022 Negative  Negative Final    ds DNA Ab 05/02/2022 Negative 1:10  Negative 1:10 Final    Performed by fluorescent crithidia assay.    Anti Sm/RNP Antibody 05/02/2022 0.07  0.00 - 0.99 Ratio Final    Anti-Sm/RNP Interpretation 05/02/2022 Negative  Negative Final    PADMINI Titer 1 05/02/2022 1:320   Final       ASSESSMENT/PLAN:  Problem List Items Addressed This Visit        Immunology/Multi System    Positive PADMINI (antinuclear antibody)    Overview     - with polyarthralgia   - has appt with Rheumatology 8/17/22              Orthopedic    Elbow swelling, right    Overview     - improved  - no fluctuance or signs of infection            Relevant Orders    Uric Acid    Right elbow pain - Primary    Overview     - normal ROM  - no inciting trauma  - bursitis vs cellulitis vs gout vs tendonitis  - clinically improved with ice and NSAIDs and okay to continue  - check uric acid level           Relevant Orders    Uric Acid          ORDERS:   Orders Placed This Encounter    Uric Acid       Vaccines recommended: covid-19 booster    Follow-up as needed.    This note is dictated using the M*Modal Fluency Direct word recognition program. There are word recognition mistakes that are occasionally missed on review.    Dr. Cielo Mina D.O.   [facility]

## 2022-06-09 ENCOUNTER — PATIENT MESSAGE (OUTPATIENT)
Dept: FAMILY MEDICINE | Facility: CLINIC | Age: 37
End: 2022-06-09
Payer: COMMERCIAL

## 2022-06-11 NOTE — TELEPHONE ENCOUNTER
Okabilio for August visit and for the visit to be virtual. I can refill her medications until August visit.   Dr. Cileo Mina D.O.   Family Medicine

## 2022-06-20 ENCOUNTER — PATIENT MESSAGE (OUTPATIENT)
Dept: FAMILY MEDICINE | Facility: CLINIC | Age: 37
End: 2022-06-20
Payer: COMMERCIAL

## 2022-06-20 DIAGNOSIS — M25.521 RIGHT ELBOW PAIN: Primary | ICD-10-CM

## 2022-06-20 DIAGNOSIS — M25.421 ELBOW SWELLING, RIGHT: ICD-10-CM

## 2022-06-23 ENCOUNTER — OFFICE VISIT (OUTPATIENT)
Dept: RHEUMATOLOGY | Facility: CLINIC | Age: 37
End: 2022-06-23
Payer: COMMERCIAL

## 2022-06-23 ENCOUNTER — HOSPITAL ENCOUNTER (OUTPATIENT)
Dept: RADIOLOGY | Facility: HOSPITAL | Age: 37
Discharge: HOME | End: 2022-06-23
Attending: INTERNAL MEDICINE
Payer: COMMERCIAL

## 2022-06-23 ENCOUNTER — OFFICE VISIT (OUTPATIENT)
Dept: OPHTHALMOLOGY | Facility: CLINIC | Age: 37
End: 2022-06-23
Payer: COMMERCIAL

## 2022-06-23 VITALS
DIASTOLIC BLOOD PRESSURE: 97 MMHG | BODY MASS INDEX: 20.16 KG/M2 | SYSTOLIC BLOOD PRESSURE: 144 MMHG | WEIGHT: 125.44 LBS | HEART RATE: 123 BPM | HEIGHT: 66 IN

## 2022-06-23 DIAGNOSIS — M35.01 SJOGREN'S SYNDROME WITH KERATOCONJUNCTIVITIS SICCA: Primary | ICD-10-CM

## 2022-06-23 DIAGNOSIS — M05.9 SEROPOSITIVE RHEUMATOID ARTHRITIS: Primary | ICD-10-CM

## 2022-06-23 DIAGNOSIS — Z71.89 OTHER SPECIFIED COUNSELING: ICD-10-CM

## 2022-06-23 DIAGNOSIS — Z79.899 ENCOUNTER FOR LONG-TERM (CURRENT) USE OF OTHER MEDICATIONS: ICD-10-CM

## 2022-06-23 DIAGNOSIS — Z79.899 HIGH RISK MEDICATION USE: ICD-10-CM

## 2022-06-23 DIAGNOSIS — H52.13 MYOPIA OF BOTH EYES: ICD-10-CM

## 2022-06-23 DIAGNOSIS — H35.413 LATTICE DEGENERATION, BOTH EYES: ICD-10-CM

## 2022-06-23 DIAGNOSIS — M35.00 SJOGREN'S SYNDROME, WITH UNSPECIFIED ORGAN INVOLVEMENT: ICD-10-CM

## 2022-06-23 DIAGNOSIS — M05.9 SEROPOSITIVE RHEUMATOID ARTHRITIS: ICD-10-CM

## 2022-06-23 PROCEDURE — 3077F PR MOST RECENT SYSTOLIC BLOOD PRESSURE >= 140 MM HG: ICD-10-PCS | Mod: CPTII,S$GLB,, | Performed by: INTERNAL MEDICINE

## 2022-06-23 PROCEDURE — 92310 CONTACT LENS FITTING OU: CPT | Mod: CSM,,, | Performed by: OPTOMETRIST

## 2022-06-23 PROCEDURE — 99205 OFFICE O/P NEW HI 60 MIN: CPT | Mod: S$GLB,,, | Performed by: INTERNAL MEDICINE

## 2022-06-23 PROCEDURE — 3008F BODY MASS INDEX DOCD: CPT | Mod: CPTII,S$GLB,, | Performed by: INTERNAL MEDICINE

## 2022-06-23 PROCEDURE — 99999 PR PBB SHADOW E&M-EST. PATIENT-LVL IV: CPT | Mod: PBBFAC,,, | Performed by: INTERNAL MEDICINE

## 2022-06-23 PROCEDURE — 92004 PR EYE EXAM, NEW PATIENT,COMPREHESV: ICD-10-PCS | Mod: S$GLB,,, | Performed by: OPTOMETRIST

## 2022-06-23 PROCEDURE — 92134 CPTRZ OPH DX IMG PST SGM RTA: CPT | Mod: S$GLB,,, | Performed by: OPTOMETRIST

## 2022-06-23 PROCEDURE — 1159F MED LIST DOCD IN RCRD: CPT | Mod: CPTII,S$GLB,, | Performed by: INTERNAL MEDICINE

## 2022-06-23 PROCEDURE — 77077 JOINT SURVEY SINGLE VIEW: CPT | Mod: 26,,, | Performed by: RADIOLOGY

## 2022-06-23 PROCEDURE — 92134 OCT, RETINA - OU - BOTH EYES: ICD-10-PCS | Mod: S$GLB,,, | Performed by: OPTOMETRIST

## 2022-06-23 PROCEDURE — 99205 PR OFFICE/OUTPT VISIT, NEW, LEVL V, 60-74 MIN: ICD-10-PCS | Mod: S$GLB,,, | Performed by: INTERNAL MEDICINE

## 2022-06-23 PROCEDURE — 92015 DETERMINE REFRACTIVE STATE: CPT | Mod: S$GLB,,, | Performed by: OPTOMETRIST

## 2022-06-23 PROCEDURE — 3008F PR BODY MASS INDEX (BMI) DOCUMENTED: ICD-10-PCS | Mod: CPTII,S$GLB,, | Performed by: INTERNAL MEDICINE

## 2022-06-23 PROCEDURE — 99999 PR PBB SHADOW E&M-EST. PATIENT-LVL IV: ICD-10-PCS | Mod: PBBFAC,,, | Performed by: INTERNAL MEDICINE

## 2022-06-23 PROCEDURE — 1159F MED LIST DOCD IN RCRD: CPT | Mod: CPTII,S$GLB,, | Performed by: OPTOMETRIST

## 2022-06-23 PROCEDURE — 99999 PR PBB SHADOW E&M-EST. PATIENT-LVL III: CPT | Mod: PBBFAC,,, | Performed by: OPTOMETRIST

## 2022-06-23 PROCEDURE — 3080F PR MOST RECENT DIASTOLIC BLOOD PRESSURE >= 90 MM HG: ICD-10-PCS | Mod: CPTII,S$GLB,, | Performed by: INTERNAL MEDICINE

## 2022-06-23 PROCEDURE — 92004 COMPRE OPH EXAM NEW PT 1/>: CPT | Mod: S$GLB,,, | Performed by: OPTOMETRIST

## 2022-06-23 PROCEDURE — 1159F PR MEDICATION LIST DOCUMENTED IN MEDICAL RECORD: ICD-10-PCS | Mod: CPTII,S$GLB,, | Performed by: INTERNAL MEDICINE

## 2022-06-23 PROCEDURE — 77077 JOINT SURVEY SINGLE VIEW: CPT | Mod: TC

## 2022-06-23 PROCEDURE — 77077 XR ARTHRITIS SURVEY: ICD-10-PCS | Mod: 26,,, | Performed by: RADIOLOGY

## 2022-06-23 PROCEDURE — 92015 PR REFRACTION: ICD-10-PCS | Mod: S$GLB,,, | Performed by: OPTOMETRIST

## 2022-06-23 PROCEDURE — 99999 PR PBB SHADOW E&M-EST. PATIENT-LVL III: ICD-10-PCS | Mod: PBBFAC,,, | Performed by: OPTOMETRIST

## 2022-06-23 PROCEDURE — 3077F SYST BP >= 140 MM HG: CPT | Mod: CPTII,S$GLB,, | Performed by: INTERNAL MEDICINE

## 2022-06-23 PROCEDURE — 3080F DIAST BP >= 90 MM HG: CPT | Mod: CPTII,S$GLB,, | Performed by: INTERNAL MEDICINE

## 2022-06-23 PROCEDURE — 92310 PR CONTACT LENS FITTING (NO CHANGE): ICD-10-PCS | Mod: CSM,,, | Performed by: OPTOMETRIST

## 2022-06-23 PROCEDURE — 1159F PR MEDICATION LIST DOCUMENTED IN MEDICAL RECORD: ICD-10-PCS | Mod: CPTII,S$GLB,, | Performed by: OPTOMETRIST

## 2022-06-23 RX ORDER — HYDROXYCHLOROQUINE SULFATE 200 MG/1
200 TABLET, FILM COATED ORAL 2 TIMES DAILY
Qty: 60 TABLET | Refills: 3 | Status: SHIPPED | OUTPATIENT
Start: 2022-06-23 | End: 2022-07-23

## 2022-06-23 RX ORDER — PREDNISONE 5 MG/1
TABLET ORAL
Qty: 60 TABLET | Refills: 0 | Status: SHIPPED | OUTPATIENT
Start: 2022-06-23 | End: 2022-07-23

## 2022-06-23 RX ORDER — METHOTREXATE 2.5 MG/1
15 TABLET ORAL
Qty: 24 TABLET | Refills: 3 | Status: SHIPPED | OUTPATIENT
Start: 2022-06-23 | End: 2022-08-18

## 2022-06-23 RX ORDER — FOLIC ACID 1 MG/1
1 TABLET ORAL DAILY
Qty: 30 TABLET | Refills: 4 | Status: SHIPPED | OUTPATIENT
Start: 2022-06-23 | End: 2022-08-18

## 2022-06-23 NOTE — PROGRESS NOTES
RHEUMATOLOGY OUTPATIENT CLINIC NOTE    2022    Attending Rheumatologist: Peterson Taylor  Primary Care Provider/Physician Requesting Consultation: Cielo Mina DO   Chief Complaint/Reason For Consultation:  positive rheumatoid factor      Subjective:     Kan Traore is a 36 y.o. White female with abnormal serologies and chronic pain referred for rheumatic evaluation.    Main concern of widespread arthralgias with inflammatory features.    Review of Systems   Constitutional: Positive for malaise/fatigue. Negative for fever.   HENT:        Mild xerophthalmia. Denies other sicca symptoms.   Eyes: Negative for photophobia and pain.   Respiratory: Negative for shortness of breath.    Gastrointestinal: Negative for blood in stool and melena.   Genitourinary: Negative for dysuria, hematuria and urgency.   Musculoskeletal: Positive for joint pain.   Skin: Negative for rash.        denies Raynaud's phenomena   Neurological: Negative for tingling and focal weakness.   Endo/Heme/Allergies:        Denies history of vascular thrombosis.  No history eclampsia/preeclampsia.  Denies pregnancy loss over 10 weeks gestation.  No history of  lupus.       Chronic comorbid conditions affecting medical decision making today:  Past Medical History:   Diagnosis Date    Allergic rhinitis     Anxiety     Asthma     Attention deficit disorder (ADD)     Bilateral nephrolithiasis     Depression     Hydronephrosis     Hyperglycemia 3/9/2021    - glucose 131 on recent labs - patient was nonfasting Lab Results Component Value Date  HGBA1C 5.1 06/10/2021      Urinary tract infection     Vaginal infection      Past Surgical History:   Procedure Laterality Date    APPENDECTOMY  2017    DILATION AND CURETTAGE OF UTERUS  2014    KIDNEY STONE SURGERY      LITHOTRIPSY  2011    LITHOTRIPSY  2017    LOOP ELECTROSURGICAL EXCISION PROCEDURE (LEEP) N/A 10/2/2020    Procedure: LEEP (LOOP ELECTROSURGICAL EXCISION  PROCEDURE);  Surgeon: aKcy Elias MD;  Location: [facility];  Service: OB/GYN;  Laterality: N/A;    TONSILLECTOMY       Family History   Problem Relation Age of Onset    Hypertension Mother 50    Hypothyroidism Father 52    Stroke Maternal Grandfather     Hypertension Maternal Grandfather     Heart disease Maternal Grandfather 56         of AMI    Heart failure Paternal Grandmother 81    Rheum arthritis Paternal Grandmother     No Known Problems Son     No Known Problems Son     No Known Problems Son     Breast cancer Neg Hx     Colon cancer Neg Hx     Ovarian cancer Neg Hx     Kidney disease Neg Hx      Social History     Substance and Sexual Activity   Alcohol Use No     Social History     Tobacco Use   Smoking Status Never Smoker   Smokeless Tobacco Never Used     Social History     Substance and Sexual Activity   Drug Use No       Current Outpatient Medications:     albuterol (PROVENTIL/VENTOLIN HFA) 90 mcg/actuation inhaler, Inhale 2 puffs into the lungs every 6 (six) hours as needed for Wheezing. Rescue, Disp: 8.5 g, Rfl: 11    citalopram (CELEXA) 20 MG tablet, Take 1 tablet (20 mg total) by mouth once daily., Disp: 90 tablet, Rfl: 3    dextroamphetamine-amphetamine (ADDERALL XR) 30 MG 24 hr capsule, Take 1 capsule (30 mg total) by mouth every morning., Disp: 30 capsule, Rfl: 0    etonogestreL-ethinyl estradioL (NUVARING) 0.12-0.015 mg/24 hr vaginal ring, Place 1 each vaginally every 21 days. Change ring every 21 days, doing continously, Disp: 3 each, Rfl: 5    meloxicam (MOBIC) 15 MG tablet, Take 1 tablet (15 mg total) by mouth once daily., Disp: 30 tablet, Rfl: 2    TURMERIC ORAL, Take 1,500 Int'l Units by mouth., Disp: , Rfl:     ALPRAZolam (XANAX) 0.5 MG tablet, Take 1 tablet (0.5 mg total) by mouth 3 (three) times daily as needed for Insomnia or Anxiety. (Patient not taking: Reported on 2022), Disp: 30 tablet, Rfl: 0    ondansetron (ZOFRAN-ODT) 4 MG TbDL, Take 1 tablet (4 mg  total) by mouth every 8 (eight) hours as needed (nausea/migraine). (Patient not taking: Reported on 6/23/2022), Disp: 30 tablet, Rfl: 5    triamcinolone acetonide 0.1% (KENALOG) 0.1 % cream, Apply topically 2 (two) times daily. (Patient not taking: Reported on 6/23/2022), Disp: 80 g, Rfl: 1    Current Facility-Administered Medications:     triamcinolone acetonide injection 40 mg, 40 mg, Other, 1 time in Clinic/HOD, Brian Marcial DO     Objective:     Vitals:    06/23/22 0831   BP: (!) 144/97   Pulse: (!) 123     Physical Exam   Eyes: Conjunctivae are normal.   Pulmonary/Chest: Effort normal. No respiratory distress.   Musculoskeletal:         General: Swelling (Right olecranon bursa, nodule present.) and tenderness present. Normal range of motion.      Cervical back: Normal range of motion.   Skin: No rash noted. There is erythema (Faint poikiloderma upper back).       Reviewed available old and all outside pertinent medical records available.    All lab results personally reviewed and interpreted by me.  Lab Results   Component Value Date    WBC 5.54 05/02/2022    HGB 13.2 05/02/2022    HCT 40.7 05/02/2022    MCV 87 05/02/2022    RDW 13.0 05/02/2022     05/02/2022    PLTEST Appears normal 02/02/2017       Lab Results   Component Value Date     03/08/2021    K 4.0 03/08/2021     03/08/2021    CO2 24 03/08/2021     (H) 03/08/2021    BUN 12 03/08/2021    CALCIUM 9.7 03/08/2021    PROT 8.3 03/08/2021    ALBUMIN 4.1 03/08/2021    BILITOT 0.7 03/08/2021    AST 22 03/08/2021    ALKPHOS 49 03/08/2021    ALT 13 03/08/2021       Lab Results   Component Value Date    COLORU Yellow 11/03/2017    APPEARANCEUA Clear 11/03/2017    SPECGRAV 1.020 11/03/2017    PHUR 6.0 11/03/2017    PROTEINUA Negative 11/03/2017    KETONESU Negative 11/03/2017    LEUKOCYTESUR Negative 11/03/2017    NITRITE Negative 11/03/2017    UROBILINOGEN Negative 11/03/2017       No results found for: PTH    Lab Results    Component Value Date    URICACID 3.2 06/08/2022       Lab Results   Component Value Date    CRP 2.5 05/02/2022       Lab Results   Component Value Date    ANATITER 1:320 05/02/2022       No components found for: TSPOTTB,  QUANTIFERON     ASSESSMENT:     Seropositive rheumatoid arthritis referred for rheumatic evaluation.  Clinical diagnosis based on active inflammatory arthritis, presence of synovitis with probable nodules, and antibody profile.  High titer rheumatoid serologies, SSA positive as well.  Concomitant secondary Sjogren's also plausible in view of moderate sicca sytmptoms.  Presenting with mild/moderate flare. Recommend x-rays to establish baseline/determine presence of marginal erosive changes.  Recommend DMARD therapy with combination methotrexate and Plaquenil.  Recommend regiment for at least 3 months prior determining response to therapy.  Will provide systemic steroid taper try to discontinue for immediate relief.    PLAN:     1. Seropositive rheumatoid arthritis    2. Sjogren syndrome    3. High risk medication use    4. Other specified counseling    Disclaimer: This note is prepared using voice recognition software and as such is likely to have errors and has not been proof read. Please contact me for questions.         Peterson Taylor M.D.

## 2022-06-23 NOTE — PROGRESS NOTES
HPI     NP to DKT  Patient here today for yearly eye exam  Vision changes since last eye exam?: Yes at distance  Wears CTL full-time   Patient was just diagnosed with Sjogren's syndrome and RA  Will start Plaquenil 200 mg BID   Any eye pain today: No    Other ocular symptoms: Dry eyes    Interested in contact lens fitting today? Yes                Last edited by Cesar Dukes, OD on 6/23/2022  4:43 PM. (History)            Assessment /Plan     For exam results, see Encounter Report.    Sjogren's syndrome with keratoconjunctivitis sicca  Recommend refresh preservative free tears 4-5 times daily, daily disposable contact lenses fitted today. CTL hygiene reviewed.     Myopia of both eyes  Eyeglass Final Rx     Eyeglass Final Rx       Sphere Cylinder Axis    Right -6.25 +0.50 095    Left -7.50 +1.25 080    Type: SVL    Expiration Date: 6/23/2023              Contact Lens Final Rx     Final Contact Lens Rx       Brand Base Curve Diameter Sphere    Right Dailies Total 1 8.5 14.1 -5.75    Left Dailies Total 1 8.5 14.1 -6.00    Expiration Date: 6/23/2023    Replacement: Daily    Solutions: OptiFree PureMoist    Wearing Schedule: Daily Wear                Lattice degeneration, both eyes  Retina flat, RD precautions reviewed. Observe.     Encounter for long-term (current) use of other medications  Starting plaquenil 200mg bid for seropositive RA per Dr Taylor, baseline mOCT done today was normal. Recommend patient RTC next available for HVF 10-2     RTC 1 week for baseline HVF 10-2 and contact lens follow up

## 2022-07-01 DIAGNOSIS — F90.0 ATTENTION DEFICIT HYPERACTIVITY DISORDER (ADHD), PREDOMINANTLY INATTENTIVE TYPE: ICD-10-CM

## 2022-07-01 RX ORDER — DEXTROAMPHETAMINE SACCHARATE, AMPHETAMINE ASPARTATE MONOHYDRATE, DEXTROAMPHETAMINE SULFATE AND AMPHETAMINE SULFATE 7.5; 7.5; 7.5; 7.5 MG/1; MG/1; MG/1; MG/1
30 CAPSULE, EXTENDED RELEASE ORAL EVERY MORNING
Qty: 30 CAPSULE | Refills: 0 | Status: SHIPPED | OUTPATIENT
Start: 2022-07-01 | End: 2022-08-05 | Stop reason: SDUPTHER

## 2022-07-01 NOTE — TELEPHONE ENCOUNTER
No new care gaps identified.  [facility] Embedded Care Gaps. Reference number: [id number]. 7/01/2022   2:33:29 PM CDT

## 2022-07-06 ENCOUNTER — PATIENT MESSAGE (OUTPATIENT)
Dept: SPORTS MEDICINE | Facility: CLINIC | Age: 37
End: 2022-07-06
Payer: COMMERCIAL

## 2022-07-21 ENCOUNTER — OFFICE VISIT (OUTPATIENT)
Dept: OPHTHALMOLOGY | Facility: CLINIC | Age: 37
End: 2022-07-21
Payer: COMMERCIAL

## 2022-07-21 DIAGNOSIS — M35.01 SJOGREN'S SYNDROME WITH KERATOCONJUNCTIVITIS SICCA: ICD-10-CM

## 2022-07-21 DIAGNOSIS — Z79.899 ENCOUNTER FOR LONG-TERM (CURRENT) USE OF OTHER MEDICATIONS: Primary | ICD-10-CM

## 2022-07-21 PROCEDURE — 99213 PR OFFICE/OUTPT VISIT, EST, LEVL III, 20-29 MIN: ICD-10-PCS | Mod: S$GLB,,, | Performed by: OPTOMETRIST

## 2022-07-21 PROCEDURE — 92083 HUMPHREY VISUAL FIELD - OU - BOTH EYES: ICD-10-PCS | Mod: S$GLB,,, | Performed by: OPTOMETRIST

## 2022-07-21 PROCEDURE — 92083 EXTENDED VISUAL FIELD XM: CPT | Mod: S$GLB,,, | Performed by: OPTOMETRIST

## 2022-07-21 PROCEDURE — 1159F MED LIST DOCD IN RCRD: CPT | Mod: CPTII,S$GLB,, | Performed by: OPTOMETRIST

## 2022-07-21 PROCEDURE — 99213 OFFICE O/P EST LOW 20 MIN: CPT | Mod: S$GLB,,, | Performed by: OPTOMETRIST

## 2022-07-21 PROCEDURE — 1159F PR MEDICATION LIST DOCUMENTED IN MEDICAL RECORD: ICD-10-PCS | Mod: CPTII,S$GLB,, | Performed by: OPTOMETRIST

## 2022-07-21 PROCEDURE — 99999 PR PBB SHADOW E&M-EST. PATIENT-LVL III: ICD-10-PCS | Mod: PBBFAC,,, | Performed by: OPTOMETRIST

## 2022-07-21 PROCEDURE — 99999 PR PBB SHADOW E&M-EST. PATIENT-LVL III: CPT | Mod: PBBFAC,,, | Performed by: OPTOMETRIST

## 2022-07-21 NOTE — PROGRESS NOTES
HPI     Last visit with DKT on 06/23/2022.  Was told to rtc 1 week for baseline HVF 10-2 and contact lens follow up.    Last edited by Margaret Bocanegra on 7/21/2022  8:44 AM. (History)            Assessment /Plan     For exam results, see Encounter Report.    Encounter for long-term (current) use of other medications  -     [facility] - OU - Extended - Both Eyes  Starting plaquenil 200mg bid for seropositive RA per Dr Taylor, baseline HVF 10-2 done today normal. Recommend patient RTC 1 year for DFE with HVF 10-2     Sjogren's syndrome with keratoconjunctivitis sicca  Doing well with daily disposable CTL. Consider Restasis if no improvement.     RTC 1 yr for dilated eye exam with HVF 10-2 and mOCT or sooner if any changes to vision.   Discussed above and answered questions.

## 2022-08-03 DIAGNOSIS — Z00.00 ROUTINE GENERAL MEDICAL EXAMINATION AT A HEALTH CARE FACILITY: Primary | ICD-10-CM

## 2022-08-03 DIAGNOSIS — M06.9 RHEUMATOID ARTHRITIS FLARE: ICD-10-CM

## 2022-08-03 RX ORDER — NORGESTIMATE AND ETHINYL ESTRADIOL 0.25-0.035
1 KIT ORAL DAILY
Qty: 63 TABLET | Refills: 3 | Status: SHIPPED | OUTPATIENT
Start: 2022-08-03 | End: 2022-11-08 | Stop reason: SDUPTHER

## 2022-08-05 ENCOUNTER — PATIENT MESSAGE (OUTPATIENT)
Dept: FAMILY MEDICINE | Facility: CLINIC | Age: 37
End: 2022-08-05
Payer: COMMERCIAL

## 2022-08-17 ENCOUNTER — PATIENT MESSAGE (OUTPATIENT)
Dept: RHEUMATOLOGY | Facility: CLINIC | Age: 37
End: 2022-08-17

## 2022-08-17 ENCOUNTER — HOSPITAL ENCOUNTER (OUTPATIENT)
Dept: RADIOLOGY | Facility: HOSPITAL | Age: 37
Discharge: HOME | End: 2022-08-17
Attending: INTERNAL MEDICINE
Payer: COMMERCIAL

## 2022-08-17 ENCOUNTER — OFFICE VISIT (OUTPATIENT)
Dept: RHEUMATOLOGY | Facility: CLINIC | Age: 37
End: 2022-08-17
Payer: COMMERCIAL

## 2022-08-17 VITALS
BODY MASS INDEX: 21.05 KG/M2 | DIASTOLIC BLOOD PRESSURE: 71 MMHG | HEIGHT: 66 IN | HEART RATE: 105 BPM | SYSTOLIC BLOOD PRESSURE: 107 MMHG | WEIGHT: 131 LBS

## 2022-08-17 DIAGNOSIS — M06.9 RHEUMATOID ARTHRITIS FLARE: ICD-10-CM

## 2022-08-17 DIAGNOSIS — R76.8 RHEUMATOID FACTOR POSITIVE: ICD-10-CM

## 2022-08-17 DIAGNOSIS — R76.8 CYCLIC CITRULLINATED PEPTIDE (CCP) ANTIBODY POSITIVE: ICD-10-CM

## 2022-08-17 PROCEDURE — 71046 X-RAY EXAM CHEST 2 VIEWS: CPT | Mod: 26,,, | Performed by: RADIOLOGY

## 2022-08-17 PROCEDURE — 3008F BODY MASS INDEX DOCD: CPT | Mod: CPTII,S$GLB,, | Performed by: INTERNAL MEDICINE

## 2022-08-17 PROCEDURE — 3078F DIAST BP <80 MM HG: CPT | Mod: CPTII,S$GLB,, | Performed by: INTERNAL MEDICINE

## 2022-08-17 PROCEDURE — 3078F PR MOST RECENT DIASTOLIC BLOOD PRESSURE < 80 MM HG: ICD-10-PCS | Mod: CPTII,S$GLB,, | Performed by: INTERNAL MEDICINE

## 2022-08-17 PROCEDURE — 1159F PR MEDICATION LIST DOCUMENTED IN MEDICAL RECORD: ICD-10-PCS | Mod: CPTII,S$GLB,, | Performed by: INTERNAL MEDICINE

## 2022-08-17 PROCEDURE — 99215 OFFICE O/P EST HI 40 MIN: CPT | Mod: S$GLB,,, | Performed by: INTERNAL MEDICINE

## 2022-08-17 PROCEDURE — 99215 PR OFFICE/OUTPT VISIT, EST, LEVL V, 40-54 MIN: ICD-10-PCS | Mod: S$GLB,,, | Performed by: INTERNAL MEDICINE

## 2022-08-17 PROCEDURE — 3074F PR MOST RECENT SYSTOLIC BLOOD PRESSURE < 130 MM HG: ICD-10-PCS | Mod: CPTII,S$GLB,, | Performed by: INTERNAL MEDICINE

## 2022-08-17 PROCEDURE — 1159F MED LIST DOCD IN RCRD: CPT | Mod: CPTII,S$GLB,, | Performed by: INTERNAL MEDICINE

## 2022-08-17 PROCEDURE — 3074F SYST BP LT 130 MM HG: CPT | Mod: CPTII,S$GLB,, | Performed by: INTERNAL MEDICINE

## 2022-08-17 PROCEDURE — 71046 XR CHEST PA AND LATERAL: ICD-10-PCS | Mod: 26,,, | Performed by: RADIOLOGY

## 2022-08-17 PROCEDURE — 99999 PR PBB SHADOW E&M-EST. PATIENT-LVL III: ICD-10-PCS | Mod: PBBFAC,,, | Performed by: INTERNAL MEDICINE

## 2022-08-17 PROCEDURE — 71046 X-RAY EXAM CHEST 2 VIEWS: CPT | Mod: TC,FY,PO

## 2022-08-17 PROCEDURE — 3008F PR BODY MASS INDEX (BMI) DOCUMENTED: ICD-10-PCS | Mod: CPTII,S$GLB,, | Performed by: INTERNAL MEDICINE

## 2022-08-17 PROCEDURE — 99999 PR PBB SHADOW E&M-EST. PATIENT-LVL III: CPT | Mod: PBBFAC,,, | Performed by: INTERNAL MEDICINE

## 2022-08-17 RX ORDER — METHOTREXATE 2.5 MG/1
TABLET ORAL
Qty: 32 TABLET | Refills: 0 | Status: SHIPPED | OUTPATIENT
Start: 2022-08-17 | End: 2023-06-29

## 2022-08-17 RX ORDER — FOLIC ACID 1 MG/1
1 TABLET ORAL DAILY
Qty: 30 TABLET | Refills: 11 | Status: SHIPPED | OUTPATIENT
Start: 2022-08-17 | End: 2022-09-07 | Stop reason: SDUPTHER

## 2022-08-17 NOTE — PROGRESS NOTES
VIRTUAL/TELEMEDICINE VISIT   FAMILY MEDICINE   [facility]     The patient location is: [place]  The chief complaint leading to consultation is: follow-up  Visit type: Virtual visit with synchronous audio and video   Total time spent: 30 minute  Each patient to whom he or she provides medical services by telemedicine is:  (1) informed of the relationship between the physician and patient and the respective role of any other health care provider with respect to management of the patient; and (2) notified that he or she may decline to receive medical services by telemedicine and may withdraw from such care at any time.    Reason for visit:   Chief Complaint   Patient presents with    Follow-up    Fatigue    ADHD       SUBJECTIVE: Kan Traore is a 37 y.o. female  - with ADHD, mild intermittent asthma, depression, rheumatoid arthritis with positive rheumatoid factor and anxiety presents for ADHD follow-up     Gynecology Dr. Kacy Elias MD  Dermatology Dr. Lulu Pak MD  Sports Medicine Dr. Brian Marcial  Rheumatology; Dr. Paiz  Optometry: Dr. Cesar Dukes, OD    Today she reports that she is doing well.  She recently saw Rheumatology.  She has rheumatoid arthritis with positive rheumatoid factor affecting multiple joints.  She reports that she has the most problems with shoulders wrists and ankles.  She has recently started on methotrexate which she has not started yet.  She did have a flare back in June and was unable to establish with a local rheumatologist.  She saw Dr. Taylor in [place] in was started on prednisone, methotrexate and Plaquenil.  She completed the prednisone and methotrexate was still on Plaquenil when she has tablets with Dr. Paiz.  She reports after seeing Dr. Paiz her Plaquenil was discontinued and she was restarted on methotrexate.  She has also started meloxicam to help joint pains.  (rheumatology notes reviewed)    She does report that she has been  feeling more fatigued lately.  She also has noticed that she has been gaining weight though her activity and diet has not changed.  She has concerns that her hair is thinning as well.  She did see her recent thyroid labs and was concerned that may be affecting her symptoms.  She has had abnormal TSH for the last year.  She does have a family history of thyroid disease with her father.  Her TSH has says the been increasing.  Her prior TPO last year was normal.  Her free T4 remains normal    1. ADHD predominantly inattentive     Age diagnosed: 4 th grade  Diagnosed by: Psychiatrist  Initial evaluation present in chart: none     Past medications: Adderal 10 mg afternoon with her long acting medication  Reasons for changing past medications: stopped working     Current medications: Adderall XR 30 mg daily (filled 08/05/2022)   Adderall 10 mg in afternoon (rarely and for longer days.  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    Eyes: Negative for discharge.   Respiratory: Negative for wheezing.    Cardiovascular: Negative for chest pain and palpitations.   Gastrointestinal: Negative for blood in stool, constipation, diarrhea and vomiting.   Genitourinary: Negative for dysuria and hematuria.   Musculoskeletal: Negative for neck pain.   Neurological: Negative for weakness and headaches.   Endo/Heme/Allergies: Negative for polydipsia.   All other systems reviewed and are negative.        HISTORY:   Past Medical History:   Diagnosis Date    Allergic rhinitis     Anxiety     Asthma     Attention deficit disorder (ADD)     Bilateral nephrolithiasis     Depression     Hydronephrosis     Hyperglycemia 3/9/2021    - glucose 131 on recent labs - patient was nonfasting Lab Results  Component Value Date  HGBA1C 5.1 06/10/2021      Urinary tract infection     Vaginal infection        Past Surgical History:   Procedure Laterality Date    APPENDECTOMY  2017    DILATION AND CURETTAGE OF UTERUS  2014    KIDNEY STONE SURGERY      LITHOTRIPSY      LITHOTRIPSY  2017    LOOP ELECTROSURGICAL EXCISION PROCEDURE (LEEP) N/A 10/2/2020    Procedure: LEEP (LOOP ELECTROSURGICAL EXCISION PROCEDURE);  Surgeon: Kacy Elias MD;  Location: [facility];  Service: OB/GYN;  Laterality: N/A;    TONSILLECTOMY         Family History   Problem Relation Age of Onset    Hypertension Mother 50    Hypothyroidism Father 52    Stroke Maternal Grandfather     Hypertension Maternal Grandfather     Heart disease Maternal Grandfather 56         of AMI    Heart failure Paternal Grandmother 81    Rheum arthritis Paternal Grandmother     No Known Problems Son     No Known Problems Son     No Known Problems Son     Breast cancer Neg Hx     Colon cancer Neg Hx     Ovarian cancer Neg Hx     Kidney disease Neg Hx        Social History     Tobacco Use    Smoking status: Never Smoker    Smokeless tobacco: Never Used   Substance Use Topics    Alcohol use: No    Drug use: No       Social History     Social History Narrative    .  Myron filed for divorce 2020 in still pending.  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   from .       ALLERGIES:   Review of patient's allergies indicates:  No Known Allergies    MEDS:     Current Outpatient Medications:     citalopram (CELEXA) 20 MG tablet, Take 1 tablet (20 mg total) by mouth once daily., Disp: 90 tablet, Rfl: 3    dextroamphetamine-amphetamine (ADDERALL XR) 30 MG 24 hr capsule, Take 1 capsule (30 mg total) by mouth every morning., Disp: 30 capsule, Rfl: 0    folic acid (FOLVITE) 1 MG tablet, Take 1 tablet (1 mg total) by mouth once daily., Disp: 30 tablet, Rfl: 11    meloxicam (MOBIC) 15 MG tablet, Take  1 tablet (15 mg total) by mouth once daily., Disp: 30 tablet, Rfl: 2    TURMERIC ORAL, Take 1,500 Int'l Units by mouth., Disp: , Rfl:     albuterol (PROVENTIL/VENTOLIN HFA) 90 mcg/actuation inhaler, Inhale 2 puffs into the lungs every 6 (six) hours as needed for Wheezing. Rescue (Patient not taking: Reported on 8/18/2022), Disp: 8.5 g, Rfl: 11    ALPRAZolam (XANAX) 0.5 MG tablet, Take 1 tablet (0.5 mg total) by mouth 3 (three) times daily as needed for Insomnia or Anxiety. (Patient not taking: No sig reported), Disp: 30 tablet, Rfl: 0    dextroamphetamine-amphetamine (ADDERALL) 10 mg Tab, Take 1 tablet by mouth daily as needed., Disp: , Rfl:     levothyroxine (SYNTHROID) 25 MCG tablet, Take 1 tablet (25 mcg total) by mouth before breakfast., Disp: 90 tablet, Rfl: 0    methotrexate 2.5 MG Tab, Take 4 pills once a week for the first 2 weeks and then increase to 6 pills once a week (Patient not taking: Reported on 8/18/2022), Disp: 32 tablet, Rfl: 0    norgestimate-ethinyl estradioL (ORTHO-CYCLEN) 0.25-35 mg-mcg per tablet, Take 1 tablet by mouth once daily. Take one active pill daily, skipping sugar pills, Disp: 63 tablet, Rfl: 3    ondansetron (ZOFRAN-ODT) 4 MG TbDL, Take 1 tablet (4 mg total) by mouth every 8 (eight) hours as needed (nausea/migraine). (Patient not taking: Reported on 8/17/2022), Disp: 30 tablet, Rfl: 5    Vital signs:   There were no vitals filed for this visit.  There is no height or weight on file to calculate BMI.    PHYSICAL EXAM:     Physical Exam  Constitutional:       General: She is not in acute distress.  Pulmonary:      Effort: Pulmonary effort is normal. No respiratory distress.   Neurological:      Mental Status: She is alert.   Psychiatric:         Mood and Affect: Mood and affect normal.         Speech: Speech normal.           PHQ4 = No data recorded    PERTINENT RESULTS:   Lab Visit on 08/17/2022   Component Date Value Ref Range Status    Sodium 08/17/2022 896 500 - 889  mmol/L Final    Potassium 08/17/2022 4.2  3.5 - 5.1 mmol/L Final    Chloride 08/17/2022 102  95 - 110 mmol/L Final    CO2 08/17/2022 24  23 - 29 mmol/L Final    Glucose 08/17/2022 101  70 - 110 mg/dL Final    BUN 08/17/2022 10  7 - 17 mg/dL Final    Creatinine 08/17/2022 0.73  0.50 - 1.40 mg/dL Final    Calcium 08/17/2022 8.9  8.7 - 10.5 mg/dL Final    Total Protein 08/17/2022 7.6  6.0 - 8.4 g/dL Final    Albumin 08/17/2022 4.2  3.5 - 5.2 g/dL Final    Total Bilirubin 08/17/2022 0.7  0.1 - 1.0 mg/dL Final    Comment: For infants and newborns, interpretation of results should be based  on gestational age, weight and in agreement with clinical  observations.    Premature Infant recommended reference ranges:  Up to 24 hours.............<8.0 mg/dL  Up to 48 hours............<12.0 mg/dL  3-5 days..................<15.0 mg/dL  6-29 days.................<15.0 mg/dL      Alkaline Phosphatase 08/17/2022 51  38 - 126 U/L Final    AST 08/17/2022 23  15 - 46 U/L Final    ALT 08/17/2022 14  10 - 44 U/L Final    Anion Gap 08/17/2022 13  8 - 16 mmol/L Final    eGFR 08/17/2022 >60.0  >60 mL/min/1.73 m^2 Final    Cholesterol 08/17/2022 166  120 - 199 mg/dL Final    Comment: The National Cholesterol Education Program (NCEP) has set the  following guidelines (reference ranges) for Cholesterol:  Optimal.....................<200 mg/dL  Borderline High.............200-239 mg/dL  High........................> or = 240 mg/dL      Triglycerides 08/17/2022 137  30 - 150 mg/dL Final    Comment: The National Cholesterol Education Program (NCEP) has set the  following guidelines (reference values) for triglycerides:  Normal......................<150 mg/dL  Borderline High.............150-199 mg/dL  High........................200-499 mg/dL      HDL 08/17/2022 56  40 - 75 mg/dL Final    Comment: The National Cholesterol Education Program (NCEP) has set the  following guidelines (reference values) for HDL  Cholesterol:  Low...............<40 mg/dL  Optimal...........>60 mg/dL      LDL Cholesterol 08/17/2022 82.6  63.0 - 159.0 mg/dL Final    Comment: The National Cholesterol Education Program (NCEP) has set the  following guidelines (reference values) for LDL Cholesterol:  Optimal.......................<130 mg/dL  Borderline High...............130-159 mg/dL  High..........................160-189 mg/dL  Very High.....................>190 mg/dL      HDL/Cholesterol Ratio 08/17/2022 33.7  20.0 - 50.0 % Final    Total Cholesterol/HDL Ratio 08/17/2022 3.0  2.0 - 5.0 Final    Non-HDL Cholesterol 08/17/2022 110  mg/dL Final    Comment: Risk category and Non-HDL cholesterol goals:  Coronary heart disease (CHD)or equivalent (10-year risk of CHD >20%):  Non-HDL cholesterol goal     <130 mg/dL  Two or more CHD risk factors and 10-year risk of CHD <= 20%:  Non-HDL cholesterol goal     <160 mg/dL  0 to 1 CHD risk factor:  Non-HDL cholesterol goal     <190 mg/dL      TSH 08/17/2022 6.390 (A) 0.400 - 4.000 uIU/mL Final    Comment: Warning:  Heterophilic antibodies in serum or plasma of   certain individuals are known to cause interference with   immunoassays. These antibodies may be present in blood samples   from individuals regularly exposed to animal or who have been   treated with animal products.     Patients taking high doses of supplemental biotin may have  negatively biased results.       Free T4 08/17/2022 0.89  0.71 - 1.51 ng/dL Final       ASSESSMENT/PLAN:  Problem List Items Addressed This Visit        Psychiatric    Attention deficit hyperactivity disorder (ADHD), predominantly inattentive type    Overview     -  reviewed  - well controlled  - continue current medications           Depression    Overview     - well controlled  - continue current medication           Generalized anxiety disorder    Overview     - well controlled  - continue current medication              Immunology/Multi System    Positive PADMINI  (antinuclear antibody)    Overview     - followed by Rheumatology           Rheumatoid arthritis involving multiple sites with positive rheumatoid factor    Overview     - followed by Rheumatology              Endocrine    Other specified hypothyroidism - Primary    Overview     - family history of hypothyroidism (father)  Lab Results   Component Value Date    TSH 6.390 (H) 08/17/2022    FREET4 0.89 08/17/2022     - 06/10/2021 TPO normal, free T3 and free T4  - TSH increasing with symptoms of weight gain, fatigue and hair thinning  - recommend start Levothyroxine 25 mcg daily  - repeat TSH, F4 and TPO in 6 weeks  - counseling regarding new medication including expected results, potential side effects, and appropriate use.  - questions elicited and answered  - encouraged to patient to notify me of any questions or concerns             Relevant Medications    levothyroxine (SYNTHROID) 25 MCG tablet    Other Relevant Orders    T4, Free    Thyroid Peroxidase Antibody    TSH          ORDERS:   Orders Placed This Encounter    T4, Free    Thyroid Peroxidase Antibody    TSH    levothyroxine (SYNTHROID) 25 MCG tablet       Vaccines recommended:  COVID-19, Pneumovax 23    Today I discussed that I will no longer be practicing at [facility] effective 1/2023. My new location will be at [facility]. We assisted Kan Traore in establishing with a new provider for follow-up. I encouraged Kan Traore to notify me with an questions or concerns prior to my departure.     Follow-up in repeat thyroid labs 6 weeks and office visit with repeat labs in 3 months or sooner if any concerns.      This note is dictated using the M*Modal Fluency Direct word recognition program. There are word recognition mistakes that are occasionally missed on review.    Dr. Cielo Mina D.O.   Family Medicine

## 2022-08-17 NOTE — PROGRESS NOTES
Subjective:       Patient ID: Kan Traore is a 37 y.o. female.    Chief Complaint: Disease Management    HPI 37 year old with F with PMH of anxiety,asthma, nephrolithiasis here for evaluation.She saw Dr. Taylor in [place] who diagnosed her with RA.   She has been dealing with pain since mid March.She gets pain in elbows, shoulders, wrists,feet,and ankles. Pain level van be as high as 8/10. She gets swelling in right elbow. She gets swelling in hands.  She used to get swelling in ankles but it has improved. She has stiffness in lower back and shoulders.  Sometimes, she has trouble gripping things with left hand.  Denies any rashes.Denies dry eyes or dry mouth. Denies oral ulcers. She reports hair loss for a year. She has had 3 kids. Denies history of pre-eclampsia.  She has 1 miscarriage around 9 weeks. She had covid Jan 2020.  Denies photosensitivity or pleurisy.She denies smoking. She is taking plaquenil one q day. She took course of steroids and it did help her with her pain.  She took MTX for a month. She is taking folic acid 1 mg a day.       Family hx: grandmother: RA          Past Medical History:   Diagnosis Date    Allergic rhinitis     Anxiety     Asthma     Attention deficit disorder (ADD)     Bilateral nephrolithiasis     Depression     Hydronephrosis     Hyperglycemia 3/9/2021    - glucose 131 on recent labs - patient was nonfasting Lab Results Component Value Date  HGBA1C 5.1 06/10/2021      Urinary tract infection     Vaginal infection        Review of Systems   Constitutional: Negative for activity change, appetite change, chills, diaphoresis and fatigue.   HENT: Negative for congestion, ear discharge, ear pain, facial swelling, mouth sores, sinus pressure, sneezing, sore throat, tinnitus and trouble swallowing.    Eyes: Negative for photophobia, pain, discharge, redness, itching and visual disturbance.   Respiratory: Negative for apnea, chest tightness, shortness of breath, wheezing  "and stridor.    Cardiovascular: Negative for leg swelling.   Gastrointestinal: Negative for abdominal distention, abdominal pain, anal bleeding, blood in stool, constipation, diarrhea and nausea.   Endocrine: Negative for cold intolerance and heat intolerance.   Genitourinary: Negative for difficulty urinating and dysuria.   Musculoskeletal: Positive for arthralgias and joint swelling. Negative for back pain, gait problem, myalgias, neck pain and neck stiffness.   Skin: Negative for color change, pallor, rash and wound.   Neurological: Negative for dizziness, seizures, light-headedness and numbness.   Hematological: Negative for adenopathy. Does not bruise/bleed easily.   Psychiatric/Behavioral: Negative for sleep disturbance. The patient is not nervous/anxious.          Objective:   /71   Pulse 105   Ht 5' 6" (1.676 m)   Wt 59.4 kg (131 lb)   BMI 21.14 kg/m²      Physical Exam   Constitutional: She is oriented to person, place, and time.   HENT:   Head: Normocephalic and atraumatic.   Right Ear: External ear normal.   Left Ear: External ear normal.   Nose: Nose normal.   Mouth/Throat: Oropharynx is clear and moist. No oropharyngeal exudate.   Eyes: Pupils are equal, round, and reactive to light. Conjunctivae are normal. Right eye exhibits no discharge. Left eye exhibits no discharge. No scleral icterus.   Neck: No JVD present. No thyromegaly present.   Cardiovascular: Normal rate, regular rhythm and normal heart sounds. Exam reveals no gallop and no friction rub.   No murmur heard.  Pulmonary/Chest: Effort normal and breath sounds normal. No respiratory distress. She has no wheezes. She has no rales. She exhibits no tenderness.   Abdominal: Soft. Bowel sounds are normal. She exhibits no distension and no mass. There is no abdominal tenderness. There is no rebound and no guarding.   Musculoskeletal:         General: Swelling and tenderness present.      Cervical back: Neck supple.   Lymphadenopathy:     " She has no cervical adenopathy.   Neurological: She is alert and oriented to person, place, and time. No cranial nerve deficit. Gait normal. Coordination normal.   Skin: Skin is dry. No rash noted. No erythema. No pallor.   Tenderness of wrists, mcps  Tenderness of mtps   Psychiatric: Affect and judgment normal.          Labs: reviewed    No data to display     Assessment:     37 year old with F with PMH of anxiety,asthma, nephrolithiasis here to establish care for RA.  She saw Dr. Taylor once and transferred to me. Her labs and clinical picture are consistent with seropositive RA.  She has +PADMINI and SSA but denies sicca symptoms.  1. Rheumatoid factor positive    2. Cyclic citrullinated peptide (CCP) antibody positive            Plan:       Problem List Items Addressed This Visit    None     Visit Diagnoses     Rheumatoid factor positive        Cyclic citrullinated peptide (CCP) antibody positive            Stop plaquenil  Start MTX 4 pills once a week for the first 2 weeks and then increase to 6 pills once a week (Risks and benefits of initiating MTX discussed. Patient aware that risks include and not limited to lung toxicity, liver abnormalities, cell count abnormalities, malignancy, and allergic  reaction to medication. Patient agrees with starting medication.  Start folic acid 1 mg poq day  Labs in a month *    Start mobic 15 mg poq day  Chest xray  45 * minutes of total time spent on the encounter, which includes face to face time and non-face to face time preparing to see the patient (eg, review of tests), Obtaining and/or reviewing separately obtained history, Documenting clinical information in the electronic or other health record, Independently interpreting results (not separately reported) and communicating results to the patient/family/caregiver, or Care coordination (not separately reported).

## 2022-08-18 ENCOUNTER — OFFICE VISIT (OUTPATIENT)
Dept: FAMILY MEDICINE | Facility: CLINIC | Age: 37
End: 2022-08-18
Payer: COMMERCIAL

## 2022-08-18 DIAGNOSIS — R76.8 POSITIVE ANA (ANTINUCLEAR ANTIBODY): ICD-10-CM

## 2022-08-18 DIAGNOSIS — F33.42 RECURRENT MAJOR DEPRESSIVE DISORDER, IN FULL REMISSION: ICD-10-CM

## 2022-08-18 DIAGNOSIS — F90.0 ATTENTION DEFICIT HYPERACTIVITY DISORDER (ADHD), PREDOMINANTLY INATTENTIVE TYPE: ICD-10-CM

## 2022-08-18 DIAGNOSIS — M05.79 RHEUMATOID ARTHRITIS INVOLVING MULTIPLE SITES WITH POSITIVE RHEUMATOID FACTOR: ICD-10-CM

## 2022-08-18 DIAGNOSIS — E03.8 OTHER SPECIFIED HYPOTHYROIDISM: Primary | ICD-10-CM

## 2022-08-18 DIAGNOSIS — F41.1 GENERALIZED ANXIETY DISORDER: ICD-10-CM

## 2022-08-18 PROCEDURE — 1159F PR MEDICATION LIST DOCUMENTED IN MEDICAL RECORD: ICD-10-PCS | Mod: CPTII,95,, | Performed by: FAMILY MEDICINE

## 2022-08-18 PROCEDURE — 1159F MED LIST DOCD IN RCRD: CPT | Mod: CPTII,95,, | Performed by: FAMILY MEDICINE

## 2022-08-18 PROCEDURE — 1160F RVW MEDS BY RX/DR IN RCRD: CPT | Mod: CPTII,95,, | Performed by: FAMILY MEDICINE

## 2022-08-18 PROCEDURE — 99214 PR OFFICE/OUTPT VISIT, EST, LEVL IV, 30-39 MIN: ICD-10-PCS | Mod: 95,,, | Performed by: FAMILY MEDICINE

## 2022-08-18 PROCEDURE — 99214 OFFICE O/P EST MOD 30 MIN: CPT | Mod: 95,,, | Performed by: FAMILY MEDICINE

## 2022-08-18 PROCEDURE — 1160F PR REVIEW ALL MEDS BY PRESCRIBER/CLIN PHARMACIST DOCUMENTED: ICD-10-PCS | Mod: CPTII,95,, | Performed by: FAMILY MEDICINE

## 2022-08-18 RX ORDER — DEXTROAMPHETAMINE SACCHARATE, AMPHETAMINE ASPARTATE, DEXTROAMPHETAMINE SULFATE AND AMPHETAMINE SULFATE 2.5; 2.5; 2.5; 2.5 MG/1; MG/1; MG/1; MG/1
1 TABLET ORAL DAILY PRN
COMMUNITY
End: 2022-12-29 | Stop reason: SDUPTHER

## 2022-08-18 RX ORDER — LEVOTHYROXINE SODIUM 25 UG/1
25 TABLET ORAL
Qty: 90 TABLET | Refills: 0 | Status: SHIPPED | OUTPATIENT
Start: 2022-08-18 | End: 2022-12-09 | Stop reason: SDUPTHER

## 2022-09-07 DIAGNOSIS — F90.0 ATTENTION DEFICIT HYPERACTIVITY DISORDER (ADHD), PREDOMINANTLY INATTENTIVE TYPE: ICD-10-CM

## 2022-09-07 RX ORDER — DEXTROAMPHETAMINE SACCHARATE, AMPHETAMINE ASPARTATE MONOHYDRATE, DEXTROAMPHETAMINE SULFATE AND AMPHETAMINE SULFATE 7.5; 7.5; 7.5; 7.5 MG/1; MG/1; MG/1; MG/1
30 CAPSULE, EXTENDED RELEASE ORAL EVERY MORNING
Qty: 30 CAPSULE | Refills: 0 | Status: CANCELLED | OUTPATIENT
Start: 2022-09-07

## 2022-09-07 NOTE — TELEPHONE ENCOUNTER
No new care gaps identified.  [facility] Embedded Care Gaps. Reference number: [id number]. 9/07/2022   4:51:20 PM CDT

## 2022-09-13 ENCOUNTER — PATIENT MESSAGE (OUTPATIENT)
Dept: FAMILY MEDICINE | Facility: CLINIC | Age: 37
End: 2022-09-13
Payer: COMMERCIAL

## 2022-09-13 ENCOUNTER — PATIENT MESSAGE (OUTPATIENT)
Dept: RHEUMATOLOGY | Facility: CLINIC | Age: 37
End: 2022-09-13
Payer: COMMERCIAL

## 2022-09-20 NOTE — TELEPHONE ENCOUNTER
Paperwork completed and left on Elin's desk    Dr. Cielo Mina D.O.   [facility]

## 2022-09-21 RX ORDER — METHOTREXATE 2.5 MG/1
15 TABLET ORAL
Qty: 72 TABLET | Refills: 0 | Status: SHIPPED | OUTPATIENT
Start: 2022-09-21 | End: 2022-12-20 | Stop reason: SDUPTHER

## 2022-10-07 DIAGNOSIS — F90.0 ATTENTION DEFICIT HYPERACTIVITY DISORDER (ADHD), PREDOMINANTLY INATTENTIVE TYPE: ICD-10-CM

## 2022-10-07 NOTE — TELEPHONE ENCOUNTER
No new care gaps identified.  [facility] Embedded Care Gaps. Reference number: [id number]. 10/07/2022   1:45:10 PM CDT

## 2022-10-09 RX ORDER — DEXTROAMPHETAMINE SACCHARATE, AMPHETAMINE ASPARTATE MONOHYDRATE, DEXTROAMPHETAMINE SULFATE AND AMPHETAMINE SULFATE 7.5; 7.5; 7.5; 7.5 MG/1; MG/1; MG/1; MG/1
30 CAPSULE, EXTENDED RELEASE ORAL EVERY MORNING
Qty: 30 CAPSULE | Refills: 0 | Status: SHIPPED | OUTPATIENT
Start: 2022-10-09 | End: 2022-11-08 | Stop reason: SDUPTHER

## 2022-11-08 DIAGNOSIS — F90.0 ATTENTION DEFICIT HYPERACTIVITY DISORDER (ADHD), PREDOMINANTLY INATTENTIVE TYPE: ICD-10-CM

## 2022-11-08 RX ORDER — DEXTROAMPHETAMINE SACCHARATE, AMPHETAMINE ASPARTATE MONOHYDRATE, DEXTROAMPHETAMINE SULFATE AND AMPHETAMINE SULFATE 7.5; 7.5; 7.5; 7.5 MG/1; MG/1; MG/1; MG/1
30 CAPSULE, EXTENDED RELEASE ORAL EVERY MORNING
Qty: 30 CAPSULE | Refills: 0 | Status: SHIPPED | OUTPATIENT
Start: 2022-11-08 | End: 2022-12-09 | Stop reason: SDUPTHER

## 2022-11-08 NOTE — TELEPHONE ENCOUNTER
No new care gaps identified.  [facility] Embedded Care Gaps. Reference number: [id number]. 11/08/2022   4:59:22 PM CST

## 2022-11-28 DIAGNOSIS — R76.8 RHEUMATOID FACTOR POSITIVE: Primary | ICD-10-CM

## 2022-12-08 ENCOUNTER — PATIENT MESSAGE (OUTPATIENT)
Dept: RHEUMATOLOGY | Facility: CLINIC | Age: 37
End: 2022-12-08
Payer: COMMERCIAL

## 2022-12-09 ENCOUNTER — PATIENT MESSAGE (OUTPATIENT)
Dept: FAMILY MEDICINE | Facility: CLINIC | Age: 37
End: 2022-12-09
Payer: COMMERCIAL

## 2022-12-09 DIAGNOSIS — E03.8 OTHER SPECIFIED HYPOTHYROIDISM: ICD-10-CM

## 2022-12-09 DIAGNOSIS — F90.0 ATTENTION DEFICIT HYPERACTIVITY DISORDER (ADHD), PREDOMINANTLY INATTENTIVE TYPE: ICD-10-CM

## 2022-12-09 RX ORDER — DEXTROAMPHETAMINE SACCHARATE, AMPHETAMINE ASPARTATE MONOHYDRATE, DEXTROAMPHETAMINE SULFATE AND AMPHETAMINE SULFATE 7.5; 7.5; 7.5; 7.5 MG/1; MG/1; MG/1; MG/1
30 CAPSULE, EXTENDED RELEASE ORAL EVERY MORNING
Qty: 30 CAPSULE | Refills: 0 | Status: SHIPPED | OUTPATIENT
Start: 2022-12-09 | End: 2022-12-29 | Stop reason: SDUPTHER

## 2022-12-09 NOTE — TELEPHONE ENCOUNTER
No new care gaps identified.  [facility] Embedded Care Gaps. Reference number: [id number]. 12/09/2022   10:37:19 AM CST

## 2022-12-12 RX ORDER — LEVOTHYROXINE SODIUM 50 UG/1
50 TABLET ORAL
Qty: 90 TABLET | Refills: 0 | Status: SHIPPED | OUTPATIENT
Start: 2022-12-12 | End: 2023-03-29 | Stop reason: SDUPTHER

## 2022-12-28 ENCOUNTER — PATIENT MESSAGE (OUTPATIENT)
Dept: RHEUMATOLOGY | Facility: CLINIC | Age: 37
End: 2022-12-28

## 2022-12-28 ENCOUNTER — PATIENT MESSAGE (OUTPATIENT)
Dept: FAMILY MEDICINE | Facility: CLINIC | Age: 37
End: 2022-12-28
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
## 2022-12-29 ENCOUNTER — OFFICE VISIT (OUTPATIENT)
Dept: PRIMARY CARE CLINIC | Facility: CLINIC | Age: 37
End: 2022-12-29
Attending: FAMILY MEDICINE
Payer: COMMERCIAL

## 2022-12-29 DIAGNOSIS — F33.42 RECURRENT MAJOR DEPRESSIVE DISORDER, IN FULL REMISSION: ICD-10-CM

## 2022-12-29 DIAGNOSIS — F41.1 GENERALIZED ANXIETY DISORDER: ICD-10-CM

## 2022-12-29 DIAGNOSIS — E03.8 OTHER SPECIFIED HYPOTHYROIDISM: ICD-10-CM

## 2022-12-29 DIAGNOSIS — F90.0 ATTENTION DEFICIT HYPERACTIVITY DISORDER (ADHD), PREDOMINANTLY INATTENTIVE TYPE: Primary | ICD-10-CM

## 2022-12-29 PROCEDURE — 1160F PR REVIEW ALL MEDS BY PRESCRIBER/CLIN PHARMACIST DOCUMENTED: ICD-10-PCS | Mod: CPTII,95,, | Performed by: FAMILY MEDICINE

## 2022-12-29 PROCEDURE — 99214 OFFICE O/P EST MOD 30 MIN: CPT | Mod: 95,,, | Performed by: FAMILY MEDICINE

## 2022-12-29 PROCEDURE — 1159F MED LIST DOCD IN RCRD: CPT | Mod: CPTII,95,, | Performed by: FAMILY MEDICINE

## 2022-12-29 PROCEDURE — 1159F PR MEDICATION LIST DOCUMENTED IN MEDICAL RECORD: ICD-10-PCS | Mod: CPTII,95,, | Performed by: FAMILY MEDICINE

## 2022-12-29 PROCEDURE — 1160F RVW MEDS BY RX/DR IN RCRD: CPT | Mod: CPTII,95,, | Performed by: FAMILY MEDICINE

## 2022-12-29 PROCEDURE — 99214 PR OFFICE/OUTPT VISIT, EST, LEVL IV, 30-39 MIN: ICD-10-PCS | Mod: 95,,, | Performed by: FAMILY MEDICINE

## 2022-12-29 RX ORDER — DEXTROAMPHETAMINE SACCHARATE, AMPHETAMINE ASPARTATE, DEXTROAMPHETAMINE SULFATE AND AMPHETAMINE SULFATE 2.5; 2.5; 2.5; 2.5 MG/1; MG/1; MG/1; MG/1
1 TABLET ORAL DAILY
Qty: 30 TABLET | Refills: 0 | Status: SHIPPED | OUTPATIENT
Start: 2022-12-29 | End: 2023-03-09 | Stop reason: SDUPTHER

## 2022-12-29 RX ORDER — DEXTROAMPHETAMINE SACCHARATE, AMPHETAMINE ASPARTATE, DEXTROAMPHETAMINE SULFATE AND AMPHETAMINE SULFATE 2.5; 2.5; 2.5; 2.5 MG/1; MG/1; MG/1; MG/1
1 TABLET ORAL DAILY
Qty: 30 TABLET | Refills: 0 | Status: SHIPPED | OUTPATIENT
Start: 2022-12-29 | End: 2022-12-29

## 2022-12-29 RX ORDER — DEXTROAMPHETAMINE SACCHARATE, AMPHETAMINE ASPARTATE MONOHYDRATE, DEXTROAMPHETAMINE SULFATE AND AMPHETAMINE SULFATE 7.5; 7.5; 7.5; 7.5 MG/1; MG/1; MG/1; MG/1
30 CAPSULE, EXTENDED RELEASE ORAL EVERY MORNING
Qty: 30 CAPSULE | Refills: 0 | Status: SHIPPED | OUTPATIENT
Start: 2022-12-29 | End: 2022-12-29

## 2022-12-29 RX ORDER — DEXTROAMPHETAMINE SACCHARATE, AMPHETAMINE ASPARTATE MONOHYDRATE, DEXTROAMPHETAMINE SULFATE AND AMPHETAMINE SULFATE 7.5; 7.5; 7.5; 7.5 MG/1; MG/1; MG/1; MG/1
30 CAPSULE, EXTENDED RELEASE ORAL EVERY MORNING
Qty: 30 CAPSULE | Refills: 0 | Status: SHIPPED | OUTPATIENT
Start: 2022-12-29 | End: 2023-01-10 | Stop reason: SDUPTHER

## 2023-01-10 DIAGNOSIS — F41.1 GENERALIZED ANXIETY DISORDER: ICD-10-CM

## 2023-01-10 DIAGNOSIS — F90.0 ATTENTION DEFICIT HYPERACTIVITY DISORDER (ADHD), PREDOMINANTLY INATTENTIVE TYPE: ICD-10-CM

## 2023-01-10 DIAGNOSIS — F33.42 RECURRENT MAJOR DEPRESSIVE DISORDER, IN FULL REMISSION: ICD-10-CM

## 2023-01-10 RX ORDER — DEXTROAMPHETAMINE SACCHARATE, AMPHETAMINE ASPARTATE MONOHYDRATE, DEXTROAMPHETAMINE SULFATE AND AMPHETAMINE SULFATE 7.5; 7.5; 7.5; 7.5 MG/1; MG/1; MG/1; MG/1
30 CAPSULE, EXTENDED RELEASE ORAL EVERY MORNING
Qty: 30 CAPSULE | Refills: 0 | Status: SHIPPED | OUTPATIENT
Start: 2023-01-10 | End: 2023-03-29 | Stop reason: RX

## 2023-01-10 RX ORDER — CITALOPRAM 20 MG/1
20 TABLET, FILM COATED ORAL DAILY
Qty: 90 TABLET | Refills: 3 | Status: SHIPPED | OUTPATIENT
Start: 2023-01-10 | End: 2023-11-01 | Stop reason: SDUPTHER

## 2023-01-10 NOTE — TELEPHONE ENCOUNTER
No new care gaps identified.  [facility] Embedded Care Gaps. Reference number: [id number]. 1/10/2023   12:47:02 PM CST

## 2023-01-11 RX ORDER — LISDEXAMFETAMINE DIMESYLATE 30 MG/1
30 CAPSULE ORAL EVERY MORNING
Qty: 30 CAPSULE | Refills: 0 | Status: SHIPPED | OUTPATIENT
Start: 2023-01-11 | End: 2023-02-13 | Stop reason: SDUPTHER

## 2023-01-11 NOTE — TELEPHONE ENCOUNTER
Pt states that :  The pharmacy is telling me that the adderall is on back order and they are unsure when they will be able to get it. Is there something else you can prescribe until I can get the adderall?

## 2023-01-12 ENCOUNTER — PATIENT MESSAGE (OUTPATIENT)
Dept: RHEUMATOLOGY | Facility: CLINIC | Age: 38
End: 2023-01-12
Payer: COMMERCIAL

## 2023-01-25 ENCOUNTER — PATIENT MESSAGE (OUTPATIENT)
Dept: RHEUMATOLOGY | Facility: CLINIC | Age: 38
End: 2023-01-25
Payer: COMMERCIAL

## 2023-01-31 DIAGNOSIS — R11.0 NAUSEA: ICD-10-CM

## 2023-01-31 RX ORDER — ONDANSETRON 4 MG/1
4 TABLET, ORALLY DISINTEGRATING ORAL EVERY 8 HOURS PRN
Qty: 30 TABLET | Refills: 5 | Status: SHIPPED | OUTPATIENT
Start: 2023-01-31

## 2023-01-31 NOTE — TELEPHONE ENCOUNTER
Refill Routing Note   Medication(s) are not appropriate for processing by [facility] for the following reason(s):       Med OP    [facility] action(s):  Route                          Appointments  past 12m or future 3m with PCP    Date Provider   Last Visit   12/29/2022 Cielo Mina, DO   Next Visit   3/29/2023 Cielo Mina, DO   ED visits in past 90 days: 0        Note composed:12:11 PM 01/31/2023

## 2023-01-31 NOTE — TELEPHONE ENCOUNTER
No new care gaps identified.  [facility] Embedded Care Gaps. Reference number: [id number]. 1/31/2023   12:10:05 PM CST

## 2023-02-08 ENCOUNTER — PATIENT MESSAGE (OUTPATIENT)
Dept: PRIMARY CARE CLINIC | Facility: CLINIC | Age: 38
End: 2023-02-08
Payer: COMMERCIAL

## 2023-02-08 ENCOUNTER — E-VISIT (OUTPATIENT)
Dept: PRIMARY CARE CLINIC | Facility: CLINIC | Age: 38
End: 2023-02-08
Attending: FAMILY MEDICINE
Payer: COMMERCIAL

## 2023-02-08 DIAGNOSIS — J02.0 STREPTOCOCCAL PHARYNGITIS: Primary | ICD-10-CM

## 2023-02-08 PROCEDURE — 99421 PR E&M, ONLINE DIGIT, EST, < 7 DAYS, 5-10 MINS: ICD-10-PCS | Mod: S$GLB,,, | Performed by: FAMILY MEDICINE

## 2023-02-08 PROCEDURE — 99421 OL DIG E/M SVC 5-10 MIN: CPT | Mod: S$GLB,,, | Performed by: FAMILY MEDICINE

## 2023-02-08 RX ORDER — AMOXICILLIN 500 MG/1
500 TABLET, FILM COATED ORAL EVERY 12 HOURS
Qty: 20 TABLET | Refills: 0 | Status: SHIPPED | OUTPATIENT
Start: 2023-02-08 | End: 2023-02-19

## 2023-02-08 NOTE — PROGRESS NOTES
"  Patient ID: Kan Traore is a 37 y.o. female.    Chief Complaint: Sore Throat    The patient initiated a request through [employer] on 2/8/2023 for evaluation and management with a chief complaint of Sore Throat     I evaluated the questionnaire submission on 2/8/23.    [employer] message:  "Ginny Mina,   My son was diagnosed with strep throat on Sunday and was given antibiotics.  I am starting with a little bit of a sore throat and generalized weakness/Aches. Could I get a round of antibiotics also or do you suggest something else? Thanks in advance for your assistance. "    [facility] Evisit Upper Respitatory/Cough Questionnaire    2/8/2023  2:27 PM CST - Filed by Patient   Do you agree to participate in an E-Visit? Yes   If you have any of the following symptoms, please present to your local ER or call 911:  I acknowledge   What is the main issue that you would like for your doctor to address today? Sore throat   Are you able to take your vital signs? No   What symptoms do you currently have?  Muscle or body aches;  Sore throat   Have you had a fever? No   When did your symptoms first appear? 2/6/2023   In the last two weeks, have you been in close contact with someone who has COVID-19 or the Flu? No   In the last two weeks, have you worked or volunteered in a healthcare facility or as a ? Healthcare facilities include a hospital, medical or dental clinic, long-term care facility, or nursing home No   Do you live in a long-term care facility, nursing home, or homeless shelter? No   List what you have done or taken to help your symptoms. Ibuprofen and salt watch gargles   How severe are your symptoms? Moderate   Have you taken an at home Covid test? No   Have you taken a Flu test? No   Have you been fully vaccinated for COVID? (2 Pfizer, 2 Moderna or 1 Paras & Paras vaccine injections) Yes   Have you received a booster? No   Have you recieved a Flu shot? Yes   When did you recieve your Flu shot? " 11/1/2022   Do you have transportation to get tested for COVID if it is indicated and ordered for you at an [facility] location? Yes   Are you currently pregnant, could you be pregnant, or are you breast feeding? None of the above   Provide any information you feel is important to your history not asked above    Please attach any relevant images or files           Active Problem List with Overview Notes    Diagnosis Date Noted    Streptococcal pharyngitis 02/08/2023    Rheumatoid arthritis involving multiple sites with positive rheumatoid factor 08/18/2022     - followed by Rheumatology      Right elbow pain 06/08/2022     - normal ROM  - no inciting trauma  - bursitis vs cellulitis vs gout vs tendonitis  - clinically improved with ice and NSAIDs and okay to continue  - check uric acid level      Elbow swelling, right 06/08/2022     - improved  - no fluctuance or signs of infection       Positive PADMINI (antinuclear antibody) 06/08/2022     - followed by Rheumatology      Other specified hypothyroidism 03/09/2021     - family history of hypothyroidism (father)  Lab Results   Component Value Date    TSH 7.580 (H) 11/28/2022    FREET4 0.78 11/28/2022     - 06/10/2021 TPO normal, free T3 and free T4  - recommend repeat TSH since it has been 4 weeks consistently taking her levothyroxine 25 mcg daily.      Status post LEEP (loop electrosurgical excision procedure) of cervix 10/02/2020    Cervical dysplasia 10/02/2020    Mild intermittent asthma without complication 04/23/2020    Attention deficit hyperactivity disorder (ADHD), predominantly inattentive type 09/25/2018     -  reviewed  - well controlled  - continue current medications      History of nephrolithiasis 08/23/2018    Allergic rhinitis     Generalized anxiety disorder      - well controlled  - continue current medication      Depression      - well controlled  - continue current medication          There were no vitals filed for this visit.    Recent Labs  Obtained:  No visits with results within 7 Day(s) from this visit.   Latest known visit with results is:   Lab Visit on 12/08/2022   Component Date Value Ref Range Status    WBC 12/08/2022 4.91  3.90 - 12.70 K/uL Final    RBC 12/08/2022 4.79  4.00 - 5.40 M/uL Final    Hemoglobin 12/08/2022 14.0  12.0 - 16.0 g/dL Final    Hematocrit 12/08/2022 42.2  37.0 - 48.5 % Final    MCV 12/08/2022 88  82 - 98 fL Final    MCH 12/08/2022 29.2  27.0 - 31.0 pg Final    MCHC 12/08/2022 33.2  32.0 - 36.0 g/dL Final    RDW 12/08/2022 13.5  11.5 - 14.5 % Final    Platelets 12/08/2022 226  150 - 450 K/uL Final    MPV 12/08/2022 11.3  9.2 - 12.9 fL Final    Immature Granulocytes 12/08/2022 0.2  0.0 - 0.5 % Final    Gran # (ANC) 12/08/2022 2.7  1.8 - 7.7 K/uL Final    Immature Grans (Abs) 12/08/2022 0.01  0.00 - 0.04 K/uL Final    Comment: Mild elevation in immature granulocytes is non specific and   can be seen in a variety of conditions including stress response,   acute inflammation, trauma and pregnancy. Correlation with other   laboratory and clinical findings is essential.      Lymph # 12/08/2022 1.5  1.0 - 4.8 K/uL Final    Mono # 12/08/2022 0.6  0.3 - 1.0 K/uL Final    Eos # 12/08/2022 0.1  0.0 - 0.5 K/uL Final    Baso # 12/08/2022 0.03  0.00 - 0.20 K/uL Final    nRBC 12/08/2022 0  0 /100 WBC Final    Gran % 12/08/2022 54.0  38.0 - 73.0 % Final    Lymph % 12/08/2022 31.0  18.0 - 48.0 % Final    Mono % 12/08/2022 11.6  4.0 - 15.0 % Final    Eosinophil % 12/08/2022 2.6  0.0 - 8.0 % Final    Basophil % 12/08/2022 0.6  0.0 - 1.9 % Final    Differential Method 12/08/2022 Automated   Final    Sodium 12/08/2022 139  136 - 145 mmol/L Final    Potassium 12/08/2022 3.8  3.5 - 5.1 mmol/L Final    Chloride 12/08/2022 106  95 - 110 mmol/L Final    CO2 12/08/2022 26  23 - 29 mmol/L Final    Glucose 12/08/2022 144 (H)  70 - 110 mg/dL Final    BUN 12/08/2022 11  7 - 17 mg/dL Final    Creatinine 12/08/2022 0.59  0.50 - 1.40 mg/dL Final    Calcium  12/08/2022 8.6 (L)  8.7 - 10.5 mg/dL Final    Total Protein 12/08/2022 7.8  6.0 - 8.4 g/dL Final    Albumin 12/08/2022 4.0  3.5 - 5.2 g/dL Final    Total Bilirubin 12/08/2022 0.8  0.1 - 1.0 mg/dL Final    Comment: For infants and newborns, interpretation of results should be based  on gestational age, weight and in agreement with clinical  observations.    Premature Infant recommended reference ranges:  Up to 24 hours.............<8.0 mg/dL  Up to 48 hours............<12.0 mg/dL  3-5 days..................<15.0 mg/dL  6-29 days.................<15.0 mg/dL      Alkaline Phosphatase 12/08/2022 68  38 - 126 U/L Final    AST 12/08/2022 26  15 - 46 U/L Final    ALT 12/08/2022 18  10 - 44 U/L Final    Anion Gap 12/08/2022 7 (L)  8 - 16 mmol/L Final    eGFR 12/08/2022 >60.0  >60 mL/min/1.73 m^2 Final    CRP 12/08/2022 2.7  0.0 - 8.2 mg/L Final    Sed Rate 12/08/2022 5  0 - 20 mm/Hr Final       Encounter Diagnosis   Name Primary?    Streptococcal pharyngitis Yes         Medications Ordered This Encounter   Medications    amoxicillin (AMOXIL) 500 MG Tab     Sig: Take 1 tablet (500 mg total) by mouth every 12 (twelve) hours. for 10 days     Dispense:  20 tablet     Refill:  0        E-Visit Time Tracking:    Day 1 Time (in minutes): 10     Total Time (in minutes): 10        Dr. Cielo Mina D.O.   [facility]

## 2023-02-13 DIAGNOSIS — F90.0 ATTENTION DEFICIT HYPERACTIVITY DISORDER (ADHD), PREDOMINANTLY INATTENTIVE TYPE: ICD-10-CM

## 2023-02-13 RX ORDER — LISDEXAMFETAMINE DIMESYLATE 30 MG/1
30 CAPSULE ORAL EVERY MORNING
Qty: 30 CAPSULE | Refills: 0 | Status: SHIPPED | OUTPATIENT
Start: 2023-02-13 | End: 2023-03-13 | Stop reason: SDUPTHER

## 2023-02-13 NOTE — TELEPHONE ENCOUNTER
No new care gaps identified.  [facility] Embedded Care Gaps. Reference number: [id number]. 2/13/2023   10:33:58 AM CST

## 2023-03-09 DIAGNOSIS — F90.0 ATTENTION DEFICIT HYPERACTIVITY DISORDER (ADHD), PREDOMINANTLY INATTENTIVE TYPE: ICD-10-CM

## 2023-03-09 RX ORDER — LISDEXAMFETAMINE DIMESYLATE 30 MG/1
30 CAPSULE ORAL EVERY MORNING
Qty: 30 CAPSULE | Refills: 0 | Status: CANCELLED | OUTPATIENT
Start: 2023-03-09 | End: 2023-04-08

## 2023-03-09 NOTE — TELEPHONE ENCOUNTER
No new care gaps identified.  [facility] Embedded Care Gaps. Reference number: [id number]. 3/09/2023   3:04:44 PM CST

## 2023-03-09 NOTE — TELEPHONE ENCOUNTER
No new care gaps identified.  [facility] Embedded Care Gaps. Reference number: [id number]. 3/09/2023   3:54:18 PM CST

## 2023-03-10 RX ORDER — LISDEXAMFETAMINE DIMESYLATE 30 MG/1
30 CAPSULE ORAL EVERY MORNING
Qty: 30 CAPSULE | Refills: 0 | Status: CANCELLED | OUTPATIENT
Start: 2023-03-10 | End: 2023-04-09

## 2023-03-10 NOTE — TELEPHONE ENCOUNTER
Refill Encounter    PCP Visits: Recent Visits  Date Type Provider Dept   12/29/22 Office Visit Cielo Mina, DO [facility]   08/18/22 Office Visit Cielo Mina, DO [facility]   06/08/22 Office Visit Cielo Mina, DO [facility]   04/06/22 Office Visit Cielo Mina, DO [facility]   Showing recent visits within past 360 days and meeting all other requirements  Future Appointments  Date Type Provider Dept   03/29/23 Appointment Cielo Mina, DO [facility]   Showing future appointments within next 720 days and meeting all other requirements     Last 3 Blood Pressure:   BP Readings from Last 3 Encounters:   08/17/22 107/71   06/23/22 (!) 144/97   06/08/22 118/68     Preferred Pharmacy:   [facility]  [id number]  [place] [address]  Phone: [phone] Fax: [phone]    Requested RX:  Requested Prescriptions     Pending Prescriptions Disp Refills    dextroamphetamine-amphetamine (ADDERALL) 10 mg Tab 30 tablet 0     Sig: Take 1 tablet (10 mg total) by mouth once daily.    lisdexamfetamine (VYVANSE) 30 MG capsule 30 capsule 0     Sig: Take 1 capsule (30 mg total) by mouth every morning.      RX Route: Normal

## 2023-03-10 NOTE — TELEPHONE ENCOUNTER
Refill Encounter    PCP Visits: Recent Visits  Date Type Provider Dept   12/29/22 Office Visit Cielo Mina, DO [facility]   08/18/22 Office Visit Cielo Mina, DO [facility]   06/08/22 Office Visit Cielo Mina, DO [facility]   04/06/22 Office Visit Cielo Mina, DO [facility]   Showing recent visits within past 360 days and meeting all other requirements  Future Appointments  Date Type Provider Dept   03/29/23 Appointment Cielo Mina, DO [facility]   Showing future appointments within next 720 days and meeting all other requirements     Last 3 Blood Pressure:   BP Readings from Last 3 Encounters:   08/17/22 107/71   06/23/22 (!) 144/97   06/08/22 118/68     Preferred Pharmacy:   [facility]  [id number]  [place] [address]  Phone: [phone] Fax: [phone]    Requested RX:  Requested Prescriptions     Pending Prescriptions Disp Refills    dextroamphetamine-amphetamine (ADDERALL) 10 mg Tab 30 tablet 0     Sig: Take 1 tablet (10 mg total) by mouth once daily.      RX Route: Normal

## 2023-03-13 ENCOUNTER — PATIENT MESSAGE (OUTPATIENT)
Dept: PRIMARY CARE CLINIC | Facility: CLINIC | Age: 38
End: 2023-03-13
Payer: COMMERCIAL

## 2023-03-13 DIAGNOSIS — F90.0 ATTENTION DEFICIT HYPERACTIVITY DISORDER (ADHD), PREDOMINANTLY INATTENTIVE TYPE: ICD-10-CM

## 2023-03-13 RX ORDER — LISDEXAMFETAMINE DIMESYLATE 30 MG/1
30 CAPSULE ORAL EVERY MORNING
Qty: 30 CAPSULE | Refills: 0 | Status: SHIPPED | OUTPATIENT
Start: 2023-03-13 | End: 2023-03-29 | Stop reason: SDUPTHER

## 2023-03-13 RX ORDER — DEXTROAMPHETAMINE SACCHARATE, AMPHETAMINE ASPARTATE, DEXTROAMPHETAMINE SULFATE AND AMPHETAMINE SULFATE 2.5; 2.5; 2.5; 2.5 MG/1; MG/1; MG/1; MG/1
1 TABLET ORAL DAILY
Qty: 30 TABLET | Refills: 0 | Status: SHIPPED | OUTPATIENT
Start: 2023-03-13 | End: 2023-03-29 | Stop reason: SDUPTHER

## 2023-03-13 NOTE — TELEPHONE ENCOUNTER
No new care gaps identified.  [facility] Embedded Care Gaps. Reference number: [id number]. 3/13/2023   2:17:17 PM CDT

## 2023-03-15 ENCOUNTER — PATIENT OUTREACH (OUTPATIENT)
Dept: ADMINISTRATIVE | Facility: HOSPITAL | Age: 38
End: 2023-03-15
Payer: COMMERCIAL

## 2023-03-15 ENCOUNTER — OFFICE VISIT (OUTPATIENT)
Dept: RHEUMATOLOGY | Facility: CLINIC | Age: 38
End: 2023-03-15
Payer: COMMERCIAL

## 2023-03-15 ENCOUNTER — LAB VISIT (OUTPATIENT)
Dept: LAB | Facility: HOSPITAL | Age: 38
End: 2023-03-15
Attending: INTERNAL MEDICINE
Payer: COMMERCIAL

## 2023-03-15 VITALS
SYSTOLIC BLOOD PRESSURE: 113 MMHG | WEIGHT: 127 LBS | BODY MASS INDEX: 20.41 KG/M2 | HEART RATE: 113 BPM | DIASTOLIC BLOOD PRESSURE: 73 MMHG | HEIGHT: 66 IN

## 2023-03-15 DIAGNOSIS — R76.8 RHEUMATOID FACTOR POSITIVE: ICD-10-CM

## 2023-03-15 DIAGNOSIS — M05.9 SEROPOSITIVE RHEUMATOID ARTHRITIS: ICD-10-CM

## 2023-03-15 DIAGNOSIS — N23 RENAL COLIC, BILATERAL: Primary | ICD-10-CM

## 2023-03-15 DIAGNOSIS — M05.9 SEROPOSITIVE RHEUMATOID ARTHRITIS: Primary | ICD-10-CM

## 2023-03-15 PROCEDURE — 3008F BODY MASS INDEX DOCD: CPT | Mod: CPTII,S$GLB,, | Performed by: INTERNAL MEDICINE

## 2023-03-15 PROCEDURE — 3078F PR MOST RECENT DIASTOLIC BLOOD PRESSURE < 80 MM HG: ICD-10-PCS | Mod: CPTII,S$GLB,, | Performed by: INTERNAL MEDICINE

## 2023-03-15 PROCEDURE — 1159F MED LIST DOCD IN RCRD: CPT | Mod: CPTII,S$GLB,, | Performed by: INTERNAL MEDICINE

## 2023-03-15 PROCEDURE — 99214 OFFICE O/P EST MOD 30 MIN: CPT | Mod: S$GLB,,, | Performed by: INTERNAL MEDICINE

## 2023-03-15 PROCEDURE — 99214 PR OFFICE/OUTPT VISIT, EST, LEVL IV, 30-39 MIN: ICD-10-PCS | Mod: S$GLB,,, | Performed by: INTERNAL MEDICINE

## 2023-03-15 PROCEDURE — 3008F PR BODY MASS INDEX (BMI) DOCUMENTED: ICD-10-PCS | Mod: CPTII,S$GLB,, | Performed by: INTERNAL MEDICINE

## 2023-03-15 PROCEDURE — 1159F PR MEDICATION LIST DOCUMENTED IN MEDICAL RECORD: ICD-10-PCS | Mod: CPTII,S$GLB,, | Performed by: INTERNAL MEDICINE

## 2023-03-15 PROCEDURE — 3074F PR MOST RECENT SYSTOLIC BLOOD PRESSURE < 130 MM HG: ICD-10-PCS | Mod: CPTII,S$GLB,, | Performed by: INTERNAL MEDICINE

## 2023-03-15 PROCEDURE — 3074F SYST BP LT 130 MM HG: CPT | Mod: CPTII,S$GLB,, | Performed by: INTERNAL MEDICINE

## 2023-03-15 PROCEDURE — 3078F DIAST BP <80 MM HG: CPT | Mod: CPTII,S$GLB,, | Performed by: INTERNAL MEDICINE

## 2023-03-15 PROCEDURE — 86480 TB TEST CELL IMMUN MEASURE: CPT | Performed by: INTERNAL MEDICINE

## 2023-03-15 PROCEDURE — 99999 PR PBB SHADOW E&M-EST. PATIENT-LVL III: ICD-10-PCS | Mod: PBBFAC,,, | Performed by: INTERNAL MEDICINE

## 2023-03-15 PROCEDURE — 99999 PR PBB SHADOW E&M-EST. PATIENT-LVL III: CPT | Mod: PBBFAC,,, | Performed by: INTERNAL MEDICINE

## 2023-03-15 RX ORDER — FOLIC ACID 1 MG/1
3 TABLET ORAL DAILY
Qty: 90 TABLET | Refills: 11 | Status: SHIPPED | OUTPATIENT
Start: 2023-03-15 | End: 2024-03-20 | Stop reason: SDUPTHER

## 2023-03-15 RX ORDER — METHOTREXATE 2.5 MG/1
20 TABLET ORAL
Qty: 96 TABLET | Refills: 0 | Status: SHIPPED | OUTPATIENT
Start: 2023-03-15 | End: 2023-08-17 | Stop reason: SDUPTHER

## 2023-03-15 NOTE — PROGRESS NOTES
Subjective:       Patient ID: Kan Traore is a 37 y.o. female.    Chief Complaint: Disease Management    HPI 37 year old with F with PMH of anxiety,asthma, nephrolithiasis here for evaluation.She saw Dr. Taylor in [place] who diagnosed her with RA.   She has been dealing with pain since mid March.She gets pain in elbows, shoulders, wrists,feet,and ankles. Pain level van be as high as 8/10. She gets swelling in right elbow. She gets swelling in hands.  She used to get swelling in ankles but it has improved. She has stiffness in lower back and shoulders.  Sometimes, she has trouble gripping things with left hand.  Denies any rashes.Denies dry eyes or dry mouth. Denies oral ulcers. She reports hair loss for a year. She has had 3 kids. Denies history of pre-eclampsia.  She has 1 miscarriage around 9 weeks. She had covid Jan 2020.  Denies photosensitivity or pleurisy.She denies smoking. She is taking plaquenil one q day. She took course of steroids and it did help her with her pain.  She took MTX for a month. She is taking folic acid 1 mg a day.       Family hx: grandmother: RA      Interval hx: she is taking MTX 6 pills once a week and folic acid. She still has some pain in hands and feet.  Pain is 7/10.     Past Medical History:   Diagnosis Date    Allergic rhinitis     Anxiety     Asthma     Attention deficit disorder (ADD)     Bilateral nephrolithiasis     Depression     Hydronephrosis     Hyperglycemia 3/9/2021    - glucose 131 on recent labs - patient was nonfasting Lab Results Component Value Date  HGBA1C 5.1 06/10/2021      Urinary tract infection     Vaginal infection        Review of Systems   Constitutional: Negative for activity change, appetite change, chills, diaphoresis and fatigue.   HENT: Negative for congestion, ear discharge, ear pain, facial swelling, mouth sores, sinus pressure, sneezing, sore throat, tinnitus and trouble swallowing.    Eyes: Negative for photophobia, pain, discharge, redness,  "itching and visual disturbance.   Respiratory: Negative for apnea, chest tightness, shortness of breath, wheezing and stridor.    Cardiovascular: Negative for leg swelling.   Gastrointestinal: Negative for abdominal distention, abdominal pain, anal bleeding, blood in stool, constipation, diarrhea and nausea.   Endocrine: Negative for cold intolerance and heat intolerance.   Genitourinary: Negative for difficulty urinating and dysuria.   Musculoskeletal: Positive for arthralgias and joint swelling. Negative for back pain, gait problem, myalgias, neck pain and neck stiffness.   Skin: Negative for color change, pallor, rash and wound.   Neurological: Negative for dizziness, seizures, light-headedness and numbness.   Hematological: Negative for adenopathy. Does not bruise/bleed easily.   Psychiatric/Behavioral: Negative for sleep disturbance. The patient is not nervous/anxious.          Objective:   /71   Pulse 105   Ht 5' 6" (1.676 m)   Wt 59.4 kg (131 lb)   BMI 21.14 kg/m²      Physical Exam   Constitutional: She is oriented to person, place, and time.   HENT:   Head: Normocephalic and atraumatic.   Right Ear: External ear normal.   Left Ear: External ear normal.   Nose: Nose normal.   Mouth/Throat: Oropharynx is clear and moist. No oropharyngeal exudate.   Eyes: Pupils are equal, round, and reactive to light. Conjunctivae are normal. Right eye exhibits no discharge. Left eye exhibits no discharge. No scleral icterus.   Neck: No JVD present. No thyromegaly present.   Cardiovascular: Normal rate, regular rhythm and normal heart sounds. Exam reveals no gallop and no friction rub.   No murmur heard.  Pulmonary/Chest: Effort normal and breath sounds normal. No respiratory distress. She has no wheezes. She has no rales. She exhibits no tenderness.   Abdominal: Soft. Bowel sounds are normal. She exhibits no distension and no mass. There is no abdominal tenderness. There is no rebound and no guarding. "   Musculoskeletal:         General: Swelling and tenderness present.      Cervical back: Neck supple.   Lymphadenopathy:     She has no cervical adenopathy.   Neurological: She is alert and oriented to person, place, and time. No cranial nerve deficit. Gait normal. Coordination normal.   Skin: Skin is dry. No rash noted. No erythema. No pallor.   Tenderness of wrists, mcps  Tenderness of mtps   Psychiatric: Affect and judgment normal.          Labs: reviewed    No data to display     Assessment:     37 year old with F with PMH of anxiety,asthma, nephrolithiasis here to establish care for RA.  She saw Dr. Taylor once and transferred to me. Her labs and clinical picture are consistent with seropositive RA.  She has +PADMINI and SSA but denies sicca symptoms. She is still having synovitis on MTX 6 pills once a week so will increase to 8.  1. Rheumatoid factor positive    2. Cyclic citrullinated peptide (CCP) antibody positive            Plan:       Problem List Items Addressed This Visit    None       Visit Diagnoses       Rheumatoid factor positive        Cyclic citrullinated peptide (CCP) antibody positive            Increase MTX from 6 pills once a week to 8 pills once a week (Risks and benefits of initiating MTX discussed. Patient aware that risks include and not limited to lung toxicity, liver abnormalities, cell count abnormalities, malignancy, and allergic  reaction to medication. Patient agrees with starting medication.  Increase folic acid  from 1 mg poq day to 3 mg po qday due to hair loss   Labs in a month *    Continue mobic 15 mg poq day      #hypothyroidism: not controlled. Following up with pcp    30 * minutes of total time spent on the encounter, which includes face to face time and non-face to face time preparing to see the patient (eg, review of tests), Obtaining and/or reviewing separately obtained history, Documenting clinical information in the electronic or other health record, Independently  interpreting results (not separately reported) and communicating results to the patient/family/caregiver, or Care coordination (not separately reported).

## 2023-03-16 LAB
GAMMA INTERFERON BACKGROUND BLD IA-ACNC: 0.08 IU/ML
M TB IFN-G CD4+ BCKGRND COR BLD-ACNC: -0.02 IU/ML
MITOGEN IGNF BCKGRD COR BLD-ACNC: 9.29 IU/ML
TB GOLD PLUS: NEGATIVE
TB2 - NIL: -0.01 IU/ML

## 2023-03-28 PROBLEM — J02.0 STREPTOCOCCAL PHARYNGITIS: Status: RESOLVED | Noted: 2023-02-08 | Resolved: 2023-03-28

## 2023-03-28 RX ORDER — LEVOTHYROXINE SODIUM 25 UG/1
25 TABLET ORAL
Qty: 30 TABLET | Refills: 11 | Status: CANCELLED | OUTPATIENT
Start: 2023-03-28 | End: 2024-03-27

## 2023-03-28 NOTE — PROGRESS NOTES
"VIRTUAL/TELEMEDICINE VISIT   FAMILY MEDICINE  [facility]    The patient location is: [place]  The chief complaint leading to consultation is: follow-up ADHD and thyroid  Visit type: Virtual visit with synchronous audio and video   Total time spent: 30 minute  Each patient to whom he or she provides medical services by telemedicine is:  (1) informed of the relationship between the physician and patient and the respective role of any other health care provider with respect to management of the patient; and (2) notified that he or she may decline to receive medical services by telemedicine and may withdraw from such care at any time.    Reason for visit:   Chief Complaint   Patient presents with    ADHD    Thyroid Problem       SUBJECTIVE: Kan Traore is a 37 y.o. female  - with ADHD, mild intermittent asthma, depression, rheumatoid arthritis with positive rheumatoid factor and anxiety presents for ADHD follow-up ADHD and thyroid     Gynecology Dr. Kacy Elias MD  Dermatology Dr. Lulu Pak MD  Sports Medicine Dr. Brian Marcial  Rheumatology; Dr. Paiz  Optometry: Dr. Cesar Dukes, OD    1. ADHD predominantly inattentive     Age diagnosed: 4 th grade  Diagnosed by: Psychiatrist  Initial evaluation present in chart: no     Past medications: Adderall  XR 30 mg daily   Reasons for changing past medications:  Availability     Current medications:   Vyvanse 30 mg daily  - changed from Adderall XR 30 mg daily due to availability issues  Adderall 10 mg in the afternoon as needed      reviewed: yes 1/5/23 adderall and 3/14/23 Vyvanse     Concerns with medication:  Reports that with the Vyvanse she seems to "crash" around 4:00 p.m. and she is very tired at that time. She has had this issue in the past wonders Adderall immediate release.  She did not have any issues on Adderall XR however it the medication is currently having supply chain issues.  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 Hypothyroidism     Age diagnosed: 36 yo    Symptomatic at diagnosis: fatigue, constipation  Prior evaluation by Endocrinologist: No  Prior thyroid US: no    Current medication:   Levothyroxine 50 mcg daily    Side effects from medication: none  Scheduled for medication: AM fasting separate from other medications    Symptoms from thyroid issue: denies fatigue, weight changes, heat/cold intolerance, bowel/skin changes or CVS symptoms  Concerns: denies    Lab Results       Component                Value               Date                       TSH                      3.104               03/15/2023                 FREET4                   0.78                11/28/2022              Lab Results       Component                Value               Date                       TSH                      7.580 (H)           11/28/2022                 FREET4                   0.78                11/28/2022                Review of Systems   HENT:  Negative for hearing loss.    Eyes:  Negative for discharge.   Respiratory:  Negative for wheezing.    Cardiovascular:  Negative for chest pain and palpitations.   Gastrointestinal:  Negative for blood in stool, constipation, diarrhea and vomiting.   Genitourinary:  Negative for dysuria and hematuria.   Musculoskeletal:  Negative for neck pain.   Neurological:  Negative for weakness and headaches.   Endo/Heme/Allergies:  Negative for polydipsia.   All other systems reviewed and are negative.      HISTORY:   Past Medical History:   Diagnosis Date    Allergic rhinitis     Anxiety     Asthma     Attention deficit disorder (ADD)     Bilateral nephrolithiasis     Depression     Hydronephrosis     Hyperglycemia 3/9/2021    - glucose 131 on recent labs - patient was nonfasting Lab Results Component Value Date  HGBA1C 5.1 06/10/2021      Urinary tract  infection     Vaginal infection        Past Surgical History:   Procedure Laterality Date    APPENDECTOMY  2017    DILATION AND CURETTAGE OF UTERUS  2014    KIDNEY STONE SURGERY      LITHOTRIPSY      LITHOTRIPSY  2017    LOOP ELECTROSURGICAL EXCISION PROCEDURE (LEEP) N/A 10/2/2020    Procedure: LEEP (LOOP ELECTROSURGICAL EXCISION PROCEDURE);  Surgeon: Kacy Elias MD;  Location: [facility];  Service: OB/GYN;  Laterality: N/A;    TONSILLECTOMY         Family History   Problem Relation Age of Onset    Hypertension Mother 50    Hypothyroidism Father 52    Stroke Maternal Grandfather     Hypertension Maternal Grandfather     Heart disease Maternal Grandfather 56         of AMI    Heart failure Paternal Grandmother 81    Rheum arthritis Paternal Grandmother     No Known Problems Son     No Known Problems Son     No Known Problems Son     Breast cancer Neg Hx     Colon cancer Neg Hx     Ovarian cancer Neg Hx     Kidney disease Neg Hx        Social History     Tobacco Use    Smoking status: Never    Smokeless tobacco: Never   Substance Use Topics    Alcohol use: No    Drug use: No       Social History     Social History Narrative    .  Myron filed for divorce 2020 in still pending.  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   from .       ALLERGIES:   Review of patient's allergies indicates:  No Known Allergies    MEDS:   Outpatient Medications as of 3/29/2023   Medication Sig Dispense Refill    citalopram (CELEXA) 20 MG tablet Take 1 tablet (20 mg total) by mouth once daily. 90 tablet 3    folic acid (FOLVITE) 1 MG tablet Take 3 tablets (3 mg total) by mouth once daily. 90 tablet 11    meloxicam (MOBIC) 15 MG tablet TAKE ONE TABLET BY MOUTH EVERY DAY 30 tablet 2    methotrexate 2.5 MG Tab Take 4 pills once a week for the first 2 weeks and then increase to 6 pills once a week 32 tablet 0    methotrexate 2.5 MG Tab Take 6 tablets (15 mg total) by mouth every 7  days. 24 tablet 3    methotrexate 2.5 MG Tab Take 8 tablets (20 mg total) by mouth every 7 days. 96 tablet 0    norgestimate-ethinyl estradioL (ORTHO-CYCLEN) 0.25-35 mg-mcg per tablet Take 1 tablet by mouth once daily. Take one active pill daily, skipping sugar pills 63 tablet 3    ondansetron (ZOFRAN-ODT) 4 MG TbDL Dissolve 1 tablet (4 mg total) by mouth every 8 (eight) hours as needed (nausea/migraine). 30 tablet 5    TURMERIC ORAL Take 1,500 Int'l Units by mouth.      dextroamphetamine-amphetamine (ADDERALL) 10 mg Tab Take 1 tablet (10 mg total) by mouth once daily. 30 tablet 0    levothyroxine (SYNTHROID) 50 MCG tablet Take 1 tablet (50 mcg total) by mouth before breakfast. 90 tablet 3    lisdexamfetamine (VYVANSE) 30 MG capsule Take 1 capsule (30 mg total) by mouth every morning. 30 capsule 0     No current facility-administered medications on file as of 3/29/2023.         Vital signs:   There were no vitals filed for this visit.  There is no height or weight on file to calculate BMI.    PHYSICAL EXAM:     Physical Exam  Constitutional:       General: She is not in acute distress.  Pulmonary:      Effort: Pulmonary effort is normal. No respiratory distress.   Neurological:      Mental Status: She is alert.   Psychiatric:         Speech: Speech normal.         PERTINENT RESULTS:   No visits with results within 1 Week(s) from this visit.   Latest known visit with results is:   Lab Visit on 03/15/2023   Component Date Value Ref Range Status    TSH 03/15/2023 3.104  0.400 - 4.000 uIU/mL Final    WBC 03/15/2023 5.07  3.90 - 12.70 K/uL Final    RBC 03/15/2023 4.55  4.00 - 5.40 M/uL Final    Hemoglobin 03/15/2023 12.8  12.0 - 16.0 g/dL Final    Hematocrit 03/15/2023 41.6  37.0 - 48.5 % Final    MCV 03/15/2023 91  82 - 98 fL Final    MCH 03/15/2023 28.1  27.0 - 31.0 pg Final    MCHC 03/15/2023 30.8 (L)  32.0 - 36.0 g/dL Final    RDW 03/15/2023 14.0  11.5 - 14.5 % Final    Platelets 03/15/2023 208  150 - 450 K/uL Final     MPV 03/15/2023 11.9  9.2 - 12.9 fL Final    Immature Granulocytes 03/15/2023 0.0  0.0 - 0.5 % Final    Gran # (ANC) 03/15/2023 2.8  1.8 - 7.7 K/uL Final    Immature Grans (Abs) 03/15/2023 0.00  0.00 - 0.04 K/uL Final    Comment: Mild elevation in immature granulocytes is non specific and   can be seen in a variety of conditions including stress response,   acute inflammation, trauma and pregnancy. Correlation with other   laboratory and clinical findings is essential.      Lymph # 03/15/2023 1.6  1.0 - 4.8 K/uL Final    Mono # 03/15/2023 0.5  0.3 - 1.0 K/uL Final    Eos # 03/15/2023 0.2  0.0 - 0.5 K/uL Final    Baso # 03/15/2023 0.04  0.00 - 0.20 K/uL Final    nRBC 03/15/2023 0  0 /100 WBC Final    Gran % 03/15/2023 54.9  38.0 - 73.0 % Final    Lymph % 03/15/2023 31.6  18.0 - 48.0 % Final    Mono % 03/15/2023 9.5  4.0 - 15.0 % Final    Eosinophil % 03/15/2023 3.2  0.0 - 8.0 % Final    Basophil % 03/15/2023 0.8  0.0 - 1.9 % Final    Differential Method 03/15/2023 Automated   Final    Sodium 03/15/2023 137  136 - 145 mmol/L Final    Potassium 03/15/2023 3.6  3.5 - 5.1 mmol/L Final    Chloride 03/15/2023 104  95 - 110 mmol/L Final    CO2 03/15/2023 24  23 - 29 mmol/L Final    Glucose 03/15/2023 101  70 - 110 mg/dL Final    BUN 03/15/2023 11  6 - 20 mg/dL Final    Creatinine 03/15/2023 0.7  0.5 - 1.4 mg/dL Final    Calcium 03/15/2023 8.8  8.7 - 10.5 mg/dL Final    Total Protein 03/15/2023 7.3  6.0 - 8.4 g/dL Final    Albumin 03/15/2023 3.3 (L)  3.5 - 5.2 g/dL Final    Total Bilirubin 03/15/2023 0.6  0.1 - 1.0 mg/dL Final    Comment: For infants and newborns, interpretation of results should be based  on gestational age, weight and in agreement with clinical  observations.    Premature Infant recommended reference ranges:  Up to 24 hours.............<8.0 mg/dL  Up to 48 hours............<12.0 mg/dL  3-5 days..................<15.0 mg/dL  6-29 days.................<15.0 mg/dL      Alkaline Phosphatase 03/15/2023 51 (L)   55 - 135 U/L Final    AST 03/15/2023 12  10 - 40 U/L Final    ALT 03/15/2023 8 (L)  10 - 44 U/L Final    Anion Gap 03/15/2023 9  8 - 16 mmol/L Final    eGFR 03/15/2023 >60.0  >60 mL/min/1.73 m^2 Final    CRP 03/15/2023 1.9  0.0 - 8.2 mg/L Final    Sed Rate 03/15/2023 6  0 - 36 mm/Hr Final    Comment: [place] [facility] [place] [facility]:  Normal range:  Male   0-10 mm/Hr  Female 0-20 mm/Hr      Hep B Core Total Ab 03/15/2023 Non-reactive  Non-reactive Final    Hepatitis B Surface Ag 03/15/2023 Non-reactive  Non-reactive Final       ASSESSMENT/PLAN:    1. Attention deficit hyperactivity disorder (ADHD), predominantly inattentive type  Overview:  -  reviewed  - well controlled  - continue current medications   - plans to switch back Adderall XR 30 mg daily when supply chain issues resolve.    Orders:  -     lisdexamfetamine (VYVANSE) 30 MG capsule; Take 1 capsule (30 mg total) by mouth every morning.  Dispense: 30 capsule; Refill: 0  -     dextroamphetamine-amphetamine (ADDERALL) 10 mg Tab; Take 1 tablet (10 mg total) by mouth once daily.  Dispense: 30 tablet; Refill: 0    2. Other specified hypothyroidism  Overview:  - family history of hypothyroidism (father)  Lab Results   Component Value Date    TSH 3.104 03/15/2023    FREET4 0.78 11/28/2022     - 06/10/2021 TPO normal, free T3 and free T4  - well controlled  - continue current management plan   - patient encouraged to notify me with any changes    Orders:  -     levothyroxine (SYNTHROID) 50 MCG tablet; Take 1 tablet (50 mcg total) by mouth before breakfast.  Dispense: 90 tablet; Refill: 3    3. Recurrent major depressive disorder, in full remission  Overview:  - well controlled  - continue current management plan   - patient encouraged to notify me with any changes              ORDERS:   Orders Placed This Encounter    levothyroxine (SYNTHROID) 50 MCG tablet    lisdexamfetamine (VYVANSE) 30 MG capsule    dextroamphetamine-amphetamine (ADDERALL) 10 mg Tab        Vaccines recommended: PCV 20 and covid-19 booster    Follow-up in 3 month annual or sooner if any concerns.      This note is dictated using the M*Modal Fluency Direct word recognition program. There are word recognition mistakes that are occasionally missed on review.    Dr. Cielo Mina D.O.   [facility]

## 2023-03-29 ENCOUNTER — OFFICE VISIT (OUTPATIENT)
Dept: PRIMARY CARE CLINIC | Facility: CLINIC | Age: 38
End: 2023-03-29
Attending: FAMILY MEDICINE
Payer: COMMERCIAL

## 2023-03-29 ENCOUNTER — TELEPHONE (OUTPATIENT)
Dept: PRIMARY CARE CLINIC | Facility: CLINIC | Age: 38
End: 2023-03-29

## 2023-03-29 DIAGNOSIS — F33.42 RECURRENT MAJOR DEPRESSIVE DISORDER, IN FULL REMISSION: ICD-10-CM

## 2023-03-29 DIAGNOSIS — E03.8 OTHER SPECIFIED HYPOTHYROIDISM: ICD-10-CM

## 2023-03-29 DIAGNOSIS — F90.0 ATTENTION DEFICIT HYPERACTIVITY DISORDER (ADHD), PREDOMINANTLY INATTENTIVE TYPE: Primary | ICD-10-CM

## 2023-03-29 PROCEDURE — 1160F RVW MEDS BY RX/DR IN RCRD: CPT | Mod: CPTII,95,, | Performed by: FAMILY MEDICINE

## 2023-03-29 PROCEDURE — 1159F MED LIST DOCD IN RCRD: CPT | Mod: CPTII,95,, | Performed by: FAMILY MEDICINE

## 2023-03-29 PROCEDURE — 99214 OFFICE O/P EST MOD 30 MIN: CPT | Mod: 95,,, | Performed by: FAMILY MEDICINE

## 2023-03-29 PROCEDURE — 99214 PR OFFICE/OUTPT VISIT, EST, LEVL IV, 30-39 MIN: ICD-10-PCS | Mod: 95,,, | Performed by: FAMILY MEDICINE

## 2023-03-29 PROCEDURE — 1159F PR MEDICATION LIST DOCUMENTED IN MEDICAL RECORD: ICD-10-PCS | Mod: CPTII,95,, | Performed by: FAMILY MEDICINE

## 2023-03-29 PROCEDURE — 1160F PR REVIEW ALL MEDS BY PRESCRIBER/CLIN PHARMACIST DOCUMENTED: ICD-10-PCS | Mod: CPTII,95,, | Performed by: FAMILY MEDICINE

## 2023-03-29 RX ORDER — DEXTROAMPHETAMINE SACCHARATE, AMPHETAMINE ASPARTATE, DEXTROAMPHETAMINE SULFATE AND AMPHETAMINE SULFATE 2.5; 2.5; 2.5; 2.5 MG/1; MG/1; MG/1; MG/1
1 TABLET ORAL DAILY
Qty: 30 TABLET | Refills: 0 | Status: SHIPPED | OUTPATIENT
Start: 2023-03-29 | End: 2023-05-24 | Stop reason: SDUPTHER

## 2023-03-29 RX ORDER — LISDEXAMFETAMINE DIMESYLATE 30 MG/1
30 CAPSULE ORAL EVERY MORNING
Qty: 30 CAPSULE | Refills: 0 | Status: SHIPPED | OUTPATIENT
Start: 2023-03-29 | End: 2023-05-24 | Stop reason: SDUPTHER

## 2023-03-29 RX ORDER — LEVOTHYROXINE SODIUM 50 UG/1
50 TABLET ORAL
Qty: 90 TABLET | Refills: 3 | Status: SHIPPED | OUTPATIENT
Start: 2023-03-29 | End: 2024-02-14 | Stop reason: SDUPTHER

## 2023-03-29 NOTE — TELEPHONE ENCOUNTER
----- Message from Cielo Mina DO sent at 3/29/2023  8:14 AM CDT -----  Plesae schedule 3 month annual in person

## 2023-04-05 ENCOUNTER — PATIENT MESSAGE (OUTPATIENT)
Dept: UROLOGY | Facility: CLINIC | Age: 38
End: 2023-04-05

## 2023-04-05 ENCOUNTER — OFFICE VISIT (OUTPATIENT)
Dept: UROLOGY | Facility: CLINIC | Age: 38
End: 2023-04-05
Payer: COMMERCIAL

## 2023-04-05 VITALS
DIASTOLIC BLOOD PRESSURE: 85 MMHG | BODY MASS INDEX: 20.31 KG/M2 | SYSTOLIC BLOOD PRESSURE: 134 MMHG | WEIGHT: 126.38 LBS | HEIGHT: 66 IN | HEART RATE: 103 BPM

## 2023-04-05 DIAGNOSIS — N20.0 BILATERAL NEPHROLITHIASIS: ICD-10-CM

## 2023-04-05 DIAGNOSIS — R10.9 LEFT FLANK PAIN: ICD-10-CM

## 2023-04-05 DIAGNOSIS — R10.2 SUPRAPUBIC PAIN: ICD-10-CM

## 2023-04-05 DIAGNOSIS — N21.0 BLADDER STONE: ICD-10-CM

## 2023-04-05 DIAGNOSIS — N21.0 BLADDER STONE: Primary | ICD-10-CM

## 2023-04-05 DIAGNOSIS — R10.2 SUPRAPUBIC PAIN: Primary | ICD-10-CM

## 2023-04-05 PROCEDURE — 3008F BODY MASS INDEX DOCD: CPT | Mod: CPTII,S$GLB,, | Performed by: NURSE PRACTITIONER

## 2023-04-05 PROCEDURE — 1159F MED LIST DOCD IN RCRD: CPT | Mod: CPTII,S$GLB,, | Performed by: NURSE PRACTITIONER

## 2023-04-05 PROCEDURE — 3008F PR BODY MASS INDEX (BMI) DOCUMENTED: ICD-10-PCS | Mod: CPTII,S$GLB,, | Performed by: NURSE PRACTITIONER

## 2023-04-05 PROCEDURE — 99999 PR PBB SHADOW E&M-EST. PATIENT-LVL V: CPT | Mod: PBBFAC,,, | Performed by: NURSE PRACTITIONER

## 2023-04-05 PROCEDURE — 1159F PR MEDICATION LIST DOCUMENTED IN MEDICAL RECORD: ICD-10-PCS | Mod: CPTII,S$GLB,, | Performed by: NURSE PRACTITIONER

## 2023-04-05 PROCEDURE — 3075F PR MOST RECENT SYSTOLIC BLOOD PRESS GE 130-139MM HG: ICD-10-PCS | Mod: CPTII,S$GLB,, | Performed by: NURSE PRACTITIONER

## 2023-04-05 PROCEDURE — 3079F PR MOST RECENT DIASTOLIC BLOOD PRESSURE 80-89 MM HG: ICD-10-PCS | Mod: CPTII,S$GLB,, | Performed by: NURSE PRACTITIONER

## 2023-04-05 PROCEDURE — 1160F PR REVIEW ALL MEDS BY PRESCRIBER/CLIN PHARMACIST DOCUMENTED: ICD-10-PCS | Mod: CPTII,S$GLB,, | Performed by: NURSE PRACTITIONER

## 2023-04-05 PROCEDURE — 1160F RVW MEDS BY RX/DR IN RCRD: CPT | Mod: CPTII,S$GLB,, | Performed by: NURSE PRACTITIONER

## 2023-04-05 PROCEDURE — 3079F DIAST BP 80-89 MM HG: CPT | Mod: CPTII,S$GLB,, | Performed by: NURSE PRACTITIONER

## 2023-04-05 PROCEDURE — 3075F SYST BP GE 130 - 139MM HG: CPT | Mod: CPTII,S$GLB,, | Performed by: NURSE PRACTITIONER

## 2023-04-05 PROCEDURE — 99204 PR OFFICE/OUTPT VISIT, NEW, LEVL IV, 45-59 MIN: ICD-10-PCS | Mod: S$GLB,,, | Performed by: NURSE PRACTITIONER

## 2023-04-05 PROCEDURE — 99999 PR PBB SHADOW E&M-EST. PATIENT-LVL V: ICD-10-PCS | Mod: PBBFAC,,, | Performed by: NURSE PRACTITIONER

## 2023-04-05 PROCEDURE — 99204 OFFICE O/P NEW MOD 45 MIN: CPT | Mod: S$GLB,,, | Performed by: NURSE PRACTITIONER

## 2023-04-05 RX ORDER — TAMSULOSIN HYDROCHLORIDE 0.4 MG/1
0.4 CAPSULE ORAL DAILY
Qty: 30 CAPSULE | Refills: 0 | Status: SHIPPED | OUTPATIENT
Start: 2023-04-05 | End: 2023-06-29

## 2023-04-05 RX ORDER — KETOROLAC TROMETHAMINE 10 MG/1
10 TABLET, FILM COATED ORAL EVERY 6 HOURS PRN
Qty: 20 TABLET | Refills: 0 | Status: SHIPPED | OUTPATIENT
Start: 2023-04-05 | End: 2023-04-10

## 2023-04-05 NOTE — PATIENT INSTRUCTIONS
Start trial of tamsulosin (Flomax) 0.4 mg daily and increase water intake to promote passage of stone.   Strain urine and bring in any collected stone particles to clinic for analysis.   Follow-up in 3 weeks with U/S prior to visit.

## 2023-04-05 NOTE — PROGRESS NOTES
Subjective:       Patient ID: Kan Traore is a 37 y.o. female.    Chief Complaint: Bladder Pain (Possible kidney stones)    Patient is new to me. She is a 36 yo WF who is here today for evaluation of bladder stone and bilateral nephrolithiasis that was noted on U/S dated 4/4/2023. Results discussed with patient. Patient reports suprapubic pain and left flank discomfort.     Abdominal Pain  This is a new problem. Episode onset: 1 week ago. The problem occurs daily. The problem has been unchanged. The pain is located in the suprapubic region. The pain is at a severity of 5/10. The pain is moderate. The quality of the pain is colicky. The abdominal pain does not radiate. Associated symptoms include frequency. Pertinent negatives include no constipation, diarrhea, dysuria, fever, headaches, hematuria, nausea or vomiting. Nothing aggravates the pain. The pain is relieved by Nothing. Treatments tried: ibuprofen. The treatment provided moderate relief. Prior diagnostic workup includes ultrasound. There is no history of colon cancer or irritable bowel syndrome. Patient's medical history includes kidney stones.   Review of Systems   Constitutional:  Negative for chills, fatigue and fever.   Gastrointestinal:  Positive for abdominal pain. Negative for change in bowel habit, constipation, diarrhea, nausea, vomiting and change in bowel habit.   Genitourinary:  Positive for flank pain (left), frequency and pelvic pain (suprapubic). Negative for decreased urine volume, difficulty urinating, dysuria, hematuria, nocturia, urgency, vaginal bleeding, vaginal discharge and vaginal pain.   Neurological:  Negative for dizziness, weakness and headaches.   Psychiatric/Behavioral: Negative.         Objective:      Physical Exam  Vitals and nursing note reviewed.   Constitutional:       General: She is not in acute distress.     Appearance: She is well-developed and normal weight. She is not ill-appearing.   HENT:      Head:  Normocephalic and atraumatic.   Eyes:      Pupils: Pupils are equal, round, and reactive to light.   Cardiovascular:      Rate and Rhythm: Normal rate.   Pulmonary:      Effort: Pulmonary effort is normal. No respiratory distress.   Abdominal:      Palpations: Abdomen is soft.      Tenderness: There is no abdominal tenderness.   Musculoskeletal:         General: Normal range of motion.      Cervical back: Normal range of motion.   Skin:     General: Skin is warm and dry.   Neurological:      Mental Status: She is alert and oriented to person, place, and time.      Coordination: Coordination normal.   Psychiatric:         Mood and Affect: Mood normal.         Behavior: Behavior normal.         Thought Content: Thought content normal.         Judgment: Judgment normal.       Assessment:       Problem List Items Addressed This Visit    None  Visit Diagnoses       Bladder stone    -  Primary    Relevant Medications    tamsulosin (FLOMAX) 0.4 mg Cap    Other Relevant Orders    US Retroperitoneal Complete    Bilateral nephrolithiasis        Relevant Medications    tamsulosin (FLOMAX) 0.4 mg Cap    Other Relevant Orders    US Retroperitoneal Complete    Left flank pain        Relevant Medications    ketorolac (TORADOL) 10 mg tablet    Other Relevant Orders    US Retroperitoneal Complete    Suprapubic pain        Relevant Medications    ketorolac (TORADOL) 10 mg tablet    Other Relevant Orders    US Retroperitoneal Complete            Plan:           Kan was seen today for bladder pain.    Diagnoses and all orders for this visit:    Bladder stone  -     US Retroperitoneal Complete; Future  -     tamsulosin (FLOMAX) 0.4 mg Cap; Take 1 capsule (0.4 mg total) by mouth once daily.    Bilateral nephrolithiasis  -     US Retroperitoneal Complete; Future  -     tamsulosin (FLOMAX) 0.4 mg Cap; Take 1 capsule (0.4 mg total) by mouth once daily.    Left flank pain  -     US Retroperitoneal Complete; Future  -     ketorolac  (TORADOL) 10 mg tablet; Take 1 tablet (10 mg total) by mouth every 6 (six) hours as needed for Pain.    Suprapubic pain  -     US Retroperitoneal Complete; Future  -     ketorolac (TORADOL) 10 mg tablet; Take 1 tablet (10 mg total) by mouth every 6 (six) hours as needed for Pain.    Other order  Start trial of tamsulosin (Flomax) 0.4 mg daily and increase water intake to promote passage of stone.   Strain urine and bring in any collected stone particles to clinic for analysis.     Follow-up in 3 weeks with U/S prior to visit.    Cade Ortiz NP

## 2023-04-06 ENCOUNTER — PATIENT MESSAGE (OUTPATIENT)
Dept: UROLOGY | Facility: CLINIC | Age: 38
End: 2023-04-06
Payer: COMMERCIAL

## 2023-04-06 RX ORDER — CYCLOBENZAPRINE HCL 10 MG
10 TABLET ORAL 3 TIMES DAILY PRN
Qty: 21 TABLET | Refills: 0 | Status: SHIPPED | OUTPATIENT
Start: 2023-04-06 | End: 2023-04-13

## 2023-05-24 ENCOUNTER — PATIENT MESSAGE (OUTPATIENT)
Dept: PRIMARY CARE CLINIC | Facility: CLINIC | Age: 38
End: 2023-05-24
Payer: COMMERCIAL

## 2023-05-24 DIAGNOSIS — F90.0 ATTENTION DEFICIT HYPERACTIVITY DISORDER (ADHD), PREDOMINANTLY INATTENTIVE TYPE: ICD-10-CM

## 2023-05-24 RX ORDER — DEXTROAMPHETAMINE SACCHARATE, AMPHETAMINE ASPARTATE, DEXTROAMPHETAMINE SULFATE AND AMPHETAMINE SULFATE 2.5; 2.5; 2.5; 2.5 MG/1; MG/1; MG/1; MG/1
1 TABLET ORAL DAILY
Qty: 30 TABLET | Refills: 0 | Status: SHIPPED | OUTPATIENT
Start: 2023-05-24 | End: 2023-06-29 | Stop reason: SDUPTHER

## 2023-05-24 RX ORDER — LISDEXAMFETAMINE DIMESYLATE 30 MG/1
30 CAPSULE ORAL EVERY MORNING
Qty: 30 CAPSULE | Refills: 0 | Status: SHIPPED | OUTPATIENT
Start: 2023-05-24 | End: 2023-06-29 | Stop reason: SDUPTHER

## 2023-05-24 NOTE — TELEPHONE ENCOUNTER
Refill Encounter    PCP Visits: Recent Visits  Date Type Provider Dept   03/29/23 Office Visit Cielo Mina, DO [facility]   12/29/22 Office Visit Cielo Mina, DO [facility]   08/18/22 Office Visit Cielo Mina, DO [facility]   06/08/22 Office Visit Cielo Mina, DO [facility]   Showing recent visits within past 360 days and meeting all other requirements  Future Appointments  Date Type Provider Dept   06/29/23 Appointment Cielo Mina, DO [facility]   Showing future appointments within next 720 days and meeting all other requirements     Last 3 Blood Pressure:   BP Readings from Last 3 Encounters:   04/05/23 134/85   03/15/23 113/73   08/17/22 107/71     Preferred Pharmacy:   [facility]  [address]  [place] [address]  Phone: [phone] Fax: [phone]    Requested RX:  Requested Prescriptions     Pending Prescriptions Disp Refills    dextroamphetamine-amphetamine (ADDERALL) 10 mg Tab 30 tablet 0     Sig: Take 1 tablet (10 mg total) by mouth once daily.    lisdexamfetamine (VYVANSE) 30 MG capsule 30 capsule 0     Sig: Take 1 capsule (30 mg total) by mouth every morning.      RX Route: Normal

## 2023-05-24 NOTE — TELEPHONE ENCOUNTER
No care due was identified.  Health [facility] Embedded Care Due Messages. Reference number: [id number].   5/24/2023 9:09:21 AM CDT

## 2023-06-15 ENCOUNTER — PATIENT OUTREACH (OUTPATIENT)
Dept: ADMINISTRATIVE | Facility: HOSPITAL | Age: 38
End: 2023-06-15
Payer: COMMERCIAL

## 2023-06-15 DIAGNOSIS — Z00.00 ANNUAL PHYSICAL EXAM: Primary | ICD-10-CM

## 2023-06-28 ENCOUNTER — OFFICE VISIT (OUTPATIENT)
Dept: RHEUMATOLOGY | Facility: CLINIC | Age: 38
End: 2023-06-28
Payer: COMMERCIAL

## 2023-06-28 DIAGNOSIS — Z79.899 HIGH RISK MEDICATION USE: Primary | ICD-10-CM

## 2023-06-28 DIAGNOSIS — M06.9 RHEUMATOID ARTHRITIS FLARE: ICD-10-CM

## 2023-06-28 PROBLEM — B97.7 HPV IN FEMALE: Status: ACTIVE | Noted: 2023-06-28

## 2023-06-28 PROBLEM — N87.0 CIN I (CERVICAL INTRAEPITHELIAL NEOPLASIA I): Status: ACTIVE | Noted: 2023-06-28

## 2023-06-28 PROCEDURE — 99214 PR OFFICE/OUTPT VISIT, EST, LEVL IV, 30-39 MIN: ICD-10-PCS | Mod: 95,,, | Performed by: INTERNAL MEDICINE

## 2023-06-28 PROCEDURE — 99214 OFFICE O/P EST MOD 30 MIN: CPT | Mod: 95,,, | Performed by: INTERNAL MEDICINE

## 2023-06-28 NOTE — PROGRESS NOTES
"FAMILY MEDICINE  [facility]    Reason for visit:   Chief Complaint   Patient presents with    ADHD    Annual Exam         SUBJECTIVE: Kan Traore is a 37 y.o. female  - with ADHD, mild intermittent asthma, depression, rheumatoid arthritis with positive rheumatoid factor , hypothyroidism, nephrolithiasis and anxiety presents for annual and ADHD      Gynecology Dr. Kacy Elias MD  Dermatology Dr. Lulu Pak MD  Sports Medicine Dr. Brian Marcial  Rheumatology; Dr. Paiz  Optometry: Dr. Cesar Dukes, OD    Kan Traore was recently seen by Urology secondary to renal stones.  She would severe pain secondary to the renal stones.  She was prescribed Flomax as well as ketorolac without any relief from pain.  She was later prescribed cyclobenzaprine.  She reports she is actually had to take one oxycodone/acetaminophen 5/325mg and was finally able to passed a stone.  Before for renal ultrasound showed a few 4 mm echogenic foci noted in both her right and left kidneys and a 4 mm focus within the bladder. She reports that she is been feeling much better since she was able to passed stone    1. ADHD predominantly inattentive     Age diagnosed: 4 th grade  Diagnosed by: Psychiatrist  Initial evaluation present in chart: no     Past medications: Adderall  XR 30 mg daily   Reasons for changing past medications:  Availability     Current medications:   Vyvanse 30 mg daily  - changed from Adderall XR 30 mg daily due to availability issues  Adderall 10 mg in the afternoon as needed      reviewed: yes      Concerns with medication:  She continues to find that the Vyvanse does not seem to work as well as her Adderall XR however there is continued to be supply chain issues.  There is some improvement with taking and Adderall 10 mg in the afternoon.   Prior note:  Reports that with the Vyvanse she seems to "crash" around 4:00 p.m. and she is very tired at that time. She has had this issue in the past " ruby Adderall immediate release.  She did not have any issues on Adderall XR however it the medication is currently having supply chain issues.    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 Hypothyroidism     Age diagnosed: 38 yo    Symptomatic at diagnosis: fatigue, constipation  Prior evaluation by Endocrinologist: No  Prior thyroid US: no    Current medication:   Levothyroxine 50 mcg daily    Side effects from medication: none  Scheduled for medication: AM fasting separate from other medications    Symptoms from thyroid issue: denies fatigue, weight changes, heat/cold intolerance, bowel/skin changes or CVS symptoms  Concerns: denies    Lab Results       Component                Value               Date                       TSH                      3.104               03/15/2023                 FREET4                   0.78                11/28/2022              Lab Results       Component                Value               Date                       TSH                      7.580 (H)           11/28/2022                 FREET4                   0.78                11/28/2022                  Review of Systems   All other systems reviewed and are negative.    HEALTH MAINTENANCE:   Health Maintenance   Topic Date Due    TETANUS VACCINE  07/30/2025    Hepatitis C Screening  Completed    Lipid Panel  Completed     Health Maintenance Topics with due status: Not Due       Topic Last Completion Date    TETANUS VACCINE 07/30/2015     Health Maintenance Due   Topic Date Due    Pneumococcal Vaccines (Age 0-64) (1 - PCV) Never done    COVID-19 Vaccine (3 - Pfizer series) 10/15/2021    Cervical Cancer Screening  02/17/2023       HISTORY:   Past Medical History:   Diagnosis Date    Allergic rhinitis     Anxiety     Asthma     Attention deficit  disorder (ADD)     Bilateral nephrolithiasis     Depression     Hydronephrosis     Hyperglycemia 3/9/2021    - glucose 131 on recent labs - patient was nonfasting Lab Results Component Value Date  HGBA1C 5.1 06/10/2021      Urinary tract infection     Vaginal infection        Past Surgical History:   Procedure Laterality Date    APPENDECTOMY  2017    DILATION AND CURETTAGE OF UTERUS  2014    KIDNEY STONE SURGERY      LITHOTRIPSY      LITHOTRIPSY  2017    LOOP ELECTROSURGICAL EXCISION PROCEDURE (LEEP) N/A 10/02/2020    Procedure: LEEP (LOOP ELECTROSURGICAL EXCISION PROCEDURE);  Surgeon: Kacy Elias MD;  Location: [facility] OR;  Service: OB/GYN;  Laterality: N/A;    TONSILLECTOMY         Family History   Problem Relation Age of Onset    Hypertension Mother 50    Asthma Mother     Hypothyroidism Father 52    Stroke Maternal Grandfather     Hypertension Maternal Grandfather     Heart disease Maternal Grandfather 56         of AMI    Heart failure Paternal Grandmother 81    Rheum arthritis Paternal Grandmother     Arthritis Paternal Grandmother     No Known Problems Son     No Known Problems Son     No Known Problems Son     Breast cancer Neg Hx     Colon cancer Neg Hx     Ovarian cancer Neg Hx     Kidney disease Neg Hx        Social History     Tobacco Use    Smoking status: Never     Passive exposure: Never    Smokeless tobacco: Never   Substance Use Topics    Alcohol use: Yes     Alcohol/week: 1.0 standard drink     Types: 1 Drinks containing 0.5 oz of alcohol per week     Comment: Socially    Drug use: No       Social History     Social History Narrative    .  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   from .       ALLERGIES:   Review of patient's allergies indicates:  No Known Allergies    MEDS:   Outpatient Medications as of 2023   Medication Sig Dispense Refill     "citalopram (CELEXA) 20 MG tablet Take 1 tablet (20 mg total) by mouth once daily. 90 tablet 3    folic acid (FOLVITE) 1 MG tablet Take 3 tablets (3 mg total) by mouth once daily. 90 tablet 11    levothyroxine (SYNTHROID) 50 MCG tablet Take 1 tablet (50 mcg total) by mouth before breakfast. 90 tablet 3    meloxicam (MOBIC) 15 MG tablet TAKE ONE TABLET BY MOUTH EVERY DAY 30 tablet 2    methotrexate 2.5 MG Tab Take 8 tablets (20 mg total) by mouth every 7 days. 96 tablet 0    norgestimate-ethinyl estradioL (ORTHO-CYCLEN) 0.25-35 mg-mcg per tablet Take 1 tablet by mouth once daily. Take one active pill daily, skipping sugar pills 63 tablet 3    ondansetron (ZOFRAN-ODT) 4 MG TbDL Dissolve 1 tablet (4 mg total) by mouth every 8 (eight) hours as needed (nausea/migraine). 30 tablet 5    TURMERIC ORAL Take 1,500 Int'l Units by mouth.      dextroamphetamine-amphetamine (ADDERALL) 10 mg Tab Take 1 tablet (10 mg total) by mouth once daily. 30 tablet 0    lisdexamfetamine (VYVANSE) 30 MG capsule Take 1 capsule (30 mg total) by mouth every morning. 30 capsule 0     No current facility-administered medications on file as of 6/29/2023.       Vital signs:   Vitals:    06/29/23 1426   BP: 129/78   Pulse: 105   SpO2: 96%   Weight: 57.7 kg (127 lb 3.2 oz)   Height: 5' 6" (1.676 m)     Body mass index is 20.53 kg/m².    PHYSICAL EXAM:     Physical Exam  Vitals reviewed.   Constitutional:       General: She is not in acute distress.  HENT:      Head: Normocephalic and atraumatic.      Right Ear: Tympanic membrane and ear canal normal.      Left Ear: Tympanic membrane and ear canal normal.   Eyes:      General: No scleral icterus.     Conjunctiva/sclera: Conjunctivae normal.   Neck:      Thyroid: No thyromegaly.      Vascular: No carotid bruit.   Cardiovascular:      Rate and Rhythm: Normal rate and regular rhythm.      Pulses: Normal pulses.      Heart sounds: Normal heart sounds. No murmur heard.    No friction rub. No gallop. "   Pulmonary:      Effort: Pulmonary effort is normal.      Breath sounds: Normal breath sounds. No wheezing, rhonchi or rales.   Abdominal:      General: Bowel sounds are normal. There is no distension.      Palpations: Abdomen is soft.      Tenderness: There is no abdominal tenderness. There is no right CVA tenderness, left CVA tenderness, guarding or rebound.   Musculoskeletal:      Cervical back: Normal range of motion and neck supple.      Right lower leg: No edema.      Left lower leg: No edema.   Lymphadenopathy:      Cervical: No cervical adenopathy.   Skin:     General: Skin is warm.      Capillary Refill: Capillary refill takes less than 2 seconds.   Neurological:      Mental Status: She is alert.           PERTINENT RESULTS:   No visits with results within 1 Week(s) from this visit.   Latest known visit with results is:   Lab Visit on 03/15/2023   Component Date Value Ref Range Status    TSH 03/15/2023 3.104  0.400 - 4.000 uIU/mL Final    WBC 03/15/2023 5.07  3.90 - 12.70 K/uL Final    RBC 03/15/2023 4.55  4.00 - 5.40 M/uL Final    Hemoglobin 03/15/2023 12.8  12.0 - 16.0 g/dL Final    Hematocrit 03/15/2023 41.6  37.0 - 48.5 % Final    MCV 03/15/2023 91  82 - 98 fL Final    MCH 03/15/2023 28.1  27.0 - 31.0 pg Final    MCHC 03/15/2023 30.8 (L)  32.0 - 36.0 g/dL Final    RDW 03/15/2023 14.0  11.5 - 14.5 % Final    Platelets 03/15/2023 208  150 - 450 K/uL Final    MPV 03/15/2023 11.9  9.2 - 12.9 fL Final    Immature Granulocytes 03/15/2023 0.0  0.0 - 0.5 % Final    Gran # (ANC) 03/15/2023 2.8  1.8 - 7.7 K/uL Final    Immature Grans (Abs) 03/15/2023 0.00  0.00 - 0.04 K/uL Final    Comment: Mild elevation in immature granulocytes is non specific and   can be seen in a variety of conditions including stress response,   acute inflammation, trauma and pregnancy. Correlation with other   laboratory and clinical findings is essential.      Lymph # 03/15/2023 1.6  1.0 - 4.8 K/uL Final    Mono #  03/15/2023 0.5  0.3 - 1.0 K/uL Final    Eos # 03/15/2023 0.2  0.0 - 0.5 K/uL Final    Baso # 03/15/2023 0.04  0.00 - 0.20 K/uL Final    nRBC 03/15/2023 0  0 /100 WBC Final    Gran % 03/15/2023 54.9  38.0 - 73.0 % Final    Lymph % 03/15/2023 31.6  18.0 - 48.0 % Final    Mono % 03/15/2023 9.5  4.0 - 15.0 % Final    Eosinophil % 03/15/2023 3.2  0.0 - 8.0 % Final    Basophil % 03/15/2023 0.8  0.0 - 1.9 % Final    Differential Method 03/15/2023 Automated   Final    Sodium 03/15/2023 137  136 - 145 mmol/L Final    Potassium 03/15/2023 3.6  3.5 - 5.1 mmol/L Final    Chloride 03/15/2023 104  95 - 110 mmol/L Final    CO2 03/15/2023 24  23 - 29 mmol/L Final    Glucose 03/15/2023 101  70 - 110 mg/dL Final    BUN 03/15/2023 11  6 - 20 mg/dL Final    Creatinine 03/15/2023 0.7  0.5 - 1.4 mg/dL Final    Calcium 03/15/2023 8.8  8.7 - 10.5 mg/dL Final    Total Protein 03/15/2023 7.3  6.0 - 8.4 g/dL Final    Albumin 03/15/2023 3.3 (L)  3.5 - 5.2 g/dL Final    Total Bilirubin 03/15/2023 0.6  0.1 - 1.0 mg/dL Final    Comment: For infants and newborns, interpretation of results should be based  on gestational age, weight and in agreement with clinical  observations.    Premature Infant recommended reference ranges:  Up to 24 hours.............<8.0 mg/dL  Up to 48 hours............<12.0 mg/dL  3-5 days..................<15.0 mg/dL  6-29 days.................<15.0 mg/dL      Alkaline Phosphatase 03/15/2023 51 (L)  55 - 135 U/L Final    AST 03/15/2023 12  10 - 40 U/L Final    ALT 03/15/2023 8 (L)  10 - 44 U/L Final    Anion Gap 03/15/2023 9  8 - 16 mmol/L Final    eGFR 03/15/2023 >60.0  >60 mL/min/1.73 m^2 Final    CRP 03/15/2023 1.9  0.0 - 8.2 mg/L Final    Sed Rate 03/15/2023 6  0 - 36 mm/Hr Final    Comment: Manual ESR performed at [facility]:  Normal range:  Male   0-10 mm/Hr  Female 0-20 mm/Hr      Hep B Core Total Ab 03/15/2023 Non-reactive  Non-reactive Final    Hepatitis B Surface Ag 03/15/2023  Non-reactive  Non-reactive Final     Lab Results   Component Value Date    CHOL 166 08/17/2022    CHOL 164 03/08/2021    CHOL 144 08/13/2018     Lab Results   Component Value Date    HDL 56 08/17/2022    HDL 61 03/08/2021    HDL 51 08/13/2018     Lab Results   Component Value Date    LDLCALC 82.6 08/17/2022    LDLCALC 77.0 03/08/2021    LDLCALC 81.0 08/13/2018     No results found for: DLDL  Lab Results   Component Value Date    TRIG 137 08/17/2022    TRIG 130 03/08/2021    TRIG 60 08/13/2018       f1   Lab Results   Component Value Date    CHOLHDL 33.7 08/17/2022    CHOLHDL 37.2 03/08/2021    CHOLHDL 35.4 08/13/2018     Component Ref Range & Units 1 yr ago 2 yr ago   HPV other High Risk types, PCR Negative Positive Abnormal   Positive Abnormal  CM    Comment: Other HPV genotypes include:   31,33,35,39,45,51,52,56,58,59,66 and 68.    HPV High Risk type 16, PCR Negative Negative  Negative    HPV High Risk type 18, PCR Negative Negative  Negative    Resulting Agency  [facility]           Narrative  Performed by: [facility]  Release to patient->Immediate      Specimen Collected: 02/17/22 11:44             Final Pathologic Diagnosis  Abnormal   Specimen Adequacy   Satisfactory for interpretation. Endocervical component is present.   [place] Category   Epithelial cell abnormalities.   Final Diagnostic Interpretation   Low-grade squamous intraepithelial lesion.   Inflammation present.     Comment: Interp By Мария Shah MD, Signed on 03/02/2022 at 16:13   Disclaimer  Abnormal   The Pap smear is a screening test that aids in the detection of cervical   cancer and cancer precursors. Both false positive and false negative results   can occur. The test should be used at regular intervals, and positive results   should be confirmed before definitive therapy.   This liquid based specimen is processed using the  or T5makr Thin PrepPAP   System. This specimen has been analyzed by the [employer] Imaging System   ([employer]), an automated imaging and review system which assists   the laboratory in evaluating cells on ThinPrep PAP tests. Following automated   imaging, selected fields from every slide are reviewed by a cytotechnologist   and/or pathologist.   Screening was performed at [facility], [address] [place] [address].     Resulting Agency [facility]           Narrative  Performed by: [employer]  Release to patient->Immediate      Specimen Collected: 02/17/22 11:44           ASSESSMENT/PLAN:    1. Encounter for general adult medical examination with abnormal findings  Overview:  - preventative health counseling  - counseling on current recommendations for breast cancer screening. Average risk  - counseling on current recommendations for cervical cancer screening. + CIN1 overdue for repeat PAP and recommend schedule  - counseling on current recommendations for colon cancer screening.Average risk  - Vaccines recommended at this visit: Covid-19      2. Attention deficit hyperactivity disorder (ADHD), predominantly inattentive type  Overview:  -  reviewed  - well controlled  - continue current medications   - plans to switch back Adderall XR 30 mg daily when supply chain issues resolve.    Orders:  -     dextroamphetamine-amphetamine (ADDERALL) 10 mg Tab; Take 1 tablet (10 mg total) by mouth once daily.  Dispense: 30 tablet; Refill: 0  -     lisdexamfetamine (VYVANSE) 30 MG capsule; Take 1 capsule (30 mg total) by mouth every morning.  Dispense: 30 capsule; Refill: 0    3. Recurrent major depressive disorder, in full remission  Overview:  - well controlled  - continue current management plan   - patient encouraged to notify me with any changes      4. Generalized anxiety disorder  Overview:  - well controlled  - continue current medication      5. Other specified hypothyroidism  Overview:  - family history of hypothyroidism (father)  Lab Results   Component Value Date     TSH 3.104 03/15/2023    FREET4 0.78 11/28/2022     - 06/10/2021 TPO normal, free T3 and free T4  - well controlled  - continue current management plan   - patient encouraged to notify me with any changes      6. Rheumatoid arthritis involving multiple sites with positive rheumatoid factor  Overview:  - followed by Rheumatology      7. CONSUELO I (cervical intraepithelial neoplasia I)  Overview:  - 2/17/22 PAP LSIL with +HR HPV  - 4/18/2022 colonoscopy + CONSUELO 1 repeat PAP 1 year  - overdue for repeat PAP      8. HPV in female  Overview:  - + HR HPV   - negative HPV 16/18            ORDERS:   Orders Placed This Encounter    dextroamphetamine-amphetamine (ADDERALL) 10 mg Tab    lisdexamfetamine (VYVANSE) 30 MG capsule       Vaccines recommended:  Prevnar 20 and COVID-19 booster She declined vaccinations    Follow-up in 3 months virtual ADHD or sooner if any concerns.      This note is dictated using the M*Modal Fluency Direct word recognition program. There are word recognition mistakes that are occasionally missed on review.    Dr. Cielo Mina D.O.   Family Medicine

## 2023-06-28 NOTE — PROGRESS NOTES
Subjective:       Patient ID: Kan Traore is a 37 y.o. female.    Chief Complaint: Disease Management    HPI 37 year old with F with PMH of anxiety,asthma, nephrolithiasis here for evaluation.She saw Dr. Taylor in [place] who diagnosed her with RA.   She has been dealing with pain since mid March.She gets pain in elbows, shoulders, wrists,feet,and ankles. Pain level van be as high as 8/10. She gets swelling in right elbow. She gets swelling in hands.  She used to get swelling in ankles but it has improved. She has stiffness in lower back and shoulders.  Sometimes, she has trouble gripping things with left hand.  Denies any rashes.Denies dry eyes or dry mouth. Denies oral ulcers. She reports hair loss for a year. She has had 3 kids. Denies history of pre-eclampsia.  She has 1 miscarriage around 9 weeks. She had covid Jan 2020.  Denies photosensitivity or pleurisy.She denies smoking. She is taking plaquenil one q day. She took course of steroids and it did help her with her pain.  She took MTX for a month. She is taking folic acid 1 mg a day.       Family hx: grandmother: RA      Interval hx: she is taking MTX 6 pills once a week and folic acid. She still has some pain in hands and feet.  Pain is 7/10. She has new rash in her antecubital fossa.She will be seeing dermatologist.      Past Medical History:   Diagnosis Date    Allergic rhinitis     Anxiety     Asthma     Attention deficit disorder (ADD)     Bilateral nephrolithiasis     Depression     Hydronephrosis     Hyperglycemia 3/9/2021    - glucose 131 on recent labs - patient was nonfasting Lab Results Component Value Date  HGBA1C 5.1 06/10/2021      Urinary tract infection     Vaginal infection        Review of Systems   Constitutional: Negative for activity change, appetite change, chills, diaphoresis and fatigue.   HENT: Negative for congestion, ear discharge, ear pain, facial swelling, mouth sores, sinus pressure, sneezing, sore throat, tinnitus and  "trouble swallowing.    Eyes: Negative for photophobia, pain, discharge, redness, itching and visual disturbance.   Respiratory: Negative for apnea, chest tightness, shortness of breath, wheezing and stridor.    Cardiovascular: Negative for leg swelling.   Gastrointestinal: Negative for abdominal distention, abdominal pain, anal bleeding, blood in stool, constipation, diarrhea and nausea.   Endocrine: Negative for cold intolerance and heat intolerance.   Genitourinary: Negative for difficulty urinating and dysuria.   Musculoskeletal: Positive for arthralgias and joint swelling. Negative for back pain, gait problem, myalgias, neck pain and neck stiffness.   Skin: Negative for color change, pallor, rash and wound.   Neurological: Negative for dizziness, seizures, light-headedness and numbness.   Hematological: Negative for adenopathy. Does not bruise/bleed easily.   Psychiatric/Behavioral: Negative for sleep disturbance. The patient is not nervous/anxious.          Objective:   /71   Pulse 105   Ht 5' 6" (1.676 m)   Wt 59.4 kg (131 lb)   BMI 21.14 kg/m²      Physical Exam   Constitutional: She is oriented to person, place, and time.   HENT:   Head: Normocephalic and atraumatic.   Right Ear: External ear normal.   Left Ear: External ear normal.   Nose: Nose normal.   Mouth/Throat: Oropharynx is clear and moist. No oropharyngeal exudate.   Eyes: Pupils are equal, round, and reactive to light. Conjunctivae are normal. Right eye exhibits no discharge. Left eye exhibits no discharge. No scleral icterus.   Neck: No JVD present. No thyromegaly present.   Cardiovascular: Normal rate, regular rhythm and normal heart sounds. Exam reveals no gallop and no friction rub.   No murmur heard.  Pulmonary/Chest: Effort normal and breath sounds normal. No respiratory distress. She has no wheezes. She has no rales. She exhibits no tenderness.   Abdominal: Soft. Bowel sounds are normal. She exhibits no distension and no mass. " There is no abdominal tenderness. There is no rebound and no guarding.   Musculoskeletal:         General: Swelling and tenderness present.      Cervical back: Neck supple.   Lymphadenopathy:     She has no cervical adenopathy.   Neurological: She is alert and oriented to person, place, and time. No cranial nerve deficit. Gait normal. Coordination normal.   Skin: Skin is dry. No rash noted. No erythema. No pallor.   Tenderness of wrists, mcps  Tenderness of mtps   Psychiatric: Affect and judgment normal.          Labs: reviewed    No data to display     Assessment:     37 year old with F with PMH of anxiety,asthma, nephrolithiasis here to establish care for RA.  She saw Dr. Taylor once and transferred to me. Her labs and clinical picture are consistent with seropositive RA.  She has +PADMINI and SSA but denies sicca symptoms. She is still having synovitis on MTX 6 pills once a week so will increase to 8.  1. Rheumatoid factor positive    2. Cyclic citrullinated peptide (CCP) antibody positive            Plan:       Problem List Items Addressed This Visit    None       Visit Diagnoses       Rheumatoid factor positive        Cyclic citrullinated peptide (CCP) antibody positive            Increase MTX from 6 pills once a week to 8 pills once a week (Risks and benefits of initiating MTX discussed. Patient aware that risks include and not limited to lung toxicity, liver abnormalities, cell count abnormalities, malignancy, and allergic  reaction to medication. Patient agrees with starting medication.  Increase folic acid  from 1 mg poq day to 3 mg po qday due to hair loss   Labs in a month *    Continue mobic 15 mg poq day      #hypothyroidism: not controlled. Following up with pcp      The patient location is: home  The chief complaint leading to consultation is: joint pain    Visit type: audiovisual    Face to Face time with patient: 30   minutes of total time spent on the encounter, which includes face to face time and  non-face to face time preparing to see the patient (eg, review of tests), Obtaining and/or reviewing separately obtained history, Documenting clinical information in the electronic or other health record, Independently interpreting results (not separately reported) and communicating results to the patient/family/caregiver, or Care coordination (not separately reported).         Each patient to whom he or she provides medical services by telemedicine is:  (1) informed of the relationship between the physician and patient and the respective role of any other health care provider with respect to management of the patient; and (2) notified that he or she may decline to receive medical services by telemedicine and may withdraw from such care at any time.    Notes:

## 2023-06-28 NOTE — PROGRESS NOTES
Rapid3 Question Responses and Scores 6/27/2023   MDHAQ Score 0.1   Psychologic Score 0   Pain Score 3.5   When you awakened in the morning OVER THE LAST WEEK, did you feel stiff? Yes   If Yes, please indicate the number of hours until you are as limber as you will be for the day 3   Fatigue Score 6   Global Health Score 4   RAPID3 Score 2.61     Answers submitted by the patient for this visit:  Rheumatology Questionnaire (Submitted on 6/27/2023)  fever: No  eye redness: No  mouth sores: No  headaches: No  shortness of breath: No  chest pain: No  trouble swallowing: No  diarrhea: No  constipation: No  unexpected weight change: No  genital sore: No  dysuria: No  During the last 3 days, have you had a skin rash?: No  Bruises or bleeds easily: No  cough: No

## 2023-06-29 ENCOUNTER — OFFICE VISIT (OUTPATIENT)
Dept: FAMILY MEDICINE | Facility: CLINIC | Age: 38
End: 2023-06-29
Attending: FAMILY MEDICINE
Payer: COMMERCIAL

## 2023-06-29 ENCOUNTER — PATIENT MESSAGE (OUTPATIENT)
Dept: FAMILY MEDICINE | Facility: CLINIC | Age: 38
End: 2023-06-29

## 2023-06-29 VITALS
SYSTOLIC BLOOD PRESSURE: 129 MMHG | DIASTOLIC BLOOD PRESSURE: 78 MMHG | HEART RATE: 105 BPM | BODY MASS INDEX: 20.44 KG/M2 | WEIGHT: 127.19 LBS | HEIGHT: 66 IN | OXYGEN SATURATION: 96 %

## 2023-06-29 DIAGNOSIS — N87.0 CIN I (CERVICAL INTRAEPITHELIAL NEOPLASIA I): ICD-10-CM

## 2023-06-29 DIAGNOSIS — F90.0 ATTENTION DEFICIT HYPERACTIVITY DISORDER (ADHD), PREDOMINANTLY INATTENTIVE TYPE: ICD-10-CM

## 2023-06-29 DIAGNOSIS — M05.79 RHEUMATOID ARTHRITIS INVOLVING MULTIPLE SITES WITH POSITIVE RHEUMATOID FACTOR: ICD-10-CM

## 2023-06-29 DIAGNOSIS — B97.7 HPV IN FEMALE: ICD-10-CM

## 2023-06-29 DIAGNOSIS — F33.42 RECURRENT MAJOR DEPRESSIVE DISORDER, IN FULL REMISSION: ICD-10-CM

## 2023-06-29 DIAGNOSIS — E03.8 OTHER SPECIFIED HYPOTHYROIDISM: ICD-10-CM

## 2023-06-29 DIAGNOSIS — Z00.01 ENCOUNTER FOR GENERAL ADULT MEDICAL EXAMINATION WITH ABNORMAL FINDINGS: Primary | ICD-10-CM

## 2023-06-29 DIAGNOSIS — F41.1 GENERALIZED ANXIETY DISORDER: ICD-10-CM

## 2023-06-29 PROBLEM — M25.521 RIGHT ELBOW PAIN: Status: RESOLVED | Noted: 2022-06-08 | Resolved: 2023-06-29

## 2023-06-29 PROBLEM — M25.421 ELBOW SWELLING, RIGHT: Status: RESOLVED | Noted: 2022-06-08 | Resolved: 2023-06-29

## 2023-06-29 PROCEDURE — 3008F PR BODY MASS INDEX (BMI) DOCUMENTED: ICD-10-PCS | Mod: CPTII,S$GLB,, | Performed by: FAMILY MEDICINE

## 2023-06-29 PROCEDURE — 1159F PR MEDICATION LIST DOCUMENTED IN MEDICAL RECORD: ICD-10-PCS | Mod: CPTII,S$GLB,, | Performed by: FAMILY MEDICINE

## 2023-06-29 PROCEDURE — 99395 PREV VISIT EST AGE 18-39: CPT | Mod: S$GLB,,, | Performed by: FAMILY MEDICINE

## 2023-06-29 PROCEDURE — 3074F PR MOST RECENT SYSTOLIC BLOOD PRESSURE < 130 MM HG: ICD-10-PCS | Mod: CPTII,S$GLB,, | Performed by: FAMILY MEDICINE

## 2023-06-29 PROCEDURE — 99395 PR PREVENTIVE VISIT,EST,18-39: ICD-10-PCS | Mod: S$GLB,,, | Performed by: FAMILY MEDICINE

## 2023-06-29 PROCEDURE — 3078F PR MOST RECENT DIASTOLIC BLOOD PRESSURE < 80 MM HG: ICD-10-PCS | Mod: CPTII,S$GLB,, | Performed by: FAMILY MEDICINE

## 2023-06-29 PROCEDURE — 3008F BODY MASS INDEX DOCD: CPT | Mod: CPTII,S$GLB,, | Performed by: FAMILY MEDICINE

## 2023-06-29 PROCEDURE — 99999 PR PBB SHADOW E&M-EST. PATIENT-LVL III: CPT | Mod: PBBFAC,,, | Performed by: FAMILY MEDICINE

## 2023-06-29 PROCEDURE — 3074F SYST BP LT 130 MM HG: CPT | Mod: CPTII,S$GLB,, | Performed by: FAMILY MEDICINE

## 2023-06-29 PROCEDURE — 99999 PR PBB SHADOW E&M-EST. PATIENT-LVL III: ICD-10-PCS | Mod: PBBFAC,,, | Performed by: FAMILY MEDICINE

## 2023-06-29 PROCEDURE — 1159F MED LIST DOCD IN RCRD: CPT | Mod: CPTII,S$GLB,, | Performed by: FAMILY MEDICINE

## 2023-06-29 PROCEDURE — 3078F DIAST BP <80 MM HG: CPT | Mod: CPTII,S$GLB,, | Performed by: FAMILY MEDICINE

## 2023-06-29 RX ORDER — LISDEXAMFETAMINE DIMESYLATE 30 MG/1
30 CAPSULE ORAL EVERY MORNING
Qty: 30 CAPSULE | Refills: 0 | Status: SHIPPED | OUTPATIENT
Start: 2023-06-29 | End: 2023-08-03 | Stop reason: SDUPTHER

## 2023-06-29 RX ORDER — DEXTROAMPHETAMINE SACCHARATE, AMPHETAMINE ASPARTATE, DEXTROAMPHETAMINE SULFATE AND AMPHETAMINE SULFATE 2.5; 2.5; 2.5; 2.5 MG/1; MG/1; MG/1; MG/1
1 TABLET ORAL DAILY
Qty: 30 TABLET | Refills: 0 | Status: SHIPPED | OUTPATIENT
Start: 2023-06-29 | End: 2023-08-03 | Stop reason: SDUPTHER

## 2023-08-02 RX ORDER — NORGESTIMATE AND ETHINYL ESTRADIOL 0.25-0.035
1 KIT ORAL DAILY
Qty: 63 TABLET | Refills: 3 | Status: SHIPPED | OUTPATIENT
Start: 2023-08-02 | End: 2023-10-20 | Stop reason: SDUPTHER

## 2023-08-03 ENCOUNTER — PATIENT MESSAGE (OUTPATIENT)
Dept: PRIMARY CARE CLINIC | Facility: CLINIC | Age: 38
End: 2023-08-03
Payer: COMMERCIAL

## 2023-08-03 DIAGNOSIS — F90.0 ATTENTION DEFICIT HYPERACTIVITY DISORDER (ADHD), PREDOMINANTLY INATTENTIVE TYPE: ICD-10-CM

## 2023-08-03 RX ORDER — LISDEXAMFETAMINE DIMESYLATE 30 MG/1
30 CAPSULE ORAL EVERY MORNING
Qty: 30 CAPSULE | Refills: 0 | Status: SHIPPED | OUTPATIENT
Start: 2023-08-03 | End: 2023-10-02

## 2023-08-03 RX ORDER — DEXTROAMPHETAMINE SACCHARATE, AMPHETAMINE ASPARTATE, DEXTROAMPHETAMINE SULFATE AND AMPHETAMINE SULFATE 2.5; 2.5; 2.5; 2.5 MG/1; MG/1; MG/1; MG/1
1 TABLET ORAL DAILY
Qty: 30 TABLET | Refills: 0 | Status: SHIPPED | OUTPATIENT
Start: 2023-08-03 | End: 2023-08-31 | Stop reason: SDUPTHER

## 2023-08-03 NOTE — TELEPHONE ENCOUNTER
Refill Encounter    PCP Visits: Recent Visits  Date Type Provider Dept   06/29/23 Office Visit Cielo Mina, DO [facility]   03/29/23 Office Visit Cielo Mina, DO [facility]   12/29/22 Office Visit Cielo Mina, DO [facility]   08/18/22 Office Visit Cielo Mina, DO [facility]   Showing recent visits within past 360 days and meeting all other requirements  Future Appointments  Date Type Provider Dept   10/02/23 Appointment Cielo Mina, DO [facility]   Showing future appointments within next 720 days and meeting all other requirements     Last 3 Blood Pressure:   BP Readings from Last 3 Encounters:   06/29/23 129/78   04/05/23 134/85   03/15/23 113/73     Preferred Pharmacy:   [facility]  [address]  [place] [id number]  Phone: [phone] Fax: [phone]    Requested RX:  Requested Prescriptions      No prescriptions requested or ordered in this encounter      RX Route: Normal

## 2023-08-03 NOTE — TELEPHONE ENCOUNTER
Orders Placed This Encounter    dextroamphetamine-amphetamine (ADDERALL) 10 mg Tab    lisdexamfetamine (VYVANSE) 30 MG capsule     Dr. Cielo Mina D.O.   [facility]

## 2023-08-17 DIAGNOSIS — R76.8 RHEUMATOID FACTOR POSITIVE: ICD-10-CM

## 2023-08-17 RX ORDER — METHOTREXATE 2.5 MG/1
20 TABLET ORAL
Qty: 96 TABLET | Refills: 0 | Status: SHIPPED | OUTPATIENT
Start: 2023-08-17 | End: 2023-11-01 | Stop reason: SDUPTHER

## 2023-08-31 ENCOUNTER — PATIENT MESSAGE (OUTPATIENT)
Dept: PRIMARY CARE CLINIC | Facility: CLINIC | Age: 38
End: 2023-08-31
Payer: COMMERCIAL

## 2023-08-31 DIAGNOSIS — F90.0 ATTENTION DEFICIT HYPERACTIVITY DISORDER (ADHD), PREDOMINANTLY INATTENTIVE TYPE: ICD-10-CM

## 2023-08-31 DIAGNOSIS — F98.8 ATTENTION DEFICIT DISORDER (ADD) WITHOUT HYPERACTIVITY: ICD-10-CM

## 2023-08-31 RX ORDER — DEXTROAMPHETAMINE SACCHARATE, AMPHETAMINE ASPARTATE MONOHYDRATE, DEXTROAMPHETAMINE SULFATE AND AMPHETAMINE SULFATE 7.5; 7.5; 7.5; 7.5 MG/1; MG/1; MG/1; MG/1
30 CAPSULE, EXTENDED RELEASE ORAL EVERY MORNING
Qty: 30 CAPSULE | Refills: 0 | Status: SHIPPED | OUTPATIENT
Start: 2023-08-31 | End: 2023-08-31 | Stop reason: SDUPTHER

## 2023-08-31 RX ORDER — DEXTROAMPHETAMINE SACCHARATE, AMPHETAMINE ASPARTATE MONOHYDRATE, DEXTROAMPHETAMINE SULFATE AND AMPHETAMINE SULFATE 7.5; 7.5; 7.5; 7.5 MG/1; MG/1; MG/1; MG/1
30 CAPSULE, EXTENDED RELEASE ORAL EVERY MORNING
Qty: 30 CAPSULE | Refills: 0 | Status: SHIPPED | OUTPATIENT
Start: 2023-08-31 | End: 2023-10-02 | Stop reason: SDUPTHER

## 2023-08-31 RX ORDER — DEXTROAMPHETAMINE SACCHARATE, AMPHETAMINE ASPARTATE, DEXTROAMPHETAMINE SULFATE AND AMPHETAMINE SULFATE 2.5; 2.5; 2.5; 2.5 MG/1; MG/1; MG/1; MG/1
1 TABLET ORAL DAILY
Qty: 30 TABLET | Refills: 0 | Status: SHIPPED | OUTPATIENT
Start: 2023-08-31 | End: 2023-10-02 | Stop reason: SDUPTHER

## 2023-08-31 NOTE — TELEPHONE ENCOUNTER
No care due was identified.  [facility] Embedded Care Due Messages. Reference number: [id number].   8/31/2023 10:43:28 AM CDT

## 2023-08-31 NOTE — TELEPHONE ENCOUNTER
Refill Encounter    PCP Visits: Recent Visits  Date Type Provider Dept   06/29/23 Office Visit Cielo Mina, DO [facility]   03/29/23 Office Visit Cielo Mina, DO [facility]   12/29/22 Office Visit Cielo Mina, DO [facility]   Showing recent visits within past 360 days and meeting all other requirements  Future Appointments  Date Type Provider Dept   10/02/23 Appointment Cielo Mina,  [facility]   Showing future appointments within next 720 days and meeting all other requirements     Last 3 Blood Pressure:   BP Readings from Last 3 Encounters:   06/29/23 129/78   04/05/23 134/85   03/15/23 113/73     Preferred Pharmacy:   [facility]  [address] [address]  [place] [address]  Phone: [phone] Fax: [phone]    Requested RX:  Requested Prescriptions     Pending Prescriptions Disp Refills    dextroamphetamine-amphetamine (ADDERALL) 10 mg Tab 30 tablet 0     Sig: Take 1 tablet (10 mg total) by mouth once daily.    dextroamphetamine-amphetamine (ADDERALL XR) 30 MG 24 hr capsule 30 capsule 0     Sig: Take 1 capsule (30 mg total) by mouth every morning.      RX Route: Normal

## 2023-09-08 ENCOUNTER — TELEPHONE (OUTPATIENT)
Dept: UROLOGY | Facility: CLINIC | Age: 38
End: 2023-09-08
Payer: COMMERCIAL

## 2023-09-08 DIAGNOSIS — N20.0 BILATERAL NEPHROLITHIASIS: ICD-10-CM

## 2023-09-08 DIAGNOSIS — N21.0 BLADDER STONE: Primary | ICD-10-CM

## 2023-09-18 ENCOUNTER — PATIENT MESSAGE (OUTPATIENT)
Dept: UROLOGY | Facility: CLINIC | Age: 38
End: 2023-09-18
Payer: COMMERCIAL

## 2023-09-28 NOTE — PROGRESS NOTES
VIRTUAL VISIT/TELEMEDICINE VISIT  FAMILY MEDICINE  [facility]    The patient location is: [place]  The chief complaint leading to consultation is:   Chief Complaint   Patient presents with    ADHD     Visit type: Virtual visit with synchronous audio and video   Total time spent: 30 minute  Each patient to whom he or she provides medical services by telemedicine is:  (1) informed of the relationship between the physician and patient and the respective role of any other health care provider with respect to management of the patient; and (2) notified that he or she may decline to receive medical services by telemedicine and may withdraw from such care at any time.      Reason for visit:   Chief Complaint   Patient presents with    ADHD         SUBJECTIVE: Kan Traore is a 38 y.o. female  - with ADHD, mild intermittent asthma, depression, rheumatoid arthritis with positive rheumatoid factor , hypothyroidism, nephrolithiasis and anxiety presents for  ADHD      Gynecology Dr. Kacy Elias MD  - 2/2022 PAP with + HPV with LSIL and s/p colposcopy. Overdue for repeat PAP and she is aware.  She is contacted her gynecologist office is waiting for response  Dermatology Dr. Lulu Pak MD  Sports Medicine Dr. Brian Marcial  Rheumatology; Dr. Paiz  Optometry: Dr. Cesar Dukes, OD    Kan Traore reports that she is doing well she denies any concerns or complaints.  She is back on Adderall extended release.  She was temporarily on Vyvanse secondary to supply chain issues.    1. ADHD predominantly inattentive     Age diagnosed: 4 th grade  Diagnosed by: Psychiatrist  Initial evaluation present in chart: no     Past medications:  Vyvanse 30 mg (she was only on his medications secondary to Adderall supply chain issues; crash at end of day)  Reasons for changing past medications:  Availability     Current medications:   dextroamphetamine-amphetamine (ADDERALL XR) 30 MG 24 hr capsule, Take 1 capsule (30  mg total) by mouth every morning., Disp: 30 capsule, Rfl: 0  dextroamphetamine-amphetamine (ADDERALL) 10 mg Tab, Take 1 tablet (10 mg total) by mouth once daily., Disp: 30 tablet, Rfl: 0     reviewed: yes      Concerns with medication:  denies    AM medication: 7:30 AM Adderall XR daily  Lunch medication: denies   Afternoon medication: Adderall IR 10 mg PRN     Daily Activity:  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    Eyes:  Negative for discharge.   Respiratory:  Negative for wheezing.    Cardiovascular:  Negative for chest pain and palpitations.   Gastrointestinal:  Negative for blood in stool, constipation, diarrhea and vomiting.   Genitourinary:  Negative for dysuria and hematuria.   Musculoskeletal:  Negative for neck pain.   Neurological:  Negative for weakness and headaches.   Endo/Heme/Allergies:  Negative for polydipsia.   All other systems reviewed and are negative.      HEALTH MAINTENANCE:   Health Maintenance   Topic Date Due    TETANUS VACCINE  07/30/2025    Hepatitis C Screening  Completed    Lipid Panel  Completed     Health Maintenance Topics with due status: Not Due       Topic Last Completion Date    TETANUS VACCINE 07/30/2015     Health Maintenance Due   Topic Date Due    Pneumococcal Vaccines (Age 0-64) (1 - PCV) Never done    COVID-19 Vaccine (3 - Pfizer series) 10/15/2021    Cervical Cancer Screening  02/17/2023    Influenza Vaccine (1) 09/01/2023       HISTORY:   Past Medical History:   Diagnosis Date    Allergic rhinitis     Anxiety     Asthma     Attention deficit disorder (ADD)     Bilateral nephrolithiasis     Depression     Hydronephrosis     Hyperglycemia 3/9/2021    - glucose 131 on recent labs - patient was nonfasting Lab Results Component Value Date  HGBA1C 5.1 06/10/2021      Urinary tract infection     Vaginal infection        Past Surgical History:   Procedure Laterality Date     APPENDECTOMY  2017    DILATION AND CURETTAGE OF UTERUS  2014    KIDNEY STONE SURGERY      LITHOTRIPSY  2011    LITHOTRIPSY  2017    LOOP ELECTROSURGICAL EXCISION PROCEDURE (LEEP) N/A 10/02/2020    Procedure: LEEP (LOOP ELECTROSURGICAL EXCISION PROCEDURE);  Surgeon: Kacy Elias MD;  Location: [facility] OR;  Service: OB/GYN;  Laterality: N/A;    TONSILLECTOMY         Family History   Problem Relation Age of Onset    Hypertension Mother 50    Asthma Mother     Hypothyroidism Father 52    Stroke Maternal Grandfather     Hypertension Maternal Grandfather     Heart disease Maternal Grandfather 56         of AMI    Heart failure Paternal Grandmother 81    Rheum arthritis Paternal Grandmother     Arthritis Paternal Grandmother     No Known Problems Son     No Known Problems Son     No Known Problems Son     Breast cancer Neg Hx     Colon cancer Neg Hx     Ovarian cancer Neg Hx     Kidney disease Neg Hx        Social History     Tobacco Use    Smoking status: Never     Passive exposure: Never    Smokeless tobacco: Never   Substance Use Topics    Alcohol use: Yes     Alcohol/week: 1.0 standard drink of alcohol     Types: 1 Drinks containing 0.5 oz of alcohol per week     Comment: Socially    Drug use: No       Social History     Social History Narrative     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        ALLERGIES:   Review of patient's allergies indicates:  No Known Allergies    MEDS:   Outpatient Medications as of 10/2/2023   Medication Sig Dispense Refill    citalopram (CELEXA) 20 MG tablet Take 1 tablet (20 mg total) by mouth once daily. 90 tablet 3    folic acid (FOLVITE) 1 MG tablet Take 3 tablets (3 mg total) by mouth once daily. 90 tablet 11    levothyroxine (SYNTHROID) 50 MCG tablet Take 1 tablet (50 mcg total) by mouth before breakfast. 90 tablet 3    meloxicam (MOBIC) 15 MG tablet TAKE ONE TABLET BY MOUTH EVERY DAY 30 tablet 2    methotrexate 2.5 MG  Tab Take 8 tablets (20 mg total) by mouth every 7 days. 96 tablet 0    norgestimate-ethinyl estradioL (SPRINTEC, 28,) 0.25-35 mg-mcg per tablet Take 1 tablet by mouth once daily. Take one active pill daily, skipping sugar pills 63 tablet 3    ondansetron (ZOFRAN-ODT) 4 MG TbDL Dissolve 1 tablet (4 mg total) by mouth every 8 (eight) hours as needed (nausea/migraine). 30 tablet 5    TURMERIC ORAL Take 1,500 Int'l Units by mouth.      dextroamphetamine-amphetamine (ADDERALL) 10 mg Tab Take 1 tablet (10 mg total) by mouth once daily. 30 tablet 0     No current facility-administered medications on file as of 10/2/2023.       Vital signs:   Vitals:    10/02/23 0756   Weight: 57.6 kg (127 lb)       Body mass index is 20.5 kg/m².    PHYSICAL EXAM:     Physical Exam  Constitutional:       General: She is not in acute distress.  Pulmonary:      Effort: Pulmonary effort is normal. No respiratory distress.   Neurological:      Mental Status: She is alert.   Psychiatric:         Speech: Speech normal.             PERTINENT RESULTS:     BMP  Lab Results   Component Value Date     (L) 08/15/2023    K 4.0 08/15/2023     08/15/2023    CO2 26 08/15/2023    BUN 14 08/15/2023    CREATININE 0.59 08/15/2023    CALCIUM 8.8 08/15/2023    ANIONGAP 2 (L) 08/15/2023    EGFRNORACEVR >60.0 08/15/2023     ASSESSMENT/PLAN:    1. Attention deficit hyperactivity disorder (ADHD), predominantly inattentive type  Overview:  -  reviewed  - well controlled  - continue current medications     Orders:  -     dextroamphetamine-amphetamine (ADDERALL XR) 30 MG 24 hr capsule; Take 1 capsule (30 mg total) by mouth every morning.  Dispense: 30 capsule; Refill: 0  -     dextroamphetamine-amphetamine (ADDERALL) 10 mg Tab; Take 1 tablet (10 mg total) by mouth once daily.  Dispense: 30 tablet; Refill: 0    2. Other specified hypothyroidism  Overview:  - family history of hypothyroidism (father)  Lab Results   Component Value Date    TSH 3.104  03/15/2023    FREET4 0.78 11/28/2022     - 06/10/2021 TPO normal, free T3 and free T4  - well controlled  - continue current management plan   - patient encouraged to notify me with any changes    Orders:  -     TSH; Future; Expected date: 01/02/2024  -     T4, Free; Future; Expected date: 01/02/2024          ORDERS:   Orders Placed This Encounter    TSH    T4, Free    dextroamphetamine-amphetamine (ADDERALL XR) 30 MG 24 hr capsule    dextroamphetamine-amphetamine (ADDERALL) 10 mg Tab         Vaccines recommended: flu, Prevnar 20 and COVID-19 booster     Follow-up in 3 months ADHD or sooner if any concerns.      This note is dictated using the M*Modal Fluency Direct word recognition program. There are word recognition mistakes that are occasionally missed on review.    Dr. Cielo Mina D.O.   [facility]

## 2023-10-02 ENCOUNTER — OFFICE VISIT (OUTPATIENT)
Dept: PRIMARY CARE CLINIC | Facility: CLINIC | Age: 38
End: 2023-10-02
Attending: FAMILY MEDICINE
Payer: COMMERCIAL

## 2023-10-02 VITALS — BODY MASS INDEX: 20.5 KG/M2 | WEIGHT: 127 LBS

## 2023-10-02 DIAGNOSIS — E03.8 OTHER SPECIFIED HYPOTHYROIDISM: ICD-10-CM

## 2023-10-02 DIAGNOSIS — F90.0 ATTENTION DEFICIT HYPERACTIVITY DISORDER (ADHD), PREDOMINANTLY INATTENTIVE TYPE: Primary | ICD-10-CM

## 2023-10-02 PROBLEM — Z00.01 ENCOUNTER FOR GENERAL ADULT MEDICAL EXAMINATION WITH ABNORMAL FINDINGS: Status: RESOLVED | Noted: 2021-03-09 | Resolved: 2023-10-02

## 2023-10-02 PROCEDURE — 3008F BODY MASS INDEX DOCD: CPT | Mod: CPTII,95,, | Performed by: FAMILY MEDICINE

## 2023-10-02 PROCEDURE — 3008F PR BODY MASS INDEX (BMI) DOCUMENTED: ICD-10-PCS | Mod: CPTII,95,, | Performed by: FAMILY MEDICINE

## 2023-10-02 PROCEDURE — 99214 OFFICE O/P EST MOD 30 MIN: CPT | Mod: 95,,, | Performed by: FAMILY MEDICINE

## 2023-10-02 PROCEDURE — 1159F MED LIST DOCD IN RCRD: CPT | Mod: CPTII,95,, | Performed by: FAMILY MEDICINE

## 2023-10-02 PROCEDURE — 99214 PR OFFICE/OUTPT VISIT, EST, LEVL IV, 30-39 MIN: ICD-10-PCS | Mod: 95,,, | Performed by: FAMILY MEDICINE

## 2023-10-02 PROCEDURE — 1160F RVW MEDS BY RX/DR IN RCRD: CPT | Mod: CPTII,95,, | Performed by: FAMILY MEDICINE

## 2023-10-02 PROCEDURE — 1159F PR MEDICATION LIST DOCUMENTED IN MEDICAL RECORD: ICD-10-PCS | Mod: CPTII,95,, | Performed by: FAMILY MEDICINE

## 2023-10-02 PROCEDURE — 1160F PR REVIEW ALL MEDS BY PRESCRIBER/CLIN PHARMACIST DOCUMENTED: ICD-10-PCS | Mod: CPTII,95,, | Performed by: FAMILY MEDICINE

## 2023-10-02 RX ORDER — DEXTROAMPHETAMINE SACCHARATE, AMPHETAMINE ASPARTATE MONOHYDRATE, DEXTROAMPHETAMINE SULFATE AND AMPHETAMINE SULFATE 7.5; 7.5; 7.5; 7.5 MG/1; MG/1; MG/1; MG/1
30 CAPSULE, EXTENDED RELEASE ORAL EVERY MORNING
Qty: 30 CAPSULE | Refills: 0 | Status: SHIPPED | OUTPATIENT
Start: 2023-10-02 | End: 2023-11-01 | Stop reason: SDUPTHER

## 2023-10-02 RX ORDER — DEXTROAMPHETAMINE SACCHARATE, AMPHETAMINE ASPARTATE, DEXTROAMPHETAMINE SULFATE AND AMPHETAMINE SULFATE 2.5; 2.5; 2.5; 2.5 MG/1; MG/1; MG/1; MG/1
1 TABLET ORAL DAILY
Qty: 30 TABLET | Refills: 0 | Status: SHIPPED | OUTPATIENT
Start: 2023-10-02 | End: 2023-11-01 | Stop reason: SDUPTHER

## 2023-10-20 ENCOUNTER — OFFICE VISIT (OUTPATIENT)
Dept: OBSTETRICS AND GYNECOLOGY | Facility: CLINIC | Age: 38
End: 2023-10-20
Payer: COMMERCIAL

## 2023-10-20 VITALS — BODY MASS INDEX: 20.1 KG/M2 | SYSTOLIC BLOOD PRESSURE: 124 MMHG | DIASTOLIC BLOOD PRESSURE: 85 MMHG | WEIGHT: 124.56 LBS

## 2023-10-20 DIAGNOSIS — Z01.419 ENCOUNTER FOR ANNUAL ROUTINE GYNECOLOGICAL EXAMINATION: Primary | ICD-10-CM

## 2023-10-20 DIAGNOSIS — Z12.4 PAP SMEAR FOR CERVICAL CANCER SCREENING: ICD-10-CM

## 2023-10-20 DIAGNOSIS — Z11.3 SCREENING EXAMINATION FOR STD (SEXUALLY TRANSMITTED DISEASE): ICD-10-CM

## 2023-10-20 PROCEDURE — 1159F MED LIST DOCD IN RCRD: CPT | Mod: CPTII,S$GLB,, | Performed by: OBSTETRICS & GYNECOLOGY

## 2023-10-20 PROCEDURE — 99395 PREV VISIT EST AGE 18-39: CPT | Mod: S$GLB,,, | Performed by: OBSTETRICS & GYNECOLOGY

## 2023-10-20 PROCEDURE — 3079F PR MOST RECENT DIASTOLIC BLOOD PRESSURE 80-89 MM HG: ICD-10-PCS | Mod: CPTII,S$GLB,, | Performed by: OBSTETRICS & GYNECOLOGY

## 2023-10-20 PROCEDURE — 88141 CYTOPATH C/V INTERPRET: CPT | Mod: ,,, | Performed by: PATHOLOGY

## 2023-10-20 PROCEDURE — 3008F PR BODY MASS INDEX (BMI) DOCUMENTED: ICD-10-PCS | Mod: CPTII,S$GLB,, | Performed by: OBSTETRICS & GYNECOLOGY

## 2023-10-20 PROCEDURE — 99999 PR PBB SHADOW E&M-EST. PATIENT-LVL III: CPT | Mod: PBBFAC,,, | Performed by: OBSTETRICS & GYNECOLOGY

## 2023-10-20 PROCEDURE — 99395 PR PREVENTIVE VISIT,EST,18-39: ICD-10-PCS | Mod: S$GLB,,, | Performed by: OBSTETRICS & GYNECOLOGY

## 2023-10-20 PROCEDURE — 88141 PR  CYTOPATH CERV/VAG INTERPRET: ICD-10-PCS | Mod: ,,, | Performed by: PATHOLOGY

## 2023-10-20 PROCEDURE — 1159F PR MEDICATION LIST DOCUMENTED IN MEDICAL RECORD: ICD-10-PCS | Mod: CPTII,S$GLB,, | Performed by: OBSTETRICS & GYNECOLOGY

## 2023-10-20 PROCEDURE — 88175 CYTOPATH C/V AUTO FLUID REDO: CPT | Performed by: PATHOLOGY

## 2023-10-20 PROCEDURE — 3079F DIAST BP 80-89 MM HG: CPT | Mod: CPTII,S$GLB,, | Performed by: OBSTETRICS & GYNECOLOGY

## 2023-10-20 PROCEDURE — 87624 HPV HI-RISK TYP POOLED RSLT: CPT | Performed by: OBSTETRICS & GYNECOLOGY

## 2023-10-20 PROCEDURE — 3008F BODY MASS INDEX DOCD: CPT | Mod: CPTII,S$GLB,, | Performed by: OBSTETRICS & GYNECOLOGY

## 2023-10-20 PROCEDURE — 99999 PR PBB SHADOW E&M-EST. PATIENT-LVL III: ICD-10-PCS | Mod: PBBFAC,,, | Performed by: OBSTETRICS & GYNECOLOGY

## 2023-10-20 PROCEDURE — 3074F SYST BP LT 130 MM HG: CPT | Mod: CPTII,S$GLB,, | Performed by: OBSTETRICS & GYNECOLOGY

## 2023-10-20 PROCEDURE — 3074F PR MOST RECENT SYSTOLIC BLOOD PRESSURE < 130 MM HG: ICD-10-PCS | Mod: CPTII,S$GLB,, | Performed by: OBSTETRICS & GYNECOLOGY

## 2023-10-20 RX ORDER — NORGESTIMATE AND ETHINYL ESTRADIOL 0.25-0.035
1 KIT ORAL DAILY
Qty: 63 TABLET | Refills: 3 | Status: SHIPPED | OUTPATIENT
Start: 2023-10-20 | End: 2024-10-19

## 2023-10-20 NOTE — PROGRESS NOTES
Chief Complaint   Patient presents with    Annual Exam       HISTORY OF PRESENT ILLNESS:   Kan Traore is a 38 y.o. female  who presents for well woman exam.  Patient's last menstrual period was 09/05/2023..  She has no complaints.  Cycles are regular. Declines STD testing. Desires ocps birth control.      Past Medical History:   Diagnosis Date    Allergic rhinitis     Anxiety     Asthma     Attention deficit disorder (ADD)     Bilateral nephrolithiasis     Depression     Hydronephrosis     Hyperglycemia 3/9/2021    - glucose 131 on recent labs - patient was nonfasting Lab Results Component Value Date  HGBA1C 5.1 06/10/2021      Urinary tract infection     Vaginal infection           Past Surgical History:   Procedure Laterality Date    APPENDECTOMY  11/20/2017    DILATION AND CURETTAGE OF UTERUS  04/2014    KIDNEY STONE SURGERY      LITHOTRIPSY  2011    LITHOTRIPSY  02/09/2017    LOOP ELECTROSURGICAL EXCISION PROCEDURE (LEEP) N/A 10/02/2020    Procedure: LEEP (LOOP ELECTROSURGICAL EXCISION PROCEDURE);  Surgeon: Kacy Elias MD;  [facility];  Service: OB/GYN;  Laterality: N/A;    TONSILLECTOMY             Social History     Socioeconomic History    Marital status:     Number of children: 3   Occupational History     Employer: [employer]   Tobacco Use    Smoking status: Never     Passive exposure: Never    Smokeless tobacco: Never   Substance and Sexual Activity    Alcohol use: Yes     Alcohol/week: 1.0 standard drink of alcohol     Types: 1 Drinks containing 0.5 oz of alcohol per week     Comment: Socially    Drug use: No    Sexual activity: Yes     Partners: Male     Birth control/protection: Condom, Partner-Vasectomy, Other-see comments     Comment: Birth control pill   Social History Narrative     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        Family History   Problem Relation Age of Onset    Hypertension Mother 50     Asthma Mother     Hypothyroidism Father 52    Stroke Maternal Grandfather     Hypertension Maternal Grandfather     Heart disease Maternal Grandfather 56         of AMI    Heart failure Paternal Grandmother 81    Rheum arthritis Paternal Grandmother     Arthritis Paternal Grandmother     No Known Problems Son     No Known Problems Son     No Known Problems Son     Breast cancer Neg Hx     Colon cancer Neg Hx     Ovarian cancer Neg Hx     Kidney disease Neg Hx            OB History    Para Term  AB Living   4 3 3 0 1 3   SAB IAB Ectopic Multiple Live Births   1 0 0 0 3      # Outcome Date GA Lbr Raúl/2nd Weight Sex Delivery Anes PTL Lv   4 Term 17 39w0d 07:00 / 00:29 3.7 kg (8 lb 2.5 oz) M Vag-Spont Spinal, EPI N TEAGAN   3 Term 07/28/15 39w0d  3.221 kg (7 lb 1.6 oz) M Vag-Spont EPI, Spinal N TEAGAN      Birth Comments: Hep B given 7/28/15  Passed hearing   2 Term 12/06/10 38w0d  3.317 kg (7 lb 5 oz) M Vag-Spont  N TEAGAN   1 SAB                  COMPREHENSIVE GYN HISTORY:  PAP History: had abnormal Paps 2020 ASCUS/HPv + then lisa 2-3 on colpo then LISA 1 on leep   Infection History: hpv . Denies PID.  Benign History:Denies uterine fibroids. Denies ovarian cysts. Denies endometriosis Denies other conditions.  Cancer History: Denies cervical cancer. Denies uterine cancer or hyperplasia. Denies ovarian cancer. Denies vulvar cancer or pre-cancer. Denies vaginal cancer or pre-cancer. Denies breast cancer. Denies colon cancer.  Cycle: nl/mon/but heavy and painful   On nuvaring     ROS:  Negative       /85   Wt 56.5 kg (124 lb 9 oz)   LMP 2023   BMI 20.10 kg/m²     APPEARANCE: Well nourished, well developed, in no acute distress.  NECK: Neck symmetric   BREASTS: Symmetrical, no skin changes or visible lesions. No palpable masses, nipple discharge or adenopathy bilaterally.  PELVIC: 1 cm mobile node appreciated in right groin, more on thigh side; no other nodes palpated  VULVA: No lesions.  Normal female genitalia.  URETHRAL MEATUS: Normal size and location, no lesions, no prolapse.  URETHRA: No masses, tenderness, prolapse or scarring.  VAGINA: Moist and well rugated, no discharge, no significant cystocele or rectocele.  CERVIX: No lesions and discharge.  UTERUS: Normal size, regular shape, mobile, non-tender, bladder base nontender.  ADNEXA: No masses or tenderness.        1. Encounter for annual routine gynecological examination    2. Screening examination for STD (sexually transmitted disease)    3. Pap smear for cervical cancer screening          Plan:  Routine gyn s/p normal breast exam. Pap With HPV cotesting ordered . STD testing: GC/CT/trich, syphilis, HBV/HCV and HIV declined. Counseled on contraception and desires  ocps, does continously.    -going to monitor node and if notices others or changes in this one then will let me know.     F/u in 1 yr or PRN

## 2023-10-25 LAB
HPV HR 12 DNA SPEC QL NAA+PROBE: NEGATIVE
HPV16 AG SPEC QL: NEGATIVE
HPV18 DNA SPEC QL NAA+PROBE: NEGATIVE

## 2023-10-30 LAB
FINAL PATHOLOGIC DIAGNOSIS: ABNORMAL
Lab: ABNORMAL

## 2023-11-01 DIAGNOSIS — F33.42 RECURRENT MAJOR DEPRESSIVE DISORDER, IN FULL REMISSION: ICD-10-CM

## 2023-11-01 DIAGNOSIS — R76.8 RHEUMATOID FACTOR POSITIVE: ICD-10-CM

## 2023-11-01 DIAGNOSIS — F41.1 GENERALIZED ANXIETY DISORDER: ICD-10-CM

## 2023-11-01 DIAGNOSIS — F90.0 ATTENTION DEFICIT HYPERACTIVITY DISORDER (ADHD), PREDOMINANTLY INATTENTIVE TYPE: ICD-10-CM

## 2023-11-01 RX ORDER — CITALOPRAM 20 MG/1
20 TABLET, FILM COATED ORAL DAILY
Qty: 90 TABLET | Refills: 3 | Status: SHIPPED | OUTPATIENT
Start: 2023-11-01

## 2023-11-02 ENCOUNTER — TELEPHONE (OUTPATIENT)
Dept: OBSTETRICS AND GYNECOLOGY | Facility: CLINIC | Age: 38
End: 2023-11-02
Payer: COMMERCIAL

## 2023-11-02 RX ORDER — METHOTREXATE 2.5 MG/1
20 TABLET ORAL
Qty: 96 TABLET | Refills: 0 | Status: SHIPPED | OUTPATIENT
Start: 2023-11-02 | End: 2024-01-29 | Stop reason: SDUPTHER

## 2023-11-02 RX ORDER — DEXTROAMPHETAMINE SACCHARATE, AMPHETAMINE ASPARTATE MONOHYDRATE, DEXTROAMPHETAMINE SULFATE AND AMPHETAMINE SULFATE 7.5; 7.5; 7.5; 7.5 MG/1; MG/1; MG/1; MG/1
30 CAPSULE, EXTENDED RELEASE ORAL EVERY MORNING
Qty: 30 CAPSULE | Refills: 0 | Status: SHIPPED | OUTPATIENT
Start: 2023-11-02 | End: 2023-12-01 | Stop reason: SDUPTHER

## 2023-11-02 RX ORDER — DEXTROAMPHETAMINE SACCHARATE, AMPHETAMINE ASPARTATE, DEXTROAMPHETAMINE SULFATE AND AMPHETAMINE SULFATE 2.5; 2.5; 2.5; 2.5 MG/1; MG/1; MG/1; MG/1
1 TABLET ORAL DAILY
Qty: 30 TABLET | Refills: 0 | Status: SHIPPED | OUTPATIENT
Start: 2023-11-02 | End: 2023-12-01 | Stop reason: SDUPTHER

## 2023-11-02 NOTE — TELEPHONE ENCOUNTER
No care due was identified.  [facility] Embedded Care Due Messages. Reference number: [id number].   11/01/2023 9:38:13 PM CDT

## 2023-11-02 NOTE — TELEPHONE ENCOUNTER
No care due was identified.  [facility] Embedded Care Due Messages. Reference number: [id number].   11/01/2023 9:37:43 PM CDT

## 2023-11-02 NOTE — TELEPHONE ENCOUNTER
Refill Decision Note   Kan Aguerorier  is requesting a refill authorization.  Brief Assessment and Rationale for Refill:  Approve     Medication Therapy Plan:         Comments:     Note composed:10:17 PM 11/01/2023

## 2023-11-13 ENCOUNTER — PROCEDURE VISIT (OUTPATIENT)
Dept: OBSTETRICS AND GYNECOLOGY | Facility: CLINIC | Age: 38
End: 2023-11-13
Payer: COMMERCIAL

## 2023-11-13 VITALS — SYSTOLIC BLOOD PRESSURE: 140 MMHG | DIASTOLIC BLOOD PRESSURE: 98 MMHG

## 2023-11-13 DIAGNOSIS — N89.8 VAGINAL DISCHARGE: ICD-10-CM

## 2023-11-13 DIAGNOSIS — R87.611 PAP SMEAR OF CERVIX WITH ASCUS, CANNOT EXCLUDE HGSIL: Primary | ICD-10-CM

## 2023-11-13 PROCEDURE — 81514 NFCT DS BV&VAGINITIS DNA ALG: CPT | Performed by: OBSTETRICS & GYNECOLOGY

## 2023-11-13 PROCEDURE — 57454 BX/CURETT OF CERVIX W/SCOPE: CPT | Mod: S$GLB,,, | Performed by: OBSTETRICS & GYNECOLOGY

## 2023-11-13 PROCEDURE — 88305 TISSUE EXAM BY PATHOLOGIST: CPT | Performed by: PATHOLOGY

## 2023-11-13 PROCEDURE — 57454 COLPOSCOPY: ICD-10-PCS | Mod: S$GLB,,, | Performed by: OBSTETRICS & GYNECOLOGY

## 2023-11-13 PROCEDURE — 88342 IMHCHEM/IMCYTCHM 1ST ANTB: CPT | Mod: 26,,, | Performed by: PATHOLOGY

## 2023-11-13 PROCEDURE — 88342 CHG IMMUNOCYTOCHEMISTRY: ICD-10-PCS | Mod: 26,,, | Performed by: PATHOLOGY

## 2023-11-13 PROCEDURE — 88342 IMHCHEM/IMCYTCHM 1ST ANTB: CPT | Performed by: PATHOLOGY

## 2023-11-13 PROCEDURE — 88305 TISSUE EXAM BY PATHOLOGIST: ICD-10-PCS | Mod: 26,,, | Performed by: PATHOLOGY

## 2023-11-13 PROCEDURE — 99499 NO LOS: ICD-10-PCS | Mod: S$GLB,,, | Performed by: OBSTETRICS & GYNECOLOGY

## 2023-11-13 PROCEDURE — 99499 UNLISTED E&M SERVICE: CPT | Mod: S$GLB,,, | Performed by: OBSTETRICS & GYNECOLOGY

## 2023-11-13 PROCEDURE — 88305 TISSUE EXAM BY PATHOLOGIST: CPT | Mod: 26,,, | Performed by: PATHOLOGY

## 2023-11-13 NOTE — PROCEDURES
Colposcopy    Date/Time: 11/13/2023 9:00 AM    Performed by: Kacy Elias MD  Authorized by: Kacy Elias MD      Colposcopy Site:  Cervix  Position:  Supine  Acrowhite Lesion? Yes    Atypical Vessels: No    Transformation Zone Adequate?: Yes    Biopsy?: Yes         Location:  Cervix ((7 00))  ECC Performed?: Yes    LEEP Performed?: No    Estimated blood loss (cc):  2   Patient tolerated the procedure well with no immediate complications.   Post-operative instructions were provided for the patient.   Patient was discharged and will follow up if any complications occur     Swab done for possible yeast

## 2023-11-14 ENCOUNTER — OFFICE VISIT (OUTPATIENT)
Dept: PODIATRY | Facility: CLINIC | Age: 38
End: 2023-11-14
Payer: COMMERCIAL

## 2023-11-14 ENCOUNTER — TELEPHONE (OUTPATIENT)
Dept: PODIATRY | Facility: CLINIC | Age: 38
End: 2023-11-14

## 2023-11-14 VITALS — WEIGHT: 127.88 LBS | BODY MASS INDEX: 20.55 KG/M2 | HEIGHT: 66 IN

## 2023-11-14 DIAGNOSIS — M05.79 RHEUMATOID ARTHRITIS INVOLVING MULTIPLE SITES WITH POSITIVE RHEUMATOID FACTOR: ICD-10-CM

## 2023-11-14 DIAGNOSIS — M77.41 METATARSALGIA, RIGHT FOOT: Primary | ICD-10-CM

## 2023-11-14 DIAGNOSIS — M77.51 BURSITIS OF INTERMETATARSAL BURSA OF RIGHT FOOT: ICD-10-CM

## 2023-11-14 DIAGNOSIS — M79.671 RIGHT FOOT PAIN: Primary | ICD-10-CM

## 2023-11-14 DIAGNOSIS — M24.573 EQUINUS CONTRACTURE OF ANKLE: ICD-10-CM

## 2023-11-14 DIAGNOSIS — G57.81 NEUROMA OF THIRD INTERSPACE OF RIGHT FOOT: ICD-10-CM

## 2023-11-14 PROCEDURE — 1160F PR REVIEW ALL MEDS BY PRESCRIBER/CLIN PHARMACIST DOCUMENTED: ICD-10-PCS | Mod: CPTII,S$GLB,, | Performed by: PODIATRIST

## 2023-11-14 PROCEDURE — 3008F BODY MASS INDEX DOCD: CPT | Mod: CPTII,S$GLB,, | Performed by: PODIATRIST

## 2023-11-14 PROCEDURE — 1160F RVW MEDS BY RX/DR IN RCRD: CPT | Mod: CPTII,S$GLB,, | Performed by: PODIATRIST

## 2023-11-14 PROCEDURE — 99999 PR PBB SHADOW E&M-EST. PATIENT-LVL IV: CPT | Mod: PBBFAC,,, | Performed by: PODIATRIST

## 2023-11-14 PROCEDURE — 64455 NEUROMA INJECTION: ICD-10-PCS | Mod: RT,S$GLB,, | Performed by: PODIATRIST

## 2023-11-14 PROCEDURE — 3008F PR BODY MASS INDEX (BMI) DOCUMENTED: ICD-10-PCS | Mod: CPTII,S$GLB,, | Performed by: PODIATRIST

## 2023-11-14 PROCEDURE — 64455 NJX AA&/STRD PLTR COM DG NRV: CPT | Mod: RT,S$GLB,, | Performed by: PODIATRIST

## 2023-11-14 PROCEDURE — 99213 PR OFFICE/OUTPT VISIT, EST, LEVL III, 20-29 MIN: ICD-10-PCS | Mod: 25,S$GLB,, | Performed by: PODIATRIST

## 2023-11-14 PROCEDURE — 99999 PR PBB SHADOW E&M-EST. PATIENT-LVL IV: ICD-10-PCS | Mod: PBBFAC,,, | Performed by: PODIATRIST

## 2023-11-14 PROCEDURE — 99213 OFFICE O/P EST LOW 20 MIN: CPT | Mod: 25,S$GLB,, | Performed by: PODIATRIST

## 2023-11-14 PROCEDURE — 1159F PR MEDICATION LIST DOCUMENTED IN MEDICAL RECORD: ICD-10-PCS | Mod: CPTII,S$GLB,, | Performed by: PODIATRIST

## 2023-11-14 PROCEDURE — 1159F MED LIST DOCD IN RCRD: CPT | Mod: CPTII,S$GLB,, | Performed by: PODIATRIST

## 2023-11-14 RX ORDER — DEXAMETHASONE SODIUM PHOSPHATE 4 MG/ML
4 INJECTION, SOLUTION INTRA-ARTICULAR; INTRALESIONAL; INTRAMUSCULAR; INTRAVENOUS; SOFT TISSUE
Status: DISCONTINUED | OUTPATIENT
Start: 2023-11-14 | End: 2023-11-14 | Stop reason: HOSPADM

## 2023-11-14 RX ADMIN — DEXAMETHASONE SODIUM PHOSPHATE 4 MG: 4 INJECTION, SOLUTION INTRA-ARTICULAR; INTRALESIONAL; INTRAMUSCULAR; INTRAVENOUS; SOFT TISSUE at 09:11

## 2023-11-14 NOTE — PATIENT INSTRUCTIONS
What Are Neuromas of the Foot?  The ball of your foot is the bottom part just behind your toes. Bands of tissue (ligaments) connect the bones in the ball of your foot. Nerves run between the bones and underneath the ligaments. When a nerve becomes pinched, this causes it to swell and become painful due to the thickening of the tissue that surrounds the nerve. The painful, swollen nerve is called a neuroma (also called Abarca's neuroma).     A neuroma most often occurs at the base of either the third and fourth toes or the second and third toes.   What causes a neuroma?  Wearing tight or high-heeled shoes can cause a neuroma. Shoes that are too narrow or too pointed squeeze the bones in the ball of the foot. Shoes with high heels put extra pressure on the ends of the bones. When the bones are squeezed together, they pinch the nerve that runs between them.  Symptoms  The most common symptom of a neuroma is pain in the ball of the foot between two toes. The pain may be dull or sharp. It may feel as if you have a stone in your shoe. You may also have tingling or numbness in one or both of the toes. Symptoms may occur after you have been walking or standing for a while. Taking off your shoes and rubbing the ball of your foot may decrease or relieve the pain.  Preventing future problems  To prevent a future neuroma, buy shoes with plenty of room across the ball of the foot and in the toes. This keeps the bones from being squeezed together. Wearing low-heeled shoes (less than 2 inches) also puts less pressure on the bones and nerves in the ball of the foot.   Date Last Reviewed: 9/10/2015  © 5280-4159 [employer]. [address] [place] [address], [place] [address]. All rights reserved. This information is not intended as a substitute for professional medical care. Always follow your healthcare professional's instructions.    Abarca's Neuroma (Intermetatarsal Neuroma)        What Is a Neuroma?    A neuroma is a  thickening of nerve tissue that may develop in various parts of the body. The most common neuroma in the foot is a Abarcas neuroma, which occurs between the third and fourth toes. It is sometimes referred to as an intermetatarsal neuroma. Intermetatarsal describes its location in the ball of the foot between the metatarsal bones. Neuromas may also occur in other locations in the foot.    Abarca's NeuromaThe thickening of the nerve that defines a neuroma is the result of compression and irritation of the nerve. This compression creates enlargement of the nerve, eventually leading to permanent nerve damage.    Causes  Anything that causes compression or irritation of the nerve can lead to the development of a neuroma. One of the most common offenders is wearing shoes that have a tapered toe box or high-heeled shoes that cause the toes to be forced into the toe box. People with certain foot deformities--bunions, hammertoes, flatfeet or more flexible feet--are at higher risk for developing a neuroma. Other potential causes are activities that involve repetitive irritation to the ball of the foot, such as running or court sports. An injury or other type of trauma to the area may also lead to a neuroma.    Symptoms  If you have a Abarcas neuroma, you may have one or more of these symptoms where the nerve damage is occurring:    Tingling, burning or numbness  Pain  A feeling that something is inside the ball of the foot  A feeling that there is something in the shoe or a sock is bunched up     The progression of a Abarcas neuroma often follows this pattern:    The symptoms begin gradually. At first, they occur only occasionally when wearing narrow-toed shoes or performing certain aggravating activities.  The symptoms may go away temporarily by removing the shoe, massaging the foot or avoiding aggravating shoes or activities.  Over time, the symptoms progressively worsen and may persist for several days or weeks.  The  symptoms become more intense as the neuroma enlarges and the temporary changes in the nerve become permanent.     Diagnosis  To arrive at a diagnosis, the foot and ankle surgeon will obtain a thorough history of your symptoms and examine your foot. During the physical examination, the doctor attempts to reproduce your symptoms by manipulating your foot. Other tests or imaging studies may be performed.    The best time to see your foot and ankle surgeon is early in the development of symptoms. Early diagnosis of a Abarcas neuroma greatly lessens the need for more invasive treatments and may help you avoid surgery.    Nonsurgical Treatment  In developing a treatment plan, your foot and ankle surgeon will first determine how long you have had the neuroma and will evaluate its stage of development. Treatment approaches vary according to the severity of the problem.    For mild to moderate neuromas, treatment options may include:    -Padding. Padding techniques provide support for the metatarsal arch, thereby lessening the pressure on the nerve and decreasing the compression when walking.  -Icing. Placing an icepack on the affected area helps reduce swelling.  -Orthotic devices. Custom orthotic devices provided by your foot and ankle surgeon provide the support needed to reduce pressure and compression on the nerve.  -Activity modifications. Activities that put repetitive pressure on the neuroma should be avoided until the condition improves.  -Shoe modifications. Wear shoes with a wide toe box and avoid narrow-toed shoes or shoes with high heels.  -Medications. Oral nonsteroidal anti-inflammatory drugs (NSAIDs), such as ibuprofen, may be recommended to reduce pain and inflammation.  -Injection therapy. Treatment may include injections of cortisone, local anesthetics or other agents.     When Is Surgery Needed?  Surgery may be considered in patients who have not responded adequately to nonsurgical treatments. Your foot  and ankle surgeon will determine the approach that is best for your condition. The length of the recovery period will vary depending on the procedure performed.    Regardless of whether you have undergone surgical or nonsurgical treatment, your surgeon will recommend long-term measures to help keep your symptoms from returning. These include appropriate footwear and modification of activities to reduce the repetitive pressure on the foot.        Recommended OTC orthotics:  -powerstep  -superfeet    Recommended shoegear:  -new balance  -ascics  -naboro  -ramos        Equinus          What Is Equinus?    Equinus is a condition in which the upward bending motion of the ankle joint is limited. Someone with equinus lacks the flexibility to bring the top of the foot toward the front of the leg. Equinus can occur in one or both feet. When it involves both feet, the limitation of motion is sometimes worse in one foot than in the other.    People with equinus develop ways to compensate for their limited ankle motion, and this often leads to other foot, leg or back problems. The most common methods of compensation are flattening of the arch or picking up the heel early when walking, placing increased pressure on the ball of the foot. Other patients compensate by toe walking, while a smaller number take steps by bending abnormally at the hip or knee.    Causes  There are several possible causes for the limited range of ankle motion. Often, it is due to tightness in the Achilles tendon or calf muscles (the soleus muscle and/or gastrocnemius muscle). In some patients, this tightness is congenital (present at birth), and sometimes it is an inherited trait. Other patients acquire the tightness from being in a cast, being on crutches or frequently wearing high-heeled shoes. In addition, diabetes can affect the fibers of the Achilles tendon and cause tightness. Sometimes equinus is related to a bone blocking the ankle motion. For  example, a fragment of a broken bone following an ankle injury, or bone block, can get in the way and restrict motion. Equinus may also result from one leg being shorter than the other. Less often, equinus is caused by spasms in the calf muscle. These spasms may be signs of an underlying neurologic disorder.      Foot Problems Related to Equinus  Depending on how a patient compensates for the inability to bend properly at the ankle, a variety of foot conditions can develop, including:    Plantar fasciitis (arch/heel pain)  Calf cramping  Tendonitis (inflammation in the Achilles tendon)  Metatarsalgia (pain and/or callusing on the ball of the foot)  Flatfoot  Arthritis of the midfoot (middle area of the foot)  Pressure sores on the ball of the foot or the arch  Bunions and hammertoes  Ankle pain  Shin splints     Diagnosis  Most patients with equinus are unaware they have this condition when they first visit the doctor. Instead, they come to the doctor seeking relief for foot problems associated with equinus.    To diagnose equinus, the foot and ankle surgeon will evaluate the ankle's range of motion when the knee is flexed (bent) as well as extended (straightened). This enables the surgeon to identify whether the tendon or muscle is tight and to assess whether bone is interfering with ankle motion. X-rays may also be ordered. In some cases, the foot and ankle surgeon may refer the patient for neurologic evaluation.    Nonsurgical Treatment  Treatment includes strategies aimed at relieving the symptoms and conditions associated with equinus. In addition, the patient is treated for the equinus itself through one or more of the following options:    Night splint. The foot may be placed in a splint at night to keep it in a position that helps reduce tightness of the calf muscle.  Heel lifts. Placing heel lifts inside the shoes or wearing shoes with a moderate heel takes stress off the Achilles tendon when walking and  may reduce symptoms.  Arch supports or orthotic devices. Custom orthotic devices that fit into the shoe are often prescribed to keep weight distributed properly and to help control muscle/tendon imbalance.  Physical therapy. To help remedy muscle tightness, exercises that stretch the calf muscle(s) are recommended.     When Is Surgery Needed?  In some cases, surgery may be needed to correct the cause of equinus if it is related to a tight tendon or a bone blocking the ankle motion. The foot and ankle surgeon will determine the type of procedure that is best suited to the individual patient.

## 2023-11-14 NOTE — PROGRESS NOTES
[facility]  [address]  [address]  [place] [address]  Dept: [phone]  Dept Fax: [phone]    Yonatan Douglass Jr., DPM     Assessment:   MDM    Coding  1. Metatarsalgia, right foot  Neuroma injection      2. 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 Bursitis of intermetatarsal bursa of right foot  Neuroma injection      4. Equinus contracture of ankle        5. Rheumatoid arthritis involving multiple sites with positive rheumatoid factor            Plan:     Neuroma injection    Date/Time: 11/14/2023 9:57 AM    Performed by: Yonatan Douglass Jr., DPM  Authorized by: Yonatan Douglass Jr., DPM    Consent Done?:  Yes (Verbal)  Indications:  Pain  Site marked: the procedure site was marked    Timeout: prior to procedure the correct patient, procedure, and site was verified    Local anesthesia used?: Yes   Anesthesia:  Local infiltration  Local anesthetic:  Bupivacaine 0.5% without epinephrine  Anesthetic total (ml):  2    Location Right Foot:  Third Webspace  Needle size:  25 G  Approach:  Dorsal  Medications:  4 mg dexAMETHasone 4 mg/mL  Patient tolerance:  Patient tolerated the procedure well with no immediate complications      Kan was seen today for foot pain.    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 All questions/concerns addressed  -all tx options discussed. All alternatives, risks, benefits of all txs discussed  -the patient was educated about the diagnosis  -We discussed conservative care options possible including but not limited to shoe wear and/or padding,  bracing/strapping, at home ROM, formal PT, medical therapy, injection therapy  - The utilization of NSAIDs can be considered but their benefit has to be tempered against the risk of GI/ concerns  - A steroid injection can be undertaken.  We did discuss the potential mechanism of action of this shot.  Understanding that multiple injections at the same anatomic site do have deleterious effects on the soft tissue.  Generic risks include: steroid flare (advised to ice if necessary), skin hypo-pgimentation (which can be permanent and unsightly), elevation of blood sugar, subcutaneous atrophy (can be permanent) and infection.   -XR/imaging reviewed by me: agree with read  -labs reviewed by me: ok for vgel  -implemented icing/stretching regimen  -offloading pads dispensed      -rxs dispensed: none  -referrals: none  -WB: wbat      Follow up in about 4 weeks (around 12/12/2023).    Subjective:      Patient ID: Kan Traore is a 38 y.o. female.    Chief Complaint:   Chief Complaint   Patient presents with    Foot Pain     Right foot   Between 3 and 4 toe        CC - foot pain: patient presents to the podiatry clinic  with complaint of  right foot pain. Onset of the symptoms was several months ago. Precipitating event: unk. Current symptoms include: ability to bear weight, but with some pain, william the ball of the foot, swelling and worsening symptoms after a period of activity, burning/numbness/tingling of toes. Aggravating factors: walking and certain shoegear. Symptoms have gradually worsened. Patient has had prior foot problems. Evaluation to date: none. Treatment to date: avoidance of offending activity. Patients rates pain 7/10 on pain scale.      HPI    Last Podiatry Enc: Visit date not found  Last Enc w/ Me: 11/9/2017    Outside reports reviewed: historical medical records.  Family hx: as below  Past Medical History:   Diagnosis Date    Allergic rhinitis     Anxiety     Asthma     Attention deficit disorder (ADD)      Bilateral nephrolithiasis     Depression     Hydronephrosis     Hyperglycemia 3/9/2021    - glucose 131 on recent labs - patient was nonfasting Lab Results Component Value Date  HGBA1C 5.1 06/10/2021      Urinary tract infection     Vaginal infection      Past Surgical History:   Procedure Laterality Date    APPENDECTOMY  2017    DILATION AND CURETTAGE OF UTERUS  2014    KIDNEY STONE SURGERY      LITHOTRIPSY      LITHOTRIPSY  2017    LOOP ELECTROSURGICAL EXCISION PROCEDURE (LEEP) N/A 10/02/2020    Procedure: LEEP (LOOP ELECTROSURGICAL EXCISION PROCEDURE);  Surgeon: Kacy Elias MD;  Location: [facility] OR;  Service: OB/GYN;  Laterality: N/A;    TONSILLECTOMY       Family History   Problem Relation Age of Onset    Hypertension Mother 50    Asthma Mother     Hypothyroidism Father 52    Stroke Maternal Grandfather     Hypertension Maternal Grandfather     Heart disease Maternal Grandfather 56         of AMI    Heart failure Paternal Grandmother 81    Rheum arthritis Paternal Grandmother     Arthritis Paternal Grandmother     No Known Problems Son     No Known Problems Son     No Known Problems Son     Breast cancer Neg Hx     Colon cancer Neg Hx     Ovarian cancer Neg Hx     Kidney disease Neg Hx      Current Outpatient Medications   Medication Sig Dispense Refill    citalopram (CELEXA) 20 MG tablet Take 1 tablet (20 mg total) by mouth once daily. 90 tablet 3    dextroamphetamine-amphetamine (ADDERALL XR) 30 MG 24 hr capsule Take 1 capsule (30 mg total) by mouth every morning. 30 capsule 0    dextroamphetamine-amphetamine (ADDERALL) 10 mg Tab Take 1 tablet (10 mg total) by mouth once daily. 30 tablet 0    folic acid (FOLVITE) 1 MG tablet Take 3 tablets (3 mg total) by mouth once daily. 90 tablet 11    levothyroxine (SYNTHROID) 50 MCG tablet Take 1 tablet (50 mcg total) by mouth before breakfast. 90 tablet 3    meloxicam (MOBIC) 15 MG tablet TAKE ONE TABLET BY MOUTH EVERY DAY 30 tablet 2     methotrexate 2.5 MG Tab Take 8 tablets (20 mg total) by mouth every 7 days. 96 tablet 0    norgestimate-ethinyl estradioL (SPRINTEC, 28,) 0.25-35 mg-mcg per tablet Take 1 tablet by mouth once daily. Take one active pill daily, skipping sugar pills 63 tablet 3    ondansetron (ZOFRAN-ODT) 4 MG TbDL Dissolve 1 tablet (4 mg total) by mouth every 8 (eight) hours as needed (nausea/migraine). 30 tablet 5    TURMERIC ORAL Take 1,500 Int'l Units by mouth.       No current facility-administered medications for this visit.     Review of patient's allergies indicates:  No Known Allergies  Social History     Socioeconomic History    Marital status:     Number of children: 3   Occupational History     Employer: [employer]   Tobacco Use    Smoking status: Never     Passive exposure: Never    Smokeless tobacco: Never   Substance and Sexual Activity    Alcohol use: Yes     Alcohol/week: 1.0 standard drink of alcohol     Types: 1 Drinks containing 0.5 oz of alcohol per week     Comment: Socially    Drug use: No    Sexual activity: Yes     Partners: Male     Birth control/protection: Condom, Partner-Vasectomy, Other-see comments     Comment: Birth control pill   Social History Narrative     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        ROS    REVIEW OF SYSTEMS: Negative as documented below as well as positive findings in bold.       Constitutional  Respiratory  Gastrointestinal  Skin   - Fever - Cough - Heartburn - Rash   - Chills - Spit blood - Nausea - Itching   - Weight Loss - Shortness of breath - Vomiting - Nail pain   - Malaise/Fatigue - Wheezing - Abdominal Pain  Wound/Ulcer   - Weight Gain   - Blood in Stool  Poor wound healing       - Diarrhea          Cardiovascular  Genitourinary  Neurological  HEENT   - Chest Pain - Dysuria - Burning Sensation of feet - Headache   - Palpitations - Hematuria - Tingling / Paresthesia - Congestion   - Pain at  "night in legs - Flank Pain - Dizziness - Sore Throat   - Cramping   - Tremor - Blurred Vision   - Leg Swelling   - Sensory Change - Double Vision   - Dizzy when standing   - Speech Change - Eye Redness       - Focal Weakness - Dry Eyes       - Loss of Consciousness          Endocrine  Musculoskeletal  Psychiatric   - Cold intolerance - Muscle Pain - Depression   - Heat intolerance - Neck Pain - Insomnia   - Anemia - Joint Pain - Memory Loss   -  Easy bruising, bleeding - Heel pain - Anxiety      Toe Pain        Leg/Ankle/Foot Pain         Objective:     Ht 5' 6" (1.676 m)   Wt 58 kg (127 lb 13.9 oz)   LMP 09/05/2023 (Within Months)   BMI 20.64 kg/m²   Vitals:    11/14/23 0911   Weight: 58 kg (127 lb 13.9 oz)   Height: 5' 6" (1.676 m)   PainSc:   3   PainLoc: Foot       Physical Exam    General Appearance:   Patient appears well developed, well nourished  Patient appears stated age    Psychiatric:   Patient is oriented to time, place, and person.  Patient has appropriate mood and affect    Neck:  Trachea Midline  No visible masses    Respiratory/Ears:  No distress or labored breathing.  Able to differentiate between normal talking voice and whisper.  Able to follow commands    Eyes:  Visual Acuity intact  Lids and conjunctivae normal. No discoloration noted.    Foot Exam  Physical Exam  Ortho Exam  Ortho/SPM Exam  Foot/Ankle Musculoskeletal Exam    R LE exam con't:  V:  DP 2/4, PT 2/4   CRT< 3s to all digits tested   Tibial and popliteal lymph nodes are w/o abnormality   Edema: absent, varicosities: absent    N:  Patient displays normal ankle reflexes   SILT in SP/DP/T/Zena/Saph distributions    Ortho: +Motor EHL/FHL/TA/GA   equinus deformity present  There is moderate pain with palpation of 3rd IS  There is is moderate pain at 3rd IS with lateral compression of metatarsal heads  Compartments soft/compressible. No pain on passive stretch of big toe. No calf  Pain.    Derm:  skin intact, skin warm and dry, skin " without ulcers or lesions, skin without induration, nails normal, no erythema and no ecchymosis      Imaging / Labs:      X-Ray Foot Complete Right    Result Date: 11/14/2023  EXAMINATION: XR FOOT COMPLETE 3 VIEW RIGHT CLINICAL HISTORY: . Pain in right foot TECHNIQUE: AP, lateral, and oblique views of the right foot were performed. COMPARISON: None FINDINGS: No acute abnormality or significant arthritic change.     As above Electronically signed by: Aris Augilera MD Date:    11/14/2023 Time:    09:12        Note: This was dictated using a computer transcription program. Although proofread, it may contain computer transcription errors and phonetic errors. Other human proofreading errors may also exist. Corrections may be performed at a later time. Please contact us for any clarification if needed.    Yonatan Douglass DPM  [facility]

## 2023-11-17 LAB
FINAL PATHOLOGIC DIAGNOSIS: NORMAL
GROSS: NORMAL
Lab: NORMAL

## 2023-11-20 ENCOUNTER — TELEPHONE (OUTPATIENT)
Dept: OBSTETRICS AND GYNECOLOGY | Facility: CLINIC | Age: 38
End: 2023-11-20
Payer: COMMERCIAL

## 2023-12-01 DIAGNOSIS — F90.0 ATTENTION DEFICIT HYPERACTIVITY DISORDER (ADHD), PREDOMINANTLY INATTENTIVE TYPE: ICD-10-CM

## 2023-12-01 RX ORDER — DEXTROAMPHETAMINE SACCHARATE, AMPHETAMINE ASPARTATE MONOHYDRATE, DEXTROAMPHETAMINE SULFATE AND AMPHETAMINE SULFATE 7.5; 7.5; 7.5; 7.5 MG/1; MG/1; MG/1; MG/1
30 CAPSULE, EXTENDED RELEASE ORAL EVERY MORNING
Qty: 30 CAPSULE | Refills: 0 | Status: SHIPPED | OUTPATIENT
Start: 2023-12-01 | End: 2023-12-29 | Stop reason: SDUPTHER

## 2023-12-01 RX ORDER — DEXTROAMPHETAMINE SACCHARATE, AMPHETAMINE ASPARTATE, DEXTROAMPHETAMINE SULFATE AND AMPHETAMINE SULFATE 2.5; 2.5; 2.5; 2.5 MG/1; MG/1; MG/1; MG/1
1 TABLET ORAL DAILY
Qty: 30 TABLET | Refills: 0 | Status: SHIPPED | OUTPATIENT
Start: 2023-12-01 | End: 2023-12-29 | Stop reason: SDUPTHER

## 2023-12-01 NOTE — TELEPHONE ENCOUNTER
Refill Encounter    PCP Visits: Recent Visits  Date Type Provider Dept   10/02/23 Office Visit Cielo Mina, DO [facility]   06/29/23 Office Visit Cielo Mina, DO [facility]   03/29/23 Office Visit Cielo Mina, DO [facility]   12/29/22 Office Visit Cielo Mina, DO [facility]   Showing recent visits within past 360 days and meeting all other requirements  Future Appointments  No visits were found meeting these conditions.  Showing future appointments within next 720 days and meeting all other requirements     Last 3 Blood Pressure:   BP Readings from Last 3 Encounters:   11/13/23 (!) 140/98   10/20/23 124/85   06/29/23 129/78     Preferred Pharmacy:   [facility]  [address] [address]  [place] [address]  Phone: [phone] Fax: [phone]    Requested RX:  Requested Prescriptions     Pending Prescriptions Disp Refills    dextroamphetamine-amphetamine (ADDERALL XR) 30 MG 24 hr capsule 30 capsule 0     Sig: Take 1 capsule (30 mg total) by mouth every morning.    dextroamphetamine-amphetamine (ADDERALL) 10 mg Tab 30 tablet 0     Sig: Take 1 tablet (10 mg total) by mouth once daily.      RX Route: Normal

## 2023-12-01 NOTE — TELEPHONE ENCOUNTER
No care due was identified.  [facility] Embedded Care Due Messages. Reference number: [id number].   12/01/2023 4:30:53 PM CST

## 2023-12-11 ENCOUNTER — PATIENT MESSAGE (OUTPATIENT)
Dept: PRIMARY CARE CLINIC | Facility: CLINIC | Age: 38
End: 2023-12-11
Payer: COMMERCIAL

## 2023-12-12 ENCOUNTER — OFFICE VISIT (OUTPATIENT)
Dept: PODIATRY | Facility: CLINIC | Age: 38
End: 2023-12-12
Payer: COMMERCIAL

## 2023-12-12 DIAGNOSIS — G57.81 NEUROMA OF THIRD INTERSPACE OF RIGHT FOOT: ICD-10-CM

## 2023-12-12 DIAGNOSIS — M77.41 METATARSALGIA, RIGHT FOOT: Primary | ICD-10-CM

## 2023-12-12 DIAGNOSIS — M24.573 EQUINUS CONTRACTURE OF ANKLE: ICD-10-CM

## 2023-12-12 DIAGNOSIS — M77.51 BURSITIS OF INTERMETATARSAL BURSA OF RIGHT FOOT: ICD-10-CM

## 2023-12-12 PROCEDURE — 1160F PR REVIEW ALL MEDS BY PRESCRIBER/CLIN PHARMACIST DOCUMENTED: ICD-10-PCS | Mod: CPTII,S$GLB,, | Performed by: PODIATRIST

## 2023-12-12 PROCEDURE — 1160F RVW MEDS BY RX/DR IN RCRD: CPT | Mod: CPTII,S$GLB,, | Performed by: PODIATRIST

## 2023-12-12 PROCEDURE — 99213 OFFICE O/P EST LOW 20 MIN: CPT | Mod: S$GLB,,, | Performed by: PODIATRIST

## 2023-12-12 PROCEDURE — 99999 PR PBB SHADOW E&M-EST. PATIENT-LVL III: CPT | Mod: PBBFAC,,, | Performed by: PODIATRIST

## 2023-12-12 PROCEDURE — 99999 PR PBB SHADOW E&M-EST. PATIENT-LVL III: ICD-10-PCS | Mod: PBBFAC,,, | Performed by: PODIATRIST

## 2023-12-12 PROCEDURE — 1159F MED LIST DOCD IN RCRD: CPT | Mod: CPTII,S$GLB,, | Performed by: PODIATRIST

## 2023-12-12 PROCEDURE — 1159F PR MEDICATION LIST DOCUMENTED IN MEDICAL RECORD: ICD-10-PCS | Mod: CPTII,S$GLB,, | Performed by: PODIATRIST

## 2023-12-12 PROCEDURE — 99213 PR OFFICE/OUTPT VISIT, EST, LEVL III, 20-29 MIN: ICD-10-PCS | Mod: S$GLB,,, | Performed by: PODIATRIST

## 2023-12-12 RX ORDER — NABUMETONE 500 MG/1
500 TABLET, FILM COATED ORAL 2 TIMES DAILY
Qty: 60 TABLET | Refills: 0 | Status: SHIPPED | OUTPATIENT
Start: 2023-12-12 | End: 2024-01-23

## 2023-12-12 NOTE — PROGRESS NOTES
[facility]  [address]  [address]  [facility] [place] [address]  Dept: [phone]  Dept Fax: [phone]    Yonatan Douglass Jr., DPM     Assessment:   MDM    Coding  1. Metatarsalgia, right foot  Ambulatory referral/consult to Physical/Occupational Therapy    MRI Foot (Forefoot) Right W W/O Contrast    nabumetone (RELAFEN) 500 MG tablet      2. 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 Bursitis of intermetatarsal bursa of right foot  MRI Foot (Forefoot) Right W W/O Contrast    nabumetone (RELAFEN) 500 MG tablet      4. Equinus contracture of ankle  Ambulatory referral/consult to Physical/Occupational Therapy    MRI Foot (Forefoot) Right W W/O Contrast          Plan:     Laura Omer was seen today for toe pain.    Diagnoses and all orders for this visit:    Metatarsalgia, right foot  -     Ambulatory referral/consult to Physical/Occupational Therapy; Future  -     MRI Foot (Forefoot) Right W W/O Contrast; Future  -     nabumetone (RELAFEN) 500 MG tablet; Take 1 tablet (500 mg total) by mouth 2 (two) times daily.    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    Bursitis of intermetatarsal bursa of right foot  -     MRI Foot (Forefoot) Right W W/O Contrast; Future  -     nabumetone (RELAFEN) 500 MG tablet; Take 1 tablet (500 mg total) by mouth 2 (two) times daily.    Equinus contracture of ankle  -     Ambulatory referral/consult to Physical/Occupational Therapy; Future  -     MRI Foot (Forefoot) Right W W/O Contrast; Future        -pt seen, evaluated, and managed  -dx discussed in detail. All  questions/concerns addressed  -all tx options discussed. All alternatives, risks, benefits of all txs discussed  -the patient was educated about the diagnosis  -We discussed conservative care options possible including but not limited to shoe wear and/or padding, bracing/strapping, at home ROM, formal PT, medical therapy, injection therapy  - The utilization of NSAIDs can be considered but their benefit has to be tempered against the risk of GI/ concerns  - A steroid injection can be undertaken.  We did discuss the potential mechanism of action of this shot.  Understanding that multiple injections at the same anatomic site do have deleterious effects on the soft tissue.  Generic risks include: steroid flare (advised to ice if necessary), skin hypo-pgimentation (which can be permanent and unsightly), elevation of blood sugar, subcutaneous atrophy (can be permanent) and infection.   -XR/imaging reviewed by me: agree with read  -labs reviewed by me: ok for vgel  -implemented icing/stretching regimen  -offloading pads dispensed  -MRI ordered to better characterize    -rxs dispensed: relafen  -referrals: none  -WB: wbat      Follow up in about 6 weeks (around 1/23/2024).    Subjective:      Patient ID: Kan Traore is a 38 y.o. female.    Chief Complaint:   Chief Complaint   Patient presents with    Toe Pain     Right 3rd toe        CC - foot pain: patient presents to the podiatry clinic  with complaint of  right foot pain. Onset of the symptoms was several months ago. Precipitating event: unk. Current symptoms include: ability to bear weight, but with some pain, william the ball of the foot, swelling and worsening symptoms after a period of activity, burning/numbness/tingling of toes. Aggravating factors: walking and certain shoegear. Symptoms have gradually worsened. Patient has had prior foot problems. Evaluation to date: none. Treatment to date: avoidance of offending activity. Patients rates pain 7/10 on pain  scale.      23:  Hx as above. Pain improved slightly. Still has symptoms.    Toe Pain         Last Podiatry Enc: Visit date not found  Last Enc w/ Me: 2017    Outside reports reviewed: historical medical records.  Family hx: as below  Past Medical History:   Diagnosis Date    Allergic rhinitis     Anxiety     Asthma     Attention deficit disorder (ADD)     Bilateral nephrolithiasis     Depression     Hydronephrosis     Hyperglycemia 3/9/2021    - glucose 131 on recent labs - patient was nonfasting Lab Results Component Value Date  HGBA1C 5.1 06/10/2021      Urinary tract infection     Vaginal infection      Past Surgical History:   Procedure Laterality Date    APPENDECTOMY  2017    DILATION AND CURETTAGE OF UTERUS  2014    KIDNEY STONE SURGERY      LITHOTRIPSY      LITHOTRIPSY  2017    LOOP ELECTROSURGICAL EXCISION PROCEDURE (LEEP) N/A 10/02/2020    Procedure: LEEP (LOOP ELECTROSURGICAL EXCISION PROCEDURE);  Surgeon: Kacy Elias MD;  Location: [facility];  Service: OB/GYN;  Laterality: N/A;    TONSILLECTOMY       Family History   Problem Relation Age of Onset    Hypertension Mother 50    Asthma Mother     Hypothyroidism Father 52    Stroke Maternal Grandfather     Hypertension Maternal Grandfather     Heart disease Maternal Grandfather 56         of AMI    Heart failure Paternal Grandmother 81    Rheum arthritis Paternal Grandmother     Arthritis Paternal Grandmother     No Known Problems Son     No Known Problems Son     No Known Problems Son     Breast cancer Neg Hx     Colon cancer Neg Hx     Ovarian cancer Neg Hx     Kidney disease Neg Hx      Current Outpatient Medications   Medication Sig Dispense Refill    citalopram (CELEXA) 20 MG tablet Take 1 tablet (20 mg total) by mouth once daily. 90 tablet 3    dextroamphetamine-amphetamine (ADDERALL XR) 30 MG 24 hr capsule Take 1 capsule (30 mg total) by mouth every morning. 30 capsule 0    dextroamphetamine-amphetamine (ADDERALL) 10 mg  Tab Take 1 tablet (10 mg total) by mouth once daily. 30 tablet 0    folic acid (FOLVITE) 1 MG tablet Take 3 tablets (3 mg total) by mouth once daily. 90 tablet 11    levothyroxine (SYNTHROID) 50 MCG tablet Take 1 tablet (50 mcg total) by mouth before breakfast. 90 tablet 3    methotrexate 2.5 MG Tab Take 8 tablets (20 mg total) by mouth every 7 days. 96 tablet 0    norgestimate-ethinyl estradioL (SPRINTEC, 28,) 0.25-35 mg-mcg per tablet Take 1 tablet by mouth once daily. Take one active pill daily, skipping sugar pills 63 tablet 3    ondansetron (ZOFRAN-ODT) 4 MG TbDL Dissolve 1 tablet (4 mg total) by mouth every 8 (eight) hours as needed (nausea/migraine). 30 tablet 5    TURMERIC ORAL Take 1,500 Int'l Units by mouth.      nabumetone (RELAFEN) 500 MG tablet Take 1 tablet (500 mg total) by mouth 2 (two) times daily. 60 tablet 0     No current facility-administered medications for this visit.     Review of patient's allergies indicates:  No Known Allergies  Social History     Socioeconomic History    Marital status:     Number of children: 3   Occupational History     Employer: [employer]   Tobacco Use    Smoking status: Never     Passive exposure: Never    Smokeless tobacco: Never   Substance and Sexual Activity    Alcohol use: Yes     Alcohol/week: 1.0 standard drink of alcohol     Types: 1 Drinks containing 0.5 oz of alcohol per week     Comment: Socially    Drug use: No    Sexual activity: Yes     Partners: Male     Birth control/protection: Condom, Partner-Vasectomy, Other-see comments     Comment: Birth control pill   Social History Narrative     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        ROS    REVIEW OF SYSTEMS: Negative as documented below as well as positive findings in bold.       Constitutional  Respiratory  Gastrointestinal  Skin   - Fever - Cough - Heartburn - Rash   - Chills - Spit blood - Nausea - Itching   - Weight  Loss - Shortness of breath - Vomiting - Nail pain   - Malaise/Fatigue - Wheezing - Abdominal Pain  Wound/Ulcer   - Weight Gain   - Blood in Stool  Poor wound healing       - Diarrhea          Cardiovascular  Genitourinary  Neurological  HEENT   - Chest Pain - Dysuria - Burning Sensation of feet - Headache   - Palpitations - Hematuria - Tingling / Paresthesia - Congestion   - Pain at night in legs - Flank Pain - Dizziness - Sore Throat   - Cramping   - Tremor - Blurred Vision   - Leg Swelling   - Sensory Change - Double Vision   - Dizzy when standing   - Speech Change - Eye Redness       - Focal Weakness - Dry Eyes       - Loss of Consciousness          Endocrine  Musculoskeletal  Psychiatric   - Cold intolerance - Muscle Pain - Depression   - Heat intolerance - Neck Pain - Insomnia   - Anemia - Joint Pain - Memory Loss   -  Easy bruising, bleeding - Heel pain - Anxiety      Toe Pain        Leg/Ankle/Foot Pain         Objective:     LMP 09/05/2023 (Within Months)   Vitals:    12/12/23 1000   PainSc:   3   PainLoc: Toe       Physical Exam    General Appearance:   Patient appears well developed, well nourished  Patient appears stated age    Psychiatric:   Patient is oriented to time, place, and person.  Patient has appropriate mood and affect    Neck:  Trachea Midline  No visible masses    Respiratory/Ears:  No distress or labored breathing.  Able to differentiate between normal talking voice and whisper.  Able to follow commands    Eyes:  Visual Acuity intact  Lids and conjunctivae normal. No discoloration noted.    Foot Exam  Physical Exam  Ortho Exam  Ortho/SPM Exam  Foot/Ankle Musculoskeletal Exam    R LE exam con't:  V:  DP 2/4, PT 2/4   CRT< 3s to all digits tested   Tibial and popliteal lymph nodes are w/o abnormality   Edema: absent, varicosities: absent    N:  Patient displays normal ankle reflexes   SILT in SP/DP/T/Zena/Saph distributions    Ortho: +Motor EHL/FHL/TA/GA   equinus deformity present  There is  moderate pain with palpation of 3rd IS  There is is moderate pain at 3rd IS with lateral compression of metatarsal heads  Compartments soft/compressible. No pain on passive stretch of big toe. No calf  Pain.    Derm:  skin intact, skin warm and dry, skin without ulcers or lesions, skin without induration, nails normal, no erythema and no ecchymosis      Imaging / Labs:      X-Ray Foot Complete Right    Result Date: 11/14/2023  EXAMINATION: XR FOOT COMPLETE 3 VIEW RIGHT CLINICAL HISTORY: . Pain in right foot TECHNIQUE: AP, lateral, and oblique views of the right foot were performed. COMPARISON: None FINDINGS: No acute abnormality or significant arthritic change.     As above Electronically signed by: Aris Aguilera MD Date:    11/14/2023 Time:    09:12        Note: This was dictated using a computer transcription program. Although proofread, it may contain computer transcription errors and phonetic errors. Other human proofreading errors may also exist. Corrections may be performed at a later time. Please contact us for any clarification if needed.    Yonatan Douglass DPM  [facility]

## 2023-12-12 NOTE — PATIENT INSTRUCTIONS
What Are Neuromas of the Foot?  The ball of your foot is the bottom part just behind your toes. Bands of tissue (ligaments) connect the bones in the ball of your foot. Nerves run between the bones and underneath the ligaments. When a nerve becomes pinched, this causes it to swell and become painful due to the thickening of the tissue that surrounds the nerve. The painful, swollen nerve is called a neuroma (also called Abarca's neuroma).     A neuroma most often occurs at the base of either the third and fourth toes or the second and third toes.   What causes a neuroma?  Wearing tight or high-heeled shoes can cause a neuroma. Shoes that are too narrow or too pointed squeeze the bones in the ball of the foot. Shoes with high heels put extra pressure on the ends of the bones. When the bones are squeezed together, they pinch the nerve that runs between them.  Symptoms  The most common symptom of a neuroma is pain in the ball of the foot between two toes. The pain may be dull or sharp. It may feel as if you have a stone in your shoe. You may also have tingling or numbness in one or both of the toes. Symptoms may occur after you have been walking or standing for a while. Taking off your shoes and rubbing the ball of your foot may decrease or relieve the pain.  Preventing future problems  To prevent a future neuroma, buy shoes with plenty of room across the ball of the foot and in the toes. This keeps the bones from being squeezed together. Wearing low-heeled shoes (less than 2 inches) also puts less pressure on the bones and nerves in the ball of the foot.   Date Last Reviewed: 9/10/2015  © 4898-6482 [employer]. [address] [place] [address], [place] [address]. All rights reserved. This information is not intended as a substitute for professional medical care. Always follow your healthcare professional's instructions.    Abarca's Neuroma (Intermetatarsal Neuroma)        What Is a Neuroma?    A neuroma is a  thickening of nerve tissue that may develop in various parts of the body. The most common neuroma in the foot is a Abarcas neuroma, which occurs between the third and fourth toes. It is sometimes referred to as an intermetatarsal neuroma. Intermetatarsal describes its location in the ball of the foot between the metatarsal bones. Neuromas may also occur in other locations in the foot.    Abarca's NeuromaThe thickening of the nerve that defines a neuroma is the result of compression and irritation of the nerve. This compression creates enlargement of the nerve, eventually leading to permanent nerve damage.    Causes  Anything that causes compression or irritation of the nerve can lead to the development of a neuroma. One of the most common offenders is wearing shoes that have a tapered toe box or high-heeled shoes that cause the toes to be forced into the toe box. People with certain foot deformities--bunions, hammertoes, flatfeet or more flexible feet--are at higher risk for developing a neuroma. Other potential causes are activities that involve repetitive irritation to the ball of the foot, such as running or court sports. An injury or other type of trauma to the area may also lead to a neuroma.    Symptoms  If you have a Abarcas neuroma, you may have one or more of these symptoms where the nerve damage is occurring:    Tingling, burning or numbness  Pain  A feeling that something is inside the ball of the foot  A feeling that there is something in the shoe or a sock is bunched up     The progression of a Abarcas neuroma often follows this pattern:    The symptoms begin gradually. At first, they occur only occasionally when wearing narrow-toed shoes or performing certain aggravating activities.  The symptoms may go away temporarily by removing the shoe, massaging the foot or avoiding aggravating shoes or activities.  Over time, the symptoms progressively worsen and may persist for several days or weeks.  The  symptoms become more intense as the neuroma enlarges and the temporary changes in the nerve become permanent.     Diagnosis  To arrive at a diagnosis, the foot and ankle surgeon will obtain a thorough history of your symptoms and examine your foot. During the physical examination, the doctor attempts to reproduce your symptoms by manipulating your foot. Other tests or imaging studies may be performed.    The best time to see your foot and ankle surgeon is early in the development of symptoms. Early diagnosis of a Abarcas neuroma greatly lessens the need for more invasive treatments and may help you avoid surgery.    Nonsurgical Treatment  In developing a treatment plan, your foot and ankle surgeon will first determine how long you have had the neuroma and will evaluate its stage of development. Treatment approaches vary according to the severity of the problem.    For mild to moderate neuromas, treatment options may include:    -Padding. Padding techniques provide support for the metatarsal arch, thereby lessening the pressure on the nerve and decreasing the compression when walking.  -Icing. Placing an icepack on the affected area helps reduce swelling.  -Orthotic devices. Custom orthotic devices provided by your foot and ankle surgeon provide the support needed to reduce pressure and compression on the nerve.  -Activity modifications. Activities that put repetitive pressure on the neuroma should be avoided until the condition improves.  -Shoe modifications. Wear shoes with a wide toe box and avoid narrow-toed shoes or shoes with high heels.  -Medications. Oral nonsteroidal anti-inflammatory drugs (NSAIDs), such as ibuprofen, may be recommended to reduce pain and inflammation.  -Injection therapy. Treatment may include injections of cortisone, local anesthetics or other agents.     When Is Surgery Needed?  Surgery may be considered in patients who have not responded adequately to nonsurgical treatments. Your foot  and ankle surgeon will determine the approach that is best for your condition. The length of the recovery period will vary depending on the procedure performed.    Regardless of whether you have undergone surgical or nonsurgical treatment, your surgeon will recommend long-term measures to help keep your symptoms from returning. These include appropriate footwear and modification of activities to reduce the repetitive pressure on the foot.        Recommended OTC orthotics:  -powerstep  -superfeet    Recommended shoegear:  -new balance  -ascics  -naboro  -ramos        Equinus          What Is Equinus?    Equinus is a condition in which the upward bending motion of the ankle joint is limited. Someone with equinus lacks the flexibility to bring the top of the foot toward the front of the leg. Equinus can occur in one or both feet. When it involves both feet, the limitation of motion is sometimes worse in one foot than in the other.    People with equinus develop ways to compensate for their limited ankle motion, and this often leads to other foot, leg or back problems. The most common methods of compensation are flattening of the arch or picking up the heel early when walking, placing increased pressure on the ball of the foot. Other patients compensate by toe walking, while a smaller number take steps by bending abnormally at the hip or knee.    Causes  There are several possible causes for the limited range of ankle motion. Often, it is due to tightness in the Achilles tendon or calf muscles (the soleus muscle and/or gastrocnemius muscle). In some patients, this tightness is congenital (present at birth), and sometimes it is an inherited trait. Other patients acquire the tightness from being in a cast, being on crutches or frequently wearing high-heeled shoes. In addition, diabetes can affect the fibers of the Achilles tendon and cause tightness. Sometimes equinus is related to a bone blocking the ankle motion. For  example, a fragment of a broken bone following an ankle injury, or bone block, can get in the way and restrict motion. Equinus may also result from one leg being shorter than the other. Less often, equinus is caused by spasms in the calf muscle. These spasms may be signs of an underlying neurologic disorder.      Foot Problems Related to Equinus  Depending on how a patient compensates for the inability to bend properly at the ankle, a variety of foot conditions can develop, including:    Plantar fasciitis (arch/heel pain)  Calf cramping  Tendonitis (inflammation in the Achilles tendon)  Metatarsalgia (pain and/or callusing on the ball of the foot)  Flatfoot  Arthritis of the midfoot (middle area of the foot)  Pressure sores on the ball of the foot or the arch  Bunions and hammertoes  Ankle pain  Shin splints     Diagnosis  Most patients with equinus are unaware they have this condition when they first visit the doctor. Instead, they come to the doctor seeking relief for foot problems associated with equinus.    To diagnose equinus, the foot and ankle surgeon will evaluate the ankle's range of motion when the knee is flexed (bent) as well as extended (straightened). This enables the surgeon to identify whether the tendon or muscle is tight and to assess whether bone is interfering with ankle motion. X-rays may also be ordered. In some cases, the foot and ankle surgeon may refer the patient for neurologic evaluation.    Nonsurgical Treatment  Treatment includes strategies aimed at relieving the symptoms and conditions associated with equinus. In addition, the patient is treated for the equinus itself through one or more of the following options:    Night splint. The foot may be placed in a splint at night to keep it in a position that helps reduce tightness of the calf muscle.  Heel lifts. Placing heel lifts inside the shoes or wearing shoes with a moderate heel takes stress off the Achilles tendon when walking and  may reduce symptoms.  Arch supports or orthotic devices. Custom orthotic devices that fit into the shoe are often prescribed to keep weight distributed properly and to help control muscle/tendon imbalance.  Physical therapy. To help remedy muscle tightness, exercises that stretch the calf muscle(s) are recommended.     When Is Surgery Needed?  In some cases, surgery may be needed to correct the cause of equinus if it is related to a tight tendon or a bone blocking the ankle motion. The foot and ankle surgeon will determine the type of procedure that is best suited to the individual patient.
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
## 2023-12-29 DIAGNOSIS — F90.0 ATTENTION DEFICIT HYPERACTIVITY DISORDER (ADHD), PREDOMINANTLY INATTENTIVE TYPE: ICD-10-CM

## 2023-12-29 NOTE — TELEPHONE ENCOUNTER
Care Due:                  Date            Visit Type   Department     Provider  --------------------------------------------------------------------------------                                ESTABLISHED                              PATIENT -    [facility]  Last Visit: 10-      [facility]           Cielo Mina                              ESTABLISHED                              PATIENT -    [facility]  Next Visit: 03-      [facility]           Cielo Mina                                                            Last  Test          Frequency    Reason                     Performed    Due Date  --------------------------------------------------------------------------------    TSH.........  12 months..  levothyroxine............  03-   03-    [facility] Embedded Care Due Messages. Reference number: [id number].   12/29/2023 2:27:40 PM CST

## 2024-01-02 RX ORDER — DEXTROAMPHETAMINE SACCHARATE, AMPHETAMINE ASPARTATE, DEXTROAMPHETAMINE SULFATE AND AMPHETAMINE SULFATE 2.5; 2.5; 2.5; 2.5 MG/1; MG/1; MG/1; MG/1
1 TABLET ORAL DAILY
Qty: 30 TABLET | Refills: 0 | Status: SHIPPED | OUTPATIENT
Start: 2024-01-02 | End: 2024-02-02 | Stop reason: SDUPTHER

## 2024-01-02 RX ORDER — DEXTROAMPHETAMINE SACCHARATE, AMPHETAMINE ASPARTATE MONOHYDRATE, DEXTROAMPHETAMINE SULFATE AND AMPHETAMINE SULFATE 7.5; 7.5; 7.5; 7.5 MG/1; MG/1; MG/1; MG/1
30 CAPSULE, EXTENDED RELEASE ORAL EVERY MORNING
Qty: 30 CAPSULE | Refills: 0 | Status: SHIPPED | OUTPATIENT
Start: 2024-01-02 | End: 2024-02-02 | Stop reason: SDUPTHER

## 2024-01-03 ENCOUNTER — PATIENT MESSAGE (OUTPATIENT)
Dept: OBSTETRICS AND GYNECOLOGY | Facility: CLINIC | Age: 39
End: 2024-01-03
Payer: COMMERCIAL

## 2024-01-03 DIAGNOSIS — R30.0 DYSURIA: Primary | ICD-10-CM

## 2024-01-03 RX ORDER — NITROFURANTOIN 25; 75 MG/1; MG/1
100 CAPSULE ORAL 2 TIMES DAILY
Qty: 14 CAPSULE | Refills: 0 | Status: SHIPPED | OUTPATIENT
Start: 2024-01-03 | End: 2024-01-11

## 2024-01-09 ENCOUNTER — PATIENT MESSAGE (OUTPATIENT)
Dept: RHEUMATOLOGY | Facility: CLINIC | Age: 39
End: 2024-01-09
Payer: COMMERCIAL

## 2024-01-10 ENCOUNTER — OFFICE VISIT (OUTPATIENT)
Dept: RHEUMATOLOGY | Facility: CLINIC | Age: 39
End: 2024-01-10
Payer: COMMERCIAL

## 2024-01-10 DIAGNOSIS — M06.9 RHEUMATOID ARTHRITIS FLARE: Primary | ICD-10-CM

## 2024-01-10 PROCEDURE — 99215 OFFICE O/P EST HI 40 MIN: CPT | Mod: 95,,, | Performed by: INTERNAL MEDICINE

## 2024-01-10 RX ORDER — ADALIMUMAB 40MG/0.4ML
40 KIT SUBCUTANEOUS
Qty: 2 PEN | Refills: 11 | Status: ACTIVE | OUTPATIENT
Start: 2024-01-10 | End: 2025-01-09

## 2024-01-10 NOTE — PROGRESS NOTES
Subjective:       Patient ID: Kan Traore is a 37 y.o. female.    Chief Complaint: Disease Management    HPI 37 year old with F with PMH of anxiety,asthma, nephrolithiasis here for evaluation.She saw Dr. Taylor in [place] who diagnosed her with RA.   She has been dealing with pain since mid March.She gets pain in elbows, shoulders, wrists,feet,and ankles. Pain level van be as high as 8/10. She gets swelling in right elbow. She gets swelling in hands.  She used to get swelling in ankles but it has improved. She has stiffness in lower back and shoulders.  Sometimes, she has trouble gripping things with left hand.  Denies any rashes.Denies dry eyes or dry mouth. Denies oral ulcers. She reports hair loss for a year. She has had 3 kids. Denies history of pre-eclampsia.  She has 1 miscarriage around 9 weeks. She had covid Jan 2020.  Denies photosensitivity or pleurisy.She denies smoking. She is taking plaquenil one q day. She took course of steroids and it did help her with her pain.  She took MTX for a month. She is taking folic acid 1 mg a day.       Family hx: grandmother: RA      Interval hx: she is taking MTX 8  pills once a week and folic acid.  She continues to have pain in both hands.  On occasion, she has pain in her feet.  Reports swelling in right hand. She is stiff in morning for an hour.     Past Medical History:   Diagnosis Date    Allergic rhinitis     Anxiety     Asthma     Attention deficit disorder (ADD)     Bilateral nephrolithiasis     Depression     Hydronephrosis     Hyperglycemia 3/9/2021    - glucose 131 on recent labs - patient was nonfasting Lab Results Component Value Date  HGBA1C 5.1 06/10/2021      Urinary tract infection     Vaginal infection        Review of Systems   Constitutional: Negative for activity change, appetite change, chills, diaphoresis and fatigue.   HENT: Negative for congestion, ear discharge, ear pain, facial swelling, mouth sores, sinus pressure, sneezing, sore  "throat, tinnitus and trouble swallowing.    Eyes: Negative for photophobia, pain, discharge, redness, itching and visual disturbance.   Respiratory: Negative for apnea, chest tightness, shortness of breath, wheezing and stridor.    Cardiovascular: Negative for leg swelling.   Gastrointestinal: Negative for abdominal distention, abdominal pain, anal bleeding, blood in stool, constipation, diarrhea and nausea.   Endocrine: Negative for cold intolerance and heat intolerance.   Genitourinary: Negative for difficulty urinating and dysuria.   Musculoskeletal: Positive for arthralgias and joint swelling. Negative for back pain, gait problem, myalgias, neck pain and neck stiffness.   Skin: Negative for color change, pallor, rash and wound.   Neurological: Negative for dizziness, seizures, light-headedness and numbness.   Hematological: Negative for adenopathy. Does not bruise/bleed easily.   Psychiatric/Behavioral: Negative for sleep disturbance. The patient is not nervous/anxious.          Objective:   /71   Pulse 105   Ht 5' 6" (1.676 m)   Wt 59.4 kg (131 lb)   BMI 21.14 kg/m²      Physical Exam   Constitutional: She is oriented to person, place, and time.   HENT:   Head: Normocephalic and atraumatic.   Right Ear: External ear normal.   Left Ear: External ear normal.   Nose: Nose normal.   Mouth/Throat: Oropharynx is clear and moist. No oropharyngeal exudate.   Eyes: Pupils are equal, round, and reactive to light. Conjunctivae are normal. Right eye exhibits no discharge. Left eye exhibits no discharge. No scleral icterus.   Neck: No JVD present. No thyromegaly present.   Cardiovascular: Normal rate, regular rhythm and normal heart sounds. Exam reveals no gallop and no friction rub.   No murmur heard.  Pulmonary/Chest: Effort normal and breath sounds normal. No respiratory distress. She has no wheezes. She has no rales. She exhibits no tenderness.   Abdominal: Soft. Bowel sounds are normal. She exhibits no " distension and no mass. There is no abdominal tenderness. There is no rebound and no guarding.   Musculoskeletal:         General: Swelling and tenderness present.      Cervical back: Neck supple.   Lymphadenopathy:     She has no cervical adenopathy.   Neurological: She is alert and oriented to person, place, and time. No cranial nerve deficit. Gait normal. Coordination normal.   Skin: Skin is dry. No rash noted. No erythema. No pallor.   Tenderness of wrists, mcps  Tenderness of mtps   Psychiatric: Affect and judgment normal.          Labs: reviewed    No data to display     Assessment:     38 year old with F with PMH of anxiety,asthma, nephrolithiasis here to establish care for RA.  She saw Dr. Taylor once and transferred to me. Her labs and clinical picture are consistent with seropositive RA.  She has +PADMINI and SSA but denies sicca symptoms. She is still having synovitis on MTX 8 pills once a week so will add Humira.  She currently has +Covid and + flu so she is to hold MTX and not start Humira until she has recovered fully from cold.  1. Rheumatoid factor positive    2. Cyclic citrullinated peptide (CCP) antibody positive            Plan:            -continue MTX  8 pills once a week   -continue folic acid 3 mg poq day   Start Humira 40mg sub q every 14 days (Risks of TNF inhibitor discussed with patient and not limited to cell count abnormalities, malignancy, allergic  reaction to medication, and increased risk of infection. Patient agrees with starting medication. )  Labs in a month *    Continue mobic 15 mg poq day  Labs    #hypothyroidism: not controlled. Following up with pcp      The patient location is: home  The chief complaint leading to consultation is: joint pain    Visit type: audiovisual    Face to Face time with patient: 40   minutes of total time spent on the encounter, which includes face to face time and non-face to face time preparing to see the patient (eg, review of tests), Obtaining and/or  reviewing separately obtained history, Documenting clinical information in the electronic or other health record, Independently interpreting results (not separately reported) and communicating results to the patient/family/caregiver, or Care coordination (not separately reported).         Each patient to whom he or she provides medical services by telemedicine is:  (1) informed of the relationship between the physician and patient and the respective role of any other health care provider with respect to management of the patient; and (2) notified that he or she may decline to receive medical services by telemedicine and may withdraw from such care at any time.    Notes:

## 2024-01-10 NOTE — PROGRESS NOTES
1/10/2024     2:03 PM   Rapid3 Question Responses and Scores   MDHAQ Score 0   Psychologic Score 0   Pain Score 2   When you awakened in the morning OVER THE LAST WEEK, did you feel stiff? Yes   If Yes, please indicate the number of hours until you are as limber as you will be for the day 2   Fatigue Score 1   Global Health Score 3   RAPID3 Score 1.67     Answers submitted by the patient for this visit:  Rheumatology Questionnaire (Submitted on 1/10/2024)  fever: No  eye redness: No  mouth sores: No  headaches: Yes  shortness of breath: No  chest pain: No  trouble swallowing: Yes  diarrhea: No  constipation: No  unexpected weight change: No  genital sore: No  dysuria: No  During the last 3 days, have you had a skin rash?: No  Bruises or bleeds easily: No  cough: Yes

## 2024-01-21 ENCOUNTER — PATIENT MESSAGE (OUTPATIENT)
Dept: PODIATRY | Facility: CLINIC | Age: 39
End: 2024-01-21
Payer: COMMERCIAL

## 2024-01-22 PROBLEM — G57.81 NEUROMA OF THIRD INTERSPACE OF RIGHT FOOT: Status: ACTIVE | Noted: 2024-01-22

## 2024-01-22 PROBLEM — Z78.9 IMPAIRED MOBILITY AND ACTIVITIES OF DAILY LIVING: Status: ACTIVE | Noted: 2024-01-22

## 2024-01-22 PROBLEM — M77.41 METATARSALGIA OF RIGHT FOOT: Status: ACTIVE | Noted: 2024-01-22

## 2024-01-22 PROBLEM — M24.571 EQUINUS CONTRACTURE OF RIGHT ANKLE: Status: ACTIVE | Noted: 2024-01-22

## 2024-01-22 PROBLEM — Z74.09 IMPAIRED MOBILITY AND ACTIVITIES OF DAILY LIVING: Status: ACTIVE | Noted: 2024-01-22

## 2024-01-25 DIAGNOSIS — M06.9 RHEUMATOID ARTHRITIS FLARE: Primary | ICD-10-CM

## 2024-01-25 RX ORDER — ADALIMUMAB 40MG/0.4ML
40 KIT SUBCUTANEOUS
Qty: 1 PEN | Refills: 0 | Status: SHIPPED | OUTPATIENT
Start: 2024-01-25 | End: 2024-04-18

## 2024-01-29 DIAGNOSIS — R76.8 RHEUMATOID FACTOR POSITIVE: ICD-10-CM

## 2024-01-29 DIAGNOSIS — F90.0 ATTENTION DEFICIT HYPERACTIVITY DISORDER (ADHD), PREDOMINANTLY INATTENTIVE TYPE: ICD-10-CM

## 2024-01-29 RX ORDER — DEXTROAMPHETAMINE SACCHARATE, AMPHETAMINE ASPARTATE, DEXTROAMPHETAMINE SULFATE AND AMPHETAMINE SULFATE 2.5; 2.5; 2.5; 2.5 MG/1; MG/1; MG/1; MG/1
1 TABLET ORAL DAILY
Qty: 30 TABLET | Refills: 0 | OUTPATIENT
Start: 2024-01-29

## 2024-01-29 RX ORDER — DEXTROAMPHETAMINE SACCHARATE, AMPHETAMINE ASPARTATE MONOHYDRATE, DEXTROAMPHETAMINE SULFATE AND AMPHETAMINE SULFATE 7.5; 7.5; 7.5; 7.5 MG/1; MG/1; MG/1; MG/1
30 CAPSULE, EXTENDED RELEASE ORAL EVERY MORNING
Qty: 30 CAPSULE | Refills: 0 | OUTPATIENT
Start: 2024-01-29 | End: 2024-02-28

## 2024-01-29 NOTE — TELEPHONE ENCOUNTER
Refill Encounter    PCP Visits: Recent Visits  Date Type Provider Dept   10/02/23 Office Visit Cielo Mina,  [facility]   06/29/23 Office Visit Cielo Mina, DO [facility]   03/29/23 Office Visit Cielo Mina, DO [facility]   Showing recent visits within past 360 days and meeting all other requirements  Future Appointments  Date Type Provider Dept   03/04/24 Appointment Cielo Mina,  [facility]   Showing future appointments within next 720 days and meeting all other requirements     Last 3 Blood Pressure:   BP Readings from Last 3 Encounters:   11/13/23 (!) 140/98   10/20/23 124/85   06/29/23 129/78     Preferred Pharmacy:   [facility]  [address] [address]  [place] [id number]  Phone: [phone] Fax: [phone]    Pharmacy [facility],  [place] [address] [address]  Phone: [phone] Fax: [phone]    Requested RX:  Requested Prescriptions     Pending Prescriptions Disp Refills    dextroamphetamine-amphetamine (ADDERALL XR) 30 MG 24 hr capsule 30 capsule 0     Sig: Take 1 capsule (30 mg total) by mouth every morning.    dextroamphetamine-amphetamine (ADDERALL) 10 mg Tab 30 tablet 0     Sig: Take 1 tablet (10 mg total) by mouth once daily.      RX Route: Normal

## 2024-01-29 NOTE — TELEPHONE ENCOUNTER
No care due was identified.  [facility] Embedded Care Due Messages. Reference number: [id number].   1/29/2024 10:10:45 AM CST

## 2024-01-30 RX ORDER — METHOTREXATE 2.5 MG/1
20 TABLET ORAL
Qty: 96 TABLET | Refills: 0 | Status: SHIPPED | OUTPATIENT
Start: 2024-01-30 | End: 2024-05-22 | Stop reason: SDUPTHER

## 2024-02-01 ENCOUNTER — TELEPHONE (OUTPATIENT)
Dept: PODIATRY | Facility: CLINIC | Age: 39
End: 2024-02-01
Payer: COMMERCIAL

## 2024-02-01 DIAGNOSIS — F90.0 ATTENTION DEFICIT HYPERACTIVITY DISORDER (ADHD), PREDOMINANTLY INATTENTIVE TYPE: ICD-10-CM

## 2024-02-01 RX ORDER — DEXTROAMPHETAMINE SACCHARATE, AMPHETAMINE ASPARTATE, DEXTROAMPHETAMINE SULFATE AND AMPHETAMINE SULFATE 2.5; 2.5; 2.5; 2.5 MG/1; MG/1; MG/1; MG/1
1 TABLET ORAL DAILY
Qty: 30 TABLET | Refills: 0 | OUTPATIENT
Start: 2024-02-01

## 2024-02-01 RX ORDER — DEXTROAMPHETAMINE SACCHARATE, AMPHETAMINE ASPARTATE MONOHYDRATE, DEXTROAMPHETAMINE SULFATE AND AMPHETAMINE SULFATE 7.5; 7.5; 7.5; 7.5 MG/1; MG/1; MG/1; MG/1
30 CAPSULE, EXTENDED RELEASE ORAL EVERY MORNING
Qty: 30 CAPSULE | Refills: 0 | OUTPATIENT
Start: 2024-02-01 | End: 2024-03-02

## 2024-02-01 NOTE — TELEPHONE ENCOUNTER
Refill Encounter    PCP Visits: Recent Visits  Date Type Provider Dept   10/02/23 Office Visit Cielo Mina,  [facility]   06/29/23 Office Visit Cielo Mina, DO [facility]   03/29/23 Office Visit Cielo Mina, DO [facility]   Showing recent visits within past 360 days and meeting all other requirements  Future Appointments  Date Type Provider Dept   03/04/24 Appointment Cielo Mina,  [facility]   Showing future appointments within next 720 days and meeting all other requirements     Last 3 Blood Pressure:   BP Readings from Last 3 Encounters:   11/13/23 (!) 140/98   10/20/23 124/85   06/29/23 129/78     Preferred Pharmacy:   [facility]  [address] [address]  [place] [address]  Phone: [phone] Fax: [phone]    Pharmacy [facility],  [place] [address] [address]  Phone: [phone] Fax: [phone]    Requested RX:  Requested Prescriptions     Pending Prescriptions Disp Refills    dextroamphetamine-amphetamine (ADDERALL XR) 30 MG 24 hr capsule 30 capsule 0     Sig: Take 1 capsule (30 mg total) by mouth every morning.    dextroamphetamine-amphetamine (ADDERALL) 10 mg Tab 30 tablet 0     Sig: Take 1 tablet (10 mg total) by mouth once daily.      RX Route: Normal

## 2024-02-01 NOTE — TELEPHONE ENCOUNTER
----- Message from Yonatan Douglass Jr., DPM sent at 2/1/2024  2:56 PM CST -----  Lets please get this patient an apt next week to discuss MRI results.     Thanks    RM      ----- Message -----  From: Interface, Rad Results In  Sent: 1/26/2024   4:40 PM CST  To: Yonatan Douglass Jr., DPM

## 2024-02-01 NOTE — TELEPHONE ENCOUNTER
No care due was identified.  Health [facility] Embedded Care Due Messages. Reference number: [id number].   2/01/2024 10:57:10 AM CST

## 2024-02-02 ENCOUNTER — PATIENT MESSAGE (OUTPATIENT)
Dept: PRIMARY CARE CLINIC | Facility: CLINIC | Age: 39
End: 2024-02-02
Payer: COMMERCIAL

## 2024-02-02 DIAGNOSIS — F90.0 ATTENTION DEFICIT HYPERACTIVITY DISORDER (ADHD), PREDOMINANTLY INATTENTIVE TYPE: ICD-10-CM

## 2024-02-02 RX ORDER — DEXTROAMPHETAMINE SACCHARATE, AMPHETAMINE ASPARTATE MONOHYDRATE, DEXTROAMPHETAMINE SULFATE AND AMPHETAMINE SULFATE 7.5; 7.5; 7.5; 7.5 MG/1; MG/1; MG/1; MG/1
30 CAPSULE, EXTENDED RELEASE ORAL EVERY MORNING
Qty: 30 CAPSULE | Refills: 0 | Status: SHIPPED | OUTPATIENT
Start: 2024-02-02 | End: 2024-02-05 | Stop reason: SDUPTHER

## 2024-02-02 RX ORDER — DEXTROAMPHETAMINE SACCHARATE, AMPHETAMINE ASPARTATE, DEXTROAMPHETAMINE SULFATE AND AMPHETAMINE SULFATE 2.5; 2.5; 2.5; 2.5 MG/1; MG/1; MG/1; MG/1
1 TABLET ORAL DAILY
Qty: 30 TABLET | Refills: 0 | Status: SHIPPED | OUTPATIENT
Start: 2024-02-02 | End: 2024-02-12 | Stop reason: SDUPTHER

## 2024-02-02 NOTE — TELEPHONE ENCOUNTER
No care due was identified.  Health [facility] Embedded Care Due Messages. Reference number: [id number].   2/02/2024 9:42:52 AM CST

## 2024-02-02 NOTE — TELEPHONE ENCOUNTER
Pt is scheduled to see  on 3/4.   You recently visited our office for care. Thank you for entrusting our team with your care. Following your appointment, you may receive a survey. We’d sincerely appreciate you taking the time to complete and return the survey, should you receive one. We value your input and closely monitor our survey results to insure we deliver the best care you, and all our patients, expect and deserve. Again, thank you for choosing our team for your care.    If we need to contact you regarding any test results, we will make 2 attempts to reach you at the number you have listed during your office visit today.  If we are unable to reach you, a letter with your results and any further instructions will be mailed to you home.    Please do not hesitate to call our office with any questions or concerns at Dept: [phone].

## 2024-02-05 RX ORDER — DEXTROAMPHETAMINE SACCHARATE, AMPHETAMINE ASPARTATE MONOHYDRATE, DEXTROAMPHETAMINE SULFATE AND AMPHETAMINE SULFATE 7.5; 7.5; 7.5; 7.5 MG/1; MG/1; MG/1; MG/1
30 CAPSULE, EXTENDED RELEASE ORAL EVERY MORNING
Qty: 10 CAPSULE | Refills: 0 | Status: SHIPPED | OUTPATIENT
Start: 2024-02-05 | End: 2024-02-12 | Stop reason: SDUPTHER

## 2024-02-07 ENCOUNTER — OFFICE VISIT (OUTPATIENT)
Dept: PODIATRY | Facility: CLINIC | Age: 39
End: 2024-02-07
Payer: COMMERCIAL

## 2024-02-07 ENCOUNTER — PATIENT MESSAGE (OUTPATIENT)
Dept: PODIATRY | Facility: CLINIC | Age: 39
End: 2024-02-07

## 2024-02-07 VITALS — WEIGHT: 127 LBS | RESPIRATION RATE: 18 BRPM | BODY MASS INDEX: 20.41 KG/M2 | HEIGHT: 66 IN

## 2024-02-07 DIAGNOSIS — G57.81 NEUROMA OF THIRD INTERSPACE OF RIGHT FOOT: Primary | ICD-10-CM

## 2024-02-07 DIAGNOSIS — M77.41 METATARSALGIA, RIGHT FOOT: ICD-10-CM

## 2024-02-07 DIAGNOSIS — M77.51 BURSITIS OF INTERMETATARSAL BURSA OF RIGHT FOOT: ICD-10-CM

## 2024-02-07 DIAGNOSIS — M24.573 EQUINUS CONTRACTURE OF ANKLE: ICD-10-CM

## 2024-02-07 PROCEDURE — 3008F BODY MASS INDEX DOCD: CPT | Mod: CPTII,S$GLB,, | Performed by: PODIATRIST

## 2024-02-07 PROCEDURE — 1159F MED LIST DOCD IN RCRD: CPT | Mod: CPTII,S$GLB,, | Performed by: PODIATRIST

## 2024-02-07 PROCEDURE — 99999 PR PBB SHADOW E&M-EST. PATIENT-LVL IV: CPT | Mod: PBBFAC,,, | Performed by: PODIATRIST

## 2024-02-07 PROCEDURE — 99213 OFFICE O/P EST LOW 20 MIN: CPT | Mod: S$GLB,,, | Performed by: PODIATRIST

## 2024-02-07 PROCEDURE — 1160F RVW MEDS BY RX/DR IN RCRD: CPT | Mod: CPTII,S$GLB,, | Performed by: PODIATRIST

## 2024-02-07 NOTE — PATIENT INSTRUCTIONS
What Are Neuromas of the Foot?  The ball of your foot is the bottom part just behind your toes. Bands of tissue (ligaments) connect the bones in the ball of your foot. Nerves run between the bones and underneath the ligaments. When a nerve becomes pinched, this causes it to swell and become painful due to the thickening of the tissue that surrounds the nerve. The painful, swollen nerve is called a neuroma (also called Abarca's neuroma).     A neuroma most often occurs at the base of either the third and fourth toes or the second and third toes.   What causes a neuroma?  Wearing tight or high-heeled shoes can cause a neuroma. Shoes that are too narrow or too pointed squeeze the bones in the ball of the foot. Shoes with high heels put extra pressure on the ends of the bones. When the bones are squeezed together, they pinch the nerve that runs between them.  Symptoms  The most common symptom of a neuroma is pain in the ball of the foot between two toes. The pain may be dull or sharp. It may feel as if you have a stone in your shoe. You may also have tingling or numbness in one or both of the toes. Symptoms may occur after you have been walking or standing for a while. Taking off your shoes and rubbing the ball of your foot may decrease or relieve the pain.  Preventing future problems  To prevent a future neuroma, buy shoes with plenty of room across the ball of the foot and in the toes. This keeps the bones from being squeezed together. Wearing low-heeled shoes (less than 2 inches) also puts less pressure on the bones and nerves in the ball of the foot.   Date Last Reviewed: 9/10/2015  © 0394-9903 [employer]. [address] [place] [address], [place] [address]. All rights reserved. This information is not intended as a substitute for professional medical care. Always follow your healthcare professional's instructions.    Abarca's Neuroma (Intermetatarsal Neuroma)        What Is a Neuroma?    A neuroma is a  thickening of nerve tissue that may develop in various parts of the body. The most common neuroma in the foot is a Abarcas neuroma, which occurs between the third and fourth toes. It is sometimes referred to as an intermetatarsal neuroma. Intermetatarsal describes its location in the ball of the foot between the metatarsal bones. Neuromas may also occur in other locations in the foot.    Abarca's NeuromaThe thickening of the nerve that defines a neuroma is the result of compression and irritation of the nerve. This compression creates enlargement of the nerve, eventually leading to permanent nerve damage.    Causes  Anything that causes compression or irritation of the nerve can lead to the development of a neuroma. One of the most common offenders is wearing shoes that have a tapered toe box or high-heeled shoes that cause the toes to be forced into the toe box. People with certain foot deformities--bunions, hammertoes, flatfeet or more flexible feet--are at higher risk for developing a neuroma. Other potential causes are activities that involve repetitive irritation to the ball of the foot, such as running or court sports. An injury or other type of trauma to the area may also lead to a neuroma.    Symptoms  If you have a Abarcas neuroma, you may have one or more of these symptoms where the nerve damage is occurring:    Tingling, burning or numbness  Pain  A feeling that something is inside the ball of the foot  A feeling that there is something in the shoe or a sock is bunched up     The progression of a Abarcas neuroma often follows this pattern:    The symptoms begin gradually. At first, they occur only occasionally when wearing narrow-toed shoes or performing certain aggravating activities.  The symptoms may go away temporarily by removing the shoe, massaging the foot or avoiding aggravating shoes or activities.  Over time, the symptoms progressively worsen and may persist for several days or weeks.  The  symptoms become more intense as the neuroma enlarges and the temporary changes in the nerve become permanent.     Diagnosis  To arrive at a diagnosis, the foot and ankle surgeon will obtain a thorough history of your symptoms and examine your foot. During the physical examination, the doctor attempts to reproduce your symptoms by manipulating your foot. Other tests or imaging studies may be performed.    The best time to see your foot and ankle surgeon is early in the development of symptoms. Early diagnosis of a Abarcas neuroma greatly lessens the need for more invasive treatments and may help you avoid surgery.    Nonsurgical Treatment  In developing a treatment plan, your foot and ankle surgeon will first determine how long you have had the neuroma and will evaluate its stage of development. Treatment approaches vary according to the severity of the problem.    For mild to moderate neuromas, treatment options may include:    -Padding. Padding techniques provide support for the metatarsal arch, thereby lessening the pressure on the nerve and decreasing the compression when walking.  -Icing. Placing an icepack on the affected area helps reduce swelling.  -Orthotic devices. Custom orthotic devices provided by your foot and ankle surgeon provide the support needed to reduce pressure and compression on the nerve.  -Activity modifications. Activities that put repetitive pressure on the neuroma should be avoided until the condition improves.  -Shoe modifications. Wear shoes with a wide toe box and avoid narrow-toed shoes or shoes with high heels.  -Medications. Oral nonsteroidal anti-inflammatory drugs (NSAIDs), such as ibuprofen, may be recommended to reduce pain and inflammation.  -Injection therapy. Treatment may include injections of cortisone, local anesthetics or other agents.     When Is Surgery Needed?  Surgery may be considered in patients who have not responded adequately to nonsurgical treatments. Your foot  and ankle surgeon will determine the approach that is best for your condition. The length of the recovery period will vary depending on the procedure performed.    Regardless of whether you have undergone surgical or nonsurgical treatment, your surgeon will recommend long-term measures to help keep your symptoms from returning. These include appropriate footwear and modification of activities to reduce the repetitive pressure on the foot.        Recommended OTC orthotics:  -powerstep  -superfeet    Recommended shoegear:  -new balance  -ascics  -naboro  -ramos        Equinus          What Is Equinus?    Equinus is a condition in which the upward bending motion of the ankle joint is limited. Someone with equinus lacks the flexibility to bring the top of the foot toward the front of the leg. Equinus can occur in one or both feet. When it involves both feet, the limitation of motion is sometimes worse in one foot than in the other.    People with equinus develop ways to compensate for their limited ankle motion, and this often leads to other foot, leg or back problems. The most common methods of compensation are flattening of the arch or picking up the heel early when walking, placing increased pressure on the ball of the foot. Other patients compensate by toe walking, while a smaller number take steps by bending abnormally at the hip or knee.    Causes  There are several possible causes for the limited range of ankle motion. Often, it is due to tightness in the Achilles tendon or calf muscles (the soleus muscle and/or gastrocnemius muscle). In some patients, this tightness is congenital (present at birth), and sometimes it is an inherited trait. Other patients acquire the tightness from being in a cast, being on crutches or frequently wearing high-heeled shoes. In addition, diabetes can affect the fibers of the Achilles tendon and cause tightness. Sometimes equinus is related to a bone blocking the ankle motion. For  example, a fragment of a broken bone following an ankle injury, or bone block, can get in the way and restrict motion. Equinus may also result from one leg being shorter than the other. Less often, equinus is caused by spasms in the calf muscle. These spasms may be signs of an underlying neurologic disorder.      Foot Problems Related to Equinus  Depending on how a patient compensates for the inability to bend properly at the ankle, a variety of foot conditions can develop, including:    Plantar fasciitis (arch/heel pain)  Calf cramping  Tendonitis (inflammation in the Achilles tendon)  Metatarsalgia (pain and/or callusing on the ball of the foot)  Flatfoot  Arthritis of the midfoot (middle area of the foot)  Pressure sores on the ball of the foot or the arch  Bunions and hammertoes  Ankle pain  Shin splints     Diagnosis  Most patients with equinus are unaware they have this condition when they first visit the doctor. Instead, they come to the doctor seeking relief for foot problems associated with equinus.    To diagnose equinus, the foot and ankle surgeon will evaluate the ankle's range of motion when the knee is flexed (bent) as well as extended (straightened). This enables the surgeon to identify whether the tendon or muscle is tight and to assess whether bone is interfering with ankle motion. X-rays may also be ordered. In some cases, the foot and ankle surgeon may refer the patient for neurologic evaluation.    Nonsurgical Treatment  Treatment includes strategies aimed at relieving the symptoms and conditions associated with equinus. In addition, the patient is treated for the equinus itself through one or more of the following options:    Night splint. The foot may be placed in a splint at night to keep it in a position that helps reduce tightness of the calf muscle.  Heel lifts. Placing heel lifts inside the shoes or wearing shoes with a moderate heel takes stress off the Achilles tendon when walking and  may reduce symptoms.  Arch supports or orthotic devices. Custom orthotic devices that fit into the shoe are often prescribed to keep weight distributed properly and to help control muscle/tendon imbalance.  Physical therapy. To help remedy muscle tightness, exercises that stretch the calf muscle(s) are recommended.     When Is Surgery Needed?  In some cases, surgery may be needed to correct the cause of equinus if it is related to a tight tendon or a bone blocking the ankle motion. The foot and ankle surgeon will determine the type of procedure that is best suited to the individual patient.

## 2024-02-07 NOTE — PROGRESS NOTES
[facility]  [address]  [address]  [facility] [place] [address]  Dept: [phone]  Dept Fax: [phone]    Yonatan Douglass Jr., DPM     Assessment:   MDM    Coding  1. 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 Metatarsalgia, right foot        3. Bursitis of intermetatarsal bursa of right foot        4. Equinus contracture of ankle            Plan:     Procedures    Kan was seen today for foot pain.    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 All questions/concerns addressed  -all tx options discussed. All alternatives, risks, benefits of all txs discussed  -the patient was educated about the diagnosis  -pt has failed conservative care options possible including but not limited to shoe wear and/or padding, bracing/strapping, at home ROM, formal PT, medical therapy, injection therapy  -XR/imaging reviewed by me: agree with read  -MRI reviewed  -given failure of conservative measures, we discussed surgical intervention  -pt would benefit from operative intervention: we discussed the following procedures: GLORIA R 3rd IS + BMAC + possible OGR  -we discussed approx postop course  -would be out pt, elective surgery  -would be GA + block, supine position  -would need PCP clearance before proceeding  -labs and xr on way out  -potential DOS: pt will  call      -rxs dispensed: none  -referrals: none  -WB: wbat      Follow up if symptoms worsen or fail to improve.    Subjective:      Patient ID: Kan Traore is a 38 y.o. female.    Chief Complaint:   Chief Complaint   Patient presents with    Foot Pain     RIGHT        CC - foot pain: patient presents to the podiatry clinic  with complaint of  right foot pain. Onset of the symptoms was several months ago. Precipitating event: unk. Current symptoms include: ability to bear weight, but with some pain, william the ball of the foot, swelling and worsening symptoms after a period of activity, burning/numbness/tingling of toes. Aggravating factors: walking and certain shoegear. Symptoms have gradually worsened. Patient has had prior foot problems. Evaluation to date: none. Treatment to date: avoidance of offending activity. Patients rates pain 7/10 on pain scale.      2/7/24:  Hx as above. Pt failed conservative measures so MRI was ordered and obtained.     Toe Pain     Foot Pain        Last Podiatry Enc: Visit date not found  Last Enc w/ Me: 11/9/2017    Outside reports reviewed: historical medical records.  Family hx: as below  Past Medical History:   Diagnosis Date    Allergic rhinitis     Anxiety     Asthma     Attention deficit disorder (ADD)     Bilateral nephrolithiasis     Depression     Hydronephrosis     Hyperglycemia 3/9/2021    - glucose 131 on recent labs - patient was nonfasting Lab Results Component Value Date  HGBA1C 5.1 06/10/2021      Urinary tract infection     Vaginal infection      Past Surgical History:   Procedure Laterality Date    APPENDECTOMY  11/20/2017    DILATION AND CURETTAGE OF UTERUS  04/2014    KIDNEY STONE SURGERY      LITHOTRIPSY  2011    LITHOTRIPSY  02/09/2017    LOOP ELECTROSURGICAL EXCISION PROCEDURE (LEEP) N/A 10/02/2020    Procedure: LEEP (LOOP ELECTROSURGICAL EXCISION PROCEDURE);  Surgeon: Kacy Elias MD;  Location: [facility] OR;  Service: OB/GYN;  Laterality: N/A;    TONSILLECTOMY        Family History   Problem Relation Age of Onset    Hypertension Mother 50    Asthma Mother     Hypothyroidism Father 52    Stroke Maternal Grandfather     Hypertension Maternal Grandfather     Heart disease Maternal Grandfather 56         of AMI    Heart failure Paternal Grandmother 81    Rheum arthritis Paternal Grandmother     Arthritis Paternal Grandmother     No Known Problems Son     No Known Problems Son     No Known Problems Son     Breast cancer Neg Hx     Colon cancer Neg Hx     Ovarian cancer Neg Hx     Kidney disease Neg Hx      Current Outpatient Medications   Medication Sig Dispense Refill    adalimumab (HUMIRA,CF, PEN) 40 mg/0.4 mL PnKt Inject 0.4 mLs (40 mg total) into the skin every 14 (fourteen) days. 2 pen 11    adalimumab (HUMIRA,CF, PEN) 40 mg/0.4 mL PnKt Inject 0.4 mLs (40 mg total) into the skin every 14 (fourteen) days. 1 pen 0    citalopram (CELEXA) 20 MG tablet Take 1 tablet (20 mg total) by mouth once daily. 90 tablet 3    dextroamphetamine-amphetamine (ADDERALL XR) 30 MG 24 hr capsule Take 1 capsule (30 mg total) by mouth every morning for 10 days 10 capsule 0    dextroamphetamine-amphetamine (ADDERALL) 10 mg Tab Take 1 tablet (10 mg total) by mouth once daily. 30 tablet 0    folic acid (FOLVITE) 1 MG tablet Take 3 tablets (3 mg total) by mouth once daily. 90 tablet 11    levothyroxine (SYNTHROID) 50 MCG tablet Take 1 tablet (50 mcg total) by mouth before breakfast. 90 tablet 3    methotrexate 2.5 MG Tab Take 8 tablets (20 mg total) by mouth every 7 days. 96 tablet 0    norgestimate-ethinyl estradioL (SPRINTEC, 28,) 0.25-35 mg-mcg per tablet Take 1 tablet by mouth once daily. Take one active pill daily, skipping sugar pills 63 tablet 3    ondansetron (ZOFRAN-ODT) 4 MG TbDL Dissolve 1 tablet (4 mg total) by mouth every 8 (eight) hours as needed (nausea/migraine). 30 tablet 5    TURMERIC ORAL Take 1,500 Int'l Units by mouth.       No current facility-administered medications for this  visit.     Review of patient's allergies indicates:  No Known Allergies  Social History     Socioeconomic History    Marital status:     Number of children: 3   Occupational History     Employer: [employer]   Tobacco Use    Smoking status: Never     Passive exposure: Never    Smokeless tobacco: Never   Substance and Sexual Activity    Alcohol use: Yes     Alcohol/week: 1.0 standard drink of alcohol     Types: 1 Drinks containing 0.5 oz of alcohol per week     Comment: Socially    Drug use: No    Sexual activity: Yes     Partners: Male     Birth control/protection: Condom, Partner-Vasectomy, Other-see comments     Comment: Birth control pill   Social History Narrative     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        ROS    REVIEW OF SYSTEMS: Negative as documented below as well as positive findings in bold.       Constitutional  Respiratory  Gastrointestinal  Skin   - Fever - Cough - Heartburn - Rash   - Chills - Spit blood - Nausea - Itching   - Weight Loss - Shortness of breath - Vomiting - Nail pain   - Malaise/Fatigue - Wheezing - Abdominal Pain  Wound/Ulcer   - Weight Gain   - Blood in Stool  Poor wound healing       - Diarrhea          Cardiovascular  Genitourinary  Neurological  HEENT   - Chest Pain - Dysuria - Burning Sensation of feet - Headache   - Palpitations - Hematuria - Tingling / Paresthesia - Congestion   - Pain at night in legs - Flank Pain - Dizziness - Sore Throat   - Cramping   - Tremor - Blurred Vision   - Leg Swelling   - Sensory Change - Double Vision   - Dizzy when standing   - Speech Change - Eye Redness       - Focal Weakness - Dry Eyes       - Loss of Consciousness          Endocrine  Musculoskeletal  Psychiatric   - Cold intolerance - Muscle Pain - Depression   - Heat intolerance - Neck Pain - Insomnia   - Anemia - Joint Pain - Memory Loss   -  Easy bruising, bleeding - Heel pain - Anxiety      Toe Pain    "     Leg/Ankle/Foot Pain         Objective:     Resp 18   Ht 5' 6" (1.676 m)   Wt 57.6 kg (127 lb)   BMI 20.50 kg/m²   Vitals:    02/07/24 0944   Resp: 18   Weight: 57.6 kg (127 lb)   Height: 5' 6" (1.676 m)   PainSc: 0-No pain       Physical Exam    General Appearance:   Patient appears well developed, well nourished  Patient appears stated age    Psychiatric:   Patient is oriented to time, place, and person.  Patient has appropriate mood and affect    Neck:  Trachea Midline  No visible masses    Respiratory/Ears:  No distress or labored breathing.  Able to differentiate between normal talking voice and whisper.  Able to follow commands    Eyes:  Visual Acuity intact  Lids and conjunctivae normal. No discoloration noted.    Foot Exam  Physical Exam  Ortho Exam  Ortho/SPM Exam  Foot/Ankle Musculoskeletal Exam    R LE exam con't:  V:  DP 2/4, PT 2/4   CRT< 3s to all digits tested   Tibial and popliteal lymph nodes are w/o abnormality   Edema: absent, varicosities: absent    N:  Patient displays normal ankle reflexes   SILT in SP/DP/T/Zena/Saph distributions    Ortho: +Motor EHL/FHL/TA/GA   equinus deformity present  There is moderate pain with palpation of 3rd IS  There is is moderate pain at 3rd IS with lateral compression of metatarsal heads  Compartments soft/compressible. No pain on passive stretch of big toe. No calf  Pain.    Derm:  skin intact, skin warm and dry, skin without ulcers or lesions, skin without induration, nails normal, no erythema and no ecchymosis      Imaging / Labs:      MRI Foot (Forefoot) Right W W/O Contrast    Result Date: 1/26/2024  EXAMINATION: MRI FOOT (FOREFOOT) RIGHT W W/O CONTRAST CLINICAL HISTORY: Foot pain, chronic, metatarsalgia;  Metatarsalgia, right foot TECHNIQUE: MRI of the right forefoot performed before and after administration of 7.5 mL Gadavist intravenous contrast. COMPARISON: Radiographs 11/14/2023 FINDINGS: Bone marrow signal is maintained.  No fracture or " infiltrative process. There is a 1.9 x 0.6 x 2.4 cm T1 hypointense, T2 hyperintense mass between the 3rd and 4th MTP joints.  Mass exhibits heterogeneous, thick enhancement predominantly at the periphery.  Findings overall consistent with large Abarca's neuroma. Note made of moderate degenerative changes at the 5th MTP joint with subcortical cystic change.  Fibrous tissue seen beneath the 5th metatarsal head. Additionally, mild degenerative changes are seen at the hallux MTP joint. Lisfranc ligament is intact.  No evidence for acute ligamentous injury.     1. Large mass at the 3rd interspace, likely Abarca's neuroma. 2. Additional findings above. Electronically signed by: José Luis Almanza MD Date:    01/26/2024 Time:    16:37         Note: This was dictated using a computer transcription program. Although proofread, it may contain computer transcription errors and phonetic errors. Other human proofreading errors may also exist. Corrections may be performed at a later time. Please contact us for any clarification if needed.    Yonatan Douglass DPM  [facility]

## 2024-02-12 NOTE — PROGRESS NOTES
VIRTUAL VISIT/TELEMEDICINE VISIT  FAMILY MEDICINE  [facility]    The patient location is: [place]  The chief complaint leading to consultation is:   Chief Complaint   Patient presents with    ADHD     Visit type: Virtual visit with synchronous audio and video   Total time spent: 30 minute  Each patient to whom he or she provides medical services by telemedicine is:  (1) informed of the relationship between the physician and patient and the respective role of any other health care provider with respect to management of the patient; and (2) notified that he or she may decline to receive medical services by telemedicine and may withdraw from such care at any time.      Reason for visit:   Chief Complaint   Patient presents with    ADHD         SUBJECTIVE: Kan Traore is a 38 y.o. female  - with ADHD, mild intermittent asthma, depression, rheumatoid arthritis with positive rheumatoid factor , hypothyroidism, nephrolithiasis and anxiety presents for  ADHD and preoperative evaluation     Gynecology Dr. Kacy Elias MD  Dermatology Dr. Lulu Pak MD  Sports Medicine Dr. Brian Marcial  Rheumatology; Dr. Paiz  Optometry: Dr. Cesar Dukes, JUAN  Podiatry Yonatan Douglass Jr., DPJOSE    Kan Traore reports that since her last visit she has had persistent right foot pain.  She had an evaluation by Podiatry as well as an MRI that showed a right Abarca's neuroma.  She has been having progressive issues and has opted to have surgery.  She had a CMP done about 1 month ago with normal renal function.  She has a chest x-ray and EKG ordered by Podiatry.    1. Preoperative evaluation     Surgery:  Abarca's neuroma excision for Abarca's neuroma right foot  Surgeon: Yonatan Douglass Jr., DPM   Date of Surgery: 2/23/24  Hospital: [facility]   Anesthesia: general or MAC    Prior surgeries: yes  Reactions to anesthesia: denies  Prior post-operative complications: denies    2. ADHD predominantly  inattentive     Age diagnosed: 4 th grade  Diagnosed by: Psychiatrist  Initial evaluation present in chart: no    Kan Traore reports that she is doing well with her medication and denies any unwanted side effects.   reviewed.  She also reports that she has been doing well on her citalopram for anxiety and depression.     Past medications:  Vyvanse 30 mg (she was only on his medications secondary to Adderall supply chain issues; crash at end of day)  Reasons for changing past medications:  Availability     Current medications:   dextroamphetamine-amphetamine (ADDERALL XR) 30 MG 24 hr capsule, Take 1 capsule (30 mg total) by mouth every morning., Disp: 30 capsule, Rfl: 0  dextroamphetamine-amphetamine (ADDERALL) 10 mg Tab, Take 1 tablet (10 mg total) by mouth once daily., Disp: 30 tablet, Rfl: 0     reviewed: yes 2/5/24 and 2/2/24     Concerns with medication:  denies    AM medication: 7:30 AM Adderall XR daily  Lunch medication: denies   Afternoon medication: Adderall IR 10 mg PRN     Daily Activity:  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      HEALTH MAINTENANCE:   Health Maintenance   Topic Date Due    TETANUS VACCINE  07/30/2025    Hepatitis C Screening  Completed    Lipid Panel  Completed     Health Maintenance Topics with due status: Not Due       Topic Last Completion Date    TETANUS VACCINE 07/30/2015    Cervical Cancer Screening 10/20/2023     Health Maintenance Due   Topic Date Due    Pneumococcal Vaccines (Age 0-64) (1 of 2 - PCV) Never done    Influenza Vaccine (1) 09/01/2023    COVID-19 Vaccine (3 - 2023-24 season) 09/01/2023       HISTORY:   Past Medical History:   Diagnosis Date    Allergic rhinitis     Anxiety     Asthma     Attention deficit disorder (ADD)     Bilateral nephrolithiasis     Depression     Hydronephrosis     Hyperglycemia 03/09/2021    - glucose 131 on recent labs -  patient was nonfasting Lab Results Component Value Date  HGBA1C 5.1 06/10/2021      Rheumatoid arthritis, unspecified     Urinary tract infection     Vaginal infection        Past Surgical History:   Procedure Laterality Date    APPENDECTOMY  2017    DILATION AND CURETTAGE OF UTERUS  2014    KIDNEY STONE SURGERY      LITHOTRIPSY      LITHOTRIPSY  2017    LOOP ELECTROSURGICAL EXCISION PROCEDURE (LEEP) N/A 10/02/2020    Procedure: LEEP (LOOP ELECTROSURGICAL EXCISION PROCEDURE);  Surgeon: Kacy Elias MD;  Location: [facility] OR;  Service: OB/GYN;  Laterality: N/A;    TONSILLECTOMY         Family History   Problem Relation Age of Onset    Hypertension Mother 50    Asthma Mother     Hypothyroidism Father 52    Stroke Maternal Grandfather     Hypertension Maternal Grandfather     Heart disease Maternal Grandfather 56         of AMI    Heart failure Paternal Grandmother 81    Rheum arthritis Paternal Grandmother     Arthritis Paternal Grandmother     No Known Problems Son     No Known Problems Son     No Known Problems Son     Breast cancer Neg Hx     Colon cancer Neg Hx     Ovarian cancer Neg Hx     Kidney disease Neg Hx        Social History     Tobacco Use    Smoking status: Never     Passive exposure: Never    Smokeless tobacco: Never   Substance Use Topics    Alcohol use: Yes     Alcohol/week: 1.0 standard drink of alcohol     Types: 1 Drinks containing 0.5 oz of alcohol per week     Comment: Socially    Drug use: No       Social History     Social History Narrative     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        ALLERGIES:   Review of patient's allergies indicates:  No Known Allergies    MEDS:   Outpatient Medications as of 2024   Medication Sig Dispense Refill    adalimumab (HUMIRA,CF, PEN) 40 mg/0.4 mL PnKt Inject 0.4 mLs (40 mg total) into the skin every 14 (fourteen) days. 2 pen 11    adalimumab (HUMIRA,CF, PEN) 40 mg/0.4 mL PnKt  Inject 0.4 mLs (40 mg total) into the skin every 14 (fourteen) days. 1 pen 0    citalopram (CELEXA) 20 MG tablet Take 1 tablet (20 mg total) by mouth once daily. 90 tablet 3    [START ON 3/14/2024] dextroamphetamine-amphetamine (ADDERALL) 10 mg Tab Take 1 tablet (10 mg total) by mouth after lunch. 30 tablet 0    folic acid (FOLVITE) 1 MG tablet Take 3 tablets (3 mg total) by mouth once daily. 90 tablet 11    levothyroxine (SYNTHROID) 50 MCG tablet Take 1 tablet (50 mcg total) by mouth before breakfast. 90 tablet 3    methotrexate 2.5 MG Tab Take 8 tablets (20 mg total) by mouth every 7 days. 96 tablet 0    norgestimate-ethinyl estradioL (SPRINTEC, 28,) 0.25-35 mg-mcg per tablet Take 1 tablet by mouth once daily. Take one active pill daily, skipping sugar pills 63 tablet 3    ondansetron (ZOFRAN-ODT) 4 MG TbDL Dissolve 1 tablet (4 mg total) by mouth every 8 (eight) hours as needed (nausea/migraine). 30 tablet 5     No current facility-administered medications on file as of 2/14/2024.       Vital signs:   Vitals:    02/14/24 0946   BP: 110/76         There is no height or weight on file to calculate BMI.    PHYSICAL EXAM:     Physical Exam  Constitutional:       General: She is not in acute distress.  Pulmonary:      Effort: Pulmonary effort is normal. No respiratory distress.   Neurological:      Mental Status: She is alert.   Psychiatric:         Speech: Speech normal.             PERTINENT RESULTS:     BMP  Lab Results   Component Value Date     01/08/2024    K 4.3 01/08/2024     01/08/2024    CO2 24 01/08/2024    BUN 13 01/08/2024    CREATININE 0.60 01/08/2024    CALCIUM 9.1 01/08/2024    ANIONGAP 11 01/08/2024    EGFRNORACEVR >60.0 01/08/2024     ASSESSMENT/PLAN:    1. Preop examination  Overview:  Preoperative Assessment:  Cardiovascular risk assessment:  Non emergent surgery:   No active cardiopulmonary issues.    Surgery risk: low  Functional status: >4 Mets  Revised cardiac index: low  risk    Medically optimized. EKG and Chest Xray    Recommendations:  1. Okay to proceed with surgery as long as EKG and Chest Xray do not show any abnormalities. Please contact me if any abnormalities noted.         2. 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 Other specified hypothyroidism  Overview:  - family history of hypothyroidism (father)  Lab Results   Component Value Date    TSH 1.630 01/08/2024    FREET4 0.83 01/08/2024     - 06/10/2021 TPO normal, free T3 and free T4  - well controlled  - continue current management plan   - patient encouraged to notify me with any changes    Orders:  -     levothyroxine (SYNTHROID) 50 MCG tablet; Take 1 tablet (50 mcg total) by mouth before breakfast.  Dispense: 90 tablet; Refill: 3    4. Attention deficit hyperactivity disorder (ADHD), predominantly inattentive type  Overview:  -  reviewed  - well controlled  - continue current medications     Orders:  -     dextroamphetamine-amphetamine (ADDERALL) 10 mg Tab; Take 1 tablet (10 mg total) by mouth once daily.  Dispense: 30 tablet; Refill: 0  -     dextroamphetamine-amphetamine (ADDERALL XR) 30 MG 24 hr capsule; Take 1 capsule (30 mg total) by mouth every morning.  Dispense: 30 capsule; Refill: 0  -     dextroamphetamine-amphetamine (ADDERALL XR) 30 MG 24 hr capsule; Take 1 capsule (30 mg total) by mouth every morning.  Dispense: 30 capsule; Refill: 0  -     dextroamphetamine-amphetamine (ADDERALL) 10 mg Tab; Take 1 tablet (10 mg total) by mouth after lunch.  Dispense: 30 tablet; Refill: 0  -     dextroamphetamine-amphetamine (ADDERALL XR) 30 MG 24 hr capsule; Take 1 capsule (30 mg total) by mouth every morning.  Dispense: 30 capsule; Refill: 0  -     dextroamphetamine-amphetamine (ADDERALL) 10 mg Tab; Take 1 tablet (10 mg total) by mouth after lunch.  Dispense: 30 tablet; Refill: 0    5. Recurrent major depressive disorder, in full remission  Overview:  - well  controlled  - continue current management plan   - patient encouraged to notify me with any changes      6. Drug-induced immunodeficiency  Overview:  - currently on Humira for RA  - no recurrent infections      7. Rheumatoid arthritis involving multiple sites with positive rheumatoid factor  Overview:  - followed by Rheumatology  - stable            ORDERS:   Orders Placed This Encounter    dextroamphetamine-amphetamine (ADDERALL) 10 mg Tab    dextroamphetamine-amphetamine (ADDERALL XR) 30 MG 24 hr capsule    dextroamphetamine-amphetamine (ADDERALL XR) 30 MG 24 hr capsule    dextroamphetamine-amphetamine (ADDERALL) 10 mg Tab    dextroamphetamine-amphetamine (ADDERALL XR) 30 MG 24 hr capsule    dextroamphetamine-amphetamine (ADDERALL) 10 mg Tab    levothyroxine (SYNTHROID) 50 MCG tablet       Vaccines recommended: flu, Prevnar 20 and COVID-19 booster     Follow-up in 3 months ADHD or sooner if any concerns.      This note is dictated using the M*Modal Fluency Direct word recognition program. There are word recognition mistakes that are occasionally missed on review.    Dr. Cielo Mina D.O.   [facility]

## 2024-02-14 ENCOUNTER — OFFICE VISIT (OUTPATIENT)
Dept: PRIMARY CARE CLINIC | Facility: CLINIC | Age: 39
End: 2024-02-14
Attending: FAMILY MEDICINE
Payer: COMMERCIAL

## 2024-02-14 VITALS — DIASTOLIC BLOOD PRESSURE: 76 MMHG | SYSTOLIC BLOOD PRESSURE: 110 MMHG

## 2024-02-14 DIAGNOSIS — M05.79 RHEUMATOID ARTHRITIS INVOLVING MULTIPLE SITES WITH POSITIVE RHEUMATOID FACTOR: ICD-10-CM

## 2024-02-14 DIAGNOSIS — F90.0 ATTENTION DEFICIT HYPERACTIVITY DISORDER (ADHD), PREDOMINANTLY INATTENTIVE TYPE: ICD-10-CM

## 2024-02-14 DIAGNOSIS — Z01.818 PREOP EXAMINATION: Primary | ICD-10-CM

## 2024-02-14 DIAGNOSIS — Z79.899 DRUG-INDUCED IMMUNODEFICIENCY: ICD-10-CM

## 2024-02-14 DIAGNOSIS — F33.42 RECURRENT MAJOR DEPRESSIVE DISORDER, IN FULL REMISSION: ICD-10-CM

## 2024-02-14 DIAGNOSIS — G57.81 NEUROMA OF THIRD INTERSPACE OF RIGHT FOOT: ICD-10-CM

## 2024-02-14 DIAGNOSIS — E03.8 OTHER SPECIFIED HYPOTHYROIDISM: ICD-10-CM

## 2024-02-14 DIAGNOSIS — D84.821 DRUG-INDUCED IMMUNODEFICIENCY: ICD-10-CM

## 2024-02-14 PROCEDURE — 3074F SYST BP LT 130 MM HG: CPT | Mod: CPTII,95,, | Performed by: FAMILY MEDICINE

## 2024-02-14 PROCEDURE — 1160F RVW MEDS BY RX/DR IN RCRD: CPT | Mod: CPTII,95,, | Performed by: FAMILY MEDICINE

## 2024-02-14 PROCEDURE — 99214 OFFICE O/P EST MOD 30 MIN: CPT | Mod: 95,,, | Performed by: FAMILY MEDICINE

## 2024-02-14 PROCEDURE — 1159F MED LIST DOCD IN RCRD: CPT | Mod: CPTII,95,, | Performed by: FAMILY MEDICINE

## 2024-02-14 PROCEDURE — 3078F DIAST BP <80 MM HG: CPT | Mod: CPTII,95,, | Performed by: FAMILY MEDICINE

## 2024-02-14 RX ORDER — DEXTROAMPHETAMINE SACCHARATE, AMPHETAMINE ASPARTATE MONOHYDRATE, DEXTROAMPHETAMINE SULFATE AND AMPHETAMINE SULFATE 7.5; 7.5; 7.5; 7.5 MG/1; MG/1; MG/1; MG/1
30 CAPSULE, EXTENDED RELEASE ORAL EVERY MORNING
Qty: 30 CAPSULE | Refills: 0 | Status: SHIPPED | OUTPATIENT
Start: 2024-04-14 | End: 2024-05-14 | Stop reason: SDUPTHER

## 2024-02-14 RX ORDER — DEXTROAMPHETAMINE SACCHARATE, AMPHETAMINE ASPARTATE, DEXTROAMPHETAMINE SULFATE AND AMPHETAMINE SULFATE 2.5; 2.5; 2.5; 2.5 MG/1; MG/1; MG/1; MG/1
1 TABLET ORAL DAILY
Qty: 30 TABLET | Refills: 0 | Status: SHIPPED | OUTPATIENT
Start: 2024-02-14 | End: 2024-05-14 | Stop reason: SDUPTHER

## 2024-02-14 RX ORDER — DEXTROAMPHETAMINE SACCHARATE, AMPHETAMINE ASPARTATE MONOHYDRATE, DEXTROAMPHETAMINE SULFATE AND AMPHETAMINE SULFATE 7.5; 7.5; 7.5; 7.5 MG/1; MG/1; MG/1; MG/1
30 CAPSULE, EXTENDED RELEASE ORAL EVERY MORNING
Qty: 30 CAPSULE | Refills: 0 | Status: SHIPPED | OUTPATIENT
Start: 2024-02-14 | End: 2024-03-20 | Stop reason: SDUPTHER

## 2024-02-14 RX ORDER — LEVOTHYROXINE SODIUM 50 UG/1
50 TABLET ORAL
Qty: 90 TABLET | Refills: 3 | Status: SHIPPED | OUTPATIENT
Start: 2024-02-14 | End: 2025-02-13

## 2024-02-14 RX ORDER — DEXTROAMPHETAMINE SACCHARATE, AMPHETAMINE ASPARTATE, DEXTROAMPHETAMINE SULFATE AND AMPHETAMINE SULFATE 2.5; 2.5; 2.5; 2.5 MG/1; MG/1; MG/1; MG/1
1 TABLET ORAL
Qty: 30 TABLET | Refills: 0 | Status: SHIPPED | OUTPATIENT
Start: 2024-03-14 | End: 2024-05-14 | Stop reason: SDUPTHER

## 2024-02-14 RX ORDER — DEXTROAMPHETAMINE SACCHARATE, AMPHETAMINE ASPARTATE MONOHYDRATE, DEXTROAMPHETAMINE SULFATE AND AMPHETAMINE SULFATE 7.5; 7.5; 7.5; 7.5 MG/1; MG/1; MG/1; MG/1
30 CAPSULE, EXTENDED RELEASE ORAL EVERY MORNING
Qty: 30 CAPSULE | Refills: 0 | Status: SHIPPED | OUTPATIENT
Start: 2024-03-14 | End: 2024-05-14 | Stop reason: SDUPTHER

## 2024-02-14 RX ORDER — DEXTROAMPHETAMINE SACCHARATE, AMPHETAMINE ASPARTATE, DEXTROAMPHETAMINE SULFATE AND AMPHETAMINE SULFATE 2.5; 2.5; 2.5; 2.5 MG/1; MG/1; MG/1; MG/1
1 TABLET ORAL
Qty: 30 TABLET | Refills: 0 | Status: SHIPPED | OUTPATIENT
Start: 2024-04-14 | End: 2024-05-14 | Stop reason: SDUPTHER

## 2024-02-19 ENCOUNTER — OFFICE VISIT (OUTPATIENT)
Dept: PODIATRY | Facility: CLINIC | Age: 39
End: 2024-02-19
Payer: COMMERCIAL

## 2024-02-19 VITALS — HEIGHT: 66 IN | RESPIRATION RATE: 18 BRPM | WEIGHT: 127 LBS | BODY MASS INDEX: 20.41 KG/M2

## 2024-02-19 DIAGNOSIS — G57.81 NEUROMA OF THIRD INTERSPACE OF RIGHT FOOT: Primary | ICD-10-CM

## 2024-02-19 DIAGNOSIS — M77.41 METATARSALGIA, RIGHT FOOT: ICD-10-CM

## 2024-02-19 DIAGNOSIS — M77.51 BURSITIS OF INTERMETATARSAL BURSA OF RIGHT FOOT: ICD-10-CM

## 2024-02-19 PROBLEM — Z47.89: Status: ACTIVE | Noted: 2024-02-19

## 2024-02-19 PROCEDURE — 3044F HG A1C LEVEL LT 7.0%: CPT | Mod: CPTII,S$GLB,, | Performed by: PODIATRIST

## 2024-02-19 PROCEDURE — 1159F MED LIST DOCD IN RCRD: CPT | Mod: CPTII,S$GLB,, | Performed by: PODIATRIST

## 2024-02-19 PROCEDURE — 99999 PR PBB SHADOW E&M-EST. PATIENT-LVL V: CPT | Mod: PBBFAC,,, | Performed by: PODIATRIST

## 2024-02-19 PROCEDURE — 1160F RVW MEDS BY RX/DR IN RCRD: CPT | Mod: CPTII,S$GLB,, | Performed by: PODIATRIST

## 2024-02-19 PROCEDURE — 3008F BODY MASS INDEX DOCD: CPT | Mod: CPTII,S$GLB,, | Performed by: PODIATRIST

## 2024-02-19 PROCEDURE — 99214 OFFICE O/P EST MOD 30 MIN: CPT | Mod: S$GLB,,, | Performed by: PODIATRIST

## 2024-02-19 RX ORDER — GABAPENTIN 100 MG/1
100 CAPSULE ORAL 2 TIMES DAILY
Qty: 60 CAPSULE | Refills: 1 | Status: SHIPPED | OUTPATIENT
Start: 2024-02-19 | End: 2024-04-19

## 2024-02-19 RX ORDER — ASCORBIC ACID 500 MG
500 TABLET ORAL DAILY
Qty: 30 TABLET | Refills: 1 | Status: SHIPPED | OUTPATIENT
Start: 2024-02-19 | End: 2024-04-19

## 2024-02-19 RX ORDER — MELOXICAM 7.5 MG/1
7.5 TABLET ORAL DAILY
Qty: 30 TABLET | Refills: 1 | Status: SHIPPED | OUTPATIENT
Start: 2024-02-19 | End: 2024-04-04 | Stop reason: SDUPTHER

## 2024-02-19 RX ORDER — HYDROCODONE BITARTRATE AND ACETAMINOPHEN 5; 325 MG/1; MG/1
1 TABLET ORAL EVERY 4 HOURS PRN
Qty: 25 TABLET | Refills: 0 | Status: SHIPPED | OUTPATIENT
Start: 2024-02-19 | End: 2024-02-29 | Stop reason: SDUPTHER

## 2024-02-19 RX ORDER — CEPHALEXIN 500 MG/1
500 CAPSULE ORAL EVERY 6 HOURS
Qty: 28 CAPSULE | Refills: 0 | Status: SHIPPED | OUTPATIENT
Start: 2024-02-19 | End: 2024-02-26

## 2024-02-19 NOTE — PROGRESS NOTES
[facility]  [facility] - PODIATRY  [address] [facility]  [address]  [place] [address]  Dept: [phone]  Dept Fax: [phone]    Yonatan Douglass Jr., DPM     Assessment:   MDM     Amount and/or Complexity of Data Reviewed  Clinical lab tests: ordered and reviewed  Tests in the radiology section of CPT®: reviewed and ordered  Tests in the medicine section of CPT®: reviewed  Discuss the patient with other providers: yes  Independent visualization of images, tracings, or specimens: yes      MDM  Reviewed: previous chart, nursing note and vitals  Reviewed previous: labs, x-ray and MRI  Interpretation: labs, x-ray and MRI      1. 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 Metatarsalgia, right foot        3. Bursitis of intermetatarsal bursa of right foot              Plan:     Procedures    Kan was seen today for foot pain.    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  -     gabapentin (NEURONTIN) 100 MG capsule; Take 1 capsule (100 mg total) by mouth 2 (two) times daily.  -     meloxicam (MOBIC) 7.5 MG tablet; Take 1 tablet (7.5 mg total) by mouth once daily.  -     cephALEXin (KEFLEX) 500 MG capsule; Take 1 capsule (500 mg total) by mouth every 6 (six) hours. for 7 days  -     ascorbic acid, vitamin C, (VITAMIN C) 500 MG tablet; Take 1 tablet (500 mg total) by mouth once daily.    Metatarsalgia, right foot    Bursitis of intermetatarsal bursa of right foot          -pt seen,  evaluated, and managed  -dx discussed in detail. All questions/concerns addressed  -all tx options discussed. All alternatives, risks, benefits of all txs discussed  -the patient was educated about the diagnosis  -pt has failed conservative care options possible including but not limited to shoe wear and/or padding, bracing/strapping, at home ROM, formal PT, medical therapy, injection therapy  -XR/imaging reviewed by me: agree with read  -MRI reviewed  -given failure of conservative measures, we discussed surgical intervention  -pt would benefit from operative intervention: we discussed the following procedures: GLORIA R 3rd IS + BMAC   -Decision regarding elective surgery was made and the patient opts for surgical intervention: yes  -We discussed the patient risk factors and social determinants of health and their impacts including but not limited to: stress and/or mental health strain, social connections strain, family support strain, financial resource strain , severity of deformity / severity of injury, major medical co-morbidities, alcohol or tobacco abuse, housing resource strain, transportation strain, food insecurity, lack of physical activity  -Long discussion with patient regarding the procedure in detail. Patient understands all risks, possible benefits, potential complications, and alternatives, including, but not limited to those listed on the consent form. Pt understands consequences of failing to proceed with recommended procedure(s). All questions were answered. No guarantees given or implied as to outcome. Patient is aware of the procedure specific complications including but not limited to infection, wound or bone healing problems, damage to surrounding structures, need for additional surgery, loss of toes/foot/leg/life, scar formation, nerve pain, gait issues. They agree and exhibit appropriate understanding of all discussion points. Patient understands post-operative course and agrees to be  compliant with care. Teach back method used as part of informed consent process. Informed verbal and written consent was obtained. Consent forms read, signed, witnessed.  -we discussed postop course  -would be out pt, elective surgery  -would be GA + block, supine position  -PCP has cleared  -DOS: 2/23/24      -rxs dispensed: postop  -referrals: PT  -WB: wbat      Follow up in 1 week (on 2/26/2024).    Subjective:      Patient ID: Kan Traore is a 38 y.o. female.    Chief Complaint:   Chief Complaint   Patient presents with    Foot Pain     Right        CC - foot pain: patient presents to the podiatry clinic  with complaint of  right foot pain. Onset of the symptoms was several months ago. Precipitating event: unk. Current symptoms include: ability to bear weight, but with some pain, william the ball of the foot, swelling and worsening symptoms after a period of activity, burning/numbness/tingling of toes. Aggravating factors: walking and certain shoegear. Symptoms have gradually worsened. Patient has had prior foot problems. Evaluation to date: none. Treatment to date: avoidance of offending activity. Patients rates pain 7/10 on pain scale.      2/19/24:  Hx as above. Pt failed conservative measures so MRI was ordered and obtained. She has obtained PCP clearance and is requesting surgery.    Toe Pain     Foot Pain        Last Podiatry Enc: Visit date not found  Last Enc w/ Me: 11/9/2017    Outside reports reviewed: historical medical records.  Family hx: as below  Past Medical History:   Diagnosis Date    Allergic rhinitis     Anxiety     Asthma     history of    Attention deficit disorder (ADD)     Bilateral nephrolithiasis     Depression     states does not feel depressed    Hydronephrosis     Hyperglycemia 03/09/2021    - glucose 131 on recent labs - patient was nonfasting Lab Results Component Value Date  HGBA1C 5.1 06/10/2021      Abarca neuroma, right     Plantar fasciitis of right foot     Rheumatoid  arthritis, unspecified     Thyroid disease     hypothyroidism    Urinary tract infection     Vaginal infection      Past Surgical History:   Procedure Laterality Date    APPENDECTOMY  2017    CYSTOSCOPY W/ URETERAL STENT PLACEMENT      CYSTOSCOPY W/ URETERAL STENT REMOVAL      DILATION AND CURETTAGE OF UTERUS  2014    KIDNEY STONE SURGERY      LITHOTRIPSY      multiple    LITHOTRIPSY  2017    LOOP ELECTROSURGICAL EXCISION PROCEDURE (LEEP) N/A 10/02/2020    Procedure: LEEP (LOOP ELECTROSURGICAL EXCISION PROCEDURE);  Surgeon: Kacy Elias MD;  Location: [facility];  Service: OB/GYN;  Laterality: N/A;    TONSILLECTOMY      WISDOM TOOTH EXTRACTION      as a teenager     Family History   Problem Relation Age of Onset    Hypertension Mother 50    Asthma Mother     Hypothyroidism Father 52    Stroke Maternal Grandfather     Hypertension Maternal Grandfather     Heart disease Maternal Grandfather 56         of AMI    Heart failure Paternal Grandmother 81    Rheum arthritis Paternal Grandmother     Arthritis Paternal Grandmother     No Known Problems Son     No Known Problems Son     No Known Problems Son     Breast cancer Neg Hx     Colon cancer Neg Hx     Ovarian cancer Neg Hx     Kidney disease Neg Hx      Current Outpatient Medications   Medication Sig Dispense Refill    adalimumab (HUMIRA,CF, PEN) 40 mg/0.4 mL PnKt Inject 0.4 mLs (40 mg total) into the skin every 14 (fourteen) days. 2 pen 11    adalimumab (HUMIRA,CF, PEN) 40 mg/0.4 mL PnKt Inject 0.4 mLs (40 mg total) into the skin every 14 (fourteen) days. 1 pen 0    citalopram (CELEXA) 20 MG tablet Take 1 tablet (20 mg total) by mouth once daily. 90 tablet 3    dextroamphetamine-amphetamine (ADDERALL XR) 30 MG 24 hr capsule Take 1 capsule (30 mg total) by mouth every morning. 30 capsule 0    [START ON 3/14/2024] dextroamphetamine-amphetamine (ADDERALL XR) 30 MG 24 hr capsule Take 1 capsule (30 mg total) by mouth every morning. 30 capsule 0     [START ON 4/14/2024] dextroamphetamine-amphetamine (ADDERALL XR) 30 MG 24 hr capsule Take 1 capsule (30 mg total) by mouth every morning. 30 capsule 0    dextroamphetamine-amphetamine (ADDERALL) 10 mg Tab Take 1 tablet (10 mg total) by mouth once daily. (Patient taking differently: Take 1 tablet by mouth daily as needed.) 30 tablet 0    [START ON 3/14/2024] dextroamphetamine-amphetamine (ADDERALL) 10 mg Tab Take 1 tablet (10 mg total) by mouth after lunch. 30 tablet 0    [START ON 4/14/2024] dextroamphetamine-amphetamine (ADDERALL) 10 mg Tab Take 1 tablet (10 mg total) by mouth after lunch. 30 tablet 0    folic acid (FOLVITE) 1 MG tablet Take 3 tablets (3 mg total) by mouth once daily. 90 tablet 11    levothyroxine (SYNTHROID) 50 MCG tablet Take 1 tablet (50 mcg total) by mouth before breakfast. 90 tablet 3    methotrexate 2.5 MG Tab Take 8 tablets (20 mg total) by mouth every 7 days. (Patient taking differently: Take 20 mg by mouth every 7 days. Weekly Friday) 96 tablet 0    norgestimate-ethinyl estradioL (SPRINTEC, 28,) 0.25-35 mg-mcg per tablet Take 1 tablet by mouth once daily. Take one active pill daily, skipping sugar pills 63 tablet 3    ondansetron (ZOFRAN-ODT) 4 MG TbDL Dissolve 1 tablet (4 mg total) by mouth every 8 (eight) hours as needed (nausea/migraine). (Patient taking differently: Take 4 mg by mouth as needed (nausea/migraine).) 30 tablet 5    ascorbic acid, vitamin C, (VITAMIN C) 500 MG tablet Take 1 tablet (500 mg total) by mouth once daily. 30 tablet 1    cephALEXin (KEFLEX) 500 MG capsule Take 1 capsule (500 mg total) by mouth every 6 (six) hours. for 7 days 28 capsule 0    gabapentin (NEURONTIN) 100 MG capsule Take 1 capsule (100 mg total) by mouth 2 (two) times daily. 60 capsule 1    HYDROcodone-acetaminophen (NORCO) 5-325 mg per tablet Take 1 tablet by mouth every 4 (four) hours as needed for Pain. 25 tablet 0    meloxicam (MOBIC) 7.5 MG tablet Take 1 tablet (7.5 mg total) by mouth once daily.  30 tablet 1     No current facility-administered medications for this visit.     Review of patient's allergies indicates:  No Known Allergies  Social History     Socioeconomic History    Marital status:     Number of children: 3   Occupational History     Employer: [employer]   Tobacco Use    Smoking status: Never     Passive exposure: Never    Smokeless tobacco: Never   Substance and Sexual Activity    Alcohol use: Yes     Alcohol/week: 1.0 standard drink of alcohol     Types: 1 Drinks containing 0.5 oz of alcohol per week     Comment: Socially/Rare/Special Occasions    Drug use: No    Sexual activity: Yes     Partners: Male     Birth control/protection: Condom, Partner-Vasectomy, Other-see comments     Comment: Birth control pill   Social History Narrative     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      Social Determinants of Health     Financial Resource Strain: Low Risk  (2/14/2024)    Overall Financial Resource Strain (CARDIA)     Difficulty of Paying Living Expenses: Not very hard   Food Insecurity: No Food Insecurity (2/14/2024)    Hunger Vital Sign     Worried About Running Out of Food in the Last Year: Never true     Ran Out of Food in the Last Year: Never true   Transportation Needs: No Transportation Needs (2/14/2024)    PRAPARE - Transportation     Lack of Transportation (Medical): No     Lack of Transportation (Non-Medical): No   Physical Activity: Insufficiently Active (2/14/2024)    Exercise Vital Sign     Days of Exercise per Week: 3 days     Minutes of Exercise per Session: 30 min   Stress: Stress Concern Present (2/14/2024)    [employer] [employer] - Occupational Stress Questionnaire     Feeling of Stress : To some extent   Social Connections: Unknown (2/14/2024)    Social Connection and Isolation Panel [NHANES]     Frequency of Communication with Friends and Family: More than three times a week      "Frequency of Social Gatherings with Friends and Family: More than three times a week     Active Member of Clubs or Organizations: Yes     Attends Club or Organization Meetings: 1 to 4 times per year     Marital Status:    Housing Stability: Low Risk  (2/14/2024)    Housing Stability Vital Sign     Unable to Pay for Housing in the Last Year: No     Number of Places Lived in the Last Year: 1     Unstable Housing in the Last Year: No       ROS    REVIEW OF SYSTEMS: Negative as documented below as well as positive findings in bold.       Constitutional  Respiratory  Gastrointestinal  Skin   - Fever - Cough - Heartburn - Rash   - Chills - Spit blood - Nausea - Itching   - Weight Loss - Shortness of breath - Vomiting - Nail pain   - Malaise/Fatigue - Wheezing - Abdominal Pain  Wound/Ulcer   - Weight Gain   - Blood in Stool  Poor wound healing       - Diarrhea          Cardiovascular  Genitourinary  Neurological  HEENT   - Chest Pain - Dysuria - Burning Sensation of feet - Headache   - Palpitations - Hematuria - Tingling / Paresthesia - Congestion   - Pain at night in legs - Flank Pain - Dizziness - Sore Throat   - Cramping   - Tremor - Blurred Vision   - Leg Swelling   - Sensory Change - Double Vision   - Dizzy when standing   - Speech Change - Eye Redness       - Focal Weakness - Dry Eyes       - Loss of Consciousness          Endocrine  Musculoskeletal  Psychiatric   - Cold intolerance - Muscle Pain - Depression   - Heat intolerance - Neck Pain - Insomnia   - Anemia - Joint Pain - Memory Loss   -  Easy bruising, bleeding - Heel pain - Anxiety      Toe Pain        Leg/Ankle/Foot Pain         Objective:     Resp 18   Ht 5' 6" (1.676 m)   Wt 57.6 kg (127 lb)   LMP 02/01/2024   BMI 20.50 kg/m²   Vitals:    02/19/24 1319   Resp: 18   Weight: 57.6 kg (127 lb)   Height: 5' 6" (1.676 m)   PainSc: 0-No pain         Physical Exam    General Appearance:   Patient appears well developed, well nourished  Patient appears " stated age    Psychiatric:   Patient is oriented to time, place, and person.  Patient has appropriate mood and affect    Neck:  Trachea Midline  No visible masses    Respiratory/Ears:  No distress or labored breathing.  Able to differentiate between normal talking voice and whisper.  Able to follow commands    Eyes:  Visual Acuity intact  Lids and conjunctivae normal. No discoloration noted.    Foot Exam  Physical Exam  Ortho Exam  Ortho/SPM Exam  Foot/Ankle Musculoskeletal Exam    R LE exam con't:  V:  DP 2/4, PT 2/4   CRT< 3s to all digits tested   Tibial and popliteal lymph nodes are w/o abnormality   Edema: absent, varicosities: absent    N:  Patient displays normal ankle reflexes   SILT in SP/DP/T/Zena/Saph distributions    Ortho: +Motor EHL/FHL/TA/GA   equinus deformity is mild and present  There is moderate pain with palpation of 3rd IS  There is is moderate pain at 3rd IS with lateral compression of metatarsal heads  Compartments soft/compressible. No pain on passive stretch of big toe. No calf  Pain.    Derm:  skin intact, skin warm and dry, skin without ulcers or lesions, skin without induration, nails normal, no erythema and no ecchymosis      Imaging / Labs:      MRI Foot (Forefoot) Right W W/O Contrast    Result Date: 1/26/2024  EXAMINATION: MRI FOOT (FOREFOOT) RIGHT W W/O CONTRAST CLINICAL HISTORY: Foot pain, chronic, metatarsalgia;  Metatarsalgia, right foot TECHNIQUE: MRI of the right forefoot performed before and after administration of 7.5 mL Gadavist intravenous contrast. COMPARISON: Radiographs 11/14/2023 FINDINGS: Bone marrow signal is maintained.  No fracture or infiltrative process. There is a 1.9 x 0.6 x 2.4 cm T1 hypointense, T2 hyperintense mass between the 3rd and 4th MTP joints.  Mass exhibits heterogeneous, thick enhancement predominantly at the periphery.  Findings overall consistent with large Abarca's neuroma. Note made of moderate degenerative changes at the 5th MTP joint with  subcortical cystic change.  Fibrous tissue seen beneath the 5th metatarsal head. Additionally, mild degenerative changes are seen at the hallux MTP joint. Lisfranc ligament is intact.  No evidence for acute ligamentous injury.     1. Large mass at the 3rd interspace, likely Abarca's neuroma. 2. Additional findings above. Electronically signed by: José Luis Almanza MD Date:    01/26/2024 Time:    16:37         Note: This was dictated using a computer transcription program. Although proofread, it may contain computer transcription errors and phonetic errors. Other human proofreading errors may also exist. Corrections may be performed at a later time. Please contact us for any clarification if needed.    Yonatan Douglass DPM  [facility]

## 2024-02-19 NOTE — PATIENT INSTRUCTIONS
Report to the Same Day Surgery unit on 2/23/24     1. DO NOT EAT OR DRINK ANYTHING AFTER THE MIDNIGHT BEFORE SURGERY     2. Do not drink any alcohol or take any mind-altering drugs 24 hours before surgery.     3. STOP TAKING: Coumadin, Plavix, Pletal, Aggrenox, Aspirin, Aleve, Naproxen, Advil, Motrin, Ibuprofen, Michelle [place], Celebrex, Allopurinol, and any medications containing aspirin or antiinflammatories N/A unless instructed otherwise by your Primary Care Provider     4. You may take Tylenol and your other medications up until midnight before your surgery.     5. Take the following medications the morning of your procedure with a sip of water (less than an ounce): Effexor,Depakote     6. Leave all valuables at home.     7. Bathe like you normally do the morning of or the night before surgery, preferably with an anti-bacterial soap     8. Only 2 visitors will be allowed on the unit with patients. Children under 12 years old are not allowed to visit on unit.     9. YOU WILL NOT BE ALLOWED TO DRIVE HOME AFTER YOUR PROCEDURE!!! A family member or friend must be in the unit with you to receive discharge instructions and to sign the papers for you to be discharged home. Also, you must make arrangements before your surgery day to have a ride home in a private vehicle (no buses or public transportation allowed). YOU WILL NOT BE ALLOWED TO WALK HOME, NOR WILL STAFF BE RESPONSIBLE FOR MAKING THESE ARRANGEMENTS FOR YOU!!! FAILURE TO MAKE THE PROPER ARRANGEMENTS FOR YOURSELF MAY RESULT IN THE CANCELLATION OF YOUR PROCEDURE!    10. Obtain a History & Physical for surgical clearance for your surgery from your Primary Care Provider within 30 days of your date of surgery. Have their office fax us a copy of the H&P. Bring a paper copy of the H&P with you to surgery.

## 2024-02-23 PROBLEM — G57.61 MORTON'S NEUROMA OF RIGHT FOOT: Status: ACTIVE | Noted: 2024-02-23

## 2024-02-26 ENCOUNTER — PATIENT MESSAGE (OUTPATIENT)
Dept: PODIATRY | Facility: CLINIC | Age: 39
End: 2024-02-26
Payer: COMMERCIAL

## 2024-02-26 ENCOUNTER — TELEPHONE (OUTPATIENT)
Dept: PODIATRY | Facility: CLINIC | Age: 39
End: 2024-02-26
Payer: COMMERCIAL

## 2024-02-26 NOTE — TELEPHONE ENCOUNTER
Spoke with patient regarding her questions in the portal. I informed patient that the numbness in her toes is still to be expected due to inflammation. Also informed patient she would get a refill on meds at her post op visit this coming Thursday, patient understood.

## 2024-02-29 ENCOUNTER — OFFICE VISIT (OUTPATIENT)
Dept: PODIATRY | Facility: CLINIC | Age: 39
End: 2024-02-29
Payer: COMMERCIAL

## 2024-02-29 VITALS — HEIGHT: 66 IN | WEIGHT: 122 LBS | RESPIRATION RATE: 18 BRPM | BODY MASS INDEX: 19.61 KG/M2

## 2024-02-29 DIAGNOSIS — Z47.89 AFTERCARE FOLLOWING SURGERY OF THE MUSCULOSKELETAL SYSTEM: Primary | ICD-10-CM

## 2024-02-29 DIAGNOSIS — G57.81 NEUROMA OF THIRD INTERSPACE OF RIGHT FOOT: ICD-10-CM

## 2024-02-29 PROCEDURE — 1160F RVW MEDS BY RX/DR IN RCRD: CPT | Mod: CPTII,S$GLB,, | Performed by: PODIATRIST

## 2024-02-29 PROCEDURE — 1159F MED LIST DOCD IN RCRD: CPT | Mod: CPTII,S$GLB,, | Performed by: PODIATRIST

## 2024-02-29 PROCEDURE — 3044F HG A1C LEVEL LT 7.0%: CPT | Mod: CPTII,S$GLB,, | Performed by: PODIATRIST

## 2024-02-29 PROCEDURE — 99999 PR PBB SHADOW E&M-EST. PATIENT-LVL V: CPT | Mod: PBBFAC,,, | Performed by: PODIATRIST

## 2024-02-29 PROCEDURE — 99024 POSTOP FOLLOW-UP VISIT: CPT | Mod: S$GLB,,, | Performed by: PODIATRIST

## 2024-02-29 RX ORDER — HYDROCODONE BITARTRATE AND ACETAMINOPHEN 5; 325 MG/1; MG/1
1 TABLET ORAL EVERY 4 HOURS PRN
Qty: 25 TABLET | Refills: 0 | Status: SHIPPED | OUTPATIENT
Start: 2024-02-29 | End: 2024-03-14 | Stop reason: ALTCHOICE

## 2024-02-29 NOTE — PROGRESS NOTES
[facility] - PODIATRY  [address]  [address]  [facility] [address]  Dept: [phone]  Dept Fax: [phone]    Yonatan Douglass Jr., DPM   Yonatan Douglass Jr.     Post-Operative Visit  Assessment:     1. Aftercare following surgery of the musculoskeletal system  HYDROcodone-acetaminophen (NORCO) 5-325 mg per tablet      2. 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      Subjective:      Patient ID: Kan Traore is a 38 y.o. female.    Chief Complaint:   Chief Complaint   Patient presents with    Post-op Evaluation     Vitals:    02/29/24 1333   Resp: 18       DOS: 2/23/24  Procedure: R 3rd GLORIA + BMAC    Kan Traore returns to the clinic today for the 1st postop visit. Pt is s/p above procedure. Kan Traore rates pain at a 6/10 on visual analog scale. Denies n/v/f/c.    HPI      Outside reports reviewed: historical medical records.    Past Medical History:   Diagnosis Date    Allergic rhinitis     Anxiety     Asthma     history of    Attention deficit disorder (ADD)     Bilateral nephrolithiasis     Crutch training, encounter for 2/19/2024    Depression     states does not feel depressed    Hydronephrosis     Hyperglycemia 03/09/2021    - glucose 131 on recent labs - patient was nonfasting Lab Results Component Value Date  HGBA1C 5.1 06/10/2021      Abarca neuroma, right     Plantar fasciitis of right foot     Rheumatoid arthritis, unspecified     Thyroid disease     hypothyroidism    Urinary tract infection     Vaginal infection      Past Surgical History:   Procedure Laterality Date    APPENDECTOMY  11/20/2017    BONE MARROW ASPIRATION Right 2/23/2024    Procedure:  ASPIRATION, BONE MARROW;  Surgeon: Yonatan Douglass Jr., DPM;  Location: [facility] OR;  Service: Podiatry;  Laterality: Right;    CYSTOSCOPY W/ URETERAL STENT PLACEMENT      CYSTOSCOPY W/ URETERAL STENT REMOVAL      DECOMPRESSION OF NERVE Right 2024    Procedure: DECOMPRESSION, NERVE;  Surgeon: Yonatan Douglass Jr., DPM;  Location: [facility] OR;  Service: Podiatry;  Laterality: Right;  inter-metatarsal nerve 3rd interspace    DILATION AND CURETTAGE OF UTERUS  2014    KIDNEY STONE SURGERY      LITHOTRIPSY      multiple    LITHOTRIPSY  2017    LOOP ELECTROSURGICAL EXCISION PROCEDURE (LEEP) N/A 10/02/2020    Procedure: LEEP (LOOP ELECTROSURGICAL EXCISION PROCEDURE);  Surgeon: Kacy Elias MD;  Location: [facility] OR;  Service: OB/GYN;  Laterality: N/A;    SURGICAL REMOVAL OF MARTINEZ'S NEUROMA Right 2024    Procedure: EXCISION, MARTINEZ'S NEUROMA;  Surgeon: Yonatan Douglass Jr., DPM;  Location: [facility] OR;  Service: Podiatry;  Laterality: Right;  pop saph block    TONSILLECTOMY      WISDOM TOOTH EXTRACTION      as a teenager     Family History   Problem Relation Age of Onset    Hypertension Mother 50    Asthma Mother     Hypothyroidism Father 52    Stroke Maternal Grandfather     Hypertension Maternal Grandfather     Heart disease Maternal Grandfather 56         of AMI    Heart failure Paternal Grandmother 81    Rheum arthritis Paternal Grandmother     Arthritis Paternal Grandmother     No Known Problems Son     No Known Problems Son     No Known Problems Son     Breast cancer Neg Hx     Colon cancer Neg Hx     Ovarian cancer Neg Hx     Kidney disease Neg Hx      Current Outpatient Medications   Medication Sig Dispense Refill    adalimumab (HUMIRA,CF, PEN) 40 mg/0.4 mL PnKt Inject 0.4 mLs (40 mg total) into the skin every 14 (fourteen) days. 2 pen 11    adalimumab (HUMIRA,CF, PEN) 40 mg/0.4 mL PnKt Inject 0.4 mLs (40 mg total) into the skin every 14 (fourteen) days. 1 pen 0    ascorbic acid, vitamin C, (VITAMIN  C) 500 MG tablet Take 1 tablet (500 mg total) by mouth once daily. 30 tablet 1    ascorbic acid, vitamin C, (VITAMIN C) 500 MG tablet Take 1 tablet (500 mg total) by mouth once daily 30 tablet 1    cephALEXin (KEFLEX) 500 MG capsule Take 1 capsule (500 mg total) by mouth every 6 hours for 7 days 28 capsule 0    citalopram (CELEXA) 20 MG tablet Take 1 tablet (20 mg total) by mouth once daily. 90 tablet 3    dextroamphetamine-amphetamine (ADDERALL XR) 30 MG 24 hr capsule Take 1 capsule (30 mg total) by mouth every morning. 30 capsule 0    [START ON 3/14/2024] dextroamphetamine-amphetamine (ADDERALL XR) 30 MG 24 hr capsule Take 1 capsule (30 mg total) by mouth every morning. 30 capsule 0    [START ON 4/14/2024] dextroamphetamine-amphetamine (ADDERALL XR) 30 MG 24 hr capsule Take 1 capsule (30 mg total) by mouth every morning. 30 capsule 0    dextroamphetamine-amphetamine (ADDERALL) 10 mg Tab Take 1 tablet (10 mg total) by mouth once daily. (Patient taking differently: Take 1 tablet by mouth daily as needed.) 30 tablet 0    [START ON 3/14/2024] dextroamphetamine-amphetamine (ADDERALL) 10 mg Tab Take 1 tablet (10 mg total) by mouth after lunch. 30 tablet 0    [START ON 4/14/2024] dextroamphetamine-amphetamine (ADDERALL) 10 mg Tab Take 1 tablet (10 mg total) by mouth after lunch. 30 tablet 0    folic acid (FOLVITE) 1 MG tablet Take 3 tablets (3 mg total) by mouth once daily. 90 tablet 11    gabapentin (NEURONTIN) 100 MG capsule Take 1 capsule (100 mg total) by mouth 2 (two) times daily. 60 capsule 1    gabapentin (NEURONTIN) 100 MG capsule Take 1 capsule (100 mg total) by mouth 2 times daily 60 capsule 1    levothyroxine (SYNTHROID) 50 MCG tablet Take 1 tablet (50 mcg total) by mouth before breakfast. 90 tablet 3    meloxicam (MOBIC) 7.5 MG tablet Take 1 tablet (7.5 mg total) by mouth once daily. 30 tablet 1    meloxicam (MOBIC) 7.5 MG tablet Take 1 tablet (7.5 mg total) by mouth once daily 30 tablet 1    methotrexate 2.5  MG Tab Take 8 tablets (20 mg total) by mouth every 7 days. (Patient taking differently: Take 20 mg by mouth every 7 days. Weekly Friday) 96 tablet 0    norgestimate-ethinyl estradioL (SPRINTEC, 28,) 0.25-35 mg-mcg per tablet Take 1 tablet by mouth once daily. Take one active pill daily, skipping sugar pills 63 tablet 3    ondansetron (ZOFRAN-ODT) 4 MG TbDL Dissolve 1 tablet (4 mg total) by mouth every 8 (eight) hours as needed (nausea/migraine). (Patient taking differently: Take 4 mg by mouth as needed (nausea/migraine).) 30 tablet 5    promethazine (PHENERGAN) 25 MG tablet Take 1/2 tablet by mouth every 4 (four) hours as needed for Nausea. 30 tablet 0    HYDROcodone-acetaminophen (NORCO) 5-325 mg per tablet Take 1 tablet by mouth every 4 (four) hours as needed for Pain. 25 tablet 0    HYDROcodone-acetaminophen (NORCO) 5-325 mg per tablet Take 1 tablet by mouth every 4 hours as needed for pain 25 tablet 0     No current facility-administered medications for this visit.     Review of patient's allergies indicates:  No Known Allergies  Social History     Socioeconomic History    Marital status:     Number of children: 3   Occupational History     Employer: [employer]   Tobacco Use    Smoking status: Never     Passive exposure: Never    Smokeless tobacco: Never   Substance and Sexual Activity    Alcohol use: Yes     Alcohol/week: 1.0 standard drink of alcohol     Types: 1 Drinks containing 0.5 oz of alcohol per week     Comment: Socially/Rare/Special Occasions    Drug use: No    Sexual activity: Yes     Partners: Male     Birth control/protection: Condom, Partner-Vasectomy, Other-see comments     Comment: Birth control pill   Social History Narrative     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      Social Determinants of Health     Financial Resource Strain: Low Risk  (2/14/2024)    Overall Financial Resource Strain (CARDIA)     Difficulty  of Paying Living Expenses: Not very hard   Food Insecurity: No Food Insecurity (2/14/2024)    Hunger Vital Sign     Worried About Running Out of Food in the Last Year: Never true     Ran Out of Food in the Last Year: Never true   Transportation Needs: No Transportation Needs (2/14/2024)    PRAPARE - Transportation     Lack of Transportation (Medical): No     Lack of Transportation (Non-Medical): No   Physical Activity: Insufficiently Active (2/14/2024)    Exercise Vital Sign     Days of Exercise per Week: 3 days     Minutes of Exercise per Session: 30 min   Stress: Stress Concern Present (2/14/2024)    [employer] [place] [employer] - Occupational Stress Questionnaire     Feeling of Stress : To some extent   Social Connections: Unknown (2/14/2024)    Social Connection and Isolation Panel [NHANES]     Frequency of Communication with Friends and Family: More than three times a week     Frequency of Social Gatherings with Friends and Family: More than three times a week     Active Member of Clubs or Organizations: Yes     Attends Club or Organization Meetings: 1 to 4 times per year     Marital Status:    Housing Stability: Low Risk  (2/14/2024)    Housing Stability Vital Sign     Unable to Pay for Housing in the Last Year: No     Number of Places Lived in the Last Year: 1     Unstable Housing in the Last Year: No       ROS  REVIEW OF SYSTEMS: Negative as documented below as well as positive findings in bold.       Constitutional  Respiratory  Gastrointestinal  Skin   - Fever - Cough - Heartburn - Rash   - Chills - Spit blood - Nausea - Itching   - Weight Loss - Shortness of breath - Vomiting - Nail pain   - Malaise/Fatigue - Wheezing - Abdominal Pain  Wound/Ulcer   - Weight Gain   - Blood in Stool         - Diarrhea          Cardiovascular  Genitourinary  Neurological  HEENT   - Chest Pain - Dysuria - Dizziness - Headache   - Palpitations - Hematuria - Tingling - Congestion   - Pain at night in legs -  "Flank Pain - Tremor - Sore Throat   - Cramping   - Sensory Change - Blurred Vision   - Leg Swelling   - Speech Change - Double Vision   - Dizzy when standing   - Focal Weakness - Eye Redness       - Seizures - Dry Eyes       - Loss of Consciousness          Endocrine  Musculoskeletal  Psychiatric   - Cold intolerance - Muscle Pain - Depression   - Heat intolerance - Neck Pain - Insomnia   - Anemia - Joint Pain - Memory Loss   -  Easy bruising, bleeding - Heel pain - Anxiety      Toe Pain        Leg/Ankle/Foot Pain         Objective:     Resp 18   Ht 5' 6" (1.676 m)   Wt 55.3 kg (122 lb)   LMP 02/01/2024 Comment: Upt negative 2/23/24  BMI 19.69 kg/m²     Physical Exam    Neck:  Trachea Midline  No visible masses    Respiratory/Ears:  No distress or labored breathing.  Able to differentiate between normal talking voice and whisper.  Able to follow commands    Eyes:  Visual Acuity intact  No discoloration noted.    Physical Exam  Ortho Exam  Foot Exam    R LE exam con't:  V: DP 2/4, PT 2/4, CRT< 3s to all digits tested.    N: SILT in SP/DP/T/Zena/Saph distributions    Ortho: +Motor EHL/FHL/TA/GA   Surgical site pain present    Surgical site swelling present and mild   Compartments soft/compressible. No pain on passive stretch of big toe. No calf  Pain.     Derm: Skin intact. Sutures/staples: intact. Signs of infection: none.     Imaging / Labs:    Lab Results   Component Value Date    WBC 3.72 (L) 02/15/2024    WBC 8.23 01/08/2024    WBC 4.34 11/03/2023    WBC 3.54 (L) 08/15/2023    WBC 5.07 03/15/2023    SEDRATE 10 01/08/2024    SEDRATE 0 11/03/2023    SEDRATE 7 08/15/2023    SEDRATE 6 03/15/2023    SEDRATE 5 12/08/2022    CRP 15.5 (H) 01/08/2024    CRP 1.8 11/03/2023    CRP 2.9 08/15/2023    CRP 1.9 03/15/2023    CRP 2.7 12/08/2022    PREALBUMIN 20 02/15/2024       Lab Results   Component Value Date    PREALBUMIN 20 02/15/2024       SURG FL Surgery Fluoro Usage    Result Date: 2/23/2024  See OP Notes for results. " IMPRESSION: See OP Notes for results. This procedure was auto-finalized by: Virtual Radiologist    X-Ray Chest PA And Lateral    Result Date: 2/15/2024  EXAMINATION: XR CHEST PA AND LATERAL CLINICAL HISTORY: Other specified mononeuropathies of right lower limb TECHNIQUE: PA and lateral views of the chest were performed. COMPARISON: None FINDINGS: The cardiomediastinal silhouette is normal in size and midline. Pulmonary vascularity appears within normal limits. The lungs appear clear without confluent pulmonary parenchymal opacity. No pleural fluid. Osseous structures appear intact. Calcifications in the partially visualized upper abdomen potentially reflecting nephrolithiasis.     No evidence of acute cardiopulmonary disease. Electronically signed by: Marito Johnson MD Date:    02/15/2024 Time:    12:27

## 2024-03-14 ENCOUNTER — OFFICE VISIT (OUTPATIENT)
Dept: PODIATRY | Facility: CLINIC | Age: 39
End: 2024-03-14
Payer: COMMERCIAL

## 2024-03-14 DIAGNOSIS — Z47.89 AFTERCARE FOLLOWING SURGERY OF THE MUSCULOSKELETAL SYSTEM: Primary | ICD-10-CM

## 2024-03-14 PROCEDURE — 1160F RVW MEDS BY RX/DR IN RCRD: CPT | Mod: CPTII,S$GLB,, | Performed by: PODIATRIST

## 2024-03-14 PROCEDURE — 99024 POSTOP FOLLOW-UP VISIT: CPT | Mod: S$GLB,,, | Performed by: PODIATRIST

## 2024-03-14 PROCEDURE — 99999 PR PBB SHADOW E&M-EST. PATIENT-LVL IV: CPT | Mod: PBBFAC,,, | Performed by: PODIATRIST

## 2024-03-14 PROCEDURE — 3044F HG A1C LEVEL LT 7.0%: CPT | Mod: CPTII,S$GLB,, | Performed by: PODIATRIST

## 2024-03-14 PROCEDURE — 1159F MED LIST DOCD IN RCRD: CPT | Mod: CPTII,S$GLB,, | Performed by: PODIATRIST

## 2024-03-14 RX ORDER — PROMETHAZINE HYDROCHLORIDE 25 MG/1
12.5 TABLET ORAL EVERY 4 HOURS PRN
Qty: 30 TABLET | Refills: 0 | Status: SHIPPED | OUTPATIENT
Start: 2024-03-14

## 2024-03-14 RX ORDER — TRAMADOL HYDROCHLORIDE AND ACETAMINOPHEN 37.5; 325 MG/1; MG/1
1 TABLET, FILM COATED ORAL EVERY 4 HOURS
Qty: 25 TABLET | Refills: 0 | Status: SHIPPED | OUTPATIENT
Start: 2024-03-14 | End: 2024-04-04 | Stop reason: ALTCHOICE

## 2024-03-14 RX ORDER — DIPHENHYDRAMINE HCL 25 MG
25 CAPSULE ORAL EVERY 6 HOURS PRN
Qty: 30 CAPSULE | Refills: 0 | Status: SHIPPED | OUTPATIENT
Start: 2024-03-14

## 2024-03-14 NOTE — PROGRESS NOTES
[facility]  [facility] - PODIATRY  [address] [facility]  [address]  [place] [address]  Dept: [phone]  Dept Fax: [phone]    Yonatan Douglass Jr., DPM   Yonatan Douglass Jr.     Post-Operative Visit  Assessment:     1. Aftercare following surgery of the musculoskeletal system  tramadol-acetaminophen 37.5-325 mg (ULTRACET) 37.5-325 mg Tab    Ambulatory referral/consult to Physical/Occupational Therapy    diphenhydrAMINE (BENADRYL) 25 mg capsule    promethazine (PHENERGAN) 25 MG tablet          Plan:   MDM    Coding    3 wk s/p    - pt seen evaluated and managed  - skin healed  - sutures removed. Steris applied  - dressings re-applied - keep c/d/I x 1 wk then ok to remove at home and shower like normal  - wb: wbat in [facility] x 3 wks  - rx dispensed: narcotic stepdown to ultracet today. Phenergan renewed. Added benadryl for itching  - referrals: PT    Follow up in about 3 weeks (around 4/4/2024).      Subjective:      Patient ID: Kan Traore is a 38 y.o. female.    Chief Complaint:   Chief Complaint   Patient presents with    Follow-up     3 week stitch removal     There were no vitals filed for this visit.      DOS: 2/23/24  Procedure: R 3rd GLORIA + BMAC    Kan Traore returns to the clinic today for the a postop visit. Pt is s/p above procedure. Kan Traore rates pain at a 3/10 on visual analog scale. Denies n/v/f/c. Reports some itching.     HPI      Outside reports reviewed: historical medical records.    Past Medical History:   Diagnosis Date    Allergic rhinitis     Anxiety     Asthma     history of    Attention deficit disorder (ADD)     Bilateral nephrolithiasis     Crutch training, encounter for 2/19/2024    Depression     states does not feel depressed    Hydronephrosis     Hyperglycemia 03/09/2021    - glucose 131 on recent labs - patient was nonfasting Lab Results Component Value Date  HGBA1C 5.1 06/10/2021      Abarca neuroma, right     Plantar fasciitis of right foot     Rheumatoid  arthritis, unspecified     Thyroid disease     hypothyroidism    Urinary tract infection     Vaginal infection      Past Surgical History:   Procedure Laterality Date    APPENDECTOMY  2017    BONE MARROW ASPIRATION Right 2024    Procedure: ASPIRATION, BONE MARROW;  Surgeon: Yonatan Douglass Jr., DPM;  Location: [facility] OR;  Service: Podiatry;  Laterality: Right;    CYSTOSCOPY W/ URETERAL STENT PLACEMENT      CYSTOSCOPY W/ URETERAL STENT REMOVAL      DECOMPRESSION OF NERVE Right 2024    Procedure: DECOMPRESSION, NERVE;  Surgeon: Yonatan Douglass Jr., DPM;  Location: [facility] OR;  Service: Podiatry;  Laterality: Right;  inter-metatarsal nerve 3rd interspace    DILATION AND CURETTAGE OF UTERUS  2014    KIDNEY STONE SURGERY      LITHOTRIPSY      multiple    LITHOTRIPSY  2017    LOOP ELECTROSURGICAL EXCISION PROCEDURE (LEEP) N/A 10/02/2020    Procedure: LEEP (LOOP ELECTROSURGICAL EXCISION PROCEDURE);  Surgeon: Kacy Elias MD;  Location: [facility] OR;  Service: OB/GYN;  Laterality: N/A;    SURGICAL REMOVAL OF MARTINEZ'S NEUROMA Right 2024    Procedure: EXCISION, MARTINEZ'S NEUROMA;  Surgeon: Yonatan Douglass Jr., DPM;  Location: [facility] OR;  Service: Podiatry;  Laterality: Right;  pop saph block    TONSILLECTOMY      WISDOM TOOTH EXTRACTION      as a teenager     Family History   Problem Relation Age of Onset    Hypertension Mother 50    Asthma Mother     Hypothyroidism Father 52    Stroke Maternal Grandfather     Hypertension Maternal Grandfather     Heart disease Maternal Grandfather 56         of AMI    Heart failure Paternal Grandmother 81    Rheum arthritis Paternal Grandmother     Arthritis Paternal Grandmother     No Known Problems Son     No Known Problems Son     No Known Problems Son     Breast cancer Neg Hx     Colon cancer Neg Hx     Ovarian cancer Neg Hx     Kidney disease Neg Hx      Current Outpatient Medications   Medication Sig Dispense Refill    adalimumab (HUMIRA,CF, PEN) 40  mg/0.4 mL PnKt Inject 0.4 mLs (40 mg total) into the skin every 14 (fourteen) days. 2 pen 11    adalimumab (HUMIRA,CF, PEN) 40 mg/0.4 mL PnKt Inject 0.4 mLs (40 mg total) into the skin every 14 (fourteen) days. 1 pen 0    ascorbic acid, vitamin C, (VITAMIN C) 500 MG tablet Take 1 tablet (500 mg total) by mouth once daily. 30 tablet 1    ascorbic acid, vitamin C, (VITAMIN C) 500 MG tablet Take 1 tablet (500 mg total) by mouth once daily 30 tablet 1    citalopram (CELEXA) 20 MG tablet Take 1 tablet (20 mg total) by mouth once daily. 90 tablet 3    dextroamphetamine-amphetamine (ADDERALL XR) 30 MG 24 hr capsule Take 1 capsule (30 mg total) by mouth every morning. 30 capsule 0    dextroamphetamine-amphetamine (ADDERALL XR) 30 MG 24 hr capsule Take 1 capsule (30 mg total) by mouth every morning. 30 capsule 0    [START ON 4/14/2024] dextroamphetamine-amphetamine (ADDERALL XR) 30 MG 24 hr capsule Take 1 capsule (30 mg total) by mouth every morning. 30 capsule 0    dextroamphetamine-amphetamine (ADDERALL) 10 mg Tab Take 1 tablet (10 mg total) by mouth once daily. (Patient taking differently: Take 1 tablet by mouth daily as needed.) 30 tablet 0    dextroamphetamine-amphetamine (ADDERALL) 10 mg Tab Take 1 tablet (10 mg total) by mouth after lunch. 30 tablet 0    [START ON 4/14/2024] dextroamphetamine-amphetamine (ADDERALL) 10 mg Tab Take 1 tablet (10 mg total) by mouth after lunch. 30 tablet 0    folic acid (FOLVITE) 1 MG tablet Take 3 tablets (3 mg total) by mouth once daily. 90 tablet 11    gabapentin (NEURONTIN) 100 MG capsule Take 1 capsule (100 mg total) by mouth 2 (two) times daily. 60 capsule 1    gabapentin (NEURONTIN) 100 MG capsule Take 1 capsule (100 mg total) by mouth 2 times daily 60 capsule 1    levothyroxine (SYNTHROID) 50 MCG tablet Take 1 tablet (50 mcg total) by mouth before breakfast. 90 tablet 3    meloxicam (MOBIC) 7.5 MG tablet Take 1 tablet (7.5 mg total) by mouth once daily. 30 tablet 1    meloxicam  (MOBIC) 7.5 MG tablet Take 1 tablet (7.5 mg total) by mouth once daily 30 tablet 1    methotrexate 2.5 MG Tab Take 8 tablets (20 mg total) by mouth every 7 days. (Patient taking differently: Take 20 mg by mouth every 7 days. Weekly Friday) 96 tablet 0    norgestimate-ethinyl estradioL (SPRINTEC, 28,) 0.25-35 mg-mcg per tablet Take 1 tablet by mouth once daily. Take one active pill daily, skipping sugar pills 63 tablet 3    ondansetron (ZOFRAN-ODT) 4 MG TbDL Dissolve 1 tablet (4 mg total) by mouth every 8 (eight) hours as needed (nausea/migraine). (Patient taking differently: Take 4 mg by mouth as needed (nausea/migraine).) 30 tablet 5    tramadol-acetaminophen 37.5-325 mg (ULTRACET) 37.5-325 mg Tab Take 1 tablet by mouth every 4 (four) hours. 25 tablet 0    diphenhydrAMINE (BENADRYL) 25 mg capsule Take 1 capsule (25 mg total) by mouth every 6 (six) hours as needed for Itching. 30 capsule 0    promethazine (PHENERGAN) 25 MG tablet Take 1/2 tablet by mouth every 4 (four) hours as needed for Nausea. 30 tablet 0     No current facility-administered medications for this visit.     Review of patient's allergies indicates:  No Known Allergies  Social History     Socioeconomic History    Marital status:     Number of children: 3   Occupational History     Employer: [employer]   Tobacco Use    Smoking status: Never     Passive exposure: Never    Smokeless tobacco: Never   Substance and Sexual Activity    Alcohol use: Yes     Alcohol/week: 1.0 standard drink of alcohol     Types: 1 Drinks containing 0.5 oz of alcohol per week     Comment: Socially/Rare/Special Occasions    Drug use: No    Sexual activity: Yes     Partners: Male     Birth control/protection: Condom, Partner-Vasectomy, Other-see comments     Comment: Birth control pill   Social History Narrative     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      Social Determinants of Health      Financial Resource Strain: Low Risk  (2/14/2024)    Overall Financial Resource Strain (CARDIA)     Difficulty of Paying Living Expenses: Not very hard   Food Insecurity: No Food Insecurity (2/14/2024)    Hunger Vital Sign     Worried About Running Out of Food in the Last Year: Never true     Ran Out of Food in the Last Year: Never true   Transportation Needs: No Transportation Needs (2/14/2024)    PRAPARE - Transportation     Lack of Transportation (Medical): No     Lack of Transportation (Non-Medical): No   Physical Activity: Insufficiently Active (2/14/2024)    Exercise Vital Sign     Days of Exercise per Week: 3 days     Minutes of Exercise per Session: 30 min   Stress: Stress Concern Present (2/14/2024)    [place] [employer] - Occupational Stress Questionnaire     Feeling of Stress : To some extent   Social Connections: Unknown (2/14/2024)    Social Connection and Isolation Panel [NHANES]     Frequency of Communication with Friends and Family: More than three times a week     Frequency of Social Gatherings with Friends and Family: More than three times a week     Active Member of Clubs or Organizations: Yes     Attends Club or Organization Meetings: 1 to 4 times per year     Marital Status:    Housing Stability: Low Risk  (2/14/2024)    Housing Stability Vital Sign     Unable to Pay for Housing in the Last Year: No     Number of Places Lived in the Last Year: 1     Unstable Housing in the Last Year: No       ROS  REVIEW OF SYSTEMS: Negative as documented below as well as positive findings in bold.       Constitutional  Respiratory  Gastrointestinal  Skin   - Fever - Cough - Heartburn - Rash   - Chills - Spit blood - Nausea - Itching   - Weight Loss - Shortness of breath - Vomiting - Nail pain   - Malaise/Fatigue - Wheezing - Abdominal Pain  Wound/Ulcer   - Weight Gain   - Blood in Stool         - Diarrhea          Cardiovascular  Genitourinary  Neurological  HEENT   - Chest Pain  - Dysuria - Dizziness - Headache   - Palpitations - Hematuria - Tingling - Congestion   - Pain at night in legs - Flank Pain - Tremor - Sore Throat   - Cramping   - Sensory Change - Blurred Vision   - Leg Swelling   - Speech Change - Double Vision   - Dizzy when standing   - Focal Weakness - Eye Redness       - Seizures - Dry Eyes       - Loss of Consciousness          Endocrine  Musculoskeletal  Psychiatric   - Cold intolerance - Muscle Pain - Depression   - Heat intolerance - Neck Pain - Insomnia   - Anemia - Joint Pain - Memory Loss   -  Easy bruising, bleeding - Heel pain - Anxiety      Toe Pain        Leg/Ankle/Foot Pain         Objective:     LMP 02/01/2024 Comment: Upt negative 2/23/24    Physical Exam    Neck:  Trachea Midline  No visible masses    Respiratory/Ears:  No distress or labored breathing.  Able to differentiate between normal talking voice and whisper.  Able to follow commands    Eyes:  Visual Acuity intact  No discoloration noted.    Physical Exam  Ortho Exam  Foot Exam    R LE exam con't:  V: DP 2/4, PT 2/4, CRT< 3s to all digits tested.    N: SILT in SP/DP/T/Zena/Saph distributions    Ortho: +Motor EHL/FHL/TA/GA   Surgical site pain present and mild   Surgical site swelling absent   Compartments soft/compressible. No pain on passive stretch of big toe. No calf  Pain.     Derm: Skin intact. Sutures/staples: intact. Signs of infection: none.     Imaging / Labs:    Lab Results   Component Value Date    WBC 3.72 (L) 02/15/2024    WBC 8.23 01/08/2024    WBC 4.34 11/03/2023    WBC 3.54 (L) 08/15/2023    WBC 5.07 03/15/2023    SEDRATE 10 01/08/2024    SEDRATE 0 11/03/2023    SEDRATE 7 08/15/2023    SEDRATE 6 03/15/2023    SEDRATE 5 12/08/2022    CRP 15.5 (H) 01/08/2024    CRP 1.8 11/03/2023    CRP 2.9 08/15/2023    CRP 1.9 03/15/2023    CRP 2.7 12/08/2022    PREALBUMIN 20 02/15/2024       Lab Results   Component Value Date    PREALBUMIN 20 02/15/2024       SURG FL Surgery Fluoro Usage    Result Date:  2/23/2024  See OP Notes for results. IMPRESSION: See OP Notes for results. This procedure was auto-finalized by: Virtual Radiologist    X-Ray Chest PA And Lateral    Result Date: 2/15/2024  EXAMINATION: XR CHEST PA AND LATERAL CLINICAL HISTORY: Other specified mononeuropathies of right lower limb TECHNIQUE: PA and lateral views of the chest were performed. COMPARISON: None FINDINGS: The cardiomediastinal silhouette is normal in size and midline. Pulmonary vascularity appears within normal limits. The lungs appear clear without confluent pulmonary parenchymal opacity. No pleural fluid. Osseous structures appear intact. Calcifications in the partially visualized upper abdomen potentially reflecting nephrolithiasis.     No evidence of acute cardiopulmonary disease. Electronically signed by: Marito Johnson MD Date:    02/15/2024 Time:    12:27

## 2024-03-20 DIAGNOSIS — F90.0 ATTENTION DEFICIT HYPERACTIVITY DISORDER (ADHD), PREDOMINANTLY INATTENTIVE TYPE: ICD-10-CM

## 2024-03-20 RX ORDER — DEXTROAMPHETAMINE SACCHARATE, AMPHETAMINE ASPARTATE MONOHYDRATE, DEXTROAMPHETAMINE SULFATE AND AMPHETAMINE SULFATE 7.5; 7.5; 7.5; 7.5 MG/1; MG/1; MG/1; MG/1
30 CAPSULE, EXTENDED RELEASE ORAL EVERY MORNING
Qty: 30 CAPSULE | Refills: 0 | Status: SHIPPED | OUTPATIENT
Start: 2024-03-20 | End: 2024-04-19

## 2024-03-20 NOTE — TELEPHONE ENCOUNTER
Refill Routing Note   Medication(s) are not appropriate for processing by [facility] for the following reason(s):        Outside of protocol    [facility] action(s):  Route               Appointments  past 12m or future 3m with PCP    Date Provider   Last Visit   2/14/2024 Cielo Mina, DO   Next Visit   Visit date not found Cielo Mina, DO   ED visits in past 90 days: 0        Note composed:5:01 PM 03/20/2024

## 2024-03-20 NOTE — TELEPHONE ENCOUNTER
No care due was identified.  [facility] Embedded Care Due Messages. Reference number: [id number].   3/20/2024 3:24:24 PM CDT

## 2024-03-22 RX ORDER — FOLIC ACID 1 MG/1
3 TABLET ORAL DAILY
Qty: 90 TABLET | Refills: 11 | Status: SHIPPED | OUTPATIENT
Start: 2024-03-22 | End: 2025-03-22

## 2024-04-04 ENCOUNTER — OFFICE VISIT (OUTPATIENT)
Dept: PODIATRY | Facility: CLINIC | Age: 39
End: 2024-04-04
Payer: COMMERCIAL

## 2024-04-04 VITALS — BODY MASS INDEX: 19.68 KG/M2 | WEIGHT: 121.94 LBS

## 2024-04-04 DIAGNOSIS — G57.81 NEUROMA OF THIRD INTERSPACE OF RIGHT FOOT: ICD-10-CM

## 2024-04-04 DIAGNOSIS — Z47.89 AFTERCARE FOLLOWING SURGERY OF THE MUSCULOSKELETAL SYSTEM: Primary | ICD-10-CM

## 2024-04-04 PROCEDURE — 99024 POSTOP FOLLOW-UP VISIT: CPT | Mod: S$GLB,,, | Performed by: PODIATRIST

## 2024-04-04 PROCEDURE — 3044F HG A1C LEVEL LT 7.0%: CPT | Mod: CPTII,S$GLB,, | Performed by: PODIATRIST

## 2024-04-04 PROCEDURE — 1159F MED LIST DOCD IN RCRD: CPT | Mod: CPTII,S$GLB,, | Performed by: PODIATRIST

## 2024-04-04 PROCEDURE — 99999 PR PBB SHADOW E&M-EST. PATIENT-LVL III: CPT | Mod: PBBFAC,,, | Performed by: PODIATRIST

## 2024-04-04 RX ORDER — DOXYCYCLINE 100 MG/1
100 CAPSULE ORAL 2 TIMES DAILY
Qty: 14 CAPSULE | Refills: 0 | Status: SHIPPED | OUTPATIENT
Start: 2024-04-04 | End: 2024-04-19

## 2024-04-04 RX ORDER — GABAPENTIN 100 MG/1
CAPSULE ORAL
Qty: 60 CAPSULE | Refills: 1 | Status: SHIPPED | OUTPATIENT
Start: 2024-04-04

## 2024-04-04 RX ORDER — MELOXICAM 7.5 MG/1
7.5 TABLET ORAL DAILY
Qty: 30 TABLET | Refills: 1 | Status: SHIPPED | OUTPATIENT
Start: 2024-04-04 | End: 2024-06-03

## 2024-04-04 NOTE — PROGRESS NOTES
[facility] - PODIATRY  [address] [facility] [address]  [address]  [facility] [address]  Dept: [phone]  Dept Fax: [phone]    Yonatan Douglass Jr., DPM   Yonatan Douglass Jr.     Post-Operative Visit  Assessment:     1. Aftercare following surgery of the musculoskeletal system  gabapentin (NEURONTIN) 100 MG capsule    meloxicam (MOBIC) 7.5 MG tablet      2. 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      Subjective:      Patient ID: Kan Traore is a 38 y.o. female.    Chief Complaint:   Chief Complaint   Patient presents with    Post-op Evaluation     3rd interspace right foot     There were no vitals filed for this visit.      DOS: 2/23/24  Procedure: R 3rd GLORIA + BMAC    Kan Traore returns to the clinic today for the a postop visit. Pt is s/p above procedure. Kan Traore rates pain at a 0/10 on visual analog scale. Denies n/v/f/c. Formal PT ordered last visit.    HPI      Outside reports reviewed: historical medical records.    Past Medical History:   Diagnosis Date    Allergic rhinitis     Anxiety     Asthma     history of    Attention deficit disorder (ADD)     Bilateral nephrolithiasis     Crutch training, encounter for 2/19/2024    Depression     states does not feel depressed    Hydronephrosis     Hyperglycemia 03/09/2021    - glucose 131 on recent labs - patient was nonfasting Lab Results Component Value Date  HGBA1C 5.1 06/10/2021      Abarca neuroma, right     Plantar fasciitis of right foot     Rheumatoid arthritis, unspecified     Thyroid disease     hypothyroidism    Urinary tract infection     Vaginal infection      Past Surgical History:   Procedure Laterality Date    APPENDECTOMY  11/20/2017    BONE MARROW ASPIRATION Right  2024    Procedure: ASPIRATION, BONE MARROW;  Surgeon: Yonatan Douglass Jr., DPM;  Location: [facility] OR;  Service: Podiatry;  Laterality: Right;    CYSTOSCOPY W/ URETERAL STENT PLACEMENT      CYSTOSCOPY W/ URETERAL STENT REMOVAL      DECOMPRESSION OF NERVE Right 2024    Procedure: DECOMPRESSION, NERVE;  Surgeon: Yonatan Douglass Jr., DPM;  Location: [facility] OR;  Service: Podiatry;  Laterality: Right;  inter-metatarsal nerve 3rd interspace    DILATION AND CURETTAGE OF UTERUS  2014    KIDNEY STONE SURGERY      LITHOTRIPSY      multiple    LITHOTRIPSY  2017    LOOP ELECTROSURGICAL EXCISION PROCEDURE (LEEP) N/A 10/02/2020    Procedure: LEEP (LOOP ELECTROSURGICAL EXCISION PROCEDURE);  Surgeon: Kacy Elias MD;  Location: [facility] OR;  Service: OB/GYN;  Laterality: N/A;    SURGICAL REMOVAL OF MARTINEZ'S NEUROMA Right 2024    Procedure: EXCISION, MARTINEZ'S NEUROMA;  Surgeon: Yonatan Douglass Jr., DPM;  Location: [facility] OR;  Service: Podiatry;  Laterality: Right;  pop saph block    TONSILLECTOMY      WISDOM TOOTH EXTRACTION      as a teenager     Family History   Problem Relation Age of Onset    Hypertension Mother 50    Asthma Mother     Hypothyroidism Father 52    Stroke Maternal Grandfather     Hypertension Maternal Grandfather     Heart disease Maternal Grandfather 56         of AMI    Heart failure Paternal Grandmother 81    Rheum arthritis Paternal Grandmother     Arthritis Paternal Grandmother     No Known Problems Son     No Known Problems Son     No Known Problems Son     Breast cancer Neg Hx     Colon cancer Neg Hx     Ovarian cancer Neg Hx     Kidney disease Neg Hx      Current Outpatient Medications   Medication Sig Dispense Refill    adalimumab (HUMIRA,CF, PEN) 40 mg/0.4 mL PnKt Inject 0.4 mLs (40 mg total) into the skin every 14 (fourteen) days. 2 pen 11    adalimumab (HUMIRA,CF, PEN) 40 mg/0.4 mL PnKt Inject 0.4 mLs (40 mg total) into the skin every 14 (fourteen) days. 1 pen 0    ascorbic  acid, vitamin C, (VITAMIN C) 500 MG tablet Take 1 tablet (500 mg total) by mouth once daily. 30 tablet 1    ascorbic acid, vitamin C, (VITAMIN C) 500 MG tablet Take 1 tablet (500 mg total) by mouth once daily 30 tablet 1    citalopram (CELEXA) 20 MG tablet Take 1 tablet (20 mg total) by mouth once daily. 90 tablet 3    dextroamphetamine-amphetamine (ADDERALL XR) 30 MG 24 hr capsule Take 1 capsule (30 mg total) by mouth every morning. 30 capsule 0    [START ON 4/14/2024] dextroamphetamine-amphetamine (ADDERALL XR) 30 MG 24 hr capsule Take 1 capsule (30 mg total) by mouth every morning. 30 capsule 0    dextroamphetamine-amphetamine (ADDERALL XR) 30 MG 24 hr capsule Take 1 capsule (30 mg total) by mouth every morning. 30 capsule 0    dextroamphetamine-amphetamine (ADDERALL) 10 mg Tab Take 1 tablet (10 mg total) by mouth once daily. (Patient taking differently: Take 1 tablet by mouth daily as needed.) 30 tablet 0    dextroamphetamine-amphetamine (ADDERALL) 10 mg Tab Take 1 tablet (10 mg total) by mouth after lunch. 30 tablet 0    [START ON 4/14/2024] dextroamphetamine-amphetamine (ADDERALL) 10 mg Tab Take 1 tablet (10 mg total) by mouth after lunch. 30 tablet 0    diphenhydrAMINE (BENADRYL) 25 mg capsule Take 1 capsule (25 mg total) by mouth every 6 (six) hours as needed for Itching. 30 capsule 0    folic acid (FOLVITE) 1 MG tablet Take 3 tablets (3 mg total) by mouth once daily. 90 tablet 11    gabapentin (NEURONTIN) 100 MG capsule Take 1 capsule (100 mg total) by mouth 2 (two) times daily. 60 capsule 1    levothyroxine (SYNTHROID) 50 MCG tablet Take 1 tablet (50 mcg total) by mouth before breakfast. 90 tablet 3    meloxicam (MOBIC) 7.5 MG tablet Take 1 tablet (7.5 mg total) by mouth once daily 30 tablet 1    methotrexate 2.5 MG Tab Take 8 tablets (20 mg total) by mouth every 7 days. (Patient taking differently: Take 20 mg by mouth every 7 days. Weekly Friday) 96 tablet 0    norgestimate-ethinyl estradioL (SPRINTEC,  28,) 0.25-35 mg-mcg per tablet Take 1 tablet by mouth once daily. Take one active pill daily, skipping sugar pills 63 tablet 3    ondansetron (ZOFRAN-ODT) 4 MG TbDL Dissolve 1 tablet (4 mg total) by mouth every 8 (eight) hours as needed (nausea/migraine). (Patient taking differently: Take 4 mg by mouth as needed (nausea/migraine).) 30 tablet 5    promethazine (PHENERGAN) 25 MG tablet Take 1/2 tablet by mouth every 4 (four) hours as needed for Nausea. 30 tablet 0    gabapentin (NEURONTIN) 100 MG capsule Take 1 capsule (100 mg total) by mouth 2 times daily 60 capsule 1    meloxicam (MOBIC) 7.5 MG tablet Take 1 tablet (7.5 mg total) by mouth once daily. 30 tablet 1     No current facility-administered medications for this visit.     Review of patient's allergies indicates:  No Known Allergies  Social History     Socioeconomic History    Marital status:     Number of children: 3   Occupational History     Employer: [employer]   Tobacco Use    Smoking status: Never     Passive exposure: Never    Smokeless tobacco: Never   Substance and Sexual Activity    Alcohol use: Yes     Alcohol/week: 1.0 standard drink of alcohol     Types: 1 Drinks containing 0.5 oz of alcohol per week     Comment: Socially/Rare/Special Occasions    Drug use: No    Sexual activity: Yes     Partners: Male     Birth control/protection: Condom, Partner-Vasectomy, Other-see comments     Comment: Birth control pill   Social History Narrative     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      Social Determinants of Health     Financial Resource Strain: Low Risk  (2/14/2024)    Overall Financial Resource Strain (CARDIA)     Difficulty of Paying Living Expenses: Not very hard   Food Insecurity: No Food Insecurity (2/14/2024)    Hunger Vital Sign     Worried About Running Out of Food in the Last Year: Never true     Ran Out of Food in the Last Year: Never true   Transportation  Needs: No Transportation Needs (2/14/2024)    PRAPARE - Transportation     Lack of Transportation (Medical): No     Lack of Transportation (Non-Medical): No   Physical Activity: Insufficiently Active (2/14/2024)    Exercise Vital Sign     Days of Exercise per Week: 3 days     Minutes of Exercise per Session: 30 min   Stress: Stress Concern Present (2/14/2024)    [employer] [place] [employer] - Occupational Stress Questionnaire     Feeling of Stress : To some extent   Social Connections: Unknown (2/14/2024)    Social Connection and Isolation Panel [NHANES]     Frequency of Communication with Friends and Family: More than three times a week     Frequency of Social Gatherings with Friends and Family: More than three times a week     Active Member of Clubs or Organizations: Yes     Attends Club or Organization Meetings: 1 to 4 times per year     Marital Status:    Housing Stability: Low Risk  (2/14/2024)    Housing Stability Vital Sign     Unable to Pay for Housing in the Last Year: No     Number of Places Lived in the Last Year: 1     Unstable Housing in the Last Year: No       ROS  REVIEW OF SYSTEMS: Negative as documented below as well as positive findings in bold.       Constitutional  Respiratory  Gastrointestinal  Skin   - Fever - Cough - Heartburn - Rash   - Chills - Spit blood - Nausea - Itching   - Weight Loss - Shortness of breath - Vomiting - Nail pain   - Malaise/Fatigue - Wheezing - Abdominal Pain  Wound/Ulcer   - Weight Gain   - Blood in Stool         - Diarrhea          Cardiovascular  Genitourinary  Neurological  HEENT   - Chest Pain - Dysuria - Dizziness - Headache   - Palpitations - Hematuria - Tingling - Congestion   - Pain at night in legs - Flank Pain - Tremor - Sore Throat   - Cramping   - Sensory Change - Blurred Vision   - Leg Swelling   - Speech Change - Double Vision   - Dizzy when standing   - Focal Weakness - Eye Redness       - Seizures - Dry Eyes       - Loss of  Consciousness          Endocrine  Musculoskeletal  Psychiatric   - Cold intolerance - Muscle Pain - Depression   - Heat intolerance - Neck Pain - Insomnia   - Anemia - Joint Pain - Memory Loss   -  Easy bruising, bleeding - Heel pain - Anxiety      Toe Pain        Leg/Ankle/Foot Pain         Objective:     Wt 55.3 kg (121 lb 14.6 oz)   BMI 19.68 kg/m²     Physical Exam    Neck:  Trachea Midline  No visible masses    Respiratory/Ears:  No distress or labored breathing.  Able to differentiate between normal talking voice and whisper.  Able to follow commands    Eyes:  Visual Acuity intact  No discoloration noted.    Physical Exam  Ortho Exam  Foot Exam    R LE exam con't:  V: DP 2/4, PT 2/4, CRT< 3s to all digits tested.    N: SILT in SP/DP/T/Zena/Saph distributions    Ortho: +Motor EHL/FHL/TA/GA   Surgical site pain present and mild   Surgical site swelling absent   Compartments soft/compressible. No pain on passive stretch of big toe. No calf  Pain.     Derm: Skin intact. Sutures/staples: removed. Signs of infection: none.     Imaging / Labs:    Lab Results   Component Value Date    WBC 3.72 (L) 02/15/2024    WBC 8.23 01/08/2024    WBC 4.34 11/03/2023    WBC 3.54 (L) 08/15/2023    WBC 5.07 03/15/2023    SEDRATE 10 01/08/2024    SEDRATE 0 11/03/2023    SEDRATE 7 08/15/2023    SEDRATE 6 03/15/2023    SEDRATE 5 12/08/2022    CRP 15.5 (H) 01/08/2024    CRP 1.8 11/03/2023    CRP 2.9 08/15/2023    CRP 1.9 03/15/2023    CRP 2.7 12/08/2022    PREALBUMIN 20 02/15/2024       Lab Results   Component Value Date    PREALBUMIN 20 02/15/2024       SURG FL Surgery Fluoro Usage    Result Date: 2/23/2024  See OP Notes for results. IMPRESSION: See OP Notes for results. This procedure was auto-finalized by: Virtual Radiologist    X-Ray Chest PA And Lateral    Result Date: 2/15/2024  EXAMINATION: XR CHEST PA AND LATERAL CLINICAL HISTORY: Other specified mononeuropathies of right lower limb TECHNIQUE: PA and lateral views of the chest  were performed. COMPARISON: None FINDINGS: The cardiomediastinal silhouette is normal in size and midline. Pulmonary vascularity appears within normal limits. The lungs appear clear without confluent pulmonary parenchymal opacity. No pleural fluid. Osseous structures appear intact. Calcifications in the partially visualized upper abdomen potentially reflecting nephrolithiasis.     No evidence of acute cardiopulmonary disease. Electronically signed by: Marito Johnson MD Date:    02/15/2024 Time:    12:27

## 2024-04-24 ENCOUNTER — PATIENT MESSAGE (OUTPATIENT)
Dept: RHEUMATOLOGY | Facility: CLINIC | Age: 39
End: 2024-04-24
Payer: COMMERCIAL

## 2024-04-24 ENCOUNTER — PATIENT MESSAGE (OUTPATIENT)
Dept: PRIMARY CARE CLINIC | Facility: CLINIC | Age: 39
End: 2024-04-24
Payer: COMMERCIAL

## 2024-05-14 ENCOUNTER — OFFICE VISIT (OUTPATIENT)
Dept: PRIMARY CARE CLINIC | Facility: CLINIC | Age: 39
End: 2024-05-14
Attending: FAMILY MEDICINE
Payer: COMMERCIAL

## 2024-05-14 VITALS — BODY MASS INDEX: 20.09 KG/M2 | HEIGHT: 66 IN | WEIGHT: 125 LBS

## 2024-05-14 DIAGNOSIS — F90.0 ATTENTION DEFICIT HYPERACTIVITY DISORDER (ADHD), PREDOMINANTLY INATTENTIVE TYPE: Primary | ICD-10-CM

## 2024-05-14 PROBLEM — Z01.818 PREOP EXAMINATION: Status: RESOLVED | Noted: 2024-02-14 | Resolved: 2024-05-14

## 2024-05-14 PROCEDURE — 3044F HG A1C LEVEL LT 7.0%: CPT | Mod: CPTII,95,, | Performed by: FAMILY MEDICINE

## 2024-05-14 PROCEDURE — 1159F MED LIST DOCD IN RCRD: CPT | Mod: CPTII,95,, | Performed by: FAMILY MEDICINE

## 2024-05-14 PROCEDURE — 1160F RVW MEDS BY RX/DR IN RCRD: CPT | Mod: CPTII,95,, | Performed by: FAMILY MEDICINE

## 2024-05-14 PROCEDURE — 3008F BODY MASS INDEX DOCD: CPT | Mod: CPTII,95,, | Performed by: FAMILY MEDICINE

## 2024-05-14 PROCEDURE — 99214 OFFICE O/P EST MOD 30 MIN: CPT | Mod: 95,,, | Performed by: FAMILY MEDICINE

## 2024-05-14 RX ORDER — DEXTROAMPHETAMINE SACCHARATE, AMPHETAMINE ASPARTATE, DEXTROAMPHETAMINE SULFATE AND AMPHETAMINE SULFATE 2.5; 2.5; 2.5; 2.5 MG/1; MG/1; MG/1; MG/1
1 TABLET ORAL
Qty: 30 TABLET | Refills: 0 | Status: SHIPPED | OUTPATIENT
Start: 2024-06-13 | End: 2024-07-13

## 2024-05-14 RX ORDER — DEXTROAMPHETAMINE SACCHARATE, AMPHETAMINE ASPARTATE, DEXTROAMPHETAMINE SULFATE AND AMPHETAMINE SULFATE 2.5; 2.5; 2.5; 2.5 MG/1; MG/1; MG/1; MG/1
1 TABLET ORAL DAILY
Qty: 30 TABLET | Refills: 0 | Status: SHIPPED | OUTPATIENT
Start: 2024-05-14 | End: 2024-06-23

## 2024-05-14 RX ORDER — DEXTROAMPHETAMINE SACCHARATE, AMPHETAMINE ASPARTATE, DEXTROAMPHETAMINE SULFATE AND AMPHETAMINE SULFATE 2.5; 2.5; 2.5; 2.5 MG/1; MG/1; MG/1; MG/1
1 TABLET ORAL
Qty: 30 TABLET | Refills: 0 | Status: SHIPPED | OUTPATIENT
Start: 2024-07-12 | End: 2024-08-11

## 2024-05-14 RX ORDER — DEXTROAMPHETAMINE SACCHARATE, AMPHETAMINE ASPARTATE MONOHYDRATE, DEXTROAMPHETAMINE SULFATE AND AMPHETAMINE SULFATE 7.5; 7.5; 7.5; 7.5 MG/1; MG/1; MG/1; MG/1
30 CAPSULE, EXTENDED RELEASE ORAL EVERY MORNING
Qty: 30 CAPSULE | Refills: 0 | Status: SHIPPED | OUTPATIENT
Start: 2024-07-12 | End: 2024-08-11

## 2024-05-14 RX ORDER — DEXTROAMPHETAMINE SACCHARATE, AMPHETAMINE ASPARTATE MONOHYDRATE, DEXTROAMPHETAMINE SULFATE AND AMPHETAMINE SULFATE 7.5; 7.5; 7.5; 7.5 MG/1; MG/1; MG/1; MG/1
30 CAPSULE, EXTENDED RELEASE ORAL EVERY MORNING
Qty: 30 CAPSULE | Refills: 0 | Status: SHIPPED | OUTPATIENT
Start: 2024-06-13 | End: 2024-07-13

## 2024-05-14 RX ORDER — DEXTROAMPHETAMINE SACCHARATE, AMPHETAMINE ASPARTATE MONOHYDRATE, DEXTROAMPHETAMINE SULFATE AND AMPHETAMINE SULFATE 7.5; 7.5; 7.5; 7.5 MG/1; MG/1; MG/1; MG/1
30 CAPSULE, EXTENDED RELEASE ORAL EVERY MORNING
Qty: 30 CAPSULE | Refills: 0 | Status: SHIPPED | OUTPATIENT
Start: 2024-05-14 | End: 2024-06-23

## 2024-05-14 NOTE — PROGRESS NOTES
VIRTUAL VISIT/TELEMEDICINE VISIT  FAMILY MEDICINE  [facility]    The patient location is: [place]  The chief complaint leading to consultation is:   Chief Complaint   Patient presents with    Medication Refill     Visit type: Virtual visit with synchronous audio and video   Total time spent: 30 minute  Each patient to whom he or she provides medical services by telemedicine is:  (1) informed of the relationship between the physician and patient and the respective role of any other health care provider with respect to management of the patient; and (2) notified that he or she may decline to receive medical services by telemedicine and may withdraw from such care at any time.      Reason for visit:   Chief Complaint   Patient presents with    Medication Refill         SUBJECTIVE: Kan Traore is a 38 y.o. female  - with ADHD, mild intermittent asthma, depression, rheumatoid arthritis with positive rheumatoid factor , hypothyroidism, nephrolithiasis and anxiety presents for  ADHD      Gynecology Dr. Kacy Elias MD  Dermatology Dr. Lulu Pak MD  Sports Medicine Dr. Brian Marcial  Rheumatology; Dr. Paiz  Optometry: Dr. Cesar Dukes, JUAN  Podiatry Yonatan Douglass Jr., DPJOSE    Kan Traore  reports overall that she is doing well.  She is healed well from her Abarca's neuroma excision from her right foot.  She is ambulating well.  She does not have any residual pain.      Today she was here for refill of her ADHD medication.  She takes Adderall XR 30 mg daily and Adderall 10 mg immediate release in the afternoon.  She denies any unwanted side effects.  She reports that she is tolerating the medications well.    1. ADHD predominantly inattentive     Age diagnosed: 4 th grade  Diagnosed by: Psychiatrist  Initial evaluation present in chart: no       Past medications:  Vyvanse 30 mg (she was only on his medications secondary to Adderall supply chain issues; crash at end of  day)  Reasons for changing past medications:  Availability     Current medications:   dextroamphetamine-amphetamine (ADDERALL XR) 30 MG 24 hr capsule, Take 1 capsule (30 mg total) by mouth   dextroamphetamine-amphetamine (ADDERALL) 10 mg Tab, Take 1 tablet (10 mg total) by mouth once daily., Disp:      reviewed: yes 4/23/24     Concerns with medication:  denies    AM medication: 7:30 AM Adderall XR daily  Lunch medication: denies   Afternoon medication: Adderall IR 10 mg PRN     Daily Activity:  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    Eyes:  Negative for discharge.   Respiratory:  Negative for wheezing.    Cardiovascular:  Negative for chest pain and palpitations.   Gastrointestinal:  Negative for blood in stool, constipation, diarrhea and vomiting.   Genitourinary:  Negative for dysuria and hematuria.   Musculoskeletal:  Negative for neck pain.   Neurological:  Negative for weakness and headaches.   Endo/Heme/Allergies:  Negative for polydipsia.   All other systems reviewed and are negative.      HEALTH MAINTENANCE:   Health Maintenance   Topic Date Due    TETANUS VACCINE  07/30/2025    Hepatitis C Screening  Completed    Lipid Panel  Completed     Health Maintenance Topics with due status: Not Due       Topic Last Completion Date    TETANUS VACCINE 07/30/2015    Influenza Vaccine 11/14/2022    Cervical Cancer Screening 10/20/2023     Health Maintenance Due   Topic Date Due    Pneumococcal Vaccines (Age 0-64) (1 of 2 - PCV) Never done    COVID-19 Vaccine (3 - Pfizer risk series) 09/17/2021       HISTORY:   Past Medical History:   Diagnosis Date    Allergic rhinitis     Anxiety     Asthma     history of    Attention deficit disorder (ADD)     Bilateral nephrolithiasis     Crutch training, encounter for 2/19/2024    Depression     states does not feel depressed    Hydronephrosis     Hyperglycemia 03/09/2021    -  glucose 131 on recent labs - patient was nonfasting Lab Results Component Value Date  HGBA1C 5.1 06/10/2021      Martinez neuroma, right     Plantar fasciitis of right foot     Rheumatoid arthritis, unspecified     Thyroid disease     hypothyroidism    Urinary tract infection     Vaginal infection        Past Surgical History:   Procedure Laterality Date    APPENDECTOMY  2017    BONE MARROW ASPIRATION Right 2024    Procedure: ASPIRATION, BONE MARROW;  Surgeon: Yonatan Douglass Jr., DPM;  Location: [facility];  Service: Podiatry;  Laterality: Right;    CYSTOSCOPY W/ URETERAL STENT PLACEMENT      CYSTOSCOPY W/ URETERAL STENT REMOVAL      DECOMPRESSION OF NERVE Right 2024    Procedure: DECOMPRESSION, NERVE;  Surgeon: Yonatan Douglass Jr., DPM;  Location: [facility];  Service: Podiatry;  Laterality: Right;  inter-metatarsal nerve 3rd interspace    DILATION AND CURETTAGE OF UTERUS  2014    KIDNEY STONE SURGERY      LITHOTRIPSY      multiple    LITHOTRIPSY  2017    LOOP ELECTROSURGICAL EXCISION PROCEDURE (LEEP) N/A 10/02/2020    Procedure: LEEP (LOOP ELECTROSURGICAL EXCISION PROCEDURE);  Surgeon: Kacy Elias MD;  Location: [facility] OR;  Service: OB/GYN;  Laterality: N/A;    SURGICAL REMOVAL OF MARTINEZ'S NEUROMA Right 2024    Procedure: EXCISION, MARTINEZ'S NEUROMA;  Surgeon: Yonatan Douglass Jr., DPM;  Location: [facility];  Service: Podiatry;  Laterality: Right;  pop saph block    TONSILLECTOMY      WISDOM TOOTH EXTRACTION      as a teenager       Family History   Problem Relation Name Age of Onset    Hypertension Mother Mom 50    Asthma Mother Mom     Hypothyroidism Father  52    Stroke Maternal Grandfather [place]     Hypertension Maternal Grandfather [place]     Heart disease Maternal Grandfather [place] 56         of AMI    Heart failure Paternal Grandmother Lizett Suffrin 81    Rheum arthritis Paternal Grandmother Lizett Suffrin     Arthritis Paternal Grandmother Lizett Suffrin     No Known  Problems Son      No Known Problems Son      No Known Problems Son      Breast cancer Neg Hx      Colon cancer Neg Hx      Ovarian cancer Neg Hx      Kidney disease Neg Hx         Social History     Tobacco Use    Smoking status: Never     Passive exposure: Never    Smokeless tobacco: Never   Substance Use Topics    Alcohol use: Yes     Alcohol/week: 1.0 standard drink of alcohol     Types: 1 Drinks containing 0.5 oz of alcohol per week     Comment: Socially/Rare/Special Occasions    Drug use: No       Social History     Social History Narrative     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        ALLERGIES:   Review of patient's allergies indicates:  No Known Allergies    MEDS:   Outpatient Medications as of 5/14/2024   Medication Sig Dispense Refill    adalimumab (HUMIRA,CF, PEN) 40 mg/0.4 mL PnKt Inject 0.4 mLs (40 mg total) into the skin every 14 (fourteen) days. 2 pen 11    ascorbic acid, vitamin C, (VITAMIN C) 500 MG tablet Take 1 tablet (500 mg total) by mouth once daily 30 tablet 1    citalopram (CELEXA) 20 MG tablet Take 1 tablet (20 mg total) by mouth once daily. 90 tablet 3    diphenhydrAMINE (BENADRYL) 25 mg capsule Take 1 capsule (25 mg total) by mouth every 6 (six) hours as needed for Itching. 30 capsule 0    folic acid (FOLVITE) 1 MG tablet Take 3 tablets (3 mg total) by mouth once daily. 90 tablet 11    gabapentin (NEURONTIN) 100 MG capsule Take 1 capsule (100 mg total) by mouth 2 times daily 60 capsule 1    levothyroxine (SYNTHROID) 50 MCG tablet Take 1 tablet (50 mcg total) by mouth before breakfast. 90 tablet 3    methotrexate 2.5 MG Tab Take 8 tablets (20 mg total) by mouth every 7 days. (Patient taking differently: Take 20 mg by mouth every 7 days. Weekly Friday) 96 tablet 0    norgestimate-ethinyl estradioL (SPRINTEC, 28,) 0.25-35 mg-mcg per tablet Take 1 tablet by mouth once daily. Take one active pill daily, skipping sugar pills 63 tablet 3  "   ondansetron (ZOFRAN-ODT) 4 MG TbDL Dissolve 1 tablet (4 mg total) by mouth every 8 (eight) hours as needed (nausea/migraine). (Patient taking differently: Take 4 mg by mouth as needed (nausea/migraine).) 30 tablet 5    promethazine (PHENERGAN) 25 MG tablet Take 1/2 tablet by mouth every 4 (four) hours as needed for Nausea. 30 tablet 0    [START ON 7/12/2024] dextroamphetamine-amphetamine (ADDERALL XR) 30 MG 24 hr capsule Take 1 capsule (30 mg total) by mouth every morning. 30 capsule 0    meloxicam (MOBIC) 7.5 MG tablet Take 1 tablet (7.5 mg total) by mouth once daily 30 tablet 1    meloxicam (MOBIC) 7.5 MG tablet Take 1 tablet (7.5 mg total) by mouth once daily. 30 tablet 1     No current facility-administered medications on file as of 5/14/2024.       Vital signs:   Vitals:    05/14/24 1457   Weight: 56.7 kg (125 lb)   Height: 5' 6" (1.676 m)           Body mass index is 20.18 kg/m².    PHYSICAL EXAM:     Physical Exam  Constitutional:       General: She is not in acute distress.  Pulmonary:      Effort: Pulmonary effort is normal. No respiratory distress.   Neurological:      Mental Status: She is alert.   Psychiatric:         Speech: Speech normal.             PERTINENT RESULTS:     BMP  Lab Results   Component Value Date     (L) 05/02/2024     (L) 05/02/2024     (L) 05/02/2024    K 4.0 05/02/2024    K 4.0 05/02/2024    K 4.0 05/02/2024     05/02/2024     05/02/2024     05/02/2024    CO2 21 (L) 05/02/2024    CO2 21 (L) 05/02/2024    CO2 21 (L) 05/02/2024    BUN 13 05/02/2024    BUN 13 05/02/2024    BUN 13 05/02/2024    CREATININE 0.7 05/02/2024    CREATININE 0.7 05/02/2024    CREATININE 0.7 05/02/2024    CALCIUM 9.1 05/02/2024    CALCIUM 9.1 05/02/2024    CALCIUM 9.1 05/02/2024    ANIONGAP 9 05/02/2024    ANIONGAP 9 05/02/2024    ANIONGAP 9 05/02/2024    EGFRNORACEVR >60.0 05/02/2024    EGFRNORACEVR >60.0 05/02/2024    EGFRNORACEVR >60.0 05/02/2024 "     ASSESSMENT/PLAN:    1. Attention deficit hyperactivity disorder (ADHD), predominantly inattentive type  Overview:  -  reviewed  - well controlled  - continue current medications     Orders:  -     dextroamphetamine-amphetamine (ADDERALL) 10 mg Tab; Take 1 tablet (10 mg total) by mouth once daily.  Dispense: 30 tablet; Refill: 0  -     dextroamphetamine-amphetamine (ADDERALL) 10 mg Tab; Take 1 tablet (10 mg total) by mouth after lunch.  Dispense: 30 tablet; Refill: 0  -     dextroamphetamine-amphetamine (ADDERALL) 10 mg Tab; Take 1 tablet (10 mg total) by mouth after lunch.  Dispense: 30 tablet; Refill: 0  -     dextroamphetamine-amphetamine (ADDERALL XR) 30 MG 24 hr capsule; Take 1 capsule (30 mg total) by mouth every morning.  Dispense: 30 capsule; Refill: 0  -     dextroamphetamine-amphetamine (ADDERALL XR) 30 MG 24 hr capsule; Take 1 capsule (30 mg total) by mouth every morning.  Dispense: 30 capsule; Refill: 0  -     dextroamphetamine-amphetamine (ADDERALL XR) 30 MG 24 hr capsule; Take 1 capsule (30 mg total) by mouth every morning.  Dispense: 30 capsule; Refill: 0          ORDERS:   Orders Placed This Encounter    dextroamphetamine-amphetamine (ADDERALL) 10 mg Tab    dextroamphetamine-amphetamine (ADDERALL) 10 mg Tab    dextroamphetamine-amphetamine (ADDERALL) 10 mg Tab    dextroamphetamine-amphetamine (ADDERALL XR) 30 MG 24 hr capsule    dextroamphetamine-amphetamine (ADDERALL XR) 30 MG 24 hr capsule    dextroamphetamine-amphetamine (ADDERALL XR) 30 MG 24 hr capsule         Vaccines recommended: flu, Prevnar 20 and COVID-19 booster     Follow up in about 3 months (around 8/14/2024) for Annual. or sooner if any concerns.      This note is dictated using the M*Modal Fluency Direct word recognition program. There are word recognition mistakes that are occasionally missed on review.    Dr. Cielo Mina D.O.   [facility]

## 2024-05-22 DIAGNOSIS — R76.8 RHEUMATOID FACTOR POSITIVE: ICD-10-CM

## 2024-05-23 RX ORDER — METHOTREXATE 2.5 MG/1
20 TABLET ORAL
Qty: 96 TABLET | Refills: 0 | Status: SHIPPED | OUTPATIENT
Start: 2024-05-23 | End: 2024-08-21

## 2024-05-23 RX ORDER — NORGESTIMATE AND ETHINYL ESTRADIOL 0.25-0.035
1 KIT ORAL DAILY
Qty: 112 TABLET | Refills: 1 | Status: SHIPPED | OUTPATIENT
Start: 2024-05-23

## 2024-05-23 NOTE — TELEPHONE ENCOUNTER
Refill Decision Note   Kan Aguerorier  is requesting a refill authorization.  Brief Assessment and Rationale for Refill:  Approve     Medication Therapy Plan:  updated to 4 packs of the 28-day which equates to 3 months when skipping placebos      Comments:     Note composed:6:38 AM 05/23/2024

## 2024-06-21 DIAGNOSIS — R11.0 NAUSEA: ICD-10-CM

## 2024-06-21 DIAGNOSIS — F90.0 ATTENTION DEFICIT HYPERACTIVITY DISORDER (ADHD), PREDOMINANTLY INATTENTIVE TYPE: ICD-10-CM

## 2024-06-21 RX ORDER — DEXTROAMPHETAMINE SACCHARATE, AMPHETAMINE ASPARTATE MONOHYDRATE, DEXTROAMPHETAMINE SULFATE AND AMPHETAMINE SULFATE 7.5; 7.5; 7.5; 7.5 MG/1; MG/1; MG/1; MG/1
30 CAPSULE, EXTENDED RELEASE ORAL EVERY MORNING
Qty: 30 CAPSULE | Refills: 0 | Status: CANCELLED | OUTPATIENT
Start: 2024-06-21 | End: 2024-07-21

## 2024-06-21 RX ORDER — ONDANSETRON 4 MG/1
4 TABLET, ORALLY DISINTEGRATING ORAL EVERY 8 HOURS PRN
Qty: 30 TABLET | Refills: 0 | Status: SHIPPED | OUTPATIENT
Start: 2024-06-21

## 2024-06-21 RX ORDER — DEXTROAMPHETAMINE SACCHARATE, AMPHETAMINE ASPARTATE, DEXTROAMPHETAMINE SULFATE AND AMPHETAMINE SULFATE 2.5; 2.5; 2.5; 2.5 MG/1; MG/1; MG/1; MG/1
1 TABLET ORAL DAILY
Qty: 30 TABLET | Refills: 0 | Status: CANCELLED | OUTPATIENT
Start: 2024-06-21 | End: 2024-07-21

## 2024-06-21 NOTE — TELEPHONE ENCOUNTER
Refill Encounter    PCP Visits: Recent Visits  Date Type Provider Dept   05/14/24 Office Visit Cielo Mina, DO [facility]   02/14/24 Office Visit Cielo Mina, DO [facility]   10/02/23 Office Visit Cielo Mina, DO [facility]   06/29/23 Office Visit Cielo Mina, DO [facility]   Showing recent visits within past 360 days and meeting all other requirements  Future Appointments  Date Type Provider Dept   08/20/24 Appointment Cielo Mina, DO [facility]   08/22/24 Appointment Cielo Mina, DO [facility]   Showing future appointments within next 720 days and meeting all other requirements     Last 3 Blood Pressure:   BP Readings from Last 3 Encounters:   02/23/24 126/82   02/14/24 110/76   11/13/23 (!) 140/98     Preferred Pharmacy:   [facility]  [address] [address]  [place] [address]  Phone: [phone] Fax: [phone]    [facility], "[employer] [address] [address]  Phone: [phone] Fax: [phone]    Requested RX:  Requested Prescriptions     Pending Prescriptions Disp Refills    dextroamphetamine-amphetamine (ADDERALL) 10 mg Tab 30 tablet 0     Sig: Take 1 tablet (10 mg total) by mouth once daily.    dextroamphetamine-amphetamine (ADDERALL XR) 30 MG 24 hr capsule 30 capsule 0     Sig: Take 1 capsule (30 mg total) by mouth every morning.      RX Route: Normal

## 2024-06-21 NOTE — TELEPHONE ENCOUNTER
No care due was identified.  Health [facility] Embedded Care Due Messages. Reference number: [id number].   6/21/2024 11:08:46 AM CDT

## 2024-06-21 NOTE — TELEPHONE ENCOUNTER
No care due was identified.  Health [facility] Embedded Care Due Messages. Reference number: [id number].   6/21/2024 11:09:12 AM CDT

## 2024-06-21 NOTE — TELEPHONE ENCOUNTER
Refill Encounter    PCP Visits: Recent Visits  Date Type Provider Dept   05/14/24 Office Visit Cielo Mina, DO [facility]   02/14/24 Office Visit Cielo Mina, DO [facility]   10/02/23 Office Visit Cielo Mina, DO [facility]   06/29/23 Office Visit Cielo Mina, DO [facility]   Showing recent visits within past 360 days and meeting all other requirements  Future Appointments  Date Type Provider Dept   08/20/24 Appointment Cielo Mina, DO [facility]   08/22/24 Appointment Cielo Mina, DO [facility]   Showing future appointments within next 720 days and meeting all other requirements     Last 3 Blood Pressure:   BP Readings from Last 3 Encounters:   02/23/24 126/82   02/14/24 110/76   11/13/23 (!) 140/98     Preferred Pharmacy:   [facility]  [address]  [place] [address]  Phone: [phone] Fax: [phone]    [facility] [employer] [address] [address]  Phone: [phone] Fax: [phone]    Requested RX:  Requested Prescriptions     Pending Prescriptions Disp Refills    ondansetron (ZOFRAN-ODT) 4 MG TbDL 30 tablet 5     Sig: Dissolve 1 tablet (4 mg total) by mouth every 8 (eight) hours as needed (nausea/migraine).      RX Route: Normal

## 2024-08-02 ENCOUNTER — PATIENT MESSAGE (OUTPATIENT)
Dept: RHEUMATOLOGY | Facility: CLINIC | Age: 39
End: 2024-08-02
Payer: COMMERCIAL

## 2024-08-12 PROBLEM — Z74.09 IMPAIRED MOBILITY AND ACTIVITIES OF DAILY LIVING: Status: RESOLVED | Noted: 2024-01-22 | Resolved: 2024-08-12

## 2024-08-12 PROBLEM — Z78.9 IMPAIRED MOBILITY AND ACTIVITIES OF DAILY LIVING: Status: RESOLVED | Noted: 2024-01-22 | Resolved: 2024-08-12

## 2024-08-12 PROBLEM — M24.571 EQUINUS CONTRACTURE OF RIGHT ANKLE: Status: RESOLVED | Noted: 2024-01-22 | Resolved: 2024-08-12

## 2024-08-12 PROBLEM — M77.41 METATARSALGIA OF RIGHT FOOT: Status: RESOLVED | Noted: 2024-01-22 | Resolved: 2024-08-12

## 2024-08-12 PROBLEM — Z47.89 AFTERCARE FOLLOWING SURGERY OF THE MUSCULOSKELETAL SYSTEM: Status: RESOLVED | Noted: 2024-02-19 | Resolved: 2024-08-12

## 2024-08-12 NOTE — PROGRESS NOTES
FAMILY MEDICINE  [facility]    Reason for visit:   Chief Complaint   Patient presents with    Annual Exam    Medication Refill         SUBJECTIVE: Kan Traore is a 39 y.o. female  - with ADHD, mild intermittent asthma, depression, rheumatoid arthritis with positive rheumatoid factor , hypothyroidism, nephrolithiasis and anxiety presents for her routine annual physical     Gynecology Dr. Kacy Elias MD  Dermatology Dr. Lulu Pak MD  Sports Medicine Dr. Brian Marcial  Rheumatology; Dr. Paiz  Optometry: Dr. Cesar Dukes OD  Podiatry Yonatan Douglass Jr., DPM    Kan Traore  reports overall that she is doing well.  She reports that her joint pains have been doing much better since her foot surgery.  She never restarted her Humira or methotrexate.  She plans on making an appointment with her rheumatologist to discuss whether she needs to restart the medication at all.    1. ADHD predominantly inattentive     Age diagnosed: 4 th grade  Diagnosed by: Psychiatrist  Initial evaluation present in chart: no     Past medications:  Vyvanse 30 mg (she was only on his medications secondary to Adderall supply chain issues; crash at end of day)  Reasons for changing past medications:  Availability     Current medications:   dextroamphetamine-amphetamine (ADDERALL XR) 30 MG 24 hr capsule, Take 1 capsule (30 mg total) by mouth   dextroamphetamine-amphetamine (ADDERALL) 10 mg Tab, Take 1 tablet (10 mg total) by mouth once daily., Disp:      reviewed: yes      Concerns with medication:  denies    AM medication: 7:30 AM Adderall XR daily  Lunch medication: denies   Afternoon medication: Adderall IR 10 mg PRN     Daily Activity:  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 Hypothyroidism     Age diagnosed: 36 yo    Symptomatic at diagnosis: fatigue, constipation  Prior evaluation by Endocrinologist: No  Prior thyroid US: no    Current medication:    Levothyroxine 50 mcg daily    Side effects from medication: none  Scheduled for medication: AM fasting separate from other medications    Symptoms from thyroid issue: denies fatigue, weight changes, heat/cold intolerance, bowel/skin changes or CVS symptoms  Concerns: denies    Lab Results       Component                Value               Date                       TSH                      1.630               01/08/2024                 FREET4                   0.83                01/08/2024              Lab Results       Component                Value               Date                       TSH                      3.104               03/15/2023                 FREET4                   0.78                11/28/2022              Lab Results       Component                Value               Date                       TSH                      7.580 (H)           11/28/2022                 FREET4                   0.78                11/28/2022             Review of Systems   All other systems reviewed and are negative.      HEALTH MAINTENANCE:   Health Maintenance   Topic Date Due    Mammogram  Never done    TETANUS VACCINE  07/30/2025    Hepatitis C Screening  Completed    Lipid Panel  Completed     Health Maintenance Topics with due status: Not Due       Topic Last Completion Date    TETANUS VACCINE 07/30/2015    Influenza Vaccine 11/14/2022     Health Maintenance Due   Topic Date Due    Pneumococcal Vaccines (Age 0-64) (1 of 2 - PCV) Never done    Mammogram  Never done    COVID-19 Vaccine (3 - Pfizer risk series) 09/17/2021    Cervical Cancer Screening  10/20/2024       HISTORY:   Past Medical History:   Diagnosis Date    Allergic rhinitis     Anxiety     Asthma     history of    Attention deficit disorder (ADD)     Bilateral nephrolithiasis     Crutch training, encounter for 02/19/2024    Depression     states does not feel depressed    Equinus contracture of right ankle 01/22/2024    Hydronephrosis      Hyperglycemia 2021    - glucose 131 on recent labs - patient was nonfasting Lab Results Component Value Date  HGBA1C 5.1 06/10/2021      Metatarsalgia of right foot 2024    Martinez neuroma, right     Plantar fasciitis of right foot     Rheumatoid arthritis, unspecified     Thyroid disease     hypothyroidism    Urinary tract infection     Vaginal infection        Past Surgical History:   Procedure Laterality Date    APPENDECTOMY  2017    BONE MARROW ASPIRATION Right 2024    Procedure: ASPIRATION, BONE MARROW;  Surgeon: Yonatan Douglass Jr., DPM;  Location: [facility] OR;  Service: Podiatry;  Laterality: Right;    CYSTOSCOPY W/ URETERAL STENT PLACEMENT      CYSTOSCOPY W/ URETERAL STENT REMOVAL      DECOMPRESSION OF NERVE Right 2024    Procedure: DECOMPRESSION, NERVE;  Surgeon: Yonatan Douglass Jr., DPM;  Location: [facility] OR;  Service: Podiatry;  Laterality: Right;  inter-metatarsal nerve 3rd interspace    DILATION AND CURETTAGE OF UTERUS  2014    KIDNEY STONE SURGERY      LITHOTRIPSY      multiple    LITHOTRIPSY  2017    LOOP ELECTROSURGICAL EXCISION PROCEDURE (LEEP) N/A 10/02/2020    Procedure: LEEP (LOOP ELECTROSURGICAL EXCISION PROCEDURE);  Surgeon: Kacy Elias MD;  Location: [facility] OR;  Service: OB/GYN;  Laterality: N/A;    SURGICAL REMOVAL OF MARTINEZ'S NEUROMA Right 2024    Procedure: EXCISION, MARTINEZ'S NEUROMA;  Surgeon: Yonatan Douglass Jr., DPM;  Location: [facility] OR;  Service: Podiatry;  Laterality: Right;  pop saph block    TONSILLECTOMY      WISDOM TOOTH EXTRACTION      as a teenager       Family History   Problem Relation Name Age of Onset    Hypertension Mother Mom 50    Asthma Mother Mom     Hypothyroidism Father  52    Stroke Maternal Grandfather [place]     Hypertension Maternal Grandfather [place]     Heart disease Maternal Grandfather [place] 56         of AMI    Heart failure Paternal Grandmother Lizett Suffrin 81    Rheum arthritis Paternal Grandmother Lizett  Hon[place]     Arthritis Paternal Grandmother Lizett Olmedo     No Known Problems Son      No Known Problems Son      No Known Problems Son      Breast cancer Neg Hx      Colon cancer Neg Hx      Ovarian cancer Neg Hx      Kidney disease Neg Hx         Social History     Tobacco Use    Smoking status: Never     Passive exposure: Never    Smokeless tobacco: Never   Substance Use Topics    Alcohol use: Yes     Alcohol/week: 1.0 standard drink of alcohol     Types: 1 Drinks containing 0.5 oz of alcohol per week     Comment: Socially    Drug use: No       Social History     Social History Narrative     Myron 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        ALLERGIES:   Review of patient's allergies indicates:  No Known Allergies    MEDS:   Current Outpatient Medications on File Prior to Visit   Medication Sig Dispense Refill Last Dose    citalopram (CELEXA) 20 MG tablet Take 1 tablet (20 mg total) by mouth once daily. 90 tablet 3 Taking    folic acid (FOLVITE) 1 MG tablet Take 3 tablets (3 mg total) by mouth once daily. 90 tablet 11 Taking    levothyroxine (SYNTHROID) 50 MCG tablet Take 1 tablet (50 mcg total) by mouth before breakfast. 90 tablet 3 Taking    meloxicam (MOBIC) 7.5 MG tablet Take 1 tablet (7.5 mg total) by mouth once daily 30 tablet 1 Taking    norgestimate-ethinyl estradioL (SPRINTEC, 28,) 0.25-35 mg-mcg per tablet Take 1 tablet by mouth once daily. Take one active pill daily, skipping sugar pills 112 tablet 1 Taking    ondansetron (ZOFRAN-ODT) 4 MG TbDL Dissolve 1 tablet (4 mg total) by mouth every 8 (eight) hours as needed (nausea/migraine). 30 tablet 0 Taking    [DISCONTINUED] dextroamphetamine-amphetamine (ADDERALL XR) 30 MG 24 hr capsule Take 1 capsule (30 mg total) by mouth every morning. 30 capsule 0 Taking    [DISCONTINUED] dextroamphetamine-amphetamine (ADDERALL) 10 mg Tab Take 1 tablet (10 mg total) by mouth after lunch. 30 tablet 0 Taking    adalimumab  "(HUMIRA,CF, PEN) 40 mg/0.4 mL PnKt Inject 0.4 mLs (40 mg total) into the skin every 14 (fourteen) days. 2 pen 11     methotrexate 2.5 MG Tab Take 8 tablets (20 mg total) by mouth every 7 days. 96 tablet 0     [DISCONTINUED] ascorbic acid, vitamin C, (VITAMIN C) 500 MG tablet Take 1 tablet (500 mg total) by mouth once daily (Patient not taking: Reported on 8/20/2024) 30 tablet 1 Not Taking    [DISCONTINUED] diphenhydrAMINE (BENADRYL) 25 mg capsule Take 1 capsule (25 mg total) by mouth every 6 (six) hours as needed for Itching. (Patient not taking: Reported on 8/20/2024) 30 capsule 0 Not Taking    [DISCONTINUED] gabapentin (NEURONTIN) 100 MG capsule Take 1 capsule (100 mg total) by mouth 2 times daily (Patient not taking: Reported on 8/20/2024) 60 capsule 1 Not Taking    [DISCONTINUED] predniSONE (DELTASONE) 20 MG tablet Take 2 tablets by mouth daily x Day 1 then 1 tab daily x next  4 days (Patient not taking: Reported on 8/20/2024) 6 tablet 0 Not Taking    [DISCONTINUED] promethazine (PHENERGAN) 25 MG tablet Take 1/2 tablet by mouth every 4 (four) hours as needed for Nausea. (Patient not taking: Reported on 8/20/2024) 30 tablet 0 Not Taking         Vital signs:   Vitals:    08/20/24 1212 08/20/24 1228   BP: (!) 157/97 138/80   Pulse: 106    SpO2: 99%    Weight: 57.9 kg (127 lb 8.6 oz)    Height: 5' 6" (1.676 m)      Body mass index is 20.58 kg/m².    PHYSICAL EXAM:     Physical Exam  Vitals reviewed.   Constitutional:       General: She is not in acute distress.  HENT:      Head: Normocephalic and atraumatic.      Right Ear: Tympanic membrane and ear canal normal.      Left Ear: Tympanic membrane and ear canal normal.   Eyes:      General: No scleral icterus.     Conjunctiva/sclera: Conjunctivae normal.   Neck:      Thyroid: No thyromegaly.      Vascular: No carotid bruit.   Cardiovascular:      Rate and Rhythm: Regular rhythm. Tachycardia present.      Pulses: Normal pulses.      Heart sounds: Normal heart sounds. " No murmur heard.     No friction rub. No gallop.   Pulmonary:      Effort: Pulmonary effort is normal.      Breath sounds: Normal breath sounds. No wheezing, rhonchi or rales.   Abdominal:      General: Bowel sounds are normal. There is no distension.      Palpations: Abdomen is soft.      Tenderness: There is no abdominal tenderness.   Musculoskeletal:      Cervical back: Normal range of motion and neck supple.      Right lower leg: No edema.      Left lower leg: No edema.   Lymphadenopathy:      Cervical: No cervical adenopathy.   Skin:     General: Skin is warm.      Capillary Refill: Capillary refill takes less than 2 seconds.   Neurological:      Mental Status: She is alert.             PERTINENT RESULTS:   No visits with results within 1 Week(s) from this visit.   Latest known visit with results is:   Lab Visit on 05/02/2024   Component Date Value Ref Range Status    CRP 05/02/2024 1.5  0.0 - 8.2 mg/L Final    Sodium 05/02/2024 135 (L)  136 - 145 mmol/L Final    Potassium 05/02/2024 4.0  3.5 - 5.1 mmol/L Final    Chloride 05/02/2024 105  95 - 110 mmol/L Final    CO2 05/02/2024 21 (L)  23 - 29 mmol/L Final    Glucose 05/02/2024 124 (H)  70 - 110 mg/dL Final    BUN 05/02/2024 13  6 - 20 mg/dL Final    Creatinine 05/02/2024 0.7  0.5 - 1.4 mg/dL Final    Calcium 05/02/2024 9.1  8.7 - 10.5 mg/dL Final    Total Protein 05/02/2024 7.4  6.0 - 8.4 g/dL Final    Albumin 05/02/2024 3.5  3.5 - 5.2 g/dL Final    Total Bilirubin 05/02/2024 1.1 (H)  0.1 - 1.0 mg/dL Final    Comment: For infants and newborns, interpretation of results should be based  on gestational age, weight and in agreement with clinical  observations.    Premature Infant recommended reference ranges:  Up to 24 hours.............<8.0 mg/dL  Up to 48 hours............<12.0 mg/dL  3-5 days..................<15.0 mg/dL  6-29 days.................<15.0 mg/dL      Alkaline Phosphatase 05/02/2024 41 (L)  55 - 135 U/L Final    AST 05/02/2024 15  10 - 40 U/L  Final    ALT 05/02/2024 12  10 - 44 U/L Final    eGFR 05/02/2024 >60.0  >60 mL/min/1.73 m^2 Final    Anion Gap 05/02/2024 9  8 - 16 mmol/L Final    WBC 05/02/2024 5.99  3.90 - 12.70 K/uL Final    RBC 05/02/2024 4.90  4.00 - 5.40 M/uL Final    Hemoglobin 05/02/2024 14.0  12.0 - 16.0 g/dL Final    Hematocrit 05/02/2024 43.0  37.0 - 48.5 % Final    MCV 05/02/2024 88  82 - 98 fL Final    MCH 05/02/2024 28.6  27.0 - 31.0 pg Final    MCHC 05/02/2024 32.6  32.0 - 36.0 g/dL Final    RDW 05/02/2024 12.9  11.5 - 14.5 % Final    Platelets 05/02/2024 230  150 - 450 K/uL Final    MPV 05/02/2024 11.5  9.2 - 12.9 fL Final    Immature Granulocytes 05/02/2024 0.0  0.0 - 0.5 % Final    Gran # (ANC) 05/02/2024 2.8  1.8 - 7.7 K/uL Final    Immature Grans (Abs) 05/02/2024 0.00  0.00 - 0.04 K/uL Final    Comment: Mild elevation in immature granulocytes is non specific and   can be seen in a variety of conditions including stress response,   acute inflammation, trauma and pregnancy. Correlation with other   laboratory and clinical findings is essential.      Lymph # 05/02/2024 2.5  1.0 - 4.8 K/uL Final    Mono # 05/02/2024 0.5  0.3 - 1.0 K/uL Final    Eos # 05/02/2024 0.2  0.0 - 0.5 K/uL Final    Baso # 05/02/2024 0.06  0.00 - 0.20 K/uL Final    nRBC 05/02/2024 0  0 /100 WBC Final    Gran % 05/02/2024 46.4  38.0 - 73.0 % Final    Lymph % 05/02/2024 41.7  18.0 - 48.0 % Final    Mono % 05/02/2024 8.2  4.0 - 15.0 % Final    Eosinophil % 05/02/2024 2.7  0.0 - 8.0 % Final    Basophil % 05/02/2024 1.0  0.0 - 1.9 % Final    Differential Method 05/02/2024 Automated   Final    Sed Rate 05/02/2024 2  0 - 20 mm/Hr Final    WBC 05/02/2024 5.99  3.90 - 12.70 K/uL Final    RBC 05/02/2024 4.90  4.00 - 5.40 M/uL Final    Hemoglobin 05/02/2024 14.0  12.0 - 16.0 g/dL Final    Hematocrit 05/02/2024 43.0  37.0 - 48.5 % Final    MCV 05/02/2024 88  82 - 98 fL Final    MCH 05/02/2024 28.6  27.0 - 31.0 pg Final    MCHC 05/02/2024 32.6  32.0 - 36.0 g/dL Final     RDW 05/02/2024 12.9  11.5 - 14.5 % Final    Platelets 05/02/2024 230  150 - 450 K/uL Final    MPV 05/02/2024 11.5  9.2 - 12.9 fL Final    Immature Granulocytes 05/02/2024 0.0  0.0 - 0.5 % Final    Gran # (ANC) 05/02/2024 2.8  1.8 - 7.7 K/uL Final    Immature Grans (Abs) 05/02/2024 0.00  0.00 - 0.04 K/uL Final    Comment: Mild elevation in immature granulocytes is non specific and   can be seen in a variety of conditions including stress response,   acute inflammation, trauma and pregnancy. Correlation with other   laboratory and clinical findings is essential.      Lymph # 05/02/2024 2.5  1.0 - 4.8 K/uL Final    Mono # 05/02/2024 0.5  0.3 - 1.0 K/uL Final    Eos # 05/02/2024 0.2  0.0 - 0.5 K/uL Final    Baso # 05/02/2024 0.06  0.00 - 0.20 K/uL Final    nRBC 05/02/2024 0  0 /100 WBC Final    Gran % 05/02/2024 46.4  38.0 - 73.0 % Final    Lymph % 05/02/2024 41.7  18.0 - 48.0 % Final    Mono % 05/02/2024 8.2  4.0 - 15.0 % Final    Eosinophil % 05/02/2024 2.7  0.0 - 8.0 % Final    Basophil % 05/02/2024 1.0  0.0 - 1.9 % Final    Differential Method 05/02/2024 Automated   Final    Sodium 05/02/2024 135 (L)  136 - 145 mmol/L Final    Potassium 05/02/2024 4.0  3.5 - 5.1 mmol/L Final    Chloride 05/02/2024 105  95 - 110 mmol/L Final    CO2 05/02/2024 21 (L)  23 - 29 mmol/L Final    Glucose 05/02/2024 124 (H)  70 - 110 mg/dL Final    BUN 05/02/2024 13  6 - 20 mg/dL Final    Creatinine 05/02/2024 0.7  0.5 - 1.4 mg/dL Final    Calcium 05/02/2024 9.1  8.7 - 10.5 mg/dL Final    Total Protein 05/02/2024 7.4  6.0 - 8.4 g/dL Final    Albumin 05/02/2024 3.5  3.5 - 5.2 g/dL Final    Total Bilirubin 05/02/2024 1.1 (H)  0.1 - 1.0 mg/dL Final    Comment: For infants and newborns, interpretation of results should be based  on gestational age, weight and in agreement with clinical  observations.    Premature Infant recommended reference ranges:  Up to 24 hours.............<8.0 mg/dL  Up to 48 hours............<12.0 mg/dL  3-5  days..................<15.0 mg/dL  6-29 days.................<15.0 mg/dL      Alkaline Phosphatase 05/02/2024 41 (L)  55 - 135 U/L Final    AST 05/02/2024 15  10 - 40 U/L Final    ALT 05/02/2024 12  10 - 44 U/L Final    eGFR 05/02/2024 >60.0  >60 mL/min/1.73 m^2 Final    Anion Gap 05/02/2024 9  8 - 16 mmol/L Final    CRP 05/02/2024 1.5  0.0 - 8.2 mg/L Final    Sed Rate 05/02/2024 2  0 - 20 mm/Hr Final    WBC 05/02/2024 5.99  3.90 - 12.70 K/uL Final    RBC 05/02/2024 4.90  4.00 - 5.40 M/uL Final    Hemoglobin 05/02/2024 14.0  12.0 - 16.0 g/dL Final    Hematocrit 05/02/2024 43.0  37.0 - 48.5 % Final    MCV 05/02/2024 88  82 - 98 fL Final    MCH 05/02/2024 28.6  27.0 - 31.0 pg Final    MCHC 05/02/2024 32.6  32.0 - 36.0 g/dL Final    RDW 05/02/2024 12.9  11.5 - 14.5 % Final    Platelets 05/02/2024 230  150 - 450 K/uL Final    MPV 05/02/2024 11.5  9.2 - 12.9 fL Final    Immature Granulocytes 05/02/2024 0.0  0.0 - 0.5 % Final    Gran # (ANC) 05/02/2024 2.8  1.8 - 7.7 K/uL Final    Immature Grans (Abs) 05/02/2024 0.00  0.00 - 0.04 K/uL Final    Comment: Mild elevation in immature granulocytes is non specific and   can be seen in a variety of conditions including stress response,   acute inflammation, trauma and pregnancy. Correlation with other   laboratory and clinical findings is essential.      Lymph # 05/02/2024 2.5  1.0 - 4.8 K/uL Final    Mono # 05/02/2024 0.5  0.3 - 1.0 K/uL Final    Eos # 05/02/2024 0.2  0.0 - 0.5 K/uL Final    Baso # 05/02/2024 0.06  0.00 - 0.20 K/uL Final    nRBC 05/02/2024 0  0 /100 WBC Final    Gran % 05/02/2024 46.4  38.0 - 73.0 % Final    Lymph % 05/02/2024 41.7  18.0 - 48.0 % Final    Mono % 05/02/2024 8.2  4.0 - 15.0 % Final    Eosinophil % 05/02/2024 2.7  0.0 - 8.0 % Final    Basophil % 05/02/2024 1.0  0.0 - 1.9 % Final    Differential Method 05/02/2024 Automated   Final    CRP 05/02/2024 1.5  0.0 - 8.2 mg/L Final    Sodium 05/02/2024 135 (L)  136 - 145 mmol/L Final    Potassium 05/02/2024  4.0  3.5 - 5.1 mmol/L Final    Chloride 05/02/2024 105  95 - 110 mmol/L Final    CO2 05/02/2024 21 (L)  23 - 29 mmol/L Final    Glucose 05/02/2024 124 (H)  70 - 110 mg/dL Final    BUN 05/02/2024 13  6 - 20 mg/dL Final    Creatinine 05/02/2024 0.7  0.5 - 1.4 mg/dL Final    Calcium 05/02/2024 9.1  8.7 - 10.5 mg/dL Final    Total Protein 05/02/2024 7.4  6.0 - 8.4 g/dL Final    Albumin 05/02/2024 3.5  3.5 - 5.2 g/dL Final    Total Bilirubin 05/02/2024 1.1 (H)  0.1 - 1.0 mg/dL Final    Comment: For infants and newborns, interpretation of results should be based  on gestational age, weight and in agreement with clinical  observations.    Premature Infant recommended reference ranges:  Up to 24 hours.............<8.0 mg/dL  Up to 48 hours............<12.0 mg/dL  3-5 days..................<15.0 mg/dL  6-29 days.................<15.0 mg/dL      Alkaline Phosphatase 05/02/2024 41 (L)  55 - 135 U/L Final    AST 05/02/2024 15  10 - 40 U/L Final    ALT 05/02/2024 12  10 - 44 U/L Final    eGFR 05/02/2024 >60.0  >60 mL/min/1.73 m^2 Final    Anion Gap 05/02/2024 9  8 - 16 mmol/L Final    Sed Rate 05/02/2024 2  0 - 20 mm/Hr Final    Hep B Core Total Ab 05/02/2024 Non-reactive  Non-reactive Final    Hepatitis B Surface Ag 05/02/2024 Non-reactive  Non-reactive Final    NIL 05/02/2024 0.05194  IU/mL Final    Comment: The Nil tube value is used to determine if the patient   has a preexisting immune response which could cause a   false-positive reading on the test.   For a test to be valid, the NIL tube must have a value   of less than or equal to 8.0 IU/mL.  The mitogen control tube is used to assure the patient   has a healthy immune status and also serves as a control   for correct blood handling and incubation. It is used to   detect false negative readings.   The TB antigen tubes are coated with the M. tuberculosis   specific antigens. For a test to be considered positive,   at least one of the TB antigen tube values minus the Nil    tube value must be greater than or equal to 0.35 IU/mL   and be greater than or equal to 25% of the Nil tube value.  Diagnosing or excluding tuberculosis disease, and assessing   the probability of LTNI, requires a combination of   epidemiological, historical, medical, and diagnostic   findings that should be considered when interpreting   the test results.       TB1 - Nil 05/02/2024 0.005  IU/mL Final    TB2 - Nil 05/02/2024 0.020  IU/mL Final    Mitogen - Nil 05/02/2024 9.179  IU/mL Final    TB Gold Plus 05/02/2024 Negative  Negative Final    M. tuberculosis infection NOT likely.     Lab Results   Component Value Date    TSH 1.630 01/08/2024    FREET4 0.83 01/08/2024     Lab Results   Component Value Date    CHOL 166 08/17/2022    CHOL 164 03/08/2021    CHOL 144 08/13/2018     Lab Results   Component Value Date    HDL 56 08/17/2022    HDL 61 03/08/2021    HDL 51 08/13/2018     Lab Results   Component Value Date    LDLCALC 82.6 08/17/2022    LDLCALC 77.0 03/08/2021    LDLCALC 81.0 08/13/2018     Lab Results   Component Value Date    TRIG 137 08/17/2022    TRIG 130 03/08/2021    TRIG 60 08/13/2018       Lab Results   Component Value Date    CHOLHDL 33.7 08/17/2022    CHOLHDL 37.2 03/08/2021    CHOLHDL 35.4 08/13/2018       ASSESSMENT/PLAN:    1. Encounter for general adult medical examination with abnormal findings  Overview:  - preventative health counseling  - counseling on current recommendations for breast cancer screening. Average risk  - counseling on current recommendations for cervical cancer screening. + CIN1 and 10/2023 ASCUS. PAP yearly and due 10/2024 with Gynecology  - counseling on current recommendations for colon cancer screening.Average risk    Orders:  -     Mammo Digital Screening Bilat w/ Andrew; Future; Expected date: 08/20/2024  -     Lipid Panel; Future; Expected date: 08/20/2024  -     Hemoglobin A1C; Future; Expected date: 08/20/2024  -     T4, Free; Future; Expected date: 08/20/2024  -      TSH; Future; Expected date: 08/20/2024  -     Lipid Panel; Future; Expected date: 11/20/2024  -     Hemoglobin A1C; Future; Expected date: 11/20/2024  -     Comprehensive Metabolic Panel; Future; Expected date: 11/20/2024  -     CBC Auto Differential; Future; Expected date: 11/20/2024  -     T4, Free; Future; Expected date: 11/20/2024  -     TSH; Future; Expected date: 11/20/2024    2. Other specified hypothyroidism  Overview:  - family history of hypothyroidism (father)  Lab Results   Component Value Date    TSH 1.630 01/08/2024    FREET4 0.83 01/08/2024     - 06/10/2021 TPO normal, free T3 and free T4  - well controlled  - continue current management plan   - patient encouraged to notify me with any changes    Orders:  -     T4, Free; Future; Expected date: 08/20/2024  -     TSH; Future; Expected date: 08/20/2024  -     T4, Free; Future; Expected date: 11/20/2024  -     TSH; Future; Expected date: 11/20/2024    3. Attention deficit hyperactivity disorder (ADHD), predominantly inattentive type  Overview:  -  reviewed  - well controlled  - continue current medications     Orders:  -     dextroamphetamine-amphetamine (ADDERALL) 10 mg Tab; Take 1 tablet (10 mg total) by mouth after lunch.  Dispense: 30 tablet; Refill: 0  -     dextroamphetamine-amphetamine (ADDERALL XR) 30 MG 24 hr capsule; Take 1 capsule (30 mg total) by mouth every morning.  Dispense: 30 capsule; Refill: 0    4. Rheumatoid arthritis involving multiple sites with positive rheumatoid factor  Overview:  - followed by Rheumatology  - pain has improved   -no longer taking medications and recommend schedule follow up the rheumatology to discuss in more detail      5. Drug-induced immunodeficiency  Overview:  - was on Humira for RA  - no recurrent infections      6. CONSUELO I (cervical intraepithelial neoplasia I)  Overview:  - 2/17/22 PAP LSIL with +HR HPV  - 4/18/2022 colonoscopy + CONSUELO 1 repeat PAP 1 year  - 10/2023 PAP ASCUS and negative HPV      7.  Recurrent major depressive disorder, in full remission  Overview:  - well controlled  - continue current management plan   - patient encouraged to notify me with any changes      8. Generalized anxiety disorder  Overview:  - well controlled  - continue current medication      9. Encounter for screening mammogram for breast cancer  -     Mammo Digital Screening Bilat w/ Andrew; Future; Expected date: 08/20/2024          ORDERS:   Orders Placed This Encounter    Mammo Digital Screening Bilat w/ Andrew    Lipid Panel    Hemoglobin A1C    T4, Free    TSH    Lipid Panel    Hemoglobin A1C    Comprehensive Metabolic Panel    CBC Auto Differential    T4, Free    TSH    dextroamphetamine-amphetamine (ADDERALL) 10 mg Tab    dextroamphetamine-amphetamine (ADDERALL XR) 30 MG 24 hr capsule       Vaccines recommended:  Prevnar 20 and COVID-19 update.  She declined    Follow up in about 3 months (around 11/20/2024) for Virtual Visit, Labs, ADHD. or sooner with any concerns      This note is dictated using the M*Modal Fluency Direct word recognition program. There are word recognition mistakes that are occasionally missed on review.    Dr. Cielo Mina D.O.   Family Medicine

## 2024-08-14 ENCOUNTER — TELEPHONE (OUTPATIENT)
Dept: PRIMARY CARE CLINIC | Facility: CLINIC | Age: 39
End: 2024-08-14
Payer: COMMERCIAL

## 2024-08-14 NOTE — TELEPHONE ENCOUNTER
Pt states that she's unsure which appointment she will be able to make it to due to her car being in the shop.  Pt states that she will cancel one appointment once she knows the status of her car.

## 2024-08-14 NOTE — TELEPHONE ENCOUNTER
----- Message from Cielo Mina DO sent at 8/14/2024 12:37 PM CDT -----  Regarding: please contact  Kan Traore has appt 8/20/24 12 noon and 8/22/24 at 8;30 AM. Please see which one she wants to keep and cancel the other. If unable to contact her by phone, please contact by op5

## 2024-08-20 ENCOUNTER — TELEPHONE (OUTPATIENT)
Dept: PRIMARY CARE CLINIC | Facility: CLINIC | Age: 39
End: 2024-08-20

## 2024-08-20 ENCOUNTER — OFFICE VISIT (OUTPATIENT)
Dept: PRIMARY CARE CLINIC | Facility: CLINIC | Age: 39
End: 2024-08-20
Attending: FAMILY MEDICINE
Payer: COMMERCIAL

## 2024-08-20 VITALS
DIASTOLIC BLOOD PRESSURE: 80 MMHG | HEIGHT: 66 IN | OXYGEN SATURATION: 99 % | BODY MASS INDEX: 20.5 KG/M2 | WEIGHT: 127.56 LBS | HEART RATE: 106 BPM | SYSTOLIC BLOOD PRESSURE: 138 MMHG

## 2024-08-20 DIAGNOSIS — Z79.899 DRUG-INDUCED IMMUNODEFICIENCY: ICD-10-CM

## 2024-08-20 DIAGNOSIS — Z00.01 ENCOUNTER FOR GENERAL ADULT MEDICAL EXAMINATION WITH ABNORMAL FINDINGS: Primary | ICD-10-CM

## 2024-08-20 DIAGNOSIS — E03.8 OTHER SPECIFIED HYPOTHYROIDISM: ICD-10-CM

## 2024-08-20 DIAGNOSIS — F33.42 RECURRENT MAJOR DEPRESSIVE DISORDER, IN FULL REMISSION: ICD-10-CM

## 2024-08-20 DIAGNOSIS — F41.1 GENERALIZED ANXIETY DISORDER: ICD-10-CM

## 2024-08-20 DIAGNOSIS — Z12.31 ENCOUNTER FOR SCREENING MAMMOGRAM FOR BREAST CANCER: ICD-10-CM

## 2024-08-20 DIAGNOSIS — N87.0 CIN I (CERVICAL INTRAEPITHELIAL NEOPLASIA I): ICD-10-CM

## 2024-08-20 DIAGNOSIS — M05.79 RHEUMATOID ARTHRITIS INVOLVING MULTIPLE SITES WITH POSITIVE RHEUMATOID FACTOR: ICD-10-CM

## 2024-08-20 DIAGNOSIS — D84.821 DRUG-INDUCED IMMUNODEFICIENCY: ICD-10-CM

## 2024-08-20 DIAGNOSIS — F90.0 ATTENTION DEFICIT HYPERACTIVITY DISORDER (ADHD), PREDOMINANTLY INATTENTIVE TYPE: ICD-10-CM

## 2024-08-20 PROCEDURE — 99396 PREV VISIT EST AGE 40-64: CPT | Mod: S$GLB,,, | Performed by: FAMILY MEDICINE

## 2024-08-20 PROCEDURE — 3075F SYST BP GE 130 - 139MM HG: CPT | Mod: CPTII,S$GLB,, | Performed by: FAMILY MEDICINE

## 2024-08-20 PROCEDURE — 3008F BODY MASS INDEX DOCD: CPT | Mod: CPTII,S$GLB,, | Performed by: FAMILY MEDICINE

## 2024-08-20 PROCEDURE — 1159F MED LIST DOCD IN RCRD: CPT | Mod: CPTII,S$GLB,, | Performed by: FAMILY MEDICINE

## 2024-08-20 PROCEDURE — 3079F DIAST BP 80-89 MM HG: CPT | Mod: CPTII,S$GLB,, | Performed by: FAMILY MEDICINE

## 2024-08-20 PROCEDURE — 99999 PR PBB SHADOW E&M-EST. PATIENT-LVL IV: CPT | Mod: PBBFAC,,, | Performed by: FAMILY MEDICINE

## 2024-08-20 PROCEDURE — 3044F HG A1C LEVEL LT 7.0%: CPT | Mod: CPTII,S$GLB,, | Performed by: FAMILY MEDICINE

## 2024-08-20 RX ORDER — DEXTROAMPHETAMINE SACCHARATE, AMPHETAMINE ASPARTATE, DEXTROAMPHETAMINE SULFATE AND AMPHETAMINE SULFATE 2.5; 2.5; 2.5; 2.5 MG/1; MG/1; MG/1; MG/1
1 TABLET ORAL
Qty: 30 TABLET | Refills: 0 | Status: SHIPPED | OUTPATIENT
Start: 2024-08-20 | End: 2024-09-21

## 2024-08-20 RX ORDER — DEXTROAMPHETAMINE SACCHARATE, AMPHETAMINE ASPARTATE MONOHYDRATE, DEXTROAMPHETAMINE SULFATE AND AMPHETAMINE SULFATE 7.5; 7.5; 7.5; 7.5 MG/1; MG/1; MG/1; MG/1
30 CAPSULE, EXTENDED RELEASE ORAL EVERY MORNING
Qty: 30 CAPSULE | Refills: 0 | Status: SHIPPED | OUTPATIENT
Start: 2024-08-20 | End: 2024-09-21

## 2024-08-20 NOTE — Clinical Note
Please schedule 3 month virtual ADHD f/u with labs prior 1. Lab for [facility] on a Monday preferred by patient 2. She would like you to schedule and send her a [employer] message with dates and times. No need to call

## 2024-08-20 NOTE — TELEPHONE ENCOUNTER
----- Message from Cielo Mina DO sent at 8/20/2024 12:43 PM CDT -----  Please schedule 3 month virtual ADHD f/u with labs prior  1. Lab for [facility] on a Monday preferred by patient  2. She would like you to schedule and send her a [employer] message with dates and times. No need to call

## 2024-09-09 DIAGNOSIS — F90.0 ATTENTION DEFICIT HYPERACTIVITY DISORDER (ADHD), PREDOMINANTLY INATTENTIVE TYPE: ICD-10-CM

## 2024-09-09 DIAGNOSIS — R11.0 NAUSEA: ICD-10-CM

## 2024-09-09 RX ORDER — ONDANSETRON 4 MG/1
4 TABLET, ORALLY DISINTEGRATING ORAL EVERY 8 HOURS PRN
Qty: 30 TABLET | Refills: 0 | Status: SHIPPED | OUTPATIENT
Start: 2024-09-09

## 2024-09-09 RX ORDER — DEXTROAMPHETAMINE SACCHARATE, AMPHETAMINE ASPARTATE, DEXTROAMPHETAMINE SULFATE AND AMPHETAMINE SULFATE 2.5; 2.5; 2.5; 2.5 MG/1; MG/1; MG/1; MG/1
1 TABLET ORAL
Qty: 30 TABLET | Refills: 0 | Status: SHIPPED | OUTPATIENT
Start: 2024-09-09 | End: 2024-10-09

## 2024-09-09 RX ORDER — DEXTROAMPHETAMINE SACCHARATE, AMPHETAMINE ASPARTATE MONOHYDRATE, DEXTROAMPHETAMINE SULFATE AND AMPHETAMINE SULFATE 7.5; 7.5; 7.5; 7.5 MG/1; MG/1; MG/1; MG/1
30 CAPSULE, EXTENDED RELEASE ORAL EVERY MORNING
Qty: 30 CAPSULE | Refills: 0 | Status: SHIPPED | OUTPATIENT
Start: 2024-09-09 | End: 2024-10-09

## 2024-09-09 NOTE — TELEPHONE ENCOUNTER
No care due was identified.  Health [facility] Embedded Care Due Messages. Reference number: [id number].   9/09/2024 8:45:22 AM CDT

## 2024-09-09 NOTE — TELEPHONE ENCOUNTER
Refill Encounter    PCP Visits: Recent Visits  Date Type Provider Dept   08/20/24 Office Visit Cielo Mina, DO [facility]   05/14/24 Office Visit Cielo Mina, DO [facility]   02/14/24 Office Visit Cielo Mina, DO [facility]   10/02/23 Office Visit Cielo Mina, DO [facility]   Showing recent visits within past 360 days and meeting all other requirements  Future Appointments  Date Type Provider Dept   11/26/24 Appointment Cielo Mina, DO [facility]   Showing future appointments within next 720 days and meeting all other requirements     Last 3 Blood Pressure:   BP Readings from Last 3 Encounters:   08/20/24 138/80   02/23/24 126/82   02/14/24 110/76     Preferred Pharmacy:   [facility]  [address]  [place] [address]  Phone: [phone] Fax: [phone]    Pharmacy Clerk,  [facility] [address] [address]  Phone: [phone] Fax: [phone]    Requested RX:  Requested Prescriptions     Pending Prescriptions Disp Refills    dextroamphetamine-amphetamine (ADDERALL) 10 mg Tab 30 tablet 0     Sig: Take 1 tablet (10 mg total) by mouth after lunch.    dextroamphetamine-amphetamine (ADDERALL XR) 30 MG 24 hr capsule 30 capsule 0     Sig: Take 1 capsule (30 mg total) by mouth every morning.      RX Route: Normal

## 2024-09-09 NOTE — TELEPHONE ENCOUNTER
No care due was identified.  [facility] Embedded Care Due Messages. Reference number: [id number].   9/09/2024 8:45:50 AM CDT

## 2024-09-19 ENCOUNTER — PATIENT MESSAGE (OUTPATIENT)
Dept: PRIMARY CARE CLINIC | Facility: CLINIC | Age: 39
End: 2024-09-19
Payer: COMMERCIAL

## 2024-09-19 VITALS — SYSTOLIC BLOOD PRESSURE: 112 MMHG | DIASTOLIC BLOOD PRESSURE: 82 MMHG

## 2024-09-19 DIAGNOSIS — F90.0 ATTENTION DEFICIT HYPERACTIVITY DISORDER (ADHD), PREDOMINANTLY INATTENTIVE TYPE: Primary | ICD-10-CM

## 2024-09-19 RX ORDER — DEXTROAMPHETAMINE SACCHARATE, AMPHETAMINE ASPARTATE MONOHYDRATE, DEXTROAMPHETAMINE SULFATE, AMPHETAMINE SULFATE 9.375; 9.375; 9.375; 9.375 MG/1; MG/1; MG/1; MG/1
1 CAPSULE, EXTENDED RELEASE ORAL DAILY
Qty: 30 EACH | Refills: 0 | Status: SHIPPED | OUTPATIENT
Start: 2024-09-19 | End: 2024-10-20

## 2024-10-18 DIAGNOSIS — F90.0 ATTENTION DEFICIT HYPERACTIVITY DISORDER (ADHD), PREDOMINANTLY INATTENTIVE TYPE: Primary | ICD-10-CM

## 2024-10-18 RX ORDER — DEXTROAMPHETAMINE SACCHARATE, AMPHETAMINE ASPARTATE MONOHYDRATE, DEXTROAMPHETAMINE SULFATE, AMPHETAMINE SULFATE 9.375; 9.375; 9.375; 9.375 MG/1; MG/1; MG/1; MG/1
1 CAPSULE, EXTENDED RELEASE ORAL DAILY
Qty: 30 EACH | Refills: 0 | Status: SHIPPED | OUTPATIENT
Start: 2024-10-18 | End: 2024-11-17

## 2024-10-18 RX ORDER — DEXTROAMPHETAMINE SACCHARATE, AMPHETAMINE ASPARTATE, DEXTROAMPHETAMINE SULFATE AND AMPHETAMINE SULFATE 2.5; 2.5; 2.5; 2.5 MG/1; MG/1; MG/1; MG/1
1 TABLET ORAL
Qty: 30 TABLET | Refills: 0 | Status: SHIPPED | OUTPATIENT
Start: 2024-10-18 | End: 2024-11-17

## 2024-10-18 NOTE — TELEPHONE ENCOUNTER
Patient requesting ADHD medication refill. Last in-person office visit with PCP 08/20/2024. 30 day supply provided to patient in August, again in September. Current dose is effective.  reviewed with no cause for suspicious activity. Next appointment scheduled with PCP 11/26/2024.

## 2024-10-18 NOTE — TELEPHONE ENCOUNTER
Care Due:                  Date            Visit Type   Department     Provider  --------------------------------------------------------------------------------                                EP -                              PRIMARY      [facility] PRIMARY  Last Visit: 08-      CARE ([facility])   SPENCER Mina                              ESTABLISHED                              PATIENT -    [facility] PRIMARY  Next Visit: 11-      [facility]      CARE           Cielo Mina                                                            Last  Test          Frequency    Reason                     Performed    Due Date  --------------------------------------------------------------------------------    TSH.........  12 months..  levothyroxine............  01- 01-    Health [facility] Embedded Care Due Messages. Reference number: [id number].   10/18/2024 10:33:05 AM CDT

## 2024-10-30 ENCOUNTER — PATIENT MESSAGE (OUTPATIENT)
Dept: RHEUMATOLOGY | Facility: CLINIC | Age: 39
End: 2024-10-30
Payer: COMMERCIAL

## 2024-10-31 ENCOUNTER — OFFICE VISIT (OUTPATIENT)
Dept: RHEUMATOLOGY | Facility: CLINIC | Age: 39
End: 2024-10-31
Payer: COMMERCIAL

## 2024-10-31 DIAGNOSIS — M06.9 RHEUMATOID ARTHRITIS INVOLVING MULTIPLE JOINTS: ICD-10-CM

## 2024-10-31 DIAGNOSIS — R76.8 RHEUMATOID FACTOR POSITIVE: ICD-10-CM

## 2024-10-31 DIAGNOSIS — Z79.899 IMMUNODEFICIENCY DUE TO DRUG THERAPY: Primary | ICD-10-CM

## 2024-10-31 DIAGNOSIS — D84.821 IMMUNODEFICIENCY DUE TO DRUG THERAPY: Primary | ICD-10-CM

## 2024-10-31 PROCEDURE — 99214 OFFICE O/P EST MOD 30 MIN: CPT | Mod: 95,,, | Performed by: INTERNAL MEDICINE

## 2024-10-31 PROCEDURE — 3044F HG A1C LEVEL LT 7.0%: CPT | Mod: CPTII,95,, | Performed by: INTERNAL MEDICINE

## 2024-10-31 RX ORDER — FOLIC ACID 1 MG/1
3 TABLET ORAL DAILY
Qty: 90 TABLET | Refills: 11 | Status: SHIPPED | OUTPATIENT
Start: 2024-10-31 | End: 2025-10-31

## 2024-10-31 RX ORDER — METHOTREXATE 2.5 MG/1
20 TABLET ORAL
Qty: 96 TABLET | Refills: 0 | Status: SHIPPED | OUTPATIENT
Start: 2024-10-31 | End: 2025-01-29

## 2024-10-31 NOTE — PROGRESS NOTES
10/31/2024     8:56 AM   Rapid3 Question Responses and Scores   MDHAQ Score 0   Psychologic Score 0   Pain Score 0   When you awakened in the morning OVER THE LAST WEEK, did you feel stiff? No   Fatigue Score 5   Global Health Score 2   RAPID3 Score 0.67     Answers submitted by the patient for this visit:  Rheumatology Questionnaire (Submitted on 10/31/2024)  fever: No  eye redness: No  mouth sores: No  headaches: Yes  shortness of breath: No  chest pain: No  trouble swallowing: No  diarrhea: No  constipation: No  unexpected weight change: No  genital sore: No  dysuria: No  During the last 3 days, have you had a skin rash?: No  Bruises or bleeds easily: No  cough: No

## 2024-10-31 NOTE — PROGRESS NOTES
Subjective:       Patient ID: Kan Traore is a 37 y.o. female.    Chief Complaint: Disease Management    HPI 37 year old with F with PMH of anxiety,asthma, nephrolithiasis here for evaluation.She saw Dr. Taylor in [place] who diagnosed her with RA.   She has been dealing with pain since mid March.She gets pain in elbows, shoulders, wrists,feet,and ankles. Pain level van be as high as 8/10. She gets swelling in right elbow. She gets swelling in hands.  She used to get swelling in ankles but it has improved. She has stiffness in lower back and shoulders.  Sometimes, she has trouble gripping things with left hand.  Denies any rashes.Denies dry eyes or dry mouth. Denies oral ulcers. She reports hair loss for a year. She has had 3 kids. Denies history of pre-eclampsia.  She has 1 miscarriage around 9 weeks. She had covid Jan 2020.  Denies photosensitivity or pleurisy.She denies smoking. She is taking plaquenil one q day. She took course of steroids and it did help her with her pain.  She took MTX for a month. She is taking folic acid 1 mg a day.       Family hx: grandmother: RA      Interval hx: she is taking MTX 8  pills once a week and folic acid.She is taking Humira once a week.  She continues to have pain in both hands.  On occasion, she has pain in her feet.  Reports swelling in right hand. She is stiff in morning for an hour.     Past Medical History:   Diagnosis Date    Allergic rhinitis     Anxiety     Asthma     Attention deficit disorder (ADD)     Bilateral nephrolithiasis     Depression     Hydronephrosis     Hyperglycemia 3/9/2021    - glucose 131 on recent labs - patient was nonfasting Lab Results Component Value Date  HGBA1C 5.1 06/10/2021      Urinary tract infection     Vaginal infection        Review of Systems   Constitutional: Negative for activity change, appetite change, chills, diaphoresis and fatigue.   HENT: Negative for congestion, ear discharge, ear pain, facial swelling, mouth sores,  "sinus pressure, sneezing, sore throat, tinnitus and trouble swallowing.    Eyes: Negative for photophobia, pain, discharge, redness, itching and visual disturbance.   Respiratory: Negative for apnea, chest tightness, shortness of breath, wheezing and stridor.    Cardiovascular: Negative for leg swelling.   Gastrointestinal: Negative for abdominal distention, abdominal pain, anal bleeding, blood in stool, constipation, diarrhea and nausea.   Endocrine: Negative for cold intolerance and heat intolerance.   Genitourinary: Negative for difficulty urinating and dysuria.   Musculoskeletal: Positive for arthralgias and joint swelling. Negative for back pain, gait problem, myalgias, neck pain and neck stiffness.   Skin: Negative for color change, pallor, rash and wound.   Neurological: Negative for dizziness, seizures, light-headedness and numbness.   Hematological: Negative for adenopathy. Does not bruise/bleed easily.   Psychiatric/Behavioral: Negative for sleep disturbance. The patient is not nervous/anxious.          Objective:   /71   Pulse 105   Ht 5' 6" (1.676 m)   Wt 59.4 kg (131 lb)   BMI 21.14 kg/m²      Physical Exam   Constitutional: She is oriented to person, place, and time.   HENT:   Head: Normocephalic and atraumatic.   Right Ear: External ear normal.   Left Ear: External ear normal.   Nose: Nose normal.   Mouth/Throat: Oropharynx is clear and moist. No oropharyngeal exudate.   Eyes: Pupils are equal, round, and reactive to light. Conjunctivae are normal. Right eye exhibits no discharge. Left eye exhibits no discharge. No scleral icterus.   Neck: No JVD present. No thyromegaly present.   Cardiovascular: Normal rate, regular rhythm and normal heart sounds. Exam reveals no gallop and no friction rub.   No murmur heard.  Pulmonary/Chest: Effort normal and breath sounds normal. No respiratory distress. She has no wheezes. She has no rales. She exhibits no tenderness.   Abdominal: Soft. Bowel sounds are " normal. She exhibits no distension and no mass. There is no abdominal tenderness. There is no rebound and no guarding.   Musculoskeletal:         General: Swelling and tenderness present.      Cervical back: Neck supple.   Lymphadenopathy:     She has no cervical adenopathy.   Neurological: She is alert and oriented to person, place, and time. No cranial nerve deficit. Gait normal. Coordination normal.   Skin: Skin is dry. No rash noted. No erythema. No pallor.   Tenderness of wrists, mcps  Tenderness of mtps   Psychiatric: Affect and judgment normal.          Labs: reviewed    No data to display     Assessment:   39 year old with F with PMH of anxiety,asthma, nephrolithiasis, Mortons neuroma s/p surgery on the right foot here for follow up of seropositive RA.  # seropositive RA: Doing well overall.  She saw Dr. Taylor once and transferred to me. Her labs and clinical picture are consistent with seropositive RA.   Continue MTX 8 pills once a week  -continue folic acid 1 mg po qday  Continue Humira 40mg sub q once a week  Labs in a month *    Continue mobic 15 mg poq day  Labs      # +SSA- denies sicca symptoms.    #hypothyroidism: not controlled. Following up with pcp        Immunodeficiency due to drug-   -monitor carefully for infection and any toxicities associated with immunosuppressants  -advised age appropriate cancer screenings including yearly skin exam and age appropriate vaccinations  -advised to seek immediate care in the setting of infection and hold immunosuppressive medications if there is infection      The patient location is: home   The chief complaint leading to consultation is: joint pain    Visit type: audiovisual    Face to Face time with patient: 45   minutes of total time spent on the encounter, which includes face to face time and non-face to face time preparing to see the patient (eg, review of tests), Obtaining and/or reviewing separately obtained history, Documenting clinical information  in the electronic or other health record, Independently interpreting results (not separately reported) and communicating results to the patient/family/caregiver, or Care coordination (not separately reported).         Each patient to whom he or she provides medical services by telemedicine is:  (1) informed of the relationship between the physician and patient and the respective role of any other health care provider with respect to management of the patient; and (2) notified that he or she may decline to receive medical services by telemedicine and may withdraw from such care at any time.    Notes:

## 2024-11-20 ENCOUNTER — PATIENT MESSAGE (OUTPATIENT)
Dept: PRIMARY CARE CLINIC | Facility: CLINIC | Age: 39
End: 2024-11-20
Payer: COMMERCIAL

## 2024-11-20 DIAGNOSIS — F90.0 ATTENTION DEFICIT HYPERACTIVITY DISORDER (ADHD), PREDOMINANTLY INATTENTIVE TYPE: ICD-10-CM

## 2024-11-20 RX ORDER — DEXTROAMPHETAMINE SACCHARATE, AMPHETAMINE ASPARTATE, DEXTROAMPHETAMINE SULFATE AND AMPHETAMINE SULFATE 2.5; 2.5; 2.5; 2.5 MG/1; MG/1; MG/1; MG/1
1 TABLET ORAL
Qty: 30 TABLET | Refills: 0 | Status: CANCELLED | OUTPATIENT
Start: 2024-11-20 | End: 2024-12-20

## 2024-11-20 RX ORDER — DEXTROAMPHETAMINE SACCHARATE, AMPHETAMINE ASPARTATE MONOHYDRATE, DEXTROAMPHETAMINE SULFATE, AMPHETAMINE SULFATE 9.375; 9.375; 9.375; 9.375 MG/1; MG/1; MG/1; MG/1
1 CAPSULE, EXTENDED RELEASE ORAL DAILY
Qty: 30 EACH | Refills: 0 | Status: CANCELLED | OUTPATIENT
Start: 2024-11-20 | End: 2024-12-20

## 2024-11-20 NOTE — TELEPHONE ENCOUNTER
No care due was identified.  Health [facility] Embedded Care Due Messages. Reference number: [id number].   11/20/2024 9:05:32 AM CST

## 2024-11-21 DIAGNOSIS — F90.0 ATTENTION DEFICIT HYPERACTIVITY DISORDER (ADHD), PREDOMINANTLY INATTENTIVE TYPE: Primary | ICD-10-CM

## 2024-11-21 RX ORDER — DEXTROAMPHETAMINE SACCHARATE, AMPHETAMINE ASPARTATE, DEXTROAMPHETAMINE SULFATE AND AMPHETAMINE SULFATE 2.5; 2.5; 2.5; 2.5 MG/1; MG/1; MG/1; MG/1
1 TABLET ORAL
Qty: 30 TABLET | Refills: 0 | Status: SHIPPED | OUTPATIENT
Start: 2024-11-21 | End: 2024-12-21

## 2024-11-21 RX ORDER — DEXTROAMPHETAMINE SACCHARATE, AMPHETAMINE ASPARTATE MONOHYDRATE, DEXTROAMPHETAMINE SULFATE, AMPHETAMINE SULFATE 9.375; 9.375; 9.375; 9.375 MG/1; MG/1; MG/1; MG/1
1 CAPSULE, EXTENDED RELEASE ORAL DAILY
Qty: 30 EACH | Refills: 0 | Status: SHIPPED | OUTPATIENT
Start: 2024-11-21 | End: 2024-12-21

## 2024-11-21 NOTE — TELEPHONE ENCOUNTER
No care due was identified.  Health [facility] Embedded Care Due Messages. Reference number: [id number].   11/21/2024 8:54:29 AM CST

## 2024-11-21 NOTE — TELEPHONE ENCOUNTER
Last medication refill 10/21/2024   reviewed no abnormal activity  Upcoming VV appt with PCP for 3 month med refill 11/26/2024

## 2024-11-21 NOTE — TELEPHONE ENCOUNTER
Refill Encounter    PCP Visits: Recent Visits  Date Type Provider Dept   08/20/24 Office Visit Cielo Mina, DO [facility]   05/14/24 Office Visit Cielo Mina, DO [facility]   02/14/24 Office Visit Cielo Mina, DO [facility]   Showing recent visits within past 360 days and meeting all other requirements  Future Appointments  Date Type Provider Dept   11/26/24 Appointment Cielo Mina,  [facility]   Showing future appointments within next 720 days and meeting all other requirements     Last 3 Blood Pressure:   BP Readings from Last 3 Encounters:   09/19/24 112/82   08/20/24 138/80   02/23/24 126/82     Preferred Pharmacy:   [facility]  [address] [address]  [place] [address]  Phone: [phone] Fax: [phone]    Pharmacy [facility],  [facility] [address] [address]  Phone: [phone] Fax: [phone]    Requested RX:  Requested Prescriptions     Pending Prescriptions Disp Refills    dextroamphetamine-amphetamine (ADDERALL) 10 mg Tab 30 tablet 0     Sig: Take 1 tablet (10 mg total) by mouth after lunch.    dextroamphetamine-amphetamine (MYDAYIS) 37.5 mg CT24 30 each 0     Sig: Take 1 tablet by mouth once daily.      RX Route: Normal

## 2024-11-26 ENCOUNTER — OFFICE VISIT (OUTPATIENT)
Dept: PRIMARY CARE CLINIC | Facility: CLINIC | Age: 39
End: 2024-11-26
Attending: FAMILY MEDICINE
Payer: COMMERCIAL

## 2024-11-26 ENCOUNTER — TELEPHONE (OUTPATIENT)
Dept: PRIMARY CARE CLINIC | Facility: CLINIC | Age: 39
End: 2024-11-26

## 2024-11-26 DIAGNOSIS — F33.42 RECURRENT MAJOR DEPRESSIVE DISORDER, IN FULL REMISSION: ICD-10-CM

## 2024-11-26 DIAGNOSIS — F90.0 ATTENTION DEFICIT HYPERACTIVITY DISORDER (ADHD), PREDOMINANTLY INATTENTIVE TYPE: Primary | ICD-10-CM

## 2024-11-26 DIAGNOSIS — E03.8 OTHER SPECIFIED HYPOTHYROIDISM: ICD-10-CM

## 2024-11-26 DIAGNOSIS — F41.1 GENERALIZED ANXIETY DISORDER: ICD-10-CM

## 2024-11-26 PROCEDURE — 1160F RVW MEDS BY RX/DR IN RCRD: CPT | Mod: CPTII,95,, | Performed by: FAMILY MEDICINE

## 2024-11-26 PROCEDURE — 1159F MED LIST DOCD IN RCRD: CPT | Mod: CPTII,95,, | Performed by: FAMILY MEDICINE

## 2024-11-26 PROCEDURE — 3044F HG A1C LEVEL LT 7.0%: CPT | Mod: CPTII,95,, | Performed by: FAMILY MEDICINE

## 2024-11-26 PROCEDURE — 99214 OFFICE O/P EST MOD 30 MIN: CPT | Mod: 95,,, | Performed by: FAMILY MEDICINE

## 2024-11-26 RX ORDER — NORGESTIMATE AND ETHINYL ESTRADIOL 0.25-0.035
1 KIT ORAL DAILY
Qty: 112 TABLET | Refills: 0 | Status: SHIPPED | OUTPATIENT
Start: 2024-11-26

## 2024-11-26 NOTE — PROGRESS NOTES
FAMILY MEDICINE  [facility]    Reason for visit:   Chief Complaint   Patient presents with    ADHD    Hypothyroidism         SUBJECTIVE: Kan Traore is a 39 y.o. female  - with ADHD, mild intermittent asthma, depression, rheumatoid arthritis with positive rheumatoid factor , hypothyroidism, nephrolithiasis and anxiety presents for ADHD follow-up and follow up annual lab results     Gynecology Dr. Kacy Elias MD  - overdue for Pap   Dermatology Dr. Lulu Pak MD  Sports Medicine Dr. Brian Marcial  Rheumatology; Dr. Paiz  Optometry: Dr. Cesar Dukes, OD  Podiatry Yonatan Douglass Jr., DPM    Kan Traore reports overall that she is doing well.  She denies any concerns or complaints.  Since her last visit she did have to switch from long-acting Adderall to Mydayis secondary to availability.  However she reports that she is doing well with the medication.  Previously she has been on Vyvanse which had not been effective.  She has been doing well with the transitioned to Mydayis    Her TSH is slightly above goal.  She reports that she went on vacation and did miss several days of her levothyroxine 50 mcg daily.  She suspects that maybe the issue.  Otherwise she reports that she was feeling well.  She denies any symptoms from thyroid    1. ADHD predominantly inattentive     Age diagnosed: 4 th grade  Diagnosed by: Psychiatrist  Initial evaluation present in chart: no     Past medications:  Vyvanse 30 mg (she was only on his medications secondary to Adderall supply chain issues; crash at end of day), adderall XR 30 mg daily  Reasons for changing past medications:  Availability     Current medications:   dextroamphetamine-amphetamine (MYDAYIS) 37.5 mg CT24, Take 1 tablet by mouth once daily., Disp: 30  dextroamphetamine-amphetamine (ADDERALL) 10 mg Tab, Take 1 tablet (10 mg total) by mouth once daily.,     reviewed: yes       Concerns with medication:  denies    AM  medication: 7:30 AM Mydayis are daily  Lunch medication: denies   Afternoon medication: Adderall IR 10 mg PRN     Daily Activity:  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 Hypothyroidism     Age diagnosed: 38 yo    Symptomatic at diagnosis: fatigue, constipation  Prior evaluation by Endocrinologist: No  Prior thyroid US: no    Current medication:   Levothyroxine 50 mcg daily    Side effects from medication: none  Scheduled for medication: AM fasting separate from other medications    Symptoms from thyroid issue: denies fatigue, weight changes, heat/cold intolerance, bowel/skin changes or CVS symptoms  Concerns: denies    Lab Results       Component                Value               Date                       TSH                      4.032 (H)           11/19/2024                 TSH                      4.032 (H)           11/19/2024                 FREET4                   0.92                11/19/2024                 FREET4                   0.92                11/19/2024                Lab Results       Component                Value               Date                       TSH                      1.630               01/08/2024                 FREET4                   0.83                01/08/2024              3. Depression and Anxiety     Today 11/26/2024:  Kan Traore continues to do well on escitalopram 20 mg daily which she takes for anxiety and depression.  She denies any unwanted side effects.  She would like to continue on the medication    Panic attacks: denies  Hopelessness:  denies  Sleep issues:  denies  Suicidal thoughts:  denies  Thoughts of self harm: denies  Thoughts of harm to others:  denies  History of suicide attempts:  denies  Family history of suicide: denies    Psychiatrist: none  Psychologist: none  Counselor : none    Current medications:   citalopram (CELEXA) 20 MG tablet, Take 1 tablet (20 mg total) by mouth once daily., Disp: 90  tablet, Rfl: 3    Side effects of current treatment: None    Support system: family             Review of Systems   HENT:  Negative for hearing loss.    Eyes:  Negative for discharge.   Respiratory:  Negative for wheezing.    Cardiovascular:  Negative for chest pain and palpitations.   Gastrointestinal:  Negative for blood in stool, constipation, diarrhea and vomiting.   Genitourinary:  Negative for dysuria and hematuria.   Musculoskeletal:  Negative for neck pain.   Neurological:  Negative for weakness and headaches.   Endo/Heme/Allergies:  Negative for polydipsia.   All other systems reviewed and are negative.      HEALTH MAINTENANCE:   Health Maintenance   Topic Date Due    Mammogram  Never done    TETANUS VACCINE  07/30/2025    Hepatitis C Screening  Completed    Lipid Panel  Completed     Health Maintenance Topics with due status: Not Due       Topic Last Completion Date    TETANUS VACCINE 07/30/2015    RSV Vaccine (Age 60+ and Pregnant patients) Not Due     Health Maintenance Due   Topic Date Due    Pneumococcal Vaccines (Age 0-64) (1 of 2 - PCV) Never done    Mammogram  Never done    COVID-19 Vaccine (3 - Pfizer risk series) 09/17/2021    Influenza Vaccine (1) 09/01/2024    Cervical Cancer Screening  10/20/2024       HISTORY:   Past Medical History:   Diagnosis Date    Allergic rhinitis     Anxiety     Asthma     history of    Attention deficit disorder (ADD)     Bilateral nephrolithiasis     Crutch training, encounter for 02/19/2024    Depression     states does not feel depressed    Equinus contracture of right ankle 01/22/2024    Hydronephrosis     Hyperglycemia 03/09/2021    - glucose 131 on recent labs - patient was nonfasting Lab Results Component Value Date  HGBA1C 5.1 06/10/2021      Metatarsalgia of right foot 01/22/2024    Abarca neuroma, right     Plantar fasciitis of right foot     Rheumatoid arthritis, unspecified     Thyroid disease     hypothyroidism    Urinary tract infection     Vaginal  infection        Past Surgical History:   Procedure Laterality Date    APPENDECTOMY  2017    BONE MARROW ASPIRATION Right 2024    Procedure: ASPIRATION, BONE MARROW;  Surgeon: Yonatan Douglass Jr., DPM;  Location: [facility] OR;  Service: Podiatry;  Laterality: Right;    CYSTOSCOPY W/ URETERAL STENT PLACEMENT      CYSTOSCOPY W/ URETERAL STENT REMOVAL      DECOMPRESSION OF NERVE Right 2024    Procedure: DECOMPRESSION, NERVE;  Surgeon: Yonatan Douglass Jr., DPM;  Location: [facility] OR;  Service: Podiatry;  Laterality: Right;  inter-metatarsal nerve 3rd interspace    DILATION AND CURETTAGE OF UTERUS  2014    KIDNEY STONE SURGERY      LITHOTRIPSY      multiple    LITHOTRIPSY  2017    LOOP ELECTROSURGICAL EXCISION PROCEDURE (LEEP) N/A 10/02/2020    Procedure: LEEP (LOOP ELECTROSURGICAL EXCISION PROCEDURE);  Surgeon: Kacy Elias MD;  Location: [facility] OR;  Service: OB/GYN;  Laterality: N/A;    SURGICAL REMOVAL OF MARTINEZ'S NEUROMA Right 2024    Procedure: EXCISION, MARTINEZ'S NEUROMA;  Surgeon: Yonatan Douglass Jr., DPM;  Location: [facility] OR;  Service: Podiatry;  Laterality: Right;  pop saph block    TONSILLECTOMY      WISDOM TOOTH EXTRACTION      as a teenager       Family History   Problem Relation Name Age of Onset    Hypertension Mother Mom 50    Asthma Mother Mom     Hypothyroidism Father  52    Stroke Maternal Grandfather [place]     Hypertension Maternal Grandfather [place]     Heart disease Maternal Grandfather [place] 56         of AMI    Heart failure Paternal Grandmother Lizett Olmedo 81    Rheum arthritis Paternal Grandmother Lizett Woodsrin     Arthritis Paternal Grandmother Lizett Olmedo     No Known Problems Son      No Known Problems Son      No Known Problems Son      Breast cancer Neg Hx      Colon cancer Neg Hx      Ovarian cancer Neg Hx      Kidney disease Neg Hx         Social History     Tobacco Use    Smoking status: Never     Passive exposure: Never    Smokeless tobacco: Never    Substance Use Topics    Alcohol use: Yes     Alcohol/week: 1.0 standard drink of alcohol     Types: 1 Drinks containing 0.5 oz of alcohol per week     Comment: Socially    Drug use: No       Social History     Social History Narrative     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        ALLERGIES:   Review of patient's allergies indicates:  No Known Allergies    MEDS:   Current Outpatient Medications on File Prior to Visit   Medication Sig Dispense Refill Last Dose/Taking    adalimumab (HUMIRA,CF, PEN) 40 mg/0.4 mL PnKt Inject 0.4 mLs (40 mg total) into the skin every 14 (fourteen) days. 2 pen 11     citalopram (CELEXA) 20 MG tablet Take 1 tablet (20 mg total) by mouth once daily. 90 tablet 3     dextroamphetamine-amphetamine (ADDERALL) 10 mg Tab Take 1 tablet (10 mg total) by mouth after lunch. 30 tablet 0     dextroamphetamine-amphetamine (MYDAYIS) 37.5 mg CT24 Take 1 tablet by mouth once daily. 30 each 0     folic acid (FOLVITE) 1 MG tablet Take 3 tablets (3 mg total) by mouth once daily. 90 tablet 11     levothyroxine (SYNTHROID) 50 MCG tablet Take 1 tablet (50 mcg total) by mouth before breakfast. 90 tablet 3     meloxicam (MOBIC) 7.5 MG tablet Take 1 tablet (7.5 mg total) by mouth once daily 30 tablet 1     methotrexate 2.5 MG Tab Take 8 tablets (20 mg total) by mouth every 7 days. 96 tablet 0     norgestimate-ethinyl estradioL (SPRINTEC, 28,) 0.25-35 mg-mcg per tablet Take 1 tablet by mouth once daily. Take one active pill daily, skipping sugar pills. 112 tablet 1     ondansetron (ZOFRAN-ODT) 4 MG TbDL Dissolve 1 tablet (4 mg total) by mouth every 8 (eight) hours as needed (nausea/migraine). 30 tablet 0          Vital signs:   There were no vitals filed for this visit.    There is no height or weight on file to calculate BMI.    PHYSICAL EXAM:     Physical Exam  Constitutional:       General: She is not in acute distress.  Pulmonary:      Effort: Pulmonary  effort is normal. No respiratory distress.   Neurological:      Mental Status: She is alert.   Psychiatric:         Mood and Affect: Mood and affect normal.         Speech: Speech normal.             PERTINENT RESULTS:   No visits with results within 1 Week(s) from this visit.   Latest known visit with results is:   Lab Visit on 11/19/2024   Component Date Value Ref Range Status    Cholesterol 11/19/2024 171  120 - 199 mg/dL Final    Comment: The National Cholesterol Education Program (NCEP) has set the  following guidelines (reference ranges) for Cholesterol:  Optimal.....................<200 mg/dL  Borderline High.............200-239 mg/dL  High........................> or = 240 mg/dL      Triglycerides 11/19/2024 140  30 - 150 mg/dL Final    Comment: The National Cholesterol Education Program (NCEP) has set the  following guidelines (reference values) for triglycerides:  Normal......................<150 mg/dL  Borderline High.............150-199 mg/dL  High........................200-499 mg/dL      HDL 11/19/2024 50  40 - 75 mg/dL Final    Comment: The National Cholesterol Education Program (NCEP) has set the  following guidelines (reference values) for HDL Cholesterol:  Low...............<40 mg/dL  Optimal...........>60 mg/dL      LDL Cholesterol 11/19/2024 93.0  63.0 - 159.0 mg/dL Final    Comment: The National Cholesterol Education Program (NCEP) has set the  following guidelines (reference values) for LDL Cholesterol:  Optimal.......................<130 mg/dL  Borderline High...............130-159 mg/dL  High..........................160-189 mg/dL  Very High.....................>190 mg/dL      HDL/Cholesterol Ratio 11/19/2024 29.2  20.0 - 50.0 % Final    Total Cholesterol/HDL Ratio 11/19/2024 3.4  2.0 - 5.0 Final    Non-HDL Cholesterol 11/19/2024 121  mg/dL Final    Comment: Risk category and Non-HDL cholesterol goals:  Coronary heart disease (CHD)or equivalent (10-year risk of CHD >20%):  Non-HDL cholesterol  goal     <130 mg/dL  Two or more CHD risk factors and 10-year risk of CHD <= 20%:  Non-HDL cholesterol goal     <160 mg/dL  0 to 1 CHD risk factor:  Non-HDL cholesterol goal     <190 mg/dL      Hemoglobin A1C 11/19/2024 4.9  4.0 - 5.6 % Final    Comment: ADA Screening Guidelines:  5.7-6.4%  Consistent with prediabetes  >or=6.5%  Consistent with diabetes    High levels of fetal hemoglobin interfere with the HbA1C  assay. Heterozygous hemoglobin variants (HbS, HgC, etc)do  not significantly interfere with this assay.   However, presence of multiple variants may affect accuracy.      Estimated Avg Glucose 11/19/2024 94  68 - 131 mg/dL Final    Free T4 11/19/2024 0.92  0.71 - 1.51 ng/dL Final    TSH 11/19/2024 4.032 (H)  0.400 - 4.000 uIU/mL Final    Cholesterol 11/19/2024 171  120 - 199 mg/dL Final    Comment: The National Cholesterol Education Program (NCEP) has set the  following guidelines (reference ranges) for Cholesterol:  Optimal.....................<200 mg/dL  Borderline High.............200-239 mg/dL  High........................> or = 240 mg/dL      Triglycerides 11/19/2024 140  30 - 150 mg/dL Final    Comment: The National Cholesterol Education Program (NCEP) has set the  following guidelines (reference values) for triglycerides:  Normal......................<150 mg/dL  Borderline High.............150-199 mg/dL  High........................200-499 mg/dL      HDL 11/19/2024 50  40 - 75 mg/dL Final    Comment: The National Cholesterol Education Program (NCEP) has set the  following guidelines (reference values) for HDL Cholesterol:  Low...............<40 mg/dL  Optimal...........>60 mg/dL      LDL Cholesterol 11/19/2024 93.0  63.0 - 159.0 mg/dL Final    Comment: The National Cholesterol Education Program (NCEP) has set the  following guidelines (reference values) for LDL Cholesterol:  Optimal.......................<130 mg/dL  Borderline High...............130-159 mg/dL  High..........................160-189  mg/dL  Very High.....................>190 mg/dL      HDL/Cholesterol Ratio 11/19/2024 29.2  20.0 - 50.0 % Final    Total Cholesterol/HDL Ratio 11/19/2024 3.4  2.0 - 5.0 Final    Non-HDL Cholesterol 11/19/2024 121  mg/dL Final    Comment: Risk category and Non-HDL cholesterol goals:  Coronary heart disease (CHD)or equivalent (10-year risk of CHD >20%):  Non-HDL cholesterol goal     <130 mg/dL  Two or more CHD risk factors and 10-year risk of CHD <= 20%:  Non-HDL cholesterol goal     <160 mg/dL  0 to 1 CHD risk factor:  Non-HDL cholesterol goal     <190 mg/dL      Hemoglobin A1C 11/19/2024 4.9  4.0 - 5.6 % Final    Comment: ADA Screening Guidelines:  5.7-6.4%  Consistent with prediabetes  >or=6.5%  Consistent with diabetes    High levels of fetal hemoglobin interfere with the HbA1C  assay. Heterozygous hemoglobin variants (HbS, HgC, etc)do  not significantly interfere with this assay.   However, presence of multiple variants may affect accuracy.      Estimated Avg Glucose 11/19/2024 94  68 - 131 mg/dL Final    Sodium 11/19/2024 138  136 - 145 mmol/L Final    Potassium 11/19/2024 4.0  3.5 - 5.1 mmol/L Final    Chloride 11/19/2024 108  95 - 110 mmol/L Final    CO2 11/19/2024 24  23 - 29 mmol/L Final    Glucose 11/19/2024 96  70 - 110 mg/dL Final    BUN 11/19/2024 9  6 - 20 mg/dL Final    Creatinine 11/19/2024 0.7  0.5 - 1.4 mg/dL Final    Calcium 11/19/2024 8.7  8.7 - 10.5 mg/dL Final    Total Protein 11/19/2024 7.2  6.0 - 8.4 g/dL Final    Albumin 11/19/2024 3.2 (L)  3.5 - 5.2 g/dL Final    Total Bilirubin 11/19/2024 0.8  0.1 - 1.0 mg/dL Final    Comment: For infants and newborns, interpretation of results should be based  on gestational age, weight and in agreement with clinical  observations.    Premature Infant recommended reference ranges:  Up to 24 hours.............<8.0 mg/dL  Up to 48 hours............<12.0 mg/dL  3-5 days..................<15.0 mg/dL  6-29 days.................<15.0 mg/dL      Alkaline  Phosphatase 11/19/2024 46  40 - 150 U/L Final    AST 11/19/2024 14  10 - 40 U/L Final    ALT 11/19/2024 10  10 - 44 U/L Final    eGFR 11/19/2024 >60.0  >60 mL/min/1.73 m^2 Final    Anion Gap 11/19/2024 6 (L)  8 - 16 mmol/L Final    WBC 11/19/2024 5.20  3.90 - 12.70 K/uL Final    RBC 11/19/2024 4.61  4.00 - 5.40 M/uL Final    Hemoglobin 11/19/2024 13.7  12.0 - 16.0 g/dL Final    Hematocrit 11/19/2024 41.5  37.0 - 48.5 % Final    MCV 11/19/2024 90  82 - 98 fL Final    MCH 11/19/2024 29.7  27.0 - 31.0 pg Final    MCHC 11/19/2024 33.0  32.0 - 36.0 g/dL Final    RDW 11/19/2024 13.7  11.5 - 14.5 % Final    Platelets 11/19/2024 239  150 - 450 K/uL Final    MPV 11/19/2024 11.2  9.2 - 12.9 fL Final    Immature Granulocytes 11/19/2024 0.2  0.0 - 0.5 % Final    Gran # (ANC) 11/19/2024 1.5 (L)  1.8 - 7.7 K/uL Final    Immature Grans (Abs) 11/19/2024 0.01  0.00 - 0.04 K/uL Final    Comment: Mild elevation in immature granulocytes is non specific and   can be seen in a variety of conditions including stress response,   acute inflammation, trauma and pregnancy. Correlation with other   laboratory and clinical findings is essential.      Lymph # 11/19/2024 2.6  1.0 - 4.8 K/uL Final    Mono # 11/19/2024 0.6  0.3 - 1.0 K/uL Final    Eos # 11/19/2024 0.4  0.0 - 0.5 K/uL Final    Baso # 11/19/2024 0.07  0.00 - 0.20 K/uL Final    nRBC 11/19/2024 0  0 /100 WBC Final    Gran % 11/19/2024 29.3 (L)  38.0 - 73.0 % Final    Lymph % 11/19/2024 50.8 (H)  18.0 - 48.0 % Final    Mono % 11/19/2024 11.7  4.0 - 15.0 % Final    Eosinophil % 11/19/2024 6.9  0.0 - 8.0 % Final    Basophil % 11/19/2024 1.3  0.0 - 1.9 % Final    Aniso 11/19/2024 Slight   Final    Tear Drop Cells 11/19/2024 Occasional   Final    Differential Method 11/19/2024 Automated   Final    Free T4 11/19/2024 0.92  0.71 - 1.51 ng/dL Final    TSH 11/19/2024 4.032 (H)  0.400 - 4.000 uIU/mL Final     Lab Results   Component Value Date    TSH 4.032 (H) 11/19/2024    TSH 4.032 (H)  11/19/2024    FREET4 0.92 11/19/2024    FREET4 0.92 11/19/2024     Lab Results   Component Value Date    CHOL 171 11/19/2024    CHOL 171 11/19/2024    CHOL 166 08/17/2022     Lab Results   Component Value Date    HDL 50 11/19/2024    HDL 50 11/19/2024    HDL 56 08/17/2022     Lab Results   Component Value Date    LDLCALC 93.0 11/19/2024    LDLCALC 93.0 11/19/2024    LDLCALC 82.6 08/17/2022     Lab Results   Component Value Date    TRIG 140 11/19/2024    TRIG 140 11/19/2024    TRIG 137 08/17/2022       Lab Results   Component Value Date    CHOLHDL 29.2 11/19/2024    CHOLHDL 29.2 11/19/2024    CHOLHDL 33.7 08/17/2022       ASSESSMENT/PLAN:    1. Attention deficit hyperactivity disorder (ADHD), predominantly inattentive type  Overview:  -  reviewed  - well controlled  - continue current medications       2. Other specified hypothyroidism  Overview:  - family history of hypothyroidism (father)  Lab Results   Component Value Date    TSH 4.032 (H) 11/19/2024    TSH 4.032 (H) 11/19/2024    FREET4 0.92 11/19/2024    FREET4 0.92 11/19/2024     - 06/10/2021 TPO normal, free T3 and free T4  - TSH previously at goal   -TSH slightly above goal   -recommend continue same dose of medication and repeat TSH in 3 month. Likely secondary to missed doses    Orders:  -     TSH; Future; Expected date: 02/26/2025    3. Recurrent major depressive disorder, in full remission  Overview:  - well controlled  - continue current management plan   - patient encouraged to notify me with any changes      4. Generalized anxiety disorder  Overview:  - well controlled  - continue current medication            ORDERS:   Orders Placed This Encounter    TSH         Vaccines recommended:  Influenza, Prevnar 20 and COVID-19 update.  She declined    Follow up in about 3 months (around 2/26/2025) for ADHD, Labs, Virtual Visit. or sooner with any concerns      This note is dictated using the M*Modal Fluency Direct word recognition program. There are word  recognition mistakes that are occasionally missed on review.    Dr. Cielo Mina D.O.   [facility]

## 2024-11-26 NOTE — TELEPHONE ENCOUNTER
----- Message from Cielo Mina DO sent at 11/26/2024  3:31 PM CST -----  Please schedule 3 month virtual with nonfasting Tsh prior to visit at [facility]

## 2024-11-26 NOTE — TELEPHONE ENCOUNTER
Patient has been contacted in regards to scheduling of 3 month virtual appointment and labs. Appointments have been set and scheduled per MD advise. Appointment details provided, no further comments.

## 2024-11-27 NOTE — TELEPHONE ENCOUNTER
Refill Decision Note   Kan Aguerorier  is requesting a refill authorization.  Brief Assessment and Rationale for Refill:  Approve     Medication Therapy Plan:         Comments:     Note composed:11:04 PM 11/26/2024

## 2024-12-16 DIAGNOSIS — F41.1 GENERALIZED ANXIETY DISORDER: ICD-10-CM

## 2024-12-16 DIAGNOSIS — F90.0 ATTENTION DEFICIT HYPERACTIVITY DISORDER (ADHD), PREDOMINANTLY INATTENTIVE TYPE: ICD-10-CM

## 2024-12-16 DIAGNOSIS — R11.0 NAUSEA: ICD-10-CM

## 2024-12-16 DIAGNOSIS — F33.42 RECURRENT MAJOR DEPRESSIVE DISORDER, IN FULL REMISSION: ICD-10-CM

## 2024-12-16 RX ORDER — ONDANSETRON 4 MG/1
4 TABLET, ORALLY DISINTEGRATING ORAL EVERY 8 HOURS PRN
Qty: 30 TABLET | Refills: 11 | Status: SHIPPED | OUTPATIENT
Start: 2024-12-16

## 2024-12-16 RX ORDER — DEXTROAMPHETAMINE SACCHARATE, AMPHETAMINE ASPARTATE MONOHYDRATE, DEXTROAMPHETAMINE SULFATE, AMPHETAMINE SULFATE 9.375; 9.375; 9.375; 9.375 MG/1; MG/1; MG/1; MG/1
1 CAPSULE, EXTENDED RELEASE ORAL DAILY
Qty: 30 EACH | Refills: 0 | Status: SHIPPED | OUTPATIENT
Start: 2024-12-16 | End: 2025-01-19

## 2024-12-16 RX ORDER — DEXTROAMPHETAMINE SACCHARATE, AMPHETAMINE ASPARTATE, DEXTROAMPHETAMINE SULFATE AND AMPHETAMINE SULFATE 2.5; 2.5; 2.5; 2.5 MG/1; MG/1; MG/1; MG/1
1 TABLET ORAL
Qty: 30 TABLET | Refills: 0 | Status: SHIPPED | OUTPATIENT
Start: 2024-12-16 | End: 2025-01-19

## 2024-12-16 NOTE — TELEPHONE ENCOUNTER
No care due was identified.  [facility] Embedded Care Due Messages. Reference number: [id number].   12/16/2024 3:22:12 PM CST

## 2024-12-16 NOTE — TELEPHONE ENCOUNTER
Refill Encounter    PCP Visits: Recent Visits  Date Type Provider Dept   11/26/24 Office Visit Cielo Mina, DO [facility]   08/20/24 Office Visit Cielo Mina, DO [facility]   05/14/24 Office Visit Cielo Mina, DO [facility]   02/14/24 Office Visit Cielo Mina, DO [facility]   Showing recent visits within past 360 days and meeting all other requirements  Future Appointments  Date Type Provider Dept   02/26/25 Appointment Cielo Mina, DO [facility]   Showing future appointments within next 720 days and meeting all other requirements     Last 3 Blood Pressure:   BP Readings from Last 3 Encounters:   09/19/24 112/82   08/20/24 138/80   02/23/24 126/82     Preferred Pharmacy:   [facility]  [address]  [place] [address]  Phone: [phone] Fax: [phone]    Pharmacy [place] [place] [address] [address]  Phone: [phone] Fax: [phone]    Requested RX:  Requested Prescriptions     Pending Prescriptions Disp Refills    ondansetron (ZOFRAN-ODT) 4 MG TbDL 30 tablet 0     Sig: Dissolve 1 tablet (4 mg total) by mouth every 8 (eight) hours as needed (nausea/migraine).      RX Route: Normal

## 2024-12-16 NOTE — TELEPHONE ENCOUNTER
No care due was identified.  [facility] Embedded Care Due Messages. Reference number: [id number].   12/16/2024 3:23:17 PM CST

## 2024-12-16 NOTE — TELEPHONE ENCOUNTER
No care due was identified.  [facility] Embedded Care Due Messages. Reference number: [id number].   12/16/2024 3:22:41 PM CST

## 2024-12-16 NOTE — TELEPHONE ENCOUNTER
Refill Encounter    PCP Visits: Recent Visits  Date Type Provider Dept   11/26/24 Office Visit Cielo Mina, DO [facility]   08/20/24 Office Visit Cielo Mina, DO [facility]   05/14/24 Office Visit Cielo Mina, DO [facility]   02/14/24 Office Visit Cielo Mina, DO [facility]   Showing recent visits within past 360 days and meeting all other requirements  Future Appointments  Date Type Provider Dept   02/26/25 Appointment Cielo Mina, DO [facility]   Showing future appointments within next 720 days and meeting all other requirements     Last 3 Blood Pressure:   BP Readings from Last 3 Encounters:   09/19/24 112/82   08/20/24 138/80   02/23/24 126/82     Preferred Pharmacy:   [facility]  [address]  [place] [address]  Phone: [phone] Fax: [phone]    Pharmacy [facility],  [place] [address] [address]  Phone: [phone] Fax: [phone]    Requested RX:  Requested Prescriptions     Pending Prescriptions Disp Refills    dextroamphetamine-amphetamine (ADDERALL) 10 mg Tab 30 tablet 0     Sig: Take 1 tablet (10 mg total) by mouth after lunch.    dextroamphetamine-amphetamine (MYDAYIS) 37.5 mg CT24 30 each 0     Sig: Take 1 tablet by mouth once daily.      RX Route: Normal

## 2024-12-17 RX ORDER — CITALOPRAM 20 MG/1
20 TABLET, FILM COATED ORAL DAILY
Qty: 90 TABLET | Refills: 3 | Status: SHIPPED | OUTPATIENT
Start: 2024-12-17

## 2024-12-17 NOTE — TELEPHONE ENCOUNTER
Refill Decision Note   Kan Traore  is requesting a refill authorization.  Brief Assessment and Rationale for Refill:  Approve     Medication Therapy Plan:       Medication Reconciliation Completed: No   Comments:     No Care Gaps recommended.     Note composed:11:41 AM 12/17/2024

## 2025-01-16 ENCOUNTER — PATIENT MESSAGE (OUTPATIENT)
Dept: PRIMARY CARE CLINIC | Facility: CLINIC | Age: 40
End: 2025-01-16
Payer: COMMERCIAL

## 2025-01-16 DIAGNOSIS — F90.0 ATTENTION DEFICIT HYPERACTIVITY DISORDER (ADHD), PREDOMINANTLY INATTENTIVE TYPE: ICD-10-CM

## 2025-01-16 RX ORDER — DEXTROAMPHETAMINE SACCHARATE, AMPHETAMINE ASPARTATE MONOHYDRATE, DEXTROAMPHETAMINE SULFATE, AMPHETAMINE SULFATE 9.375; 9.375; 9.375; 9.375 MG/1; MG/1; MG/1; MG/1
1 CAPSULE, EXTENDED RELEASE ORAL DAILY
Qty: 30 EACH | Refills: 0 | Status: SHIPPED | OUTPATIENT
Start: 2025-01-16 | End: 2025-02-16

## 2025-01-16 RX ORDER — DEXTROAMPHETAMINE SACCHARATE, AMPHETAMINE ASPARTATE MONOHYDRATE, DEXTROAMPHETAMINE SULFATE, AMPHETAMINE SULFATE 9.375; 9.375; 9.375; 9.375 MG/1; MG/1; MG/1; MG/1
1 CAPSULE, EXTENDED RELEASE ORAL DAILY
Qty: 30 EACH | Refills: 0 | Status: SHIPPED | OUTPATIENT
Start: 2025-01-16 | End: 2025-01-16 | Stop reason: SDUPTHER

## 2025-01-16 NOTE — TELEPHONE ENCOUNTER
Refill Encounter    PCP Visits: Recent Visits  Date Type Provider Dept   11/26/24 Office Visit Cielo Mina, DO [facility]   08/20/24 Office Visit Cielo Mina, DO [facility]   05/14/24 Office Visit Cielo Mina, DO [facility]   02/14/24 Office Visit Cielo Mina, DO [facility]   Showing recent visits within past 360 days and meeting all other requirements  Future Appointments  Date Type Provider Dept   02/26/25 Appointment Cielo Mina, DO [facility]   Showing future appointments within next 720 days and meeting all other requirements     Last 3 Blood Pressure:   BP Readings from Last 3 Encounters:   09/19/24 112/82   08/20/24 138/80   02/23/24 126/82     Preferred Pharmacy:   [facility]  [address] [facility] [address]  [place] [address]  Phone: [phone] Fax: [phone]    Pharmacy [place], [email or web address] [address] [address]  Phone: [phone] Fax: [phone]    Requested RX:  Requested Prescriptions     Pending Prescriptions Disp Refills    dextroamphetamine-amphetamine (MYDAYIS) 37.5 mg CT24 30 each 0     Sig: Take 1 tablet by mouth once daily.      RX Route: Normal

## 2025-01-16 NOTE — TELEPHONE ENCOUNTER
No care due was identified.  [facility] Embedded Care Due Messages. Reference number: [id number].   1/16/2025 9:47:29 AM CST

## 2025-01-16 NOTE — TELEPHONE ENCOUNTER
Refill Encounter    PCP Visits: Recent Visits  Date Type Provider Dept   11/26/24 Office Visit Cielo Mina, DO [facility]   08/20/24 Office Visit Cielo Mina, DO [facility]   05/14/24 Office Visit Cielo Mina, DO [facility]   02/14/24 Office Visit Cielo Mina, DO [facility]   Showing recent visits within past 360 days and meeting all other requirements  Future Appointments  Date Type Provider Dept   02/26/25 Appointment Cielo Mina, DO [facility]   Showing future appointments within next 720 days and meeting all other requirements     Last 3 Blood Pressure:   BP Readings from Last 3 Encounters:   09/19/24 112/82   08/20/24 138/80   02/23/24 126/82     Preferred Pharmacy:   [facility]  [address] [facility] [address]  [place] [address]  Phone: [phone] Fax: [phone]    Ph[facility], [facility] [address] [address]  Phone: [phone] Fax: [phone]    Requested RX:  Requested Prescriptions     Pending Prescriptions Disp Refills    dextroamphetamine-amphetamine (MYDAYIS) 37.5 mg CT24 30 each 0     Sig: Take 1 tablet by mouth once daily.      RX Route: Normal

## 2025-01-16 NOTE — TELEPHONE ENCOUNTER
No care due was identified.  [facility] Embedded Care Due Messages. Reference number: [id number].   1/16/2025 8:49:06 AM CST

## 2025-01-25 DIAGNOSIS — M06.9 RHEUMATOID ARTHRITIS FLARE: ICD-10-CM

## 2025-01-25 RX ORDER — ADALIMUMAB 40MG/0.4ML
40 KIT SUBCUTANEOUS
Qty: 2 PEN | Refills: 11 | Status: CANCELLED | OUTPATIENT
Start: 2025-01-25 | End: 2026-01-25

## 2025-01-27 DIAGNOSIS — M06.9 RHEUMATOID ARTHRITIS FLARE: ICD-10-CM

## 2025-01-27 RX ORDER — ADALIMUMAB 40MG/0.4ML
40 KIT SUBCUTANEOUS
Qty: 2 PEN | Refills: 11 | Status: ACTIVE | OUTPATIENT
Start: 2025-01-27 | End: 2026-01-27

## 2025-01-27 RX ORDER — ADALIMUMAB 40MG/0.4ML
40 KIT SUBCUTANEOUS
Qty: 2 PEN | Refills: 11 | Status: SHIPPED | OUTPATIENT
Start: 2025-01-27 | End: 2025-01-27 | Stop reason: SDUPTHER

## 2025-02-17 ENCOUNTER — PATIENT MESSAGE (OUTPATIENT)
Dept: PRIMARY CARE CLINIC | Facility: CLINIC | Age: 40
End: 2025-02-17
Payer: COMMERCIAL

## 2025-02-17 DIAGNOSIS — R76.8 RHEUMATOID FACTOR POSITIVE: ICD-10-CM

## 2025-02-17 DIAGNOSIS — F90.0 ATTENTION DEFICIT HYPERACTIVITY DISORDER (ADHD), PREDOMINANTLY INATTENTIVE TYPE: ICD-10-CM

## 2025-02-17 RX ORDER — DEXTROAMPHETAMINE SACCHARATE, AMPHETAMINE ASPARTATE, DEXTROAMPHETAMINE SULFATE AND AMPHETAMINE SULFATE 2.5; 2.5; 2.5; 2.5 MG/1; MG/1; MG/1; MG/1
1 TABLET ORAL
Qty: 30 TABLET | Refills: 0 | Status: SHIPPED | OUTPATIENT
Start: 2025-02-17

## 2025-02-17 RX ORDER — NORGESTIMATE AND ETHINYL ESTRADIOL 0.25-0.035
1 KIT ORAL DAILY
Qty: 112 TABLET | Refills: 0 | Status: SHIPPED | OUTPATIENT
Start: 2025-02-17

## 2025-02-17 RX ORDER — DEXTROAMPHETAMINE SACCHARATE, AMPHETAMINE ASPARTATE MONOHYDRATE, DEXTROAMPHETAMINE SULFATE, AMPHETAMINE SULFATE 9.375; 9.375; 9.375; 9.375 MG/1; MG/1; MG/1; MG/1
1 CAPSULE, EXTENDED RELEASE ORAL DAILY
Qty: 30 EACH | Refills: 0 | Status: CANCELLED | OUTPATIENT
Start: 2025-02-17 | End: 2025-03-19

## 2025-02-17 RX ORDER — DEXTROAMPHETAMINE SACCHARATE, AMPHETAMINE ASPARTATE MONOHYDRATE, DEXTROAMPHETAMINE SULFATE, AMPHETAMINE SULFATE 9.375; 9.375; 9.375; 9.375 MG/1; MG/1; MG/1; MG/1
1 CAPSULE, EXTENDED RELEASE ORAL DAILY
Qty: 30 EACH | Refills: 0 | Status: SHIPPED | OUTPATIENT
Start: 2025-02-17 | End: 2025-03-20

## 2025-02-17 NOTE — TELEPHONE ENCOUNTER
Refill Encounter    PCP Visits: Recent Visits  Date Type Provider Dept   11/26/24 Office Visit Cielo Mina,  [facility]   08/20/24 Office Visit Cielo Mina, DO [facility]   05/14/24 Office Visit Cielo Mina, DO [facility]   Showing recent visits within past 360 days and meeting all other requirements  Future Appointments  Date Type Provider Dept   02/26/25 Appointment Cielo Mina,  [facility]   Showing future appointments within next 720 days and meeting all other requirements      Last 3 Blood Pressure:   BP Readings from Last 3 Encounters:   09/19/24 112/82   08/20/24 138/80   02/23/24 126/82     Preferred Pharmacy:   [facility]  [address] [address]  [place] [address]  Phone: [phone] Fax: [phone]    Pharmacy [place],  [facility] [address]  [address] [facility] [address]  Phone: [phone] Fax: [phone]    Requested RX:  Requested Prescriptions     Pending Prescriptions Disp Refills    dextroamphetamine-amphetamine (MYDAYIS) 37.5 mg CT24 30 each 0     Sig: Take 1 tablet by mouth once daily.      RX Route: Normal

## 2025-02-17 NOTE — TELEPHONE ENCOUNTER
No care due was identified.  [facility] Embedded Care Due Messages. Reference number: [id number].   2/17/2025 6:49:23 AM CST

## 2025-02-17 NOTE — TELEPHONE ENCOUNTER
Refill Routing Note   Medication(s) are not appropriate for processing by [facility] for the following reason(s):        Patient not seen by provider within 15 months    [facility] action(s):  Defer               Appointments  past 12m or future 3m with PCP    Date Provider   Last Visit   11/13/2023 Kacy Elias MD   Next Visit   Visit date not found Kacy Elias MD   ED visits in past 90 days: 0        Note composed:8:59 AM 02/17/2025

## 2025-02-17 NOTE — TELEPHONE ENCOUNTER
No care due was identified.  Health [facility] Embedded Care Due Messages. Reference number: [id number].   2/17/2025 12:08:29 PM CST

## 2025-02-17 NOTE — TELEPHONE ENCOUNTER
Refill Encounter    PCP Visits: Recent Visits  Date Type Provider Dept   11/26/24 Office Visit Cielo Mina,  [facility]   08/20/24 Office Visit Cielo Mina, DO [facility]   05/14/24 Office Visit Cielo Mina, DO [facility]   Showing recent visits within past 360 days and meeting all other requirements  Future Appointments  Date Type Provider Dept   02/26/25 Appointment Cielo Mina,  [facility]   Showing future appointments within next 720 days and meeting all other requirements      Last 3 Blood Pressure:   BP Readings from Last 3 Encounters:   09/19/24 112/82   08/20/24 138/80   02/23/24 126/82     Preferred Pharmacy:   [facility]  [address] [address]  [place] [address]  Phone: [phone] Fax: [phone]    Pharmacy [facility],  [facility] [address]  [address] [facility] [address]  Phone: [phone] Fax: [phone]    Requested RX:  Requested Prescriptions     Pending Prescriptions Disp Refills    dextroamphetamine-amphetamine (MYDAYIS) 37.5 mg CT24 30 each 0     Sig: Take 1 tablet by mouth once daily.      RX Route: Normal

## 2025-02-18 RX ORDER — METHOTREXATE 2.5 MG/1
20 TABLET ORAL
Qty: 96 TABLET | Refills: 0 | Status: SHIPPED | OUTPATIENT
Start: 2025-02-18 | End: 2025-05-19

## 2025-02-26 ENCOUNTER — OFFICE VISIT (OUTPATIENT)
Dept: PRIMARY CARE CLINIC | Facility: CLINIC | Age: 40
End: 2025-02-26
Attending: FAMILY MEDICINE
Payer: COMMERCIAL

## 2025-02-26 DIAGNOSIS — F90.0 ATTENTION DEFICIT HYPERACTIVITY DISORDER (ADHD), PREDOMINANTLY INATTENTIVE TYPE: Primary | ICD-10-CM

## 2025-02-26 DIAGNOSIS — E03.8 OTHER SPECIFIED HYPOTHYROIDISM: ICD-10-CM

## 2025-02-26 NOTE — PROGRESS NOTES
VIRTUAL/TELEMEDICINE VISIT   FAMILY MEDICINE  [facility]    The patient location is: [place]  The chief complaint leading to consultation is:   Chief Complaint   Patient presents with    ADHD    Hypothyroidism     Visit type: Virtual visit with synchronous audio and video   Total time spent: 30 minute  Each patient to whom he or she provides medical services by telemedicine is:  (1) informed of the relationship between the physician and patient and the respective role of any other health care provider with respect to management of the patient; and (2) notified that he or she may decline to receive medical services by telemedicine and may withdraw from such care at any time.    Reason for visit:   Chief Complaint   Patient presents with    ADHD    Hypothyroidism       SUBJECTIVE: Kan Traore is a 39 y.o. female  - with ADHD, mild intermittent asthma, depression, rheumatoid arthritis with positive rheumatoid factor , hypothyroidism, nephrolithiasis and anxiety presents for ADHD and thyroid follow-up     Gynecology Dr. Kacy Elias MD  - overdue for Pap   Dermatology Dr. Lulu Pak MD  Sports Medicine Dr. Brian Marcial  Rheumatology; Dr. Paiz  Optometry: Dr. Cesar Dukes, JUAN  Podiatry Yonatan Douglass Jr., DPM    Kan Traore reports overall that she is doing well.  She denies any concerns or complaints.  She is taking her ADHD medications regularly and did denies any supply chain issues.      She is also taking her thyroid medication will consistently.  She takes 1st thing in the morning before she eats.  She did not get a chance to repeat her thyroid level as ordered but is doing well and is asymptmatic    1. ADHD predominantly inattentive     Age diagnosed: 4 th grade  Diagnosed by: Psychiatrist  Initial evaluation present in chart: no     Past medications:  Vyvanse 30 mg (she was only on his medications secondary to Adderall supply chain issues; crash at end of  day), adderall XR 30 mg daily  Reasons for changing past medications:  Availability     Current medications:   dextroamphetamine-amphetamine (MYDAYIS) 37.5 mg CT24, Take 1 tablet by mouth once daily., Disp: 30  dextroamphetamine-amphetamine (ADDERALL) 10 mg Tab, Take 1 tablet (10 mg total) by mouth once daily.,     reviewed: yes       Concerns with medication:  denies    AM medication: 7:30 AM Mydayis are daily  Lunch medication: denies   Afternoon medication: Adderall IR 10 mg PRN     Daily Activity:  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 Hypothyroidism     Age diagnosed: 38 yo    Symptomatic at diagnosis: fatigue, constipation  Prior evaluation by Endocrinologist: No  Prior thyroid US: no    Current medication:   Levothyroxine 50 mcg daily    Side effects from medication: none  Scheduled for medication: AM fasting separate from other medications    Symptoms from thyroid issue: denies fatigue, weight changes, heat/cold intolerance, bowel/skin changes or CVS symptoms  Concerns: denies    Lab Results       Component                Value               Date                       TSH                      4.032 (H)           11/19/2024                 TSH                      4.032 (H)           11/19/2024                 FREET4                   0.92                11/19/2024                 FREET4                   0.92                11/19/2024                    Review of Systems   HENT:  Negative for hearing loss.    Eyes:  Negative for discharge.   Respiratory:  Negative for wheezing.    Cardiovascular:  Negative for chest pain and palpitations.   Gastrointestinal:  Negative for blood in stool, constipation, diarrhea and vomiting.   Genitourinary:  Negative for dysuria and hematuria.   Musculoskeletal:  Negative for neck pain.   Neurological:  Negative for weakness and headaches.   Endo/Heme/Allergies:  Negative for polydipsia.   All other systems reviewed and are  negative.        HISTORY:   Past Medical History:   Diagnosis Date    Allergic rhinitis     Anxiety     Asthma     history of    Attention deficit disorder (ADD)     Bilateral nephrolithiasis     Crutch training, encounter for 02/19/2024    Depression     states does not feel depressed    Equinus contracture of right ankle 01/22/2024    Hydronephrosis     Hyperglycemia 03/09/2021    - glucose 131 on recent labs - patient was nonfasting Lab Results Component Value Date  HGBA1C 5.1 06/10/2021      Metatarsalgia of right foot 01/22/2024    Martinez neuroma, right     Plantar fasciitis of right foot     Rheumatoid arthritis, unspecified     Thyroid disease     hypothyroidism    Urinary tract infection     Vaginal infection        Past Surgical History:   Procedure Laterality Date    APPENDECTOMY  11/20/2017    BONE MARROW ASPIRATION Right 2/23/2024    Procedure: ASPIRATION, BONE MARROW;  Surgeon: Yonatan Douglass Jr., DPM;  Location: [facility] OR;  Service: Podiatry;  Laterality: Right;    CYSTOSCOPY W/ URETERAL STENT PLACEMENT      CYSTOSCOPY W/ URETERAL STENT REMOVAL      DECOMPRESSION OF NERVE Right 2/23/2024    Procedure: DECOMPRESSION, NERVE;  Surgeon: Yonatan Douglass Jr., DPM;  Location: [facility] OR;  Service: Podiatry;  Laterality: Right;  inter-metatarsal nerve 3rd interspace    DILATION AND CURETTAGE OF UTERUS  04/2014    KIDNEY STONE SURGERY      LITHOTRIPSY  2011    multiple    LITHOTRIPSY  02/09/2017    LOOP ELECTROSURGICAL EXCISION PROCEDURE (LEEP) N/A 10/02/2020    Procedure: LEEP (LOOP ELECTROSURGICAL EXCISION PROCEDURE);  Surgeon: Kacy Elias MD;  Location: [facility] OR;  Service: OB/GYN;  Laterality: N/A;    SURGICAL REMOVAL OF MARTINEZ'S NEUROMA Right 2/23/2024    Procedure: EXCISION, MARTINEZ'S NEUROMA;  Surgeon: Yonatan Douglass Jr., DPM;  Location: [facility] OR;  Service: Podiatry;  Laterality: Right;  pop saph block    TONSILLECTOMY      WISDOM TOOTH EXTRACTION      as a teenager       Family History   Problem  Relation Name Age of Onset    Hypertension Mother Mom 50    Asthma Mother Mom     Hypothyroidism Father  52    Stroke Maternal Grandfather [place]     Hypertension Maternal Grandfather [place]     Heart disease Maternal Grandfather Carlinw 56         of AMI    Heart failure Paternal Grandmother Lizett Olmedo 81    Rheum arthritis Paternal Grandmother Lizett Olmedo     Arthritis Paternal Grandmother Lizett Olmedo     No Known Problems Son      No Known Problems Son      No Known Problems Son      Breast cancer Neg Hx      Colon cancer Neg Hx      Ovarian cancer Neg Hx      Kidney disease Neg Hx         Social History[1]    Social History     Social History Narrative     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        ALLERGIES:   Review of patient's allergies indicates:  No Known Allergies    MEDS:   Current Outpatient Medications on File Prior to Visit   Medication Sig Dispense Refill Last Dose/Taking    adalimumab (HUMIRA,CF, PEN) 40 mg/0.4 mL PnKt Inject 0.4 mLs (40 mg total) into the skin every 14 (fourteen) days. 2 pen 11     citalopram (CELEXA) 20 MG tablet Take 1 tablet (20 mg total) by mouth once daily. 90 tablet 3     dextroamphetamine-amphetamine (ADDERALL) 10 mg Tab Take 1 tablet (10 mg total) by mouth after lunch. 30 tablet 0     dextroamphetamine-amphetamine (MYDAYIS) 37.5 mg CT24 Take 1 tablet by mouth once daily. 30 each 0     folic acid (FOLVITE) 1 MG tablet Take 3 tablets (3 mg total) by mouth once daily. 90 tablet 11     levothyroxine (SYNTHROID) 50 MCG tablet Take 1 tablet (50 mcg total) by mouth before breakfast. 90 tablet 3     meloxicam (MOBIC) 7.5 MG tablet Take 1 tablet (7.5 mg total) by mouth once daily 30 tablet 1     methotrexate 2.5 MG Tab Take 8 tablets (20 mg total) by mouth every 7 days. 96 tablet 0     norgestimate-ethinyl estradioL (SPRINTEC, 28,) 0.25-35 mg-mcg per tablet Take 1 tablet by mouth once daily. Take one active pill  daily, skipping sugar pills. 112 tablet 0     ondansetron (ZOFRAN-ODT) 4 MG TbDL Dissolve 1 tablet (4 mg total) by mouth every 8 (eight) hours as needed (nausea/migraine). 30 tablet 11        Vital signs:   There were no vitals filed for this visit.  There is no height or weight on file to calculate BMI.    PHYSICAL EXAM:     Physical Exam  Constitutional:       General: She is not in acute distress.  Pulmonary:      Effort: Pulmonary effort is normal. No respiratory distress.   Neurological:      Mental Status: She is alert.   Psychiatric:         Speech: Speech normal.           PERTINENT RESULTS:   No visits with results within 1 Week(s) from this visit.   Latest known visit with results is:   Lab Visit on 11/19/2024   Component Date Value Ref Range Status    Cholesterol 11/19/2024 171  120 - 199 mg/dL Final    Comment: The National Cholesterol Education Program (NCEP) has set the  following guidelines (reference ranges) for Cholesterol:  Optimal.....................<200 mg/dL  Borderline High.............200-239 mg/dL  High........................> or = 240 mg/dL      Triglycerides 11/19/2024 140  30 - 150 mg/dL Final    Comment: The National Cholesterol Education Program (NCEP) has set the  following guidelines (reference values) for triglycerides:  Normal......................<150 mg/dL  Borderline High.............150-199 mg/dL  High........................200-499 mg/dL      HDL 11/19/2024 50  40 - 75 mg/dL Final    Comment: The National Cholesterol Education Program (NCEP) has set the  following guidelines (reference values) for HDL Cholesterol:  Low...............<40 mg/dL  Optimal...........>60 mg/dL      LDL Cholesterol 11/19/2024 93.0  63.0 - 159.0 mg/dL Final    Comment: The National Cholesterol Education Program (NCEP) has set the  following guidelines (reference values) for LDL Cholesterol:  Optimal.......................<130 mg/dL  Borderline High...............130-159  mg/dL  High..........................160-189 mg/dL  Very High.....................>190 mg/dL      HDL/Cholesterol Ratio 11/19/2024 29.2  20.0 - 50.0 % Final    Total Cholesterol/HDL Ratio 11/19/2024 3.4  2.0 - 5.0 Final    Non-HDL Cholesterol 11/19/2024 121  mg/dL Final    Comment: Risk category and Non-HDL cholesterol goals:  Coronary heart disease (CHD)or equivalent (10-year risk of CHD >20%):  Non-HDL cholesterol goal     <130 mg/dL  Two or more CHD risk factors and 10-year risk of CHD <= 20%:  Non-HDL cholesterol goal     <160 mg/dL  0 to 1 CHD risk factor:  Non-HDL cholesterol goal     <190 mg/dL      Hemoglobin A1C 11/19/2024 4.9  4.0 - 5.6 % Final    Comment: ADA Screening Guidelines:  5.7-6.4%  Consistent with prediabetes  >or=6.5%  Consistent with diabetes    High levels of fetal hemoglobin interfere with the HbA1C  assay. Heterozygous hemoglobin variants (HbS, HgC, etc)do  not significantly interfere with this assay.   However, presence of multiple variants may affect accuracy.      Estimated Avg Glucose 11/19/2024 94  68 - 131 mg/dL Final    Free T4 11/19/2024 0.92  0.71 - 1.51 ng/dL Final    TSH 11/19/2024 4.032 (H)  0.400 - 4.000 uIU/mL Final    Cholesterol 11/19/2024 171  120 - 199 mg/dL Final    Comment: The National Cholesterol Education Program (NCEP) has set the  following guidelines (reference ranges) for Cholesterol:  Optimal.....................<200 mg/dL  Borderline High.............200-239 mg/dL  High........................> or = 240 mg/dL      Triglycerides 11/19/2024 140  30 - 150 mg/dL Final    Comment: The National Cholesterol Education Program (NCEP) has set the  following guidelines (reference values) for triglycerides:  Normal......................<150 mg/dL  Borderline High.............150-199 mg/dL  High........................200-499 mg/dL      HDL 11/19/2024 50  40 - 75 mg/dL Final    Comment: The National Cholesterol Education Program (NCEP) has set the  following guidelines  (reference values) for HDL Cholesterol:  Low...............<40 mg/dL  Optimal...........>60 mg/dL      LDL Cholesterol 11/19/2024 93.0  63.0 - 159.0 mg/dL Final    Comment: The National Cholesterol Education Program (NCEP) has set the  following guidelines (reference values) for LDL Cholesterol:  Optimal.......................<130 mg/dL  Borderline High...............130-159 mg/dL  High..........................160-189 mg/dL  Very High.....................>190 mg/dL      HDL/Cholesterol Ratio 11/19/2024 29.2  20.0 - 50.0 % Final    Total Cholesterol/HDL Ratio 11/19/2024 3.4  2.0 - 5.0 Final    Non-HDL Cholesterol 11/19/2024 121  mg/dL Final    Comment: Risk category and Non-HDL cholesterol goals:  Coronary heart disease (CHD)or equivalent (10-year risk of CHD >20%):  Non-HDL cholesterol goal     <130 mg/dL  Two or more CHD risk factors and 10-year risk of CHD <= 20%:  Non-HDL cholesterol goal     <160 mg/dL  0 to 1 CHD risk factor:  Non-HDL cholesterol goal     <190 mg/dL      Hemoglobin A1C 11/19/2024 4.9  4.0 - 5.6 % Final    Comment: ADA Screening Guidelines:  5.7-6.4%  Consistent with prediabetes  >or=6.5%  Consistent with diabetes    High levels of fetal hemoglobin interfere with the HbA1C  assay. Heterozygous hemoglobin variants (HbS, HgC, etc)do  not significantly interfere with this assay.   However, presence of multiple variants may affect accuracy.      Estimated Avg Glucose 11/19/2024 94  68 - 131 mg/dL Final    Sodium 11/19/2024 138  136 - 145 mmol/L Final    Potassium 11/19/2024 4.0  3.5 - 5.1 mmol/L Final    Chloride 11/19/2024 108  95 - 110 mmol/L Final    CO2 11/19/2024 24  23 - 29 mmol/L Final    Glucose 11/19/2024 96  70 - 110 mg/dL Final    BUN 11/19/2024 9  6 - 20 mg/dL Final    Creatinine 11/19/2024 0.7  0.5 - 1.4 mg/dL Final    Calcium 11/19/2024 8.7  8.7 - 10.5 mg/dL Final    Total Protein 11/19/2024 7.2  6.0 - 8.4 g/dL Final    Albumin 11/19/2024 3.2 (L)  3.5 - 5.2 g/dL Final    Total  Bilirubin 11/19/2024 0.8  0.1 - 1.0 mg/dL Final    Comment: For infants and newborns, interpretation of results should be based  on gestational age, weight and in agreement with clinical  observations.    Premature Infant recommended reference ranges:  Up to 24 hours.............<8.0 mg/dL  Up to 48 hours............<12.0 mg/dL  3-5 days..................<15.0 mg/dL  6-29 days.................<15.0 mg/dL      Alkaline Phosphatase 11/19/2024 46  40 - 150 U/L Final    AST 11/19/2024 14  10 - 40 U/L Final    ALT 11/19/2024 10  10 - 44 U/L Final    eGFR 11/19/2024 >60.0  >60 mL/min/1.73 m^2 Final    Anion Gap 11/19/2024 6 (L)  8 - 16 mmol/L Final    WBC 11/19/2024 5.20  3.90 - 12.70 K/uL Final    RBC 11/19/2024 4.61  4.00 - 5.40 M/uL Final    Hemoglobin 11/19/2024 13.7  12.0 - 16.0 g/dL Final    Hematocrit 11/19/2024 41.5  37.0 - 48.5 % Final    MCV 11/19/2024 90  82 - 98 fL Final    MCH 11/19/2024 29.7  27.0 - 31.0 pg Final    MCHC 11/19/2024 33.0  32.0 - 36.0 g/dL Final    RDW 11/19/2024 13.7  11.5 - 14.5 % Final    Platelets 11/19/2024 239  150 - 450 K/uL Final    MPV 11/19/2024 11.2  9.2 - 12.9 fL Final    Immature Granulocytes 11/19/2024 0.2  0.0 - 0.5 % Final    Gran # (ANC) 11/19/2024 1.5 (L)  1.8 - 7.7 K/uL Final    Immature Grans (Abs) 11/19/2024 0.01  0.00 - 0.04 K/uL Final    Comment: Mild elevation in immature granulocytes is non specific and   can be seen in a variety of conditions including stress response,   acute inflammation, trauma and pregnancy. Correlation with other   laboratory and clinical findings is essential.      Lymph # 11/19/2024 2.6  1.0 - 4.8 K/uL Final    Mono # 11/19/2024 0.6  0.3 - 1.0 K/uL Final    Eos # 11/19/2024 0.4  0.0 - 0.5 K/uL Final    Baso # 11/19/2024 0.07  0.00 - 0.20 K/uL Final    nRBC 11/19/2024 0  0 /100 WBC Final    Gran % 11/19/2024 29.3 (L)  38.0 - 73.0 % Final    Lymph % 11/19/2024 50.8 (H)  18.0 - 48.0 % Final    Mono % 11/19/2024 11.7  4.0 - 15.0 % Final     Eosinophil % 11/19/2024 6.9  0.0 - 8.0 % Final    Basophil % 11/19/2024 1.3  0.0 - 1.9 % Final    Aniso 11/19/2024 Slight   Final    Tear Drop Cells 11/19/2024 Occasional   Final    Differential Method 11/19/2024 Automated   Final    Free T4 11/19/2024 0.92  0.71 - 1.51 ng/dL Final    TSH 11/19/2024 4.032 (H)  0.400 - 4.000 uIU/mL Final       ASSESSMENT/PLAN:    1. Attention deficit hyperactivity disorder (ADHD), predominantly inattentive type  Overview:  -  reviewed  - well controlled  - continue current medications       2. Other specified hypothyroidism  Overview:  - family history of hypothyroidism (father)  Lab Results   Component Value Date    TSH 4.032 (H) 11/19/2024    TSH 4.032 (H) 11/19/2024    FREET4 0.92 11/19/2024    FREET4 0.92 11/19/2024     - 06/10/2021 TPO normal, free T3 and free T4  - TSH previously at goal   -TSH slightly above goal last visit.  She is supposed to have repeat thyroid labs prior to this visit.  I reminded her to have that done as soon as possible nonfasting              ORDERS:        Vaccines recommended:  Flu, COVID-19    Follow up in about 3 months (around 5/26/2025) for Virtual Visit, ADHD, anxiety. or sooner with any concerns      This note is dictated using the M*Modal Fluency Direct word recognition program. There are word recognition mistakes that are occasionally missed on review.    Dr. Cielo Mina D.O.   Family Medicine         [1]   Social History  Tobacco Use    Smoking status: Never     Passive exposure: Never    Smokeless tobacco: Never   Substance Use Topics    Alcohol use: Yes     Alcohol/week: 1.0 standard drink of alcohol     Types: 1 Drinks containing 0.5 oz of alcohol per week     Comment: Socially    Drug use: No

## 2025-02-27 ENCOUNTER — TELEPHONE (OUTPATIENT)
Dept: FAMILY MEDICINE | Facility: CLINIC | Age: 40
End: 2025-02-27
Payer: COMMERCIAL

## 2025-02-27 NOTE — TELEPHONE ENCOUNTER
----- Message from Nurse Oliva sent at 2/26/2025  4:57 PM CST -----    ----- Message -----  From: Cielo Mina DO  Sent: 2/26/2025   3:03 PM CST  To: [facility] Staff    Please schedule 3 month VV f/u ADHD

## 2025-03-19 DIAGNOSIS — E03.8 OTHER SPECIFIED HYPOTHYROIDISM: ICD-10-CM

## 2025-03-19 DIAGNOSIS — F90.0 ATTENTION DEFICIT HYPERACTIVITY DISORDER (ADHD), PREDOMINANTLY INATTENTIVE TYPE: ICD-10-CM

## 2025-03-19 RX ORDER — LEVOTHYROXINE SODIUM 50 UG/1
50 TABLET ORAL
Qty: 90 TABLET | Refills: 2 | Status: SHIPPED | OUTPATIENT
Start: 2025-03-19

## 2025-03-19 RX ORDER — DEXTROAMPHETAMINE SACCHARATE, AMPHETAMINE ASPARTATE, DEXTROAMPHETAMINE SULFATE AND AMPHETAMINE SULFATE 2.5; 2.5; 2.5; 2.5 MG/1; MG/1; MG/1; MG/1
1 TABLET ORAL
Qty: 30 TABLET | Refills: 0 | Status: SHIPPED | OUTPATIENT
Start: 2025-03-19

## 2025-03-19 RX ORDER — DEXTROAMPHETAMINE SACCHARATE, AMPHETAMINE ASPARTATE MONOHYDRATE, DEXTROAMPHETAMINE SULFATE, AMPHETAMINE SULFATE 9.375; 9.375; 9.375; 9.375 MG/1; MG/1; MG/1; MG/1
1 CAPSULE, EXTENDED RELEASE ORAL DAILY
Qty: 30 EACH | Refills: 0 | Status: SHIPPED | OUTPATIENT
Start: 2025-03-19 | End: 2025-04-20

## 2025-03-19 NOTE — TELEPHONE ENCOUNTER
Refill Decision Note   Kan Traore  is requesting a refill authorization.  Brief Assessment and Rationale for Refill:  Approve     Medication Therapy Plan:  T4-WNL      Comments:     Note composed:2:37 PM 03/19/2025

## 2025-03-19 NOTE — TELEPHONE ENCOUNTER
No care due was identified.  [facility] Embedded Care Due Messages. Reference number: [id number].   3/19/2025 10:16:36 AM CDT

## 2025-03-19 NOTE — TELEPHONE ENCOUNTER
No care due was identified.  [facility] Embedded Care Due Messages. Reference number: [id number].   3/19/2025 10:17:04 AM CDT

## 2025-03-19 NOTE — TELEPHONE ENCOUNTER
Refill Encounter    PCP Visits: Recent Visits  Date Type Provider Dept   02/26/25 Office Visit Cielo Mina, DO [facility]   11/26/24 Office Visit Cielo Mina, DO [facility]   08/20/24 Office Visit Cielo Mina, DO [facility]   05/14/24 Office Visit Cielo Mina, DO [facility]   Showing recent visits within past 360 days and meeting all other requirements  Future Appointments  Date Type Provider Dept   05/27/25 Appointment Cielo Mina, DO [facility]   Showing future appointments within next 720 days and meeting all other requirements      Last 3 Blood Pressure:   BP Readings from Last 3 Encounters:   09/19/24 112/82   08/20/24 138/80   02/23/24 126/82     Preferred Pharmacy:   [facility]  [address] [facility] [address]  [place] [address]  Phone: [phone] Fax: [phone]    [email or web address],  [facility] [address] [address]  Phone: [phone] Fax: [phone]    Requested RX:  Requested Prescriptions     Pending Prescriptions Disp Refills    dextroamphetamine-amphetamine (MYDAYIS) 37.5 mg CT24 30 each 0     Sig: Take 1 tablet by mouth once daily.    dextroamphetamine-amphetamine (ADDERALL) 10 mg Tab 30 tablet 0     Sig: Take 1 tablet (10 mg total) by mouth after lunch.      RX Route: Normal

## 2025-03-20 ENCOUNTER — PATIENT MESSAGE (OUTPATIENT)
Dept: PRIMARY CARE CLINIC | Facility: CLINIC | Age: 40
End: 2025-03-20
Payer: COMMERCIAL

## 2025-04-17 ENCOUNTER — PATIENT MESSAGE (OUTPATIENT)
Dept: PRIMARY CARE CLINIC | Facility: CLINIC | Age: 40
End: 2025-04-17
Payer: COMMERCIAL

## 2025-04-17 DIAGNOSIS — F90.0 ATTENTION DEFICIT HYPERACTIVITY DISORDER (ADHD), PREDOMINANTLY INATTENTIVE TYPE: ICD-10-CM

## 2025-04-17 RX ORDER — DEXTROAMPHETAMINE SACCHARATE, AMPHETAMINE ASPARTATE MONOHYDRATE, DEXTROAMPHETAMINE SULFATE, AMPHETAMINE SULFATE 9.375; 9.375; 9.375; 9.375 MG/1; MG/1; MG/1; MG/1
1 CAPSULE, EXTENDED RELEASE ORAL DAILY
Qty: 30 EACH | Refills: 0 | Status: SHIPPED | OUTPATIENT
Start: 2025-04-17 | End: 2025-05-17

## 2025-04-17 RX ORDER — DEXTROAMPHETAMINE SACCHARATE, AMPHETAMINE ASPARTATE, DEXTROAMPHETAMINE SULFATE AND AMPHETAMINE SULFATE 2.5; 2.5; 2.5; 2.5 MG/1; MG/1; MG/1; MG/1
1 TABLET ORAL
Qty: 30 TABLET | Refills: 0 | Status: SHIPPED | OUTPATIENT
Start: 2025-04-17

## 2025-04-17 NOTE — TELEPHONE ENCOUNTER
Refill Encounter    PCP Visits: Recent Visits  Date Type Provider Dept   02/26/25 Office Visit Cielo Mina, DO [facility]   11/26/24 Office Visit Cielo Mina, DO [facility]   08/20/24 Office Visit Cielo Mina, DO [facility]   05/14/24 Office Visit Cielo Mina, DO [facility]   Showing recent visits within past 360 days and meeting all other requirements  Future Appointments  Date Type Provider Dept   05/27/25 Appointment Cielo Mina, DO [facility]   Showing future appointments within next 720 days and meeting all other requirements      Last 3 Blood Pressure:   BP Readings from Last 3 Encounters:   09/19/24 112/82   08/20/24 138/80   02/23/24 126/82     Preferred Pharmacy:   [facility]  [address] [facility] [address]  [place] [address]  Phone: [phone] Fax: [phone]    [place],  [facility] [address] [address]  Phone: [phone] Fax: [phone]    Requested RX:  Requested Prescriptions     Pending Prescriptions Disp Refills    dextroamphetamine-amphetamine (MYDAYIS) 37.5 mg CT24 30 each 0     Sig: Take 1 tablet by mouth once daily.    dextroamphetamine-amphetamine (ADDERALL) 10 mg Tab 30 tablet 0     Sig: Take 1 tablet (10 mg total) by mouth after lunch.      RX Route: Normal

## 2025-04-17 NOTE — TELEPHONE ENCOUNTER
No care due was identified.  [facility] Embedded Care Due Messages. Reference number: [id number].   4/17/2025 9:56:32 AM CDT

## 2025-05-19 DIAGNOSIS — F90.0 ATTENTION DEFICIT HYPERACTIVITY DISORDER (ADHD), PREDOMINANTLY INATTENTIVE TYPE: ICD-10-CM

## 2025-05-19 RX ORDER — NORGESTIMATE AND ETHINYL ESTRADIOL 0.25-0.035
1 KIT ORAL DAILY
Qty: 112 TABLET | Refills: 0 | Status: SHIPPED | OUTPATIENT
Start: 2025-05-19

## 2025-05-19 RX ORDER — DEXTROAMPHETAMINE SACCHARATE, AMPHETAMINE ASPARTATE MONOHYDRATE, DEXTROAMPHETAMINE SULFATE, AMPHETAMINE SULFATE 9.375; 9.375; 9.375; 9.375 MG/1; MG/1; MG/1; MG/1
1 CAPSULE, EXTENDED RELEASE ORAL DAILY
Qty: 30 EACH | Refills: 0 | Status: SHIPPED | OUTPATIENT
Start: 2025-05-19 | End: 2025-06-19

## 2025-05-19 RX ORDER — DEXTROAMPHETAMINE SACCHARATE, AMPHETAMINE ASPARTATE, DEXTROAMPHETAMINE SULFATE AND AMPHETAMINE SULFATE 2.5; 2.5; 2.5; 2.5 MG/1; MG/1; MG/1; MG/1
1 TABLET ORAL
Qty: 30 TABLET | Refills: 0 | Status: SHIPPED | OUTPATIENT
Start: 2025-05-19

## 2025-05-19 NOTE — TELEPHONE ENCOUNTER
No care due was identified.  Health [facility] Embedded Care Due Messages. Reference number: [id number].   5/19/2025 11:55:50 AM CDT

## 2025-05-19 NOTE — TELEPHONE ENCOUNTER
Refill Routing Note   Medication(s) are not appropriate for processing by [facility] for the following reason(s):        Patient not seen by provider within 15 months    [facility] action(s):  Defer             Appointments  past 12m or future 3m with PCP    Date Provider   Last Visit   11/13/2023 Kacy Elias MD   Next Visit   Visit date not found Kacy Elias MD   ED visits in past 90 days: 0        Note composed:1:50 PM 05/19/2025

## 2025-05-21 ENCOUNTER — PATIENT MESSAGE (OUTPATIENT)
Dept: RHEUMATOLOGY | Facility: CLINIC | Age: 40
End: 2025-05-21
Payer: COMMERCIAL

## 2025-05-21 DIAGNOSIS — R76.8 RHEUMATOID FACTOR POSITIVE: ICD-10-CM

## 2025-05-21 RX ORDER — METHOTREXATE 2.5 MG/1
20 TABLET ORAL
Qty: 96 TABLET | Refills: 0 | OUTPATIENT
Start: 2025-05-21 | End: 2025-08-19

## 2025-05-28 ENCOUNTER — PATIENT MESSAGE (OUTPATIENT)
Dept: PRIMARY CARE CLINIC | Facility: CLINIC | Age: 40
End: 2025-05-28
Payer: COMMERCIAL

## 2025-05-30 ENCOUNTER — RESULTS FOLLOW-UP (OUTPATIENT)
Dept: RHEUMATOLOGY | Facility: CLINIC | Age: 40
End: 2025-05-30

## 2025-06-06 NOTE — PROGRESS NOTES
VIRTUAL/TELEMEDICINE VISIT   FAMILY MEDICINE  [facility]    The patient location is: [place]  The chief complaint leading to consultation is:   Chief Complaint   Patient presents with    Follow-up     3 mths    ADHD     Visit type: Virtual visit with synchronous audio and video   Total time spent: 30 minute  Each patient to whom he or she provides medical services by telemedicine is:  (1) informed of the relationship between the physician and patient and the respective role of any other health care provider with respect to management of the patient; and (2) notified that he or she may decline to receive medical services by telemedicine and may withdraw from such care at any time.    Reason for visit:   Chief Complaint   Patient presents with    Follow-up     3 mths    ADHD       SUBJECTIVE: Kan Traore is a 39 y.o. female  - with ADHD, mild intermittent asthma, depression, rheumatoid arthritis with positive rheumatoid factor , hypothyroidism, nephrolithiasis and anxiety presents for ADHD      Gynecology Dr. Kacy Elias MD  - overdue for Pap   Dermatology Dr. Lulu Pak MD  Sports Medicine Dr. Brian Marcial  Rheumatology; Dr. Paiz  Optometry: Dr. Cesar Dukes, JUAN  Podiatry Yonatan Douglass Jr., DPM    Kan Traore reports that she is doing well.  She is back on Humira as well as methotrexate for her rheumatoid arthritis.  She had recent blood work done last month for her CBC, CMP, sed rate and CRP.  Unfortunately they did not do her thyroid level at that time.  She reports her joints are feeling good.  She reports that rheumatology be following her blood work every 3-6 months     She is overdue for Pap smear and reports that she will schedule.    1. ADHD predominantly inattentive     Age diagnosed: 4 th grade  Diagnosed by: Psychiatrist  Initial evaluation present in chart: no    Kan Traore reports that she is doing well with her medications.  No unwanted  side effects.     Past medications:  Vyvanse 30 mg (she was only on his medications secondary to Adderall supply chain issues; crash at end of day), adderall XR 30 mg daily  Reasons for changing past medications:  Availability     Current medications:   dextroamphetamine-amphetamine (MYDAYIS) 37.5 mg CT24, Take 1 tablet by mouth once daily., Disp: 30  dextroamphetamine-amphetamine (ADDERALL) 10 mg Tab, Take 1 tablet (10 mg total) by mouth once daily.,     reviewed: yes       Concerns with medication:  denies    AM medication: 7:30 AM Mydayis are daily  Lunch medication: denies   Afternoon medication: Adderall IR 10 mg PRN     Daily Activity:  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    Eyes:  Negative for discharge.   Respiratory:  Negative for wheezing.    Cardiovascular:  Negative for chest pain and palpitations.   Gastrointestinal:  Negative for blood in stool, constipation, diarrhea and vomiting.   Genitourinary:  Negative for dysuria and hematuria.   Musculoskeletal:  Negative for neck pain.   Neurological:  Negative for weakness and headaches.   Endo/Heme/Allergies:  Negative for polydipsia.   All other systems reviewed and are negative.        HISTORY:   Past Medical History:   Diagnosis Date    Allergic rhinitis     Anxiety     Asthma     history of    Attention deficit disorder (ADD)     Bilateral nephrolithiasis     Crutch training, encounter for 02/19/2024    Depression     states does not feel depressed    Equinus contracture of right ankle 01/22/2024    Hydronephrosis     Hyperglycemia 03/09/2021    - glucose 131 on recent labs - patient was nonfasting Lab Results Component Value Date  HGBA1C 5.1 06/10/2021      Metatarsalgia of right foot 01/22/2024    Abarca neuroma, right     Plantar fasciitis of right foot     Rheumatoid arthritis, unspecified     Thyroid disease     hypothyroidism    Urinary tract  infection     Vaginal infection        Past Surgical History:   Procedure Laterality Date    APPENDECTOMY  2017    BONE MARROW ASPIRATION Right 2024    Procedure: ASPIRATION, BONE MARROW;  Surgeon: Yonatan Douglass Jr., DPM;  Location: [facility] OR;  Service: Podiatry;  Laterality: Right;    CYSTOSCOPY W/ URETERAL STENT PLACEMENT      CYSTOSCOPY W/ URETERAL STENT REMOVAL      DECOMPRESSION OF NERVE Right 2024    Procedure: DECOMPRESSION, NERVE;  Surgeon: Yonatan Douglass Jr., DPM;  Location: [facility] OR;  Service: Podiatry;  Laterality: Right;  inter-metatarsal nerve 3rd interspace    DILATION AND CURETTAGE OF UTERUS  2014    KIDNEY STONE SURGERY      LITHOTRIPSY      multiple    LITHOTRIPSY  2017    LOOP ELECTROSURGICAL EXCISION PROCEDURE (LEEP) N/A 10/02/2020    Procedure: LEEP (LOOP ELECTROSURGICAL EXCISION PROCEDURE);  Surgeon: Kacy Elias MD;  Location: [facility] OR;  Service: OB/GYN;  Laterality: N/A;    SURGICAL REMOVAL OF MARTINEZ'S NEUROMA Right 2024    Procedure: EXCISION, MARTINEZ'S NEUROMA;  Surgeon: Yonatan Douglass Jr., DPM;  Location: [facility] OR;  Service: Podiatry;  Laterality: Right;  pop saph block    TONSILLECTOMY      WISDOM TOOTH EXTRACTION      as a teenager       Family History   Problem Relation Name Age of Onset    Hypertension Mother Mom 50    Asthma Mother Mom     Hypothyroidism Father  52    Stroke Maternal Grandfather [place]     Hypertension Maternal Grandfather [place]     Heart disease Maternal Grandfather [place] 56         of AMI    Heart failure Paternal Grandmother Lizett Suffrin 81    Rheum arthritis Paternal Grandmother Lizett Suffrin     Arthritis Paternal Grandmother Lizett Suffrin     No Known Problems Son      No Known Problems Son      No Known Problems Son      Breast cancer Neg Hx      Colon cancer Neg Hx      Ovarian cancer Neg Hx      Kidney disease Neg Hx         Social History[1]    Social History     Social History Narrative     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        ALLERGIES:   Review of patient's allergies indicates:  No Known Allergies    MEDS:   Current Outpatient Medications on File Prior to Visit   Medication Sig Dispense Refill Last Dose/Taking    adalimumab (HUMIRA,CF, PEN) 40 mg/0.4 mL PnKt Inject 0.4 mLs (40 mg total) into the skin every 14 (fourteen) days. 2 pen 11     citalopram (CELEXA) 20 MG tablet Take 1 tablet (20 mg total) by mouth once daily. 90 tablet 3     folic acid (FOLVITE) 1 MG tablet Take 3 tablets (3 mg total) by mouth once daily. 90 tablet 11     levothyroxine (SYNTHROID) 50 MCG tablet Take 1 tablet (50 mcg total) by mouth before breakfast. 90 tablet 2     meloxicam (MOBIC) 7.5 MG tablet Take 1 tablet (7.5 mg total) by mouth once daily 30 tablet 1     methotrexate 2.5 MG Tab Take 8 tablets (20 mg total) by mouth every 7 days. 96 tablet 0     norgestimate-ethinyl estradioL (SPRINTEC, 28,) 0.25-0.035 mg per tablet Take 1 tablet by mouth once daily. Take one active pill daily, skipping sugar pills. 112 tablet 0     ondansetron (ZOFRAN-ODT) 4 MG TbDL Dissolve 1 tablet (4 mg total) by mouth every 8 (eight) hours as needed (nausea/migraine). 30 tablet 11     [DISCONTINUED] dextroamphetamine-amphetamine (ADDERALL) 10 mg Tab Take 1 tablet (10 mg total) by mouth after lunch. 30 tablet 0     [DISCONTINUED] dextroamphetamine-amphetamine (MYDAYIS) 37.5 mg CT24 Take 1 capsule by mouth once daily. 30 each 0        Vital signs:   There were no vitals filed for this visit.  There is no height or weight on file to calculate BMI.    PHYSICAL EXAM:     Physical Exam  Constitutional:       General: She is not in acute distress.  Pulmonary:      Effort: Pulmonary effort is normal. No respiratory distress.   Neurological:      Mental Status: She is alert.   Psychiatric:         Speech: Speech normal.           PERTINENT RESULTS:   No visits with results within 1 Week(s) from this visit.   Latest  known visit with results is:   Lab Visit on 05/29/2025   Component Date Value Ref Range Status    Sodium 05/29/2025 137  136 - 145 mmol/L Final    Potassium 05/29/2025 3.5  3.5 - 5.1 mmol/L Final    Chloride 05/29/2025 108  95 - 110 mmol/L Final    CO2 05/29/2025 22 (L)  23 - 29 mmol/L Final    Glucose 05/29/2025 113 (H)  70 - 110 mg/dL Final    BUN 05/29/2025 9  6 - 20 mg/dL Final    Creatinine 05/29/2025 0.7  0.5 - 1.4 mg/dL Final    Calcium 05/29/2025 8.7  8.7 - 10.5 mg/dL Final    Protein Total 05/29/2025 7.8  6.0 - 8.4 gm/dL Final    Albumin 05/29/2025 3.4 (L)  3.5 - 5.2 g/dL Final    Bilirubin Total 05/29/2025 0.7  0.1 - 1.0 mg/dL Final    For infants and newborns, interpretation of results should be based   on gestational age, weight and in agreement with clinical   observations.    Premature Infant recommended reference ranges:   0-24 hours:  <8.0 mg/dL   24-48 hours: <12.0 mg/dL   3-5 days:    <15.0 mg/dL   6-29 days:   <15.0 mg/dL    ALP 05/29/2025 46  40 - 150 unit/L Final    AST 05/29/2025 18  11 - 45 unit/L Final    ALT 05/29/2025 13  10 - 44 unit/L Final    Anion Gap 05/29/2025 7 (L)  8 - 16 mmol/L Final    eGFR 05/29/2025 >60  >60 mL/min/1.73/m2 Final    Estimated GFR calculated using the CKD-EPI creatinine (2021) equation.    CRP 05/29/2025 2.4  <=8.2 mg/L Final    Sed Rate 05/29/2025 20  <=36 mm/hr Final    WBC 05/29/2025 6.18  3.90 - 12.70 K/uL Final    RBC 05/29/2025 4.48  4.00 - 5.40 M/uL Final    HGB 05/29/2025 13.4  12.0 - 16.0 gm/dL Final    HCT 05/29/2025 40.7  37.0 - 48.5 % Final    MCV 05/29/2025 91  82 - 98 fL Final    MCH 05/29/2025 29.9  27.0 - 31.0 pg Final    MCHC 05/29/2025 32.9  32.0 - 36.0 g/dL Final    RDW 05/29/2025 13.2  11.5 - 14.5 % Final    Platelet Count 05/29/2025 223  150 - 450 K/uL Final    MPV 05/29/2025 11.7  9.2 - 12.9 fL Final    Nucleated RBC 05/29/2025 0  <=0 /100 WBC Final    Neut % 05/29/2025 40.9  38 - 73 % Final    Lymph % 05/29/2025 43.7  18 - 48 % Final    Mono  % 05/29/2025 10.5  4 - 15 % Final    Eos % 05/29/2025 3.7  <=8 % Final    Basophil % 05/29/2025 1.0  <=1.9 % Final    Imm Grans % 05/29/2025 0.2  0.0 - 0.5 % Final    Neut # 05/29/2025 2.53  1.8 - 7.7 K/uL Final    Lymph # 05/29/2025 2.70  1 - 4.8 K/uL Final    Mono # 05/29/2025 0.65  0.3 - 1 K/uL Final    Eos # 05/29/2025 0.23  <=0.5 K/uL Final    Baso # 05/29/2025 0.06  <=0.2 K/uL Final    Imm Grans # 05/29/2025 0.01  0.00 - 0.04 K/uL Final    Mild elevation in immature granulocytes is non specific and can be seen in a variety of conditions including stress response, acute inflammation, trauma and pregnancy. Correlation with other laboratory and clinical findings is essential.       ASSESSMENT/PLAN:    1. Attention deficit hyperactivity disorder (ADHD), predominantly inattentive type  Overview:  -  reviewed  - well controlled  - continue current medications     Orders:  -     dextroamphetamine-amphetamine (MYDAYIS) 37.5 mg CT24; Take 1 capsule by mouth once daily.  Dispense: 30 each; Refill: 0  -     dextroamphetamine-amphetamine (ADDERALL) 10 mg Tab; Take 1 tablet (10 mg total) by mouth after lunch.  Dispense: 30 tablet; Refill: 0    2. Other specified hypothyroidism  Overview:  - family history of hypothyroidism (father)  Lab Results   Component Value Date    TSH 4.032 (H) 11/19/2024    TSH 4.032 (H) 11/19/2024    FREET4 0.92 11/19/2024    FREET4 0.92 11/19/2024     - 06/10/2021 TPO normal, free T3 and free T4  - TSH previously at goal   -TSH slightly above goal last visit.    - encouraged her to complete her repeat thyroid lab next time she does have rheumatology labs    Orders:  -     T4, Free; Future; Expected date: 09/06/2025  -     TSH; Future; Expected date: 09/06/2025    3. Rheumatoid arthritis involving multiple sites with positive rheumatoid factor  Overview:  - followed by Rheumatology  - currently on Humira and methotrexate      4. Encounter for gynecological examination without abnormal finding  -      Ambulatory referral/consult to Obstetrics / Gynecology; Future; Expected date: 06/13/2025    5. Encounter for lipid screening for cardiovascular disease  -     Lipid Panel; Future; Expected date: 09/06/2025    6. Screening for diabetes mellitus (DM)  -     Hemoglobin A1C; Future; Expected date: 09/06/2025            ORDERS:   Orders Placed This Encounter    Lipid Panel    Hemoglobin A1C    T4, Free    TSH    Ambulatory referral/consult to Obstetrics / Gynecology    dextroamphetamine-amphetamine (MYDAYIS) 37.5 mg CT24    dextroamphetamine-amphetamine (ADDERALL) 10 mg Tab       Vaccines recommended:  Flu, COVID-19    Follow up in about 3 months (around 9/11/2025) for Annual, ADHD. or sooner with any concerns      This note is dictated using the M*Modal Fluency Direct word recognition program. There are word recognition mistakes that are occasionally missed on review.    Dr. Cielo Mina D.O.   Family Medicine         [1]   Social History  Tobacco Use    Smoking status: Never     Passive exposure: Never    Smokeless tobacco: Never   Substance Use Topics    Alcohol use: Yes     Alcohol/week: 1.0 standard drink of alcohol     Types: 1 Drinks containing 0.5 oz of alcohol per week     Comment: Socially    Drug use: No

## 2025-06-11 ENCOUNTER — OFFICE VISIT (OUTPATIENT)
Dept: PRIMARY CARE CLINIC | Facility: CLINIC | Age: 40
End: 2025-06-11
Attending: FAMILY MEDICINE
Payer: COMMERCIAL

## 2025-06-11 DIAGNOSIS — Z13.220 ENCOUNTER FOR LIPID SCREENING FOR CARDIOVASCULAR DISEASE: ICD-10-CM

## 2025-06-11 DIAGNOSIS — F90.0 ATTENTION DEFICIT HYPERACTIVITY DISORDER (ADHD), PREDOMINANTLY INATTENTIVE TYPE: Primary | ICD-10-CM

## 2025-06-11 DIAGNOSIS — Z13.1 SCREENING FOR DIABETES MELLITUS (DM): ICD-10-CM

## 2025-06-11 DIAGNOSIS — Z01.419 ENCOUNTER FOR GYNECOLOGICAL EXAMINATION WITHOUT ABNORMAL FINDING: ICD-10-CM

## 2025-06-11 DIAGNOSIS — Z13.6 ENCOUNTER FOR LIPID SCREENING FOR CARDIOVASCULAR DISEASE: ICD-10-CM

## 2025-06-11 DIAGNOSIS — M05.79 RHEUMATOID ARTHRITIS INVOLVING MULTIPLE SITES WITH POSITIVE RHEUMATOID FACTOR: ICD-10-CM

## 2025-06-11 DIAGNOSIS — E03.8 OTHER SPECIFIED HYPOTHYROIDISM: ICD-10-CM

## 2025-06-11 PROCEDURE — 1159F MED LIST DOCD IN RCRD: CPT | Mod: CPTII,95,, | Performed by: FAMILY MEDICINE

## 2025-06-11 PROCEDURE — 98006 SYNCH AUDIO-VIDEO EST MOD 30: CPT | Mod: 95,,, | Performed by: FAMILY MEDICINE

## 2025-06-11 PROCEDURE — 1160F RVW MEDS BY RX/DR IN RCRD: CPT | Mod: CPTII,95,, | Performed by: FAMILY MEDICINE

## 2025-06-11 RX ORDER — DEXTROAMPHETAMINE SACCHARATE, AMPHETAMINE ASPARTATE, DEXTROAMPHETAMINE SULFATE AND AMPHETAMINE SULFATE 2.5; 2.5; 2.5; 2.5 MG/1; MG/1; MG/1; MG/1
1 TABLET ORAL
Qty: 30 TABLET | Refills: 0 | Status: SHIPPED | OUTPATIENT
Start: 2025-06-11

## 2025-06-11 RX ORDER — DEXTROAMPHETAMINE SACCHARATE, AMPHETAMINE ASPARTATE MONOHYDRATE, DEXTROAMPHETAMINE SULFATE, AMPHETAMINE SULFATE 9.375; 9.375; 9.375; 9.375 MG/1; MG/1; MG/1; MG/1
1 CAPSULE, EXTENDED RELEASE ORAL DAILY
Qty: 30 EACH | Refills: 0 | Status: SHIPPED | OUTPATIENT
Start: 2025-06-11 | End: 2025-07-11

## 2025-07-16 DIAGNOSIS — F90.0 ATTENTION DEFICIT HYPERACTIVITY DISORDER (ADHD), PREDOMINANTLY INATTENTIVE TYPE: ICD-10-CM

## 2025-07-16 RX ORDER — DEXTROAMPHETAMINE SACCHARATE, AMPHETAMINE ASPARTATE, DEXTROAMPHETAMINE SULFATE AND AMPHETAMINE SULFATE 2.5; 2.5; 2.5; 2.5 MG/1; MG/1; MG/1; MG/1
1 TABLET ORAL
Qty: 30 TABLET | Refills: 0 | Status: SHIPPED | OUTPATIENT
Start: 2025-07-16

## 2025-07-16 RX ORDER — DEXTROAMPHETAMINE SACCHARATE, AMPHETAMINE ASPARTATE MONOHYDRATE, DEXTROAMPHETAMINE SULFATE, AMPHETAMINE SULFATE 9.375; 9.375; 9.375; 9.375 MG/1; MG/1; MG/1; MG/1
1 CAPSULE, EXTENDED RELEASE ORAL DAILY
Qty: 30 EACH | Refills: 0 | Status: SHIPPED | OUTPATIENT
Start: 2025-07-16 | End: 2025-08-17

## 2025-07-16 NOTE — TELEPHONE ENCOUNTER
Refill Encounter    PCP Visits: Recent Visits  Date Type Provider Dept   06/11/25 Office Visit Cielo Mina, DO [facility]   02/26/25 Office Visit Cielo Mina, DO [facility]   11/26/24 Office Visit Cielo Mina, DO [facility]   08/20/24 Office Visit Cielo Mina, DO [facility]   Showing recent visits within past 360 days and meeting all other requirements  Future Appointments  Date Type Provider Dept   09/04/25 Appointment Cielo Mina, DO [facility]   Showing future appointments within next 720 days and meeting all other requirements      Last 3 Blood Pressure:   BP Readings from Last 3 Encounters:   09/19/24 112/82   08/20/24 138/80   02/23/24 126/82     Preferred Pharmacy:   [facility]  [address] [facility] [address]  [place] [address]  Phone: [phone] Fax: [phone]    [employer],  [employer] [address] [address]  Phone: [phone] Fax: [phone]    Requested RX:  Requested Prescriptions     Pending Prescriptions Disp Refills    dextroamphetamine-amphetamine (MYDAYIS) 37.5 mg CT24 30 each 0     Sig: Take 1 capsule by mouth once daily.    dextroamphetamine-amphetamine (ADDERALL) 10 mg Tab 30 tablet 0     Sig: Take 1 tablet (10 mg total) by mouth after lunch.      RX Route: Normal

## 2025-07-16 NOTE — TELEPHONE ENCOUNTER
No care due was identified.  Health [facility] Embedded Care Due Messages. Reference number: [id number].   7/16/2025 9:15:00 AM CDT

## 2025-08-13 DIAGNOSIS — F90.0 ATTENTION DEFICIT HYPERACTIVITY DISORDER (ADHD), PREDOMINANTLY INATTENTIVE TYPE: ICD-10-CM

## 2025-08-13 DIAGNOSIS — R76.8 RHEUMATOID FACTOR POSITIVE: ICD-10-CM

## 2025-08-13 RX ORDER — DEXTROAMPHETAMINE SACCHARATE, AMPHETAMINE ASPARTATE MONOHYDRATE, DEXTROAMPHETAMINE SULFATE, AMPHETAMINE SULFATE 9.375; 9.375; 9.375; 9.375 MG/1; MG/1; MG/1; MG/1
1 CAPSULE, EXTENDED RELEASE ORAL DAILY
Qty: 30 EACH | Refills: 0 | Status: SHIPPED | OUTPATIENT
Start: 2025-08-13 | End: 2025-09-14

## 2025-08-13 RX ORDER — DEXTROAMPHETAMINE SACCHARATE, AMPHETAMINE ASPARTATE, DEXTROAMPHETAMINE SULFATE AND AMPHETAMINE SULFATE 2.5; 2.5; 2.5; 2.5 MG/1; MG/1; MG/1; MG/1
1 TABLET ORAL
Qty: 30 TABLET | Refills: 0 | Status: SHIPPED | OUTPATIENT
Start: 2025-08-13

## 2025-08-13 RX ORDER — NORGESTIMATE AND ETHINYL ESTRADIOL 0.25-0.035
1 KIT ORAL DAILY
Qty: 112 TABLET | Refills: 0 | Status: SHIPPED | OUTPATIENT
Start: 2025-08-13

## 2025-08-15 ENCOUNTER — PATIENT MESSAGE (OUTPATIENT)
Dept: RHEUMATOLOGY | Facility: CLINIC | Age: 40
End: 2025-08-15
Payer: COMMERCIAL

## 2025-08-18 RX ORDER — METHOTREXATE 2.5 MG/1
20 TABLET ORAL
Qty: 96 TABLET | Refills: 0 | Status: SHIPPED | OUTPATIENT
Start: 2025-08-18 | End: 2025-11-16

## 2025-08-25 ENCOUNTER — OFFICE VISIT (OUTPATIENT)
Dept: OBSTETRICS AND GYNECOLOGY | Facility: CLINIC | Age: 40
End: 2025-08-25
Payer: COMMERCIAL

## 2025-08-25 ENCOUNTER — LAB VISIT (OUTPATIENT)
Dept: LAB | Facility: HOSPITAL | Age: 40
End: 2025-08-25
Attending: INTERNAL MEDICINE
Payer: COMMERCIAL

## 2025-08-25 VITALS
WEIGHT: 125.44 LBS | HEIGHT: 66 IN | BODY MASS INDEX: 20.16 KG/M2 | SYSTOLIC BLOOD PRESSURE: 163 MMHG | DIASTOLIC BLOOD PRESSURE: 118 MMHG

## 2025-08-25 DIAGNOSIS — Z72.89 OTHER PROBLEMS RELATED TO LIFESTYLE: ICD-10-CM

## 2025-08-25 DIAGNOSIS — Z79.899 IMMUNODEFICIENCY DUE TO DRUG THERAPY: ICD-10-CM

## 2025-08-25 DIAGNOSIS — R59.9 ENLARGED LYMPH NODE: ICD-10-CM

## 2025-08-25 DIAGNOSIS — Z13.6 ENCOUNTER FOR LIPID SCREENING FOR CARDIOVASCULAR DISEASE: ICD-10-CM

## 2025-08-25 DIAGNOSIS — Z12.31 SCREENING MAMMOGRAM FOR BREAST CANCER: ICD-10-CM

## 2025-08-25 DIAGNOSIS — N93.0 PCB (POST COITAL BLEEDING): ICD-10-CM

## 2025-08-25 DIAGNOSIS — Z12.4 PAP SMEAR FOR CERVICAL CANCER SCREENING: ICD-10-CM

## 2025-08-25 DIAGNOSIS — Z01.419 ENCOUNTER FOR ANNUAL ROUTINE GYNECOLOGICAL EXAMINATION: Primary | ICD-10-CM

## 2025-08-25 DIAGNOSIS — Z13.1 SCREENING FOR DIABETES MELLITUS (DM): ICD-10-CM

## 2025-08-25 DIAGNOSIS — Z11.3 SCREENING EXAMINATION FOR STD (SEXUALLY TRANSMITTED DISEASE): ICD-10-CM

## 2025-08-25 DIAGNOSIS — N93.9 ABNORMAL UTERINE BLEEDING (AUB): ICD-10-CM

## 2025-08-25 DIAGNOSIS — N89.8 VAGINAL IRRITATION: ICD-10-CM

## 2025-08-25 DIAGNOSIS — Z13.220 ENCOUNTER FOR LIPID SCREENING FOR CARDIOVASCULAR DISEASE: ICD-10-CM

## 2025-08-25 DIAGNOSIS — E03.8 OTHER SPECIFIED HYPOTHYROIDISM: ICD-10-CM

## 2025-08-25 DIAGNOSIS — D84.821 IMMUNODEFICIENCY DUE TO DRUG THERAPY: ICD-10-CM

## 2025-08-25 LAB
ABSOLUTE EOSINOPHIL (OHS): 0.26 K/UL
ABSOLUTE MONOCYTE (OHS): 0.62 K/UL (ref 0.3–1)
ABSOLUTE NEUTROPHIL COUNT (OHS): 3.31 K/UL (ref 1.8–7.7)
ALBUMIN SERPL BCP-MCNC: 3.5 G/DL (ref 3.5–5.2)
ALP SERPL-CCNC: 46 UNIT/L (ref 40–150)
ALT SERPL W/O P-5'-P-CCNC: 11 UNIT/L (ref 10–44)
ANION GAP (OHS): 9 MMOL/L (ref 8–16)
AST SERPL-CCNC: 21 UNIT/L (ref 11–45)
BASOPHILS # BLD AUTO: 0.06 K/UL
BASOPHILS NFR BLD AUTO: 0.9 %
BILIRUB SERPL-MCNC: 0.8 MG/DL (ref 0.1–1)
BUN SERPL-MCNC: 12 MG/DL (ref 6–20)
CALCIUM SERPL-MCNC: 8.6 MG/DL (ref 8.7–10.5)
CHLORIDE SERPL-SCNC: 106 MMOL/L (ref 95–110)
CHOLEST SERPL-MCNC: 166 MG/DL (ref 120–199)
CHOLEST/HDLC SERPL: 2.9 {RATIO} (ref 2–5)
CO2 SERPL-SCNC: 24 MMOL/L (ref 23–29)
CREAT SERPL-MCNC: 0.6 MG/DL (ref 0.5–1.4)
CRP SERPL-MCNC: 1.6 MG/L
EAG (OHS): 97 MG/DL (ref 68–131)
ERYTHROCYTE [DISTWIDTH] IN BLOOD BY AUTOMATED COUNT: 13.8 % (ref 11.5–14.5)
ERYTHROCYTE [SEDIMENTATION RATE] IN BLOOD BY PHOTOMETRIC METHOD: 5 MM/HR
GFR SERPLBLD CREATININE-BSD FMLA CKD-EPI: >60 ML/MIN/1.73/M2
GLUCOSE SERPL-MCNC: 91 MG/DL (ref 70–110)
HBA1C MFR BLD: 5 % (ref 4–5.6)
HCT VFR BLD AUTO: 40.8 % (ref 37–48.5)
HDLC SERPL-MCNC: 57 MG/DL (ref 40–75)
HDLC SERPL: 34.3 % (ref 20–50)
HGB BLD-MCNC: 13.5 GM/DL (ref 12–16)
IMM GRANULOCYTES # BLD AUTO: 0.01 K/UL (ref 0–0.04)
IMM GRANULOCYTES NFR BLD AUTO: 0.1 % (ref 0–0.5)
LDLC SERPL CALC-MCNC: 82 MG/DL (ref 63–159)
LYMPHOCYTES # BLD AUTO: 2.65 K/UL (ref 1–4.8)
MCH RBC QN AUTO: 30.1 PG (ref 27–31)
MCHC RBC AUTO-ENTMCNC: 33.1 G/DL (ref 32–36)
MCV RBC AUTO: 91 FL (ref 82–98)
NONHDLC SERPL-MCNC: 109 MG/DL
NUCLEATED RBC (/100WBC) (OHS): 0 /100 WBC
PLATELET # BLD AUTO: 253 K/UL (ref 150–450)
PMV BLD AUTO: 11.2 FL (ref 9.2–12.9)
POTASSIUM SERPL-SCNC: 3.6 MMOL/L (ref 3.5–5.1)
PROT SERPL-MCNC: 7.4 GM/DL (ref 6–8.4)
RBC # BLD AUTO: 4.49 M/UL (ref 4–5.4)
RELATIVE EOSINOPHIL (OHS): 3.8 %
RELATIVE LYMPHOCYTE (OHS): 38.4 % (ref 18–48)
RELATIVE MONOCYTE (OHS): 9 % (ref 4–15)
RELATIVE NEUTROPHIL (OHS): 47.8 % (ref 38–73)
SODIUM SERPL-SCNC: 139 MMOL/L (ref 136–145)
T4 FREE SERPL-MCNC: 0.93 NG/DL (ref 0.71–1.51)
T4 FREE SERPL-MCNC: 0.93 NG/DL (ref 0.71–1.51)
TRIGL SERPL-MCNC: 135 MG/DL (ref 30–150)
TSH SERPL-ACNC: 3.01 UIU/ML (ref 0.4–4)
WBC # BLD AUTO: 6.91 K/UL (ref 3.9–12.7)

## 2025-08-25 PROCEDURE — 99999 PR PBB SHADOW E&M-EST. PATIENT-LVL IV: CPT | Mod: PBBFAC,,, | Performed by: OBSTETRICS & GYNECOLOGY

## 2025-08-25 PROCEDURE — 88175 CYTOPATH C/V AUTO FLUID REDO: CPT | Mod: TC | Performed by: STUDENT IN AN ORGANIZED HEALTH CARE EDUCATION/TRAINING PROGRAM

## 2025-08-25 PROCEDURE — 82947 ASSAY GLUCOSE BLOOD QUANT: CPT

## 2025-08-25 PROCEDURE — 85652 RBC SED RATE AUTOMATED: CPT

## 2025-08-25 PROCEDURE — 3077F SYST BP >= 140 MM HG: CPT | Mod: CPTII,S$GLB,, | Performed by: OBSTETRICS & GYNECOLOGY

## 2025-08-25 PROCEDURE — 99396 PREV VISIT EST AGE 40-64: CPT | Mod: S$GLB,,, | Performed by: OBSTETRICS & GYNECOLOGY

## 2025-08-25 PROCEDURE — 85025 COMPLETE CBC W/AUTO DIFF WBC: CPT

## 2025-08-25 PROCEDURE — 1159F MED LIST DOCD IN RCRD: CPT | Mod: CPTII,S$GLB,, | Performed by: OBSTETRICS & GYNECOLOGY

## 2025-08-25 PROCEDURE — 87624 HPV HI-RISK TYP POOLED RSLT: CPT | Performed by: OBSTETRICS & GYNECOLOGY

## 2025-08-25 PROCEDURE — 80061 LIPID PANEL: CPT

## 2025-08-25 PROCEDURE — 3080F DIAST BP >= 90 MM HG: CPT | Mod: CPTII,S$GLB,, | Performed by: OBSTETRICS & GYNECOLOGY

## 2025-08-25 PROCEDURE — 83036 HEMOGLOBIN GLYCOSYLATED A1C: CPT

## 2025-08-25 PROCEDURE — 36415 COLL VENOUS BLD VENIPUNCTURE: CPT

## 2025-08-25 PROCEDURE — 3008F BODY MASS INDEX DOCD: CPT | Mod: CPTII,S$GLB,, | Performed by: OBSTETRICS & GYNECOLOGY

## 2025-08-25 PROCEDURE — 84439 ASSAY OF FREE THYROXINE: CPT

## 2025-08-25 PROCEDURE — 86140 C-REACTIVE PROTEIN: CPT

## 2025-08-25 RX ORDER — DROSPIRENONE AND ETHINYL ESTRADIOL 0.03MG-3MG
1 KIT ORAL DAILY
Qty: 112 TABLET | Refills: 3 | Status: SHIPPED | OUTPATIENT
Start: 2025-08-25 | End: 2026-08-25

## 2025-08-25 RX ORDER — NORGESTIMATE AND ETHINYL ESTRADIOL 0.25-0.035
1 KIT ORAL DAILY
Qty: 112 TABLET | Refills: 3 | Status: SHIPPED | OUTPATIENT
Start: 2025-08-25 | End: 2025-08-25

## 2025-08-29 LAB
INSULIN SERPL-ACNC: NORMAL U[IU]/ML
LAB AP BETHESDA CATEGORY: NORMAL
LAB AP CLINICAL FINDINGS: NORMAL
LAB AP CONTRACEPTIVES: NORMAL
LAB AP GYN ADDITIONAL FINDINGS: NORMAL
LAB AP OCHS PAP SPECIMEN ADEQUACY: NORMAL
LAB AP OHS PAP INTERPRETATION: NORMAL
LAB AP PAP DISCLAIMER COMMENTS: NORMAL
LAB AP PAP ESTROGEN REPLACEMENT THERAPY: NORMAL
LAB AP PAP PMP: NORMAL
LAB AP PAP PREVIOUS BX: NORMAL
LAB AP PAP PRIOR TREATMENT: NORMAL
LAB AP PERFORMING LOCATION(S): NORMAL

## 2025-09-02 ENCOUNTER — PATIENT MESSAGE (OUTPATIENT)
Dept: OBSTETRICS AND GYNECOLOGY | Facility: CLINIC | Age: 40
End: 2025-09-02
Payer: COMMERCIAL

## 2025-09-02 RX ORDER — FLUCONAZOLE 150 MG/1
150 TABLET ORAL
Qty: 2 TABLET | Refills: 0 | Status: SHIPPED | OUTPATIENT
Start: 2025-09-02 | End: 2025-09-11

## 2025-09-02 RX ORDER — METRONIDAZOLE 500 MG/1
500 TABLET ORAL EVERY 12 HOURS
Qty: 14 TABLET | Refills: 0 | Status: SHIPPED | OUTPATIENT
Start: 2025-09-02 | End: 2025-09-12

## 2025-09-04 ENCOUNTER — OFFICE VISIT (OUTPATIENT)
Dept: PRIMARY CARE CLINIC | Facility: CLINIC | Age: 40
End: 2025-09-04
Attending: FAMILY MEDICINE
Payer: COMMERCIAL

## 2025-09-04 VITALS
WEIGHT: 128.88 LBS | OXYGEN SATURATION: 100 % | BODY MASS INDEX: 20.71 KG/M2 | RESPIRATION RATE: 18 BRPM | HEIGHT: 66 IN | HEART RATE: 111 BPM | SYSTOLIC BLOOD PRESSURE: 148 MMHG | DIASTOLIC BLOOD PRESSURE: 98 MMHG

## 2025-09-04 DIAGNOSIS — Z00.01 ENCOUNTER FOR GENERAL ADULT MEDICAL EXAMINATION WITH ABNORMAL FINDINGS: Primary | ICD-10-CM

## 2025-09-04 DIAGNOSIS — R00.0 TACHYCARDIA: ICD-10-CM

## 2025-09-04 DIAGNOSIS — F41.1 GENERALIZED ANXIETY DISORDER: ICD-10-CM

## 2025-09-04 DIAGNOSIS — F33.42 RECURRENT MAJOR DEPRESSIVE DISORDER, IN FULL REMISSION: ICD-10-CM

## 2025-09-04 DIAGNOSIS — R76.8 POSITIVE ANA (ANTINUCLEAR ANTIBODY): ICD-10-CM

## 2025-09-04 DIAGNOSIS — B97.7 HPV IN FEMALE: ICD-10-CM

## 2025-09-04 DIAGNOSIS — J45.20 MILD INTERMITTENT ASTHMA WITHOUT COMPLICATION: ICD-10-CM

## 2025-09-04 DIAGNOSIS — F90.0 ATTENTION DEFICIT HYPERACTIVITY DISORDER (ADHD), PREDOMINANTLY INATTENTIVE TYPE: ICD-10-CM

## 2025-09-04 DIAGNOSIS — M05.79 RHEUMATOID ARTHRITIS INVOLVING MULTIPLE SITES WITH POSITIVE RHEUMATOID FACTOR: ICD-10-CM

## 2025-09-04 DIAGNOSIS — J06.9 UPPER RESPIRATORY TRACT INFECTION, UNSPECIFIED TYPE: ICD-10-CM

## 2025-09-04 DIAGNOSIS — E83.51 HYPOCALCEMIA: ICD-10-CM

## 2025-09-04 DIAGNOSIS — R03.0 ELEVATED BLOOD-PRESSURE READING WITHOUT DIAGNOSIS OF HYPERTENSION: ICD-10-CM

## 2025-09-04 DIAGNOSIS — E03.8 OTHER SPECIFIED HYPOTHYROIDISM: ICD-10-CM

## 2025-09-04 PROCEDURE — 99999 PR PBB SHADOW E&M-EST. PATIENT-LVL V: CPT | Mod: PBBFAC,,, | Performed by: FAMILY MEDICINE

## 2025-09-04 RX ORDER — METOPROLOL SUCCINATE 25 MG/1
25 TABLET, EXTENDED RELEASE ORAL DAILY
Qty: 90 TABLET | Refills: 0 | Status: SHIPPED | OUTPATIENT
Start: 2025-09-04 | End: 2025-12-04

## (undated) DEVICE — Device

## (undated) DEVICE — GLOVE SURGICAL LATEX SZ 6

## (undated) DEVICE — PANTIES FEMININE NAPKIN LG/XLG

## (undated) DEVICE — CATH URETHRAL 16FR RED

## (undated) DEVICE — NDL HYPO REG 25G X 1 1/2

## (undated) DEVICE — SEE MEDLINE ITEM 156955

## (undated) DEVICE — SEE MEDLINE ITEM 146355

## (undated) DEVICE — DRAPE SURGICAL STERI IRRG PCH

## (undated) DEVICE — SEE MEDLINE ITEM 152622

## (undated) DEVICE — PAD PREP 50/CA

## (undated) DEVICE — DRESSING TELFA N ADH 3X8

## (undated) DEVICE — SEE MEDLINE ITEM 157117

## (undated) DEVICE — CONTAINER MULTIPURPOSE/SPECIME

## (undated) DEVICE — ELECTRODE LOOP 15X12 DISPOSABL

## (undated) DEVICE — SEE MEDLINE ITEM 157116

## (undated) DEVICE — COVER OVERHEAD SURG LT BLUE

## (undated) DEVICE — SYR 10CC LUER LOCK

## (undated) DEVICE — ELECTRODE LEEP BALL TIP 5MM

## (undated) DEVICE — SEE MEDLINE ITEM 154981